# Patient Record
Sex: MALE | Race: WHITE | NOT HISPANIC OR LATINO | URBAN - METROPOLITAN AREA
[De-identification: names, ages, dates, MRNs, and addresses within clinical notes are randomized per-mention and may not be internally consistent; named-entity substitution may affect disease eponyms.]

---

## 2017-08-28 ENCOUNTER — INPATIENT (INPATIENT)
Facility: HOSPITAL | Age: 82
LOS: 2 days | Discharge: HOME | DRG: 291 | End: 2017-08-31
Attending: INTERNAL MEDICINE | Admitting: INTERNAL MEDICINE
Payer: MEDICARE

## 2017-08-28 VITALS
TEMPERATURE: 97 F | DIASTOLIC BLOOD PRESSURE: 68 MMHG | HEART RATE: 69 BPM | SYSTOLIC BLOOD PRESSURE: 150 MMHG | OXYGEN SATURATION: 97 % | RESPIRATION RATE: 18 BRPM

## 2017-08-28 DIAGNOSIS — E78.5 HYPERLIPIDEMIA, UNSPECIFIED: ICD-10-CM

## 2017-08-28 DIAGNOSIS — Z29.9 ENCOUNTER FOR PROPHYLACTIC MEASURES, UNSPECIFIED: ICD-10-CM

## 2017-08-28 DIAGNOSIS — R06.02 SHORTNESS OF BREATH: ICD-10-CM

## 2017-08-28 DIAGNOSIS — E11.9 TYPE 2 DIABETES MELLITUS WITHOUT COMPLICATIONS: ICD-10-CM

## 2017-08-28 DIAGNOSIS — I10 ESSENTIAL (PRIMARY) HYPERTENSION: ICD-10-CM

## 2017-08-28 DIAGNOSIS — R06.00 DYSPNEA, UNSPECIFIED: ICD-10-CM

## 2017-08-28 DIAGNOSIS — N18.3 CHRONIC KIDNEY DISEASE, STAGE 3 (MODERATE): ICD-10-CM

## 2017-08-28 LAB
ALBUMIN SERPL ELPH-MCNC: 4.1 G/DL — SIGNIFICANT CHANGE UP (ref 3.3–5)
ALP SERPL-CCNC: 144 U/L — HIGH (ref 40–120)
ALT FLD-CCNC: 33 U/L RC — SIGNIFICANT CHANGE UP (ref 10–45)
ANION GAP SERPL CALC-SCNC: 13 MMOL/L — SIGNIFICANT CHANGE UP (ref 5–17)
APTT BLD: 32 SEC — SIGNIFICANT CHANGE UP (ref 27.5–37.4)
AST SERPL-CCNC: 33 U/L — SIGNIFICANT CHANGE UP (ref 10–40)
BILIRUB SERPL-MCNC: 0.4 MG/DL — SIGNIFICANT CHANGE UP (ref 0.2–1.2)
BUN SERPL-MCNC: 31 MG/DL — HIGH (ref 7–23)
CALCIUM SERPL-MCNC: 9.5 MG/DL — SIGNIFICANT CHANGE UP (ref 8.4–10.5)
CHLORIDE SERPL-SCNC: 105 MMOL/L — SIGNIFICANT CHANGE UP (ref 96–108)
CK MB BLD-MCNC: 7 % — HIGH (ref 0–3.5)
CK MB CFR SERPL CALC: 9.8 NG/ML — HIGH (ref 0–6.7)
CK SERPL-CCNC: 124 U/L — SIGNIFICANT CHANGE UP (ref 30–200)
CK SERPL-CCNC: 141 U/L — SIGNIFICANT CHANGE UP (ref 30–200)
CO2 SERPL-SCNC: 22 MMOL/L — SIGNIFICANT CHANGE UP (ref 22–31)
CREAT SERPL-MCNC: 1.58 MG/DL — HIGH (ref 0.5–1.3)
GLUCOSE SERPL-MCNC: 143 MG/DL — HIGH (ref 70–99)
HCT VFR BLD CALC: 42.4 % — SIGNIFICANT CHANGE UP (ref 39–50)
HGB BLD-MCNC: 13.3 G/DL — SIGNIFICANT CHANGE UP (ref 13–17)
INR BLD: 1.12 RATIO — SIGNIFICANT CHANGE UP (ref 0.88–1.16)
MCHC RBC-ENTMCNC: 29.1 PG — SIGNIFICANT CHANGE UP (ref 27–34)
MCHC RBC-ENTMCNC: 31.4 GM/DL — LOW (ref 32–36)
MCV RBC AUTO: 92.6 FL — SIGNIFICANT CHANGE UP (ref 80–100)
NT-PROBNP SERPL-SCNC: 7449 PG/ML — HIGH (ref 0–300)
PLATELET # BLD AUTO: 136 K/UL — LOW (ref 150–400)
POTASSIUM SERPL-MCNC: 5 MMOL/L — SIGNIFICANT CHANGE UP (ref 3.5–5.3)
POTASSIUM SERPL-SCNC: 5 MMOL/L — SIGNIFICANT CHANGE UP (ref 3.5–5.3)
PROT SERPL-MCNC: 7.1 G/DL — SIGNIFICANT CHANGE UP (ref 6–8.3)
PROTHROM AB SERPL-ACNC: 12.2 SEC — SIGNIFICANT CHANGE UP (ref 9.8–12.7)
RBC # BLD: 4.58 M/UL — SIGNIFICANT CHANGE UP (ref 4.2–5.8)
RBC # FLD: 13.1 % — SIGNIFICANT CHANGE UP (ref 10.3–14.5)
SODIUM SERPL-SCNC: 140 MMOL/L — SIGNIFICANT CHANGE UP (ref 135–145)
TROPONIN T SERPL-MCNC: 0.08 NG/ML — HIGH (ref 0–0.06)
WBC # BLD: 6.7 K/UL — SIGNIFICANT CHANGE UP (ref 3.8–10.5)
WBC # FLD AUTO: 6.7 K/UL — SIGNIFICANT CHANGE UP (ref 3.8–10.5)

## 2017-08-28 PROCEDURE — 71250 CT THORAX DX C-: CPT | Mod: 26

## 2017-08-28 PROCEDURE — 99284 EMERGENCY DEPT VISIT MOD MDM: CPT | Mod: GC

## 2017-08-28 PROCEDURE — 71020: CPT | Mod: 26

## 2017-08-28 PROCEDURE — 99223 1ST HOSP IP/OBS HIGH 75: CPT

## 2017-08-28 RX ORDER — HEPARIN SODIUM 5000 [USP'U]/ML
5000 INJECTION INTRAVENOUS; SUBCUTANEOUS EVERY 12 HOURS
Qty: 0 | Refills: 0 | Status: DISCONTINUED | OUTPATIENT
Start: 2017-08-28 | End: 2017-08-31

## 2017-08-28 RX ORDER — FUROSEMIDE 40 MG
40 TABLET ORAL ONCE
Qty: 0 | Refills: 0 | Status: COMPLETED | OUTPATIENT
Start: 2017-08-28 | End: 2017-08-28

## 2017-08-28 RX ORDER — INSULIN LISPRO 100/ML
VIAL (ML) SUBCUTANEOUS AT BEDTIME
Qty: 0 | Refills: 0 | Status: DISCONTINUED | OUTPATIENT
Start: 2017-08-28 | End: 2017-08-31

## 2017-08-28 RX ORDER — ASPIRIN/CALCIUM CARB/MAGNESIUM 324 MG
81 TABLET ORAL DAILY
Qty: 0 | Refills: 0 | Status: DISCONTINUED | OUTPATIENT
Start: 2017-08-28 | End: 2017-08-31

## 2017-08-28 RX ORDER — INSULIN LISPRO 100/ML
VIAL (ML) SUBCUTANEOUS
Qty: 0 | Refills: 0 | Status: DISCONTINUED | OUTPATIENT
Start: 2017-08-28 | End: 2017-08-31

## 2017-08-28 RX ORDER — LOSARTAN POTASSIUM 100 MG/1
50 TABLET, FILM COATED ORAL DAILY
Qty: 0 | Refills: 0 | Status: DISCONTINUED | OUTPATIENT
Start: 2017-08-28 | End: 2017-08-31

## 2017-08-28 RX ORDER — ASPIRIN/CALCIUM CARB/MAGNESIUM 324 MG
324 TABLET ORAL DAILY
Qty: 0 | Refills: 0 | Status: DISCONTINUED | OUTPATIENT
Start: 2017-08-28 | End: 2017-08-28

## 2017-08-28 RX ORDER — DEXTROSE 50 % IN WATER 50 %
25 SYRINGE (ML) INTRAVENOUS ONCE
Qty: 0 | Refills: 0 | Status: DISCONTINUED | OUTPATIENT
Start: 2017-08-28 | End: 2017-08-31

## 2017-08-28 RX ADMIN — Medication 40 MILLIGRAM(S): at 20:16

## 2017-08-28 RX ADMIN — Medication 324 MILLIGRAM(S): at 17:48

## 2017-08-28 NOTE — ED PROCEDURE NOTE - GENERAL PROCEDURE NAME
POCUS: Emergency Department Focused Ultrasound performed at patient's bedside.  The complete report will be available in PACS

## 2017-08-28 NOTE — ED PROVIDER NOTE - ATTENDING CONTRIBUTION TO CARE
Patient with shortness of breath 2 weeks. persistent and worsening. Patient has had increased swelling of the lower extremities as well. No n/v/d/c. No chest pain. Patient is shortness of breath with exertion  ncat, alert, cooperative, mmm, normal conjunctiva, trachea midline, decreased breath sounds on the right lung, non-tachycardic, non-tachypneic, mild dyspnea with talking and speaking in 4 word sentences, + 2-3 lower extremity edema, mild erythema to bilateral lower extremities   will get iv, labs, ultrasound TTE, diuresis, patient without pleuritic chest pain or symptoms of PE, Large right pleural effusion and EF of 25-30% are the etiology of his shortness of breath as this time, if patient is optimized and presents symptoms of PE other than acute CHF exacerbation recommend perusing CTA pa or V/Q scan with inpatient team.

## 2017-08-28 NOTE — ED PROVIDER NOTE - NS ED ROS FT
General: No fevers, chills  HEENT: No headaches, dysphagia, changes in vision, hoarseness, nasal congestion  CVS: No chest pain, palpitations   Resp: + shortness of breath, + sputum production, no wheezing   GI: No abdominal pain, nausea, vomiting, changes in bowel habits  Renal: No dysuria  Ext: +edema, no claudication

## 2017-08-28 NOTE — ED PROVIDER NOTE - MEDICAL DECISION MAKING DETAILS
88 yo m w hx of hemoptysis, HTN, HLD presenting with worsening dyspnea concerning for acute decompensated heart failure vs pneumonia vs pulmonary embolism   - bedside TTE, chest xray  CBC, CMP, pro BNP, cardiac enzymes   Diuretics if creatinine ok  low suspicion for PE, will monitor for now, would consider CTA if no improvement with HF treatment

## 2017-08-28 NOTE — H&P ADULT - ASSESSMENT
89 yo M with PMH of HTN, HLD, DM2, CKD presenting with worsening dyspnea concerning for CHF. Pt acutely volume overloaded on exam, noted elevated BNP, as well as congestion on CXR all concerning for new diagnosis of CHF.  Differential also including infectious etiology like pneumonia vs viral URI, however given chronicity less likely. Low suspicion for PE at this time.  Admission to rule out CHF and treat volume overload with plan below.

## 2017-08-28 NOTE — H&P ADULT - RESPIRATORY COMMENTS
normal respiratory effort but appears incresed after speaking in full sentences, bilateral crackles appreciated at bases, coarse breath sounds diffusely. no wheezing.

## 2017-08-28 NOTE — ED ADULT NURSE NOTE - OBJECTIVE STATEMENT
88 y/o Male c/o worsening dyspnea at rest. Patient has baseline dyspnea. Patient was in [place] a week ago and his symptoms started when he returned . Patient c/o cough this past week,  with worsening hemoptysis as well as yellow sputum production. Patient denies  chest pain, nasal congestion, sore throat, fevers, chills. 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  (+) SOB, (+) sputum production,  (+) leg edema.  Skin warm and dry.

## 2017-08-28 NOTE — H&P ADULT - PROBLEM SELECTOR PLAN 3
hold oral agents inpatient. check a1c, place on sliding scale.  will likely need basal insulin. will assess with fingersticks, a1c

## 2017-08-28 NOTE — H&P ADULT - PROBLEM SELECTOR PLAN 1
concerning for new diagnosis CHF vs infectious etiologies. Volume overload on exam, elevated pro-BNP, CXR with congestion and questionable RLL opacity. Noted elevated cardiac enzymes without complaints of CP.  -monitor on telemetry  -trend cardiac enzymes (ED discussed with Dr. Bell, low suspicion for ACS at this time)  -give lasix 40mg IVP x 1   -strict I&Os, daily weights  -check official TTE  -check CT chest to better assess for underlying pneumonia (pt with cough, but no fever or leukocytosis)  -PT consult

## 2017-08-28 NOTE — ED PROVIDER NOTE - OBJECTIVE STATEMENT
88 yo M w hx of HTN, HLD, DM, chronic hemoptysis likely relating to prior work experience presenting with worsening dyspnea at rest. Patient was in [place] a week prior to presentation and his symptoms started on his return. Patient has baseline dyspnea, but for the past week has been having cough, worsening hemoptysis as well as yellow sputum production. Patient denies any chest pain, nasal congestion, sore throat, fevers, chills. 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 Patient does not have known hx of heart failure.     Per his pulmonologist, Dr. Red, patient was referred to ED due to concern of worsening sputum production secondary to heart failure versus pneumonia.

## 2017-08-28 NOTE — ED PROVIDER NOTE - PROGRESS NOTE DETAILS
Discussed with Dr. Bell (cardiology) will trend enzymes, no heparin gtt at this time. Will reassess after repeat enzymes.

## 2017-08-28 NOTE — H&P ADULT - EXTREMITIES COMMENTS
2-3+ pitting edema bilaterally, mild erythema of shins bilaterally, no increased warmth or TTP  oncyhomycoses of toes

## 2017-08-28 NOTE — H&P ADULT - NEGATIVE GASTROINTESTINAL SYMPTOMS
no melena/no diarrhea/no vomiting/no constipation/no abdominal pain/no hematochezia/no change in bowel habits/no nausea

## 2017-08-28 NOTE — ED PROVIDER NOTE - PHYSICAL EXAMINATION
General: NAD   HEENT: EOMI, nl oropharynx   CVS: regular rate/rhythm, no murmurs  Pulm: bilateral coarse expiratory rhonchi in upper lung fields, crackles at the bases   GI: nl bs, soft, nt, nd   ext: 3+bilateral pitting edema

## 2017-08-28 NOTE — ED ADULT NURSE NOTE - PMH
HLD (hyperlipidemia)    HTN (hypertension)    Type 2 diabetes mellitus with retinopathy, macular edema presence unspecified, unspecified long term insulin use status, unspecified retinopathy severity

## 2017-08-28 NOTE — H&P ADULT - NSHPLABSRESULTS_GEN_ALL_CORE
CXR reviewed by me: pulm vascular congestion, right base atelectasis vs consolidation    EKG reviewed by me: normal sinus rhythm 66, q wave III, TWI in I , AVL, QTc 459    labs  BNP 7449  trop 0.08    CKMB 9.8

## 2017-08-28 NOTE — H&P ADULT - NSHPSOCIALHISTORY_GEN_ALL_CORE
lives with spouse  drinks alcohol 2-3 times a week, liquor   smoked for 1 year over 70 years ago  works in [employer]   able to carry out ADLs

## 2017-08-28 NOTE — H&P ADULT - HISTORY OF PRESENT ILLNESS
88 yo M with PMH of HTN, HLD, DM2, CKD presenting with worsening dyspnea. Per patient and his family at bedside, he has been experiencing worsening breathing problems over the past couple of months, acutely worsening over the past week. Pt has noticed worsening dyspnea to the extent that he becomes short of breath after 10-15 steps or after carrying out a long conversation. Other associated symptoms include worsening "barking, wet" cough, LE swelling, increased abdominal girth, 3 pillow orthopnea all occurring over the past several months (possibly longer). Pt was in [place] for a week of vacation (returned 1 week ago) and during this time noticed his symptoms getting worse. He notes that his cough is now associated with "yellow" sputum. Pt saw his pulmonologist today for evaluation who took a CXR and subsequently advised pt to come into the ED for evaluation. Of note, pt was last hospitalzied at [facility] last november for pneumonia and hemoptysis. pt was treated without complications. 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 hemoptysis subsequently resolved after change of work environment.    Denies fever/chills, chest pain, palpitations, sore throat, nasal congestion, sick contacts, abdominal pain, nausea/vomiting, diarrhea, constipation, hemoptysis.     VS in ED  /68, P 69, R 18, O2 97%, T 97.4

## 2017-08-28 NOTE — H&P ADULT - PROBLEM SELECTOR PLAN 2
plan as above  -telemetry  -TTE  -trend cardiac enzymes  -lasix 40mg IVP x 1  -monitor I&Os, daily weights.

## 2017-08-29 LAB
ANION GAP SERPL CALC-SCNC: 12 MMOL/L — SIGNIFICANT CHANGE UP (ref 5–17)
BUN SERPL-MCNC: 28 MG/DL — HIGH (ref 7–23)
CALCIUM SERPL-MCNC: 9.4 MG/DL — SIGNIFICANT CHANGE UP (ref 8.4–10.5)
CHLORIDE SERPL-SCNC: 103 MMOL/L — SIGNIFICANT CHANGE UP (ref 96–108)
CK MB BLD-MCNC: 7.2 % — HIGH (ref 0–3.5)
CK MB CFR SERPL CALC: 8.9 NG/ML — HIGH (ref 0–6.7)
CO2 SERPL-SCNC: 24 MMOL/L — SIGNIFICANT CHANGE UP (ref 22–31)
CREAT SERPL-MCNC: 1.57 MG/DL — HIGH (ref 0.5–1.3)
GLUCOSE SERPL-MCNC: 139 MG/DL — HIGH (ref 70–99)
HBA1C BLD-MCNC: 8.5 % — HIGH (ref 4–5.6)
HCT VFR BLD CALC: 42.5 % — SIGNIFICANT CHANGE UP (ref 39–50)
HGB BLD-MCNC: 13.2 G/DL — SIGNIFICANT CHANGE UP (ref 13–17)
MCHC RBC-ENTMCNC: 27.3 PG — SIGNIFICANT CHANGE UP (ref 27–34)
MCHC RBC-ENTMCNC: 31.1 GM/DL — LOW (ref 32–36)
MCV RBC AUTO: 87.8 FL — SIGNIFICANT CHANGE UP (ref 80–100)
PLATELET # BLD AUTO: 169 K/UL — SIGNIFICANT CHANGE UP (ref 150–400)
POTASSIUM SERPL-MCNC: 4.8 MMOL/L — SIGNIFICANT CHANGE UP (ref 3.5–5.3)
POTASSIUM SERPL-SCNC: 4.8 MMOL/L — SIGNIFICANT CHANGE UP (ref 3.5–5.3)
RBC # BLD: 4.84 M/UL — SIGNIFICANT CHANGE UP (ref 4.2–5.8)
RBC # FLD: 14.9 % — HIGH (ref 10.3–14.5)
SODIUM SERPL-SCNC: 139 MMOL/L — SIGNIFICANT CHANGE UP (ref 135–145)
TROPONIN T SERPL-MCNC: 0.1 NG/ML — HIGH (ref 0–0.06)
WBC # BLD: 6.6 K/UL — SIGNIFICANT CHANGE UP (ref 3.8–10.5)
WBC # FLD AUTO: 6.6 K/UL — SIGNIFICANT CHANGE UP (ref 3.8–10.5)

## 2017-08-29 PROCEDURE — 93308 TTE F-UP OR LMTD: CPT | Mod: 26

## 2017-08-29 RX ORDER — FUROSEMIDE 40 MG
40 TABLET ORAL DAILY
Qty: 0 | Refills: 0 | Status: DISCONTINUED | OUTPATIENT
Start: 2017-08-29 | End: 2017-08-31

## 2017-08-29 RX ADMIN — HEPARIN SODIUM 5000 UNIT(S): 5000 INJECTION INTRAVENOUS; SUBCUTANEOUS at 17:33

## 2017-08-29 RX ADMIN — Medication 40 MILLIGRAM(S): at 11:27

## 2017-08-29 RX ADMIN — HEPARIN SODIUM 5000 UNIT(S): 5000 INJECTION INTRAVENOUS; SUBCUTANEOUS at 05:36

## 2017-08-29 RX ADMIN — Medication 4: at 12:20

## 2017-08-29 RX ADMIN — Medication 81 MILLIGRAM(S): at 11:27

## 2017-08-29 RX ADMIN — LOSARTAN POTASSIUM 50 MILLIGRAM(S): 100 TABLET, FILM COATED ORAL at 05:36

## 2017-08-29 NOTE — PROGRESS NOTE ADULT - SUBJECTIVE AND OBJECTIVE BOX
Patient is a 89y old  Male who presents with a chief complaint of dyspnea (28 Aug 2017 19:52)    Subjectiev : pt feels slightly better today, cough a little improved, still has dyspnea.    ROS:   -afebrile, denies chest pain    ACTIVE MEDICATION LIST:  MEDICATIONS  (STANDING):  heparin  Injectable 5000 Unit(s) SubCutaneous every 12 hours  insulin lispro (HumaLOG) corrective regimen sliding scale   SubCutaneous three times a day before meals  insulin lispro (HumaLOG) corrective regimen sliding scale   SubCutaneous at bedtime  dextrose 50% Injectable 25 Gram(s) IV Push once  aspirin  chewable 81 milliGRAM(s) Oral daily  losartan 50 milliGRAM(s) Oral daily  furosemide   Injectable 40 milliGRAM(s) IV Push daily    MEDICATIONS  (PRN):      EXAM:  Vital Signs Last 24 Hrs  T(C): 36.4 (29 Aug 2017 04:32), Max: 37.1 (28 Aug 2017 20:53)  T(F): 97.6 (29 Aug 2017 04:32), Max: 98.7 (28 Aug 2017 20:53)  HR: 73 (29 Aug 2017 11:25) (65 - 82)  BP: 153/77 (29 Aug 2017 11:25) (147/72 - 161/79)  BP(mean): --  RR: 16 (29 Aug 2017 04:32) (16 - 18)  SpO2: 99% (29 Aug 2017 04:32) (94% - 99%)    GENERAL: The patient is awake and alert in no apparent distress.     LUNGS: decreased at bases     HEART: Regular rate and rhythm without murmur.    LABS/IMAGING: reviewed    CT chest: moderate right pleural effusion with underlying atelectasis.

## 2017-08-29 NOTE — PROGRESS NOTE ADULT - SUBJECTIVE AND OBJECTIVE BOX
PULMONARY PROGRESS NOTE    JODY WILKINS  [id number]    Patient is a 89y old  Male who presents with a chief complaint of dyspnea (28 Aug 2017 19:52)      HPI:  -feels slightly better today, cough a little improved, still has dyspnea.    ROS:   -afebrile, denies chest pain    ACTIVE MEDICATION LIST:  MEDICATIONS  (STANDING):  heparin  Injectable 5000 Unit(s) SubCutaneous every 12 hours  insulin lispro (HumaLOG) corrective regimen sliding scale   SubCutaneous three times a day before meals  insulin lispro (HumaLOG) corrective regimen sliding scale   SubCutaneous at bedtime  dextrose 50% Injectable 25 Gram(s) IV Push once  aspirin  chewable 81 milliGRAM(s) Oral daily  losartan 50 milliGRAM(s) Oral daily  furosemide   Injectable 40 milliGRAM(s) IV Push daily    MEDICATIONS  (PRN):      EXAM:  Vital Signs Last 24 Hrs  T(C): 36.4 (29 Aug 2017 04:32), Max: 37.1 (28 Aug 2017 20:53)  T(F): 97.6 (29 Aug 2017 04:32), Max: 98.7 (28 Aug 2017 20:53)  HR: 73 (29 Aug 2017 11:25) (65 - 82)  BP: 153/77 (29 Aug 2017 11:25) (147/72 - 161/79)  BP(mean): --  RR: 16 (29 Aug 2017 04:32) (16 - 18)  SpO2: 99% (29 Aug 2017 04:32) (94% - 99%)    GENERAL: The patient is awake and alert in no apparent distress.     LUNGS: decreased at bases     HEART: Regular rate and rhythm without murmur.    LABS/IMAGING: reviewed    CT chest: moderate right pleural effusion with underlying atelectasis.      PROBLEM LIST:  89y Male with HEALTH ISSUES - PROBLEM Dx:  Pleural effusion  dyspnea/cough  HLD (hyperlipidemia)  CKD (chronic kidney disease), stage III  HTN (hypertension)  DM2 (diabetes mellitus, type 2)    RECS:  -Pleural effusion: will set up thoracentesis for tomorrow am, patient and family in agreement  -appreciate cardiology follow up, diuresis, echo    Meche Wagner MD   [phone]

## 2017-08-29 NOTE — PROGRESS NOTE ADULT - SUBJECTIVE AND OBJECTIVE BOX
FRANKY JODY    Patient is a 89y old  Male who presents with a chief complaint of dyspnea,increased SORTO,No CP.patient with H/O HTN,DM,hyperlipidemia,bronchiectasis, and CRI.Now with right pleural effusion,l basilar rales and edema.    Allergies    No Known Allergies    Intolerances      MEDICATIONS  (STANDING):  heparin  Injectable 5000 Unit(s) SubCutaneous every 12 hours  insulin lispro (HumaLOG) corrective regimen sliding scale   SubCutaneous three times a day before meals  insulin lispro (HumaLOG) corrective regimen sliding scale   SubCutaneous at bedtime  dextrose 50% Injectable 25 Gram(s) IV Push once  aspirin  chewable 81 milliGRAM(s) Oral daily  losartan 50 milliGRAM(s) Oral daily    MEDICATIONS  (PRN):      ROS:  Positive:SOB,SORTO    General: Denies weight loss, fevers, rash, decreased hearing  Cardiac: Denies chest pain,orthopnea, PND, claudication, edema, snoring, daytime somnolence, palpitations, syncope  Resp: Denies cough, sputum, wheezing, hemoptysis  GI: Denies change in bowel habits, diarrhea, weight loss, melena, tarry stools,   nausea, vomiting, jaundice, abdominal pain, dysphagia  : Denies dysuria, nocturia, hematuria  Neuro: Denies tinnitus, headache, visual changes, weakness, dizziness or vertigo  Musculoskeletal: Denies neck pain back pain joint pain.  Skin: Denies rash, itching, dryness.  Endocrine: Denies polydipsia, polyuria  Psychiatric: Denies depression, anxiety      PHYSICAL EXAM:  Vital Signs Last 24 Hrs  T(C): 36.4 (29 Aug 2017 04:32), Max: 37.1 (28 Aug 2017 20:53)  T(F): 97.6 (29 Aug 2017 04:32), Max: 98.7 (28 Aug 2017 20:53)  HR: 65 (29 Aug 2017 04:32) (65 - 82)  BP: 147/72 (29 Aug 2017 04:32) (147/72 - 161/79)  BP(mean): --  RR: 16 (29 Aug 2017 04:32) (16 - 18)  SpO2: 99% (29 Aug 2017 04:32) (94% - 99%)  Daily Height in cm: 170.18 (28 Aug 2017 18:15)    Daily   I&O's Summary    28 Aug 2017 07:01  -  29 Aug 2017 06:08  --------------------------------------------------------  IN: 460 mL / OUT: 0 mL / NET: 460 mL        General Appearance: 	 Alert, cooperative, no distress  HEENT: normocephalic, atraumatic, PERRLA, EOMI, conjunctiva normal, sclera anicteric,   Neck: no JVD,  carotid 2+  bilaterally without bruits, thyroid normal to inspection and palpation, no adenopathy, trachea midline  Lungs:  Decreased BS right base,left basilar rales  Cor:  pmi 5th ICS MCL, regular rate and rhythm, S1 normal intensity, S2 normal intensity, no gallops, murmurs or rubs  Abdomen:	 soft, non-tender; bowel sounds normal; no masses,  no organomegaly  Extremities: without cyanosis, clubbing,1+ edema  Vasc: 2-+ PT and DP pulses; no varicosities  Neurologic: A&O x 3 (time, place, person). Symmetric strength; limited exam  Musculoskeletal: no kyphosis, scoliosis; normal gait, normal tone  Skin: no rashes; limited exam    EKG:NSD,LAD,J piont elevation V1-V3,ST/T changes 1,AVL,V5V6.      Labs:  CBC Full  -  ( 28 Aug 2017 16:20 )  WBC Count : 6.7 K/uL  Hemoglobin : 13.3 g/dL  Hematocrit : 42.4 %  Platelet Count - Automated : 136 K/uL  Mean Cell Volume : 92.6 fl  Mean Cell Hemoglobin : 29.1 pg  Mean Cell Hemoglobin Concentration : 31.4 gm/dL  Auto Neutrophil # : x  Auto Lymphocyte # : x  Auto Monocyte # : x  Auto Eosinophil # : x  Auto Basophil # : x  Auto Neutrophil % : x  Auto Lymphocyte % : x  Auto Monocyte % : x  Auto Eosinophil % : x  Auto Basophil % : x      CARDIAC MARKERS ( 28 Aug 2017 22:56 )  x     / 0.10 ng/mL / 124 U/L / x     / 8.9 ng/mL  CARDIAC MARKERS ( 28 Aug 2017 16:20 )  x     / 0.08 ng/mL / 141 U/L / x     / 9.8 ng/mL      PT/INR - ( 28 Aug 2017 16:20 )   PT: 12.2 sec;   INR: 1.12 ratio         PTT - ( 28 Aug 2017 16:20 )  PTT:32.0 se      Impression/Plan:Patient with h/o DM,CRI,HTN,hyperlipidemia,bronchiectasis presents with SORTO,SOB and large right pleural effusions.No CP,mild elevation of CPK MB and troponins in setting of CRI.Wouldcheck complete Echo to evaluate LV FX,if significantly decreased may need cardiac Cath.Check CT of Chest with pulmonary consult.Continue Cozaar,ASA.Would give IV Lasix 40 QD X few days.SQ heparin,OOB.Insulin for DM control.        Oneil Bell MD, FACC  [facility]

## 2017-08-29 NOTE — CONSULT NOTE ADULT - SUBJECTIVE AND OBJECTIVE BOX
CHIEF COMPLAINT:   89 yo M with PMH of HTN, HLD, DM2, CKD presenting with worsening dyspnea. Per patient and his family at bedside, he has been experiencing worsening breathing problems over the past couple of months, acutely worsening over the past week. Pt has noticed worsening dyspnea to the extent that he becomes short of breath after 10-15 steps or after carrying out a long conversation. Other associated symptoms include worsening "barking, wet" cough, LE swelling, increased abdominal girth, 3 pillow orthopnea all occurring over the past several months (possibly longer). Pt was in [place] for a week of vacation (returned 1 week ago) and during this time noticed his symptoms getting worse. He notes that his cough is now associated with "yellow" sputum. Pt saw his pulmonologist today for evaluation who took a CXR and subsequently advised pt to come into the ED for evaluation. Of note, pt was last hospitalzied at [facility] last november for pneumonia and hemoptysis. pt was treated without complications. 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 hemoptysis subsequently resolved after change of work environment.  No history of cardiac disease as per patient, denies any history of MI, no chest pains on exertion    HISTORY OF PRESENT ILLNESS:    PAST MEDICAL & SURGICAL HISTORY:  Type 2 diabetes mellitus with retinopathy, macular edema presence unspecified, unspecified long term insulin use status, unspecified retinopathy severity  HLD (hyperlipidemia)  HTN (hypertension)  No significant past surgical history    MEDICATIONS:  heparin  Injectable 5000 Unit(s) SubCutaneous every 12 hours  aspirin  chewable 81 milliGRAM(s) Oral daily  losartan 50 milliGRAM(s) Oral daily  furosemide   Injectable 40 milliGRAM(s) IV Push daily  insulin lispro (HumaLOG) corrective regimen sliding scale   SubCutaneous three times a day before meals  insulin lispro (HumaLOG) corrective regimen sliding scale   SubCutaneous at bedtime  dextrose 50% Injectable 25 Gram(s) IV Push once    Allergies    No Known Allergies    Intolerances      PHYSICAL EXAM:  T(C): 36.4 (08-29-17 @ 04:32), Max: 37.1 (08-28-17 @ 20:53)  HR: 73 (08-29-17 @ 11:25) (65 - 82)  BP: 153/77 (08-29-17 @ 11:25) (147/72 - 161/79)  RR: 16 (08-29-17 @ 04:32) (16 - 18)  SpO2: 99% (08-29-17 @ 04:32) (94% - 99%)  Wt(kg): --  I&O's Summary    28 Aug 2017 07:01  -  29 Aug 2017 07:00  --------------------------------------------------------  IN: 460 mL / OUT: 0 mL / NET: 460 mL        Appearance: Normal	  HEENT:   Normal oral mucosa, PERRL, EOMI	  Cardiovascular: Normal S1 S2, No JVD, No murmurs, No edema  Respiratory: Lungs clear to auscultation	  Gastrointestinal:  Soft, Non-tender, + BS	  Extremities: 2+ edema b/l                          13.2   6.60  )-----------( 169      ( 29 Aug 2017 07:27 )             42.5     08-29    139  |  103  |  28<H>  ----------------------------<  139<H>  4.8   |  24  |  1.57<H>    Ca    9.4      29 Aug 2017 08:51    TPro  7.1  /  Alb  4.1  /  TBili  0.4  /  DBili  x   /  AST  33  /  ALT  33  /  AlkPhos  144<H>  08-28    proBNP: Serum Pro-Brain Natriuretic Peptide: 7449 pg/mL (08-28 @ 16:20)    Lipid Profile:   HgA1c: Hemoglobin A1C, Whole Blood: 8.5 % (08-29 @ 07:27)    TSH:     ASSESSMENT/PLAN:

## 2017-08-29 NOTE — CONSULT NOTE ADULT - ASSESSMENT
EKG - NSR T wave inversion 1 avl   Echo in ER - EF 25 - 30%  Official echo - pending     a/p     1) Acute Systolic CHF exacerbation - continue diuresis with IV lasix, pt is getting symptomatic relief, f/u CT chest result    2) Right sided pleural effusion - for thoracentesis tomorrow     3) DVT prophylasix - heparin SC

## 2017-08-30 ENCOUNTER — RESULT REVIEW (OUTPATIENT)
Age: 82
End: 2017-08-30

## 2017-08-30 LAB
ANION GAP SERPL CALC-SCNC: 16 MMOL/L — SIGNIFICANT CHANGE UP (ref 5–17)
B PERT IGG+IGM PNL SER: CLEAR — SIGNIFICANT CHANGE UP
BASOPHILS # BLD AUTO: 0.04 K/UL — SIGNIFICANT CHANGE UP (ref 0–0.2)
BASOPHILS NFR BLD AUTO: 0.6 % — SIGNIFICANT CHANGE UP (ref 0–2)
BUN SERPL-MCNC: 33 MG/DL — HIGH (ref 7–23)
CALCIUM SERPL-MCNC: 8.8 MG/DL — SIGNIFICANT CHANGE UP (ref 8.4–10.5)
CHLORIDE SERPL-SCNC: 98 MMOL/L — SIGNIFICANT CHANGE UP (ref 96–108)
CO2 SERPL-SCNC: 24 MMOL/L — SIGNIFICANT CHANGE UP (ref 22–31)
COLOR FLD: YELLOW — SIGNIFICANT CHANGE UP
CREAT SERPL-MCNC: 1.65 MG/DL — HIGH (ref 0.5–1.3)
EOSINOPHIL # BLD AUTO: 0.29 K/UL — SIGNIFICANT CHANGE UP (ref 0–0.5)
EOSINOPHIL NFR BLD AUTO: 4.6 % — SIGNIFICANT CHANGE UP (ref 0–6)
FLUID INTAKE SUBSTANCE CLASS: SIGNIFICANT CHANGE UP
FLUID SEGMENTED GRANULOCYTES: 15 % — SIGNIFICANT CHANGE UP
FOLATE+VIT B12 SERBLD-IMP: 4 % — SIGNIFICANT CHANGE UP
GLUCOSE FLD-MCNC: 194 MG/DL — SIGNIFICANT CHANGE UP
GLUCOSE SERPL-MCNC: 105 MG/DL — HIGH (ref 70–99)
GRAM STN FLD: SIGNIFICANT CHANGE UP
HCT VFR BLD CALC: 40.7 % — SIGNIFICANT CHANGE UP (ref 39–50)
HGB BLD-MCNC: 13 G/DL — SIGNIFICANT CHANGE UP (ref 13–17)
IMM GRANULOCYTES NFR BLD AUTO: 0.3 % — SIGNIFICANT CHANGE UP (ref 0–1.5)
LDH SERPL L TO P-CCNC: 87 U/L — SIGNIFICANT CHANGE UP
LYMPHOCYTES # BLD AUTO: 1.54 K/UL — SIGNIFICANT CHANGE UP (ref 1–3.3)
LYMPHOCYTES # BLD AUTO: 24.6 % — SIGNIFICANT CHANGE UP (ref 13–44)
LYMPHOCYTES # FLD: 53 % — SIGNIFICANT CHANGE UP
MCHC RBC-ENTMCNC: 27.2 PG — SIGNIFICANT CHANGE UP (ref 27–34)
MCHC RBC-ENTMCNC: 31.9 GM/DL — LOW (ref 32–36)
MCV RBC AUTO: 85.1 FL — SIGNIFICANT CHANGE UP (ref 80–100)
MESOTHL CELL # FLD: 1 % — SIGNIFICANT CHANGE UP
MONOCYTES # BLD AUTO: 0.68 K/UL — SIGNIFICANT CHANGE UP (ref 0–0.9)
MONOCYTES NFR BLD AUTO: 10.9 % — SIGNIFICANT CHANGE UP (ref 2–14)
MONOS+MACROS # FLD: 27 % — SIGNIFICANT CHANGE UP
NEUTROPHILS # BLD AUTO: 3.69 K/UL — SIGNIFICANT CHANGE UP (ref 1.8–7.4)
NEUTROPHILS NFR BLD AUTO: 59 % — SIGNIFICANT CHANGE UP (ref 43–77)
NIGHT BLUE STAIN TISS: SIGNIFICANT CHANGE UP
PH FLD: 7.5 — SIGNIFICANT CHANGE UP
PLATELET # BLD AUTO: 182 K/UL — SIGNIFICANT CHANGE UP (ref 150–400)
POTASSIUM SERPL-MCNC: 5 MMOL/L — SIGNIFICANT CHANGE UP (ref 3.5–5.3)
POTASSIUM SERPL-SCNC: 5 MMOL/L — SIGNIFICANT CHANGE UP (ref 3.5–5.3)
PROT FLD-MCNC: 2.5 G/DL — SIGNIFICANT CHANGE UP
RBC # BLD: 4.78 M/UL — SIGNIFICANT CHANGE UP (ref 4.2–5.8)
RBC # FLD: 14.8 % — HIGH (ref 10.3–14.5)
RCV VOL RI: 1550 /UL — HIGH (ref 0–5)
SODIUM SERPL-SCNC: 138 MMOL/L — SIGNIFICANT CHANGE UP (ref 135–145)
SPECIMEN SOURCE: SIGNIFICANT CHANGE UP
SPECIMEN SOURCE: SIGNIFICANT CHANGE UP
TOTAL NUCLEATED CELL COUNT, BODY FLUID: 288 /UL — HIGH (ref 0–5)
TUBE TYPE: SIGNIFICANT CHANGE UP
WBC # BLD: 6.26 K/UL — SIGNIFICANT CHANGE UP (ref 3.8–10.5)
WBC # FLD AUTO: 6.26 K/UL — SIGNIFICANT CHANGE UP (ref 3.8–10.5)

## 2017-08-30 PROCEDURE — 88112 CYTOPATH CELL ENHANCE TECH: CPT | Mod: 26

## 2017-08-30 PROCEDURE — 71010: CPT | Mod: 26

## 2017-08-30 PROCEDURE — 88305 TISSUE EXAM BY PATHOLOGIST: CPT | Mod: 26

## 2017-08-30 PROCEDURE — 93306 TTE W/DOPPLER COMPLETE: CPT | Mod: 26

## 2017-08-30 RX ADMIN — Medication 81 MILLIGRAM(S): at 11:57

## 2017-08-30 RX ADMIN — HEPARIN SODIUM 5000 UNIT(S): 5000 INJECTION INTRAVENOUS; SUBCUTANEOUS at 17:15

## 2017-08-30 RX ADMIN — LOSARTAN POTASSIUM 50 MILLIGRAM(S): 100 TABLET, FILM COATED ORAL at 05:11

## 2017-08-30 RX ADMIN — Medication 4: at 11:57

## 2017-08-30 RX ADMIN — Medication 2: at 17:14

## 2017-08-30 RX ADMIN — Medication 40 MILLIGRAM(S): at 05:11

## 2017-08-30 NOTE — DIETITIAN INITIAL EVALUATION ADULT. - PT NOT SOURCE
Pt with appointment about to be transferred at time of visit - limited hx attained. Family at bedside provided additional information.

## 2017-08-30 NOTE — DIETITIAN INITIAL EVALUATION ADULT. - ADHERENCE
Pt denies following modified diet PTA. Pt does not check blood sugars PTA. Pt takes oral diabetic medication PTA./n/a

## 2017-08-30 NOTE — PROCEDURE NOTE - NSPROCDETAILS_GEN_ALL_CORE
location identified, draped/prepped, sterile technique used, needle inserted/introduced/catheter inserted over needle

## 2017-08-30 NOTE — DIETITIAN INITIAL EVALUATION ADULT. - ENERGY NEEDS
ht.67inches wt.171.2pounds BMI:26.8kg/m2 IBW:148pounds(+/-10%) %IBW:116%  Pt is 88 yo M with CKD stage III, T2DM, HLD, HTN, and new onset CHF.  +1 b/l leg Edema noted  No Pressure Ulcers noted  Gin Wall MBA RDN CDN Pager [phone]

## 2017-08-30 NOTE — PROGRESS NOTE ADULT - SUBJECTIVE AND OBJECTIVE BOX
PULMONARY PROGRESS NOTE    JODY WILKINS  MRN-[id number]    Patient is a 89y old  Male who presents with a chief complaint of dyspnea (28 Aug 2017 19:52)      HPI:  no complaints this AM, some cough    ROS:   -afebrile, denies chest pain    MEDICATIONS  (STANDING):  heparin  Injectable 5000 Unit(s) SubCutaneous every 12 hours  insulin lispro (HumaLOG) corrective regimen sliding scale   SubCutaneous three times a day before meals  insulin lispro (HumaLOG) corrective regimen sliding scale   SubCutaneous at bedtime  dextrose 50% Injectable 25 Gram(s) IV Push once  aspirin  chewable 81 milliGRAM(s) Oral daily  losartan 50 milliGRAM(s) Oral daily  furosemide   Injectable 40 milliGRAM(s) IV Push daily    MEDICATIONS  (PRN):      Vital Signs Last 24 Hrs  T(C): 36.6 (30 Aug 2017 09:50), Max: 37.1 (29 Aug 2017 20:31)  T(F): 97.9 (30 Aug 2017 09:50), Max: 98.7 (29 Aug 2017 20:31)  HR: 77 (30 Aug 2017 09:50) (66 - 77)  BP: 151/71 (30 Aug 2017 09:50) (129/74 - 153/77)  BP(mean): --  RR: 18 (30 Aug 2017 09:50) (18 - 18)  SpO2: 95% (30 Aug 2017 09:50) (94% - 98%)    GENERAL: The patient is awake and alert in no apparent distress.     LUNGS: decreased at bases     HEART: S1/S2    LABS/IMAGING: reviewed    CT chest: moderate right pleural effusion with underlying atelectasis.      PROBLEM LIST:  89y Male with HEALTH ISSUES - PROBLEM Dx:  Pleural effusion  dyspnea/cough  HLD (hyperlipidemia)  CKD (chronic kidney disease), stage III  HTN (hypertension)  DM2 (diabetes mellitus, type 2)    RECS:  -Pleural effusion: s/p thora this morning, 1L yellow fluid removed, pt tolerated procedure well, follow up chemistry and cytology.  -appreciate cardiology follow up, diuresis, may need cath for decreased LV function.    Meche Wagner MD   [phone]

## 2017-08-30 NOTE — PROGRESS NOTE ADULT - ASSESSMENT
88 yo M with PMH of HTN, HLD, DM2, CKD presenting with worsening dyspnea concerning for CHF. Pt acutely volume overloaded on exam, noted elevated BNP, as well as congestion on CXR all concerning for new diagnosis of CHF.  Differential also including infectious etiology like pneumonia vs viral URI, however given chronicity less likely. Low suspicion for PE at this time.  Admission to rule out CHF and treat volume overload with plan below.

## 2017-08-30 NOTE — PROGRESS NOTE ADULT - SUBJECTIVE AND OBJECTIVE BOX
Patient is a 89y old  Male who presents with a chief complaint of dyspnea (28 Aug 2017 19:52)    Subjective/ overnight events : pt denies cp, shortness of breath,s/p thoracentesis       MEDICATIONS  (STANDING):  heparin  Injectable 5000 Unit(s) SubCutaneous every 12 hours  insulin lispro (HumaLOG) corrective regimen sliding scale   SubCutaneous three times a day before meals  insulin lispro (HumaLOG) corrective regimen sliding scale   SubCutaneous at bedtime  dextrose 50% Injectable 25 Gram(s) IV Push once  aspirin  chewable 81 milliGRAM(s) Oral daily  losartan 50 milliGRAM(s) Oral daily  furosemide   Injectable 40 milliGRAM(s) IV Push daily    MEDICATIONS  (PRN):      Vital Signs Last 24 Hrs  T(C): 36.8 (30 Aug 2017 20:29), Max: 36.8 (30 Aug 2017 20:29)  T(F): 98.3 (30 Aug 2017 20:29), Max: 98.3 (30 Aug 2017 20:29)  HR: 72 (30 Aug 2017 20:29) (67 - 77)  BP: 118/68 (30 Aug 2017 20:29) (118/68 - 151/71)  BP(mean): --  RR: 18 (30 Aug 2017 20:29) (18 - 18)  SpO2: 94% (30 Aug 2017 20:29) (94% - 96%)  Constitutional: No fever, fatigue  Skin: No rash.  Eyes: No recent vision problems or eye pain.  ENT: No congestion, ear pain, or sore throat.  Cardiovascular: No chest pain or palpation.  Respiratory: No cough, shortness of breath, congestion, or wheezing.  Gastrointestinal: No abdominal pain, nausea, vomiting, or diarrhea.  Genitourinary: No dysuria.  Musculoskeletal: No joint swelling.  Neurologic: No headache.    HEENT :PERRLA, EOMI  cvs :S1. S2  RS: dec breath sounds at bases   ABD:soft, bs+  EXT:edema+      LABS:  08-30    138  |  98  |  33<H>  ----------------------------<  105<H>  5.0   |  24  |  1.65<H>    Ca    8.8      30 Aug 2017 07:40      Creatinine Trend: 1.65 <--, 1.57 <--, 1.58 <--                        13.0   6.26  )-----------( 182      ( 30 Aug 2017 07:47 )             40.7     Urine Studies:      CARDIAC MARKERS ( 28 Aug 2017 22:56 )  x     / 0.10 ng/mL / 124 U/L / x     / 8.9 ng/mL

## 2017-08-30 NOTE — DIETITIAN INITIAL EVALUATION ADULT. - ORAL INTAKE PTA
good/Pt reports consuming 3 meals/day. Breakfast: 2 eggs. Lunch: sandwich. Dinner: pt reports eating out. Pt consumes vodka 2-3x/week. Pt's wife explained pt is a "steak and potatoes" siva with no intention of following diet once discharged. Pt works for [employer]. Pt taking MVI PTA. Pt's wife confirmed NKFA.

## 2017-08-30 NOTE — DIETITIAN INITIAL EVALUATION ADULT. - OTHER INFO
Pt seen for HbA1c of 8.5 consult. Pt about to leave for thoracentesis at time of visit - limited hx attained, additional hx attained from wife and son-in-law at bedside. Pt's wife reports UBW of 180pounds with no recent changes in weight. Per chart, pt's weight 8/29 - 177.6pounds 8/30 - 171.2pounds. Weight change likely d/t fluid shifts, weight today taken s/p thoracentesis. Pt denies any N/V, constipation or diarrhea. Pt denies any chewing/swallowing difficulties. Pt with partial upper dentures.

## 2017-08-30 NOTE — PROGRESS NOTE ADULT - SUBJECTIVE AND OBJECTIVE BOX
FRANKY JODY    Patient is a 89y old  Male who presents with a chief complaint of dyspnea,systolic CHF,pleural effusions,HTN,DM,Hyperlipidemia,CRI.  Allergies    No Known Allergies    Intolerances      MEDICATIONS  (STANDING):  heparin  Injectable 5000 Unit(s) SubCutaneous every 12 hours  insulin lispro (HumaLOG) corrective regimen sliding scale   SubCutaneous three times a day before meals  insulin lispro (HumaLOG) corrective regimen sliding scale   SubCutaneous at bedtime  dextrose 50% Injectable 25 Gram(s) IV Push once  aspirin  chewable 81 milliGRAM(s) Oral daily  losartan 50 milliGRAM(s) Oral daily  furosemide   Injectable 40 milliGRAM(s) IV Push daily    MEDICATIONS  (PRN):      ROS:  Positive:SORTO    General: Denies weight loss, fevers, rash, decreased hearing  Cardiac: Denies chest pain, SOB, SORTO, orthopnea, PND, claudication, edema, snoring, daytime somnolence, palpitations, syncope  Resp: Denies SOB, SORTO, cough, sputum, wheezing, hemoptysis  GI: Denies change in bowel habits, diarrhea, weight loss, melena, tarry stools,   nausea, vomiting, jaundice, abdominal pain, dysphagia  : Denies dysuria, nocturia, hematuria  Neuro: Denies tinnitus, headache, visual changes, weakness, dizziness or vertigo  Musculoskeletal: Denies neck pain back pain joint pain.  Skin: Denies rash, itching, dryness.  Endocrine: Denies polydipsia, polyuria  Psychiatric: Denies depression, anxiety      PHYSICAL EXAM:  Vital Signs Last 24 Hrs  T(C): 36.7 (30 Aug 2017 04:50), Max: 37.1 (29 Aug 2017 20:31)  T(F): 98 (30 Aug 2017 04:50), Max: 98.7 (29 Aug 2017 20:31)  HR: 67 (30 Aug 2017 04:50) (66 - 73)  BP: 136/70 (30 Aug 2017 04:50) (129/74 - 153/77)  BP(mean): --  RR: 18 (30 Aug 2017 04:50) (18 - 18)  SpO2: 94% (30 Aug 2017 04:50) (94% - 98%)  Daily     Daily Weight in k.6 (29 Aug 2017 12:08)  I&O's Summary    28 Aug 2017 07:01  -  29 Aug 2017 07:00  --------------------------------------------------------  IN: 460 mL / OUT: 0 mL / NET: 460 mL    29 Aug 2017 07:01  -  30 Aug 2017 05:13  --------------------------------------------------------  IN: 1410 mL / OUT: 2330 mL / NET: -920 mL        General Appearance: 	 Alert, cooperative, no distress  HEENT: normocephalic, atraumatic, PERRLA, EOMI, conjunctiva normal, sclera anicteric,   Neck: no JVD,  carotid 2+  bilaterally without bruits, thyroid normal to inspection and palpation, no adenopathy, trachea midline  Lungs:  Decreased BS right base,left rales  Cor:  pmi 5th ICS MCL, regular rate and rhythm, S1 normal intensity, S2 normal intensity, no gallops, murmurs or rubs  Abdomen:	 soft, non-tender; bowel sounds normal; no masses,  no organomegaly  Extremities: without cyanosis, clubbing,1+ edema  Vasc: 2-+ PT and DP pulses; no varicosities  Neurologic: A&O x 3 (time, place, person). Symmetric strength; limited exam  Musculoskeletal: no kyphosis, scoliosis; normal gait, normal tone  Skin: no rashes; limited exam        Labs:  CBC Full  -  ( 29 Aug 2017 07:27 )  WBC Count : 6.60 K/uL  Hemoglobin : 13.2 g/dL  Hematocrit : 42.5 %  Platelet Count - Automated : 169 K/uL  Mean Cell Volume : 87.8 fl  Mean Cell Hemoglobin : 27.3 pg  Mean Cell Hemoglobin Concentration : 31.1 gm/dL  Auto Neutrophil # : x  Auto Lymphocyte # : x  Auto Monocyte # : x  Auto Eosinophil # : x  Auto Basophil # : x  Auto Neutrophil % : x  Auto Lymphocyte % : x  Auto Monocyte % : x  Auto Eosinophil % : x  Auto Basophil % : x      CARDIAC MARKERS ( 28 Aug 2017 22:56 )  x     / 0.10 ng/mL / 124 U/L / x     / 8.9 ng/mL  CARDIAC MARKERS ( 28 Aug 2017 16:20 )  x     / 0.08 ng/mL / 141 U/L / x     / 9.8 ng/mL      PT/INR - ( 28 Aug 2017 16:20 )   PT: 12.2 sec;   INR: 1.12 ratio         PTT - ( 28 Aug 2017 16:20 )  PTT:32.0 se      Impression/Plan:Patient with SORTO,acute systolic CHF,HTN,DM,CRI,hyperlipidemia with moderate right effusion and partial collapse RLL.Continue Lasiz O>I,Cozaar,ASA ,SQ heparin.Agrre with thoracentisis today right effusion.F/U Echo to fully evaluate decreased LV FX.May need cardiac cath in future to evalute new LV dysfx.        Oneil Bell MD, FACC  [facility]

## 2017-08-30 NOTE — DIETITIAN INITIAL EVALUATION ADULT. - NS AS NUTRI INTERV ED CONTENT
Unable to provide at time of visit as pt being transferred for thoracentesis. Wife and son-in-law requested RD to return for diet education. Attempted to return x2, pt off floor.

## 2017-08-31 ENCOUNTER — TRANSCRIPTION ENCOUNTER (OUTPATIENT)
Age: 82
End: 2017-08-31

## 2017-08-31 VITALS
TEMPERATURE: 98 F | DIASTOLIC BLOOD PRESSURE: 70 MMHG | RESPIRATION RATE: 18 BRPM | HEART RATE: 71 BPM | OXYGEN SATURATION: 96 % | SYSTOLIC BLOOD PRESSURE: 127 MMHG

## 2017-08-31 LAB
ANION GAP SERPL CALC-SCNC: 20 MMOL/L — HIGH (ref 5–17)
BUN SERPL-MCNC: 41 MG/DL — HIGH (ref 7–23)
CALCIUM SERPL-MCNC: 9.8 MG/DL — SIGNIFICANT CHANGE UP (ref 8.4–10.5)
CHLORIDE SERPL-SCNC: 99 MMOL/L — SIGNIFICANT CHANGE UP (ref 96–108)
CO2 SERPL-SCNC: 22 MMOL/L — SIGNIFICANT CHANGE UP (ref 22–31)
CREAT SERPL-MCNC: 1.91 MG/DL — HIGH (ref 0.5–1.3)
GLUCOSE SERPL-MCNC: 165 MG/DL — HIGH (ref 70–99)
HCT VFR BLD CALC: 43.7 % — SIGNIFICANT CHANGE UP (ref 39–50)
HGB BLD-MCNC: 14 G/DL — SIGNIFICANT CHANGE UP (ref 13–17)
MAGNESIUM SERPL-MCNC: 1.6 MG/DL — SIGNIFICANT CHANGE UP (ref 1.6–2.6)
MCHC RBC-ENTMCNC: 27.4 PG — SIGNIFICANT CHANGE UP (ref 27–34)
MCHC RBC-ENTMCNC: 32 GM/DL — SIGNIFICANT CHANGE UP (ref 32–36)
MCV RBC AUTO: 85.5 FL — SIGNIFICANT CHANGE UP (ref 80–100)
NON-GYNECOLOGICAL CYTOLOGY STUDY: SIGNIFICANT CHANGE UP
PHOSPHATE SERPL-MCNC: 3.3 MG/DL — SIGNIFICANT CHANGE UP (ref 2.5–4.5)
PLATELET # BLD AUTO: 183 K/UL — SIGNIFICANT CHANGE UP (ref 150–400)
POTASSIUM SERPL-MCNC: 4.5 MMOL/L — SIGNIFICANT CHANGE UP (ref 3.5–5.3)
POTASSIUM SERPL-SCNC: 4.5 MMOL/L — SIGNIFICANT CHANGE UP (ref 3.5–5.3)
RBC # BLD: 5.11 M/UL — SIGNIFICANT CHANGE UP (ref 4.2–5.8)
RBC # FLD: 14.2 % — SIGNIFICANT CHANGE UP (ref 10.3–14.5)
SODIUM SERPL-SCNC: 141 MMOL/L — SIGNIFICANT CHANGE UP (ref 135–145)
WBC # BLD: 6.53 K/UL — SIGNIFICANT CHANGE UP (ref 3.8–10.5)
WBC # FLD AUTO: 6.53 K/UL — SIGNIFICANT CHANGE UP (ref 3.8–10.5)

## 2017-08-31 PROCEDURE — 93010 ELECTROCARDIOGRAM REPORT: CPT

## 2017-08-31 RX ORDER — ASPIRIN/CALCIUM CARB/MAGNESIUM 324 MG
1 TABLET ORAL
Qty: 0 | Refills: 0 | COMMUNITY
Start: 2017-08-31

## 2017-08-31 RX ADMIN — LOSARTAN POTASSIUM 50 MILLIGRAM(S): 100 TABLET, FILM COATED ORAL at 05:35

## 2017-08-31 RX ADMIN — Medication 2: at 07:57

## 2017-08-31 RX ADMIN — HEPARIN SODIUM 5000 UNIT(S): 5000 INJECTION INTRAVENOUS; SUBCUTANEOUS at 05:35

## 2017-08-31 RX ADMIN — Medication 40 MILLIGRAM(S): at 05:35

## 2017-08-31 NOTE — PROVIDER CONTACT NOTE (OTHER) - ACTION/TREATMENT ORDERED:
Continue to monitor
EKG done, no changes noted. Will continue to monitor patient.
Will continue to monitor patient.
Will continue to monitor patient.

## 2017-08-31 NOTE — CHART NOTE - NSCHARTNOTEFT_GEN_A_CORE
Request from Dr Ramon to facilitate patient discharge home today.  Medication reconciliation reviewed, revised, and resolved with Dr Ramon  who has medically cleared patient for discharge and follow ups as advised.  Patient/family states understanding of discharge instruction and follow ups.  Please refer to discharge note for detailed hospital course.   Plan discussed with consults  who agrees and patient will follow up with Dr Bell Cardiologist and Dr Poole in one week.   Medicine NP ADELINA [place] [phone]

## 2017-08-31 NOTE — PROVIDER CONTACT NOTE (OTHER) - ASSESSMENT
Pt asymptomatic
Pt asleep during event, VSS. Pt denies any CP, SOB, or palpitations. As per tele, changes in ST elevation noted.
Pt asleep during event, VSS. Pt denies any CP, SOB, palpitations. Pt back to SR 70's.
Pt asleep during events, VSS. Pt denies any CP, SOB, or palpitations.

## 2017-08-31 NOTE — PHYSICAL THERAPY INITIAL EVALUATION ADULT - PERTINENT HX OF CURRENT PROBLEM, REHAB EVAL
8/29/17	admitted with sob--r/o new onset CHF, T2DM, CKD--3,CT chest shows mod right,trace left pleural effusion, no pna, thoracentesis ordered , check echo, lasix 40 iv daily, pulm on board, card pablo

## 2017-08-31 NOTE — PROGRESS NOTE ADULT - SUBJECTIVE AND OBJECTIVE BOX
PULMONARY PROGRESS NOTE    JODY WILKINS  MRN-[id number]    Patient is a 89y old  Male who presents with a chief complaint of dyspnea (28 Aug 2017 19:52)      HPI:  no complaints this AM, cough improved, breathing comfortably, wants to go home.    ROS:   -afebrile, denies chest pain    MEDICATIONS  (STANDING):  heparin  Injectable 5000 Unit(s) SubCutaneous every 12 hours  insulin lispro (HumaLOG) corrective regimen sliding scale   SubCutaneous three times a day before meals  insulin lispro (HumaLOG) corrective regimen sliding scale   SubCutaneous at bedtime  dextrose 50% Injectable 25 Gram(s) IV Push once  aspirin  chewable 81 milliGRAM(s) Oral daily  losartan 50 milliGRAM(s) Oral daily  furosemide   Injectable 40 milliGRAM(s) IV Push daily    MEDICATIONS  (PRN):    Vital Signs Last 24 Hrs  T(C): 36.6 (31 Aug 2017 04:37), Max: 36.8 (30 Aug 2017 20:29)  T(F): 97.8 (31 Aug 2017 04:37), Max: 98.3 (30 Aug 2017 20:29)  HR: 72 (31 Aug 2017 09:52) (66 - 74)  BP: 128/72 (31 Aug 2017 09:52) (118/68 - 139/73)  BP(mean): --  RR: 18 (31 Aug 2017 04:37) (17 - 18)  SpO2: 96% (31 Aug 2017 04:37) (94% - 98%)    GENERAL: The patient is awake and alert in no apparent distress.     LUNGS: clear     HEART: S1/S2    LABS/IMAGING: reviewed    < from: Xray Chest 1 View AP/PA (08.30.17 @ 11:01) >  IMPRESSION:   Right lower lobe opacity may represent atelectasis. No pleural effusion   or pneumothorax.    < end of copied text >      PROBLEM LIST:  89y Male with HEALTH ISSUES - PROBLEM Dx:  Pleural effusion  dyspnea/cough  HLD (hyperlipidemia)  CKD (chronic kidney disease), stage III  HTN (hypertension)  DM2 (diabetes mellitus, type 2)    RECS:  -Pleural effusion: s/p thora yesterday, appears transudative, will follow up cultures and  cytology as outpatient.  -appreciate cardiology follow up, no acute change, no intervention at this time  -ok for discharge home, patient should follow up with  next week.    Meche Wagner MD   [phone]

## 2017-08-31 NOTE — CHART NOTE - NSCHARTNOTEFT_GEN_A_CORE
Notified by RN for possible ST elevation on telemetry. EKG obtained. No acute changes, including no ST/T wave changes on the EKG in comparison with prior from 8/28/17. Patient with no complaints of chest pain or shortness of breath. Will continue to monitor.   Shaniqua St PA-C [id number]

## 2017-08-31 NOTE — DISCHARGE NOTE ADULT - CARE PLAN
Principal Discharge DX:	Congestive heart failure  Goal:	Resolved  Instructions for follow-up, activity and diet:	Weigh yourself daily.  If you gain 3lbs in 3 days, or 5lbs in a week call your Health Care Provider.  Do not eat or drink foods containing more than 2000mg of salt (sodium) in your diet every day.  Call your Health Care Provider if you have any swelling or increased swelling in your feet, ankles, and/or stomach.  Take all of your medication as directed.  If you become dizzy call your Health Care Provider.  Secondary Diagnosis:	CKD (chronic kidney disease), stage III  Instructions for follow-up, activity and diet:	Avoid taking (NSAIDs) - (ex: Ibuprofen, Advil, Celebrex, Naprosyn)  Avoid taking any nephrotoxic agents (can harm kidneys) - Intravenous contrast for diagnostic testing, combination cold medications.  Have all medications adjusted for your renal function by your Health Care Provider.  Blood pressure control is important.  Take all medication as prescribed.  Secondary Diagnosis:	HTN (hypertension)  Instructions for follow-up, activity and diet:	Follow up with your medical doctor to establish long term blood pressure treatment goals.

## 2017-08-31 NOTE — DISCHARGE NOTE ADULT - PLAN OF CARE
Resolved Weigh yourself daily.  If you gain 3lbs in 3 days, or 5lbs in a week call your Health Care Provider.  Do not eat or drink foods containing more than 2000mg of salt (sodium) in your diet every day.  Call your Health Care Provider if you have any swelling or increased swelling in your feet, ankles, and/or stomach.  Take all of your medication as directed.  If you become dizzy call your Health Care Provider. Avoid taking (NSAIDs) - (ex: Ibuprofen, Advil, Celebrex, Naprosyn)  Avoid taking any nephrotoxic agents (can harm kidneys) - Intravenous contrast for diagnostic testing, combination cold medications.  Have all medications adjusted for your renal function by your Health Care Provider.  Blood pressure control is important.  Take all medication as prescribed. Follow up with your medical doctor to establish long term blood pressure treatment goals.

## 2017-08-31 NOTE — DISCHARGE NOTE ADULT - CARE PROVIDER_API CALL
Oneil Bell), Cardiovascular Disease; Internal Medicine  [address]  [address]  [place] [address]  Phone: [phone]  Fax: [phone]    Primitivo Poole), Critical Care Medicine; Internal Medicine; Pulmonary Disease; Sleep Medicine  [address]  [address]  [place] [address]  Phone: [phone]  Fax: [phone]

## 2017-08-31 NOTE — PROGRESS NOTE ADULT - SUBJECTIVE AND OBJECTIVE BOX
FRANKY JODY    Patient is a 89y old  Male who presents with a chief complaint of dyspnea,diastolic CHF,hTN,DM much improved after diuresis and thoracentesis.      Allergies    No Known Allergies    Intolerances      MEDICATIONS  (STANDING):  heparin  Injectable 5000 Unit(s) SubCutaneous every 12 hours  insulin lispro (HumaLOG) corrective regimen sliding scale   SubCutaneous three times a day before meals  insulin lispro (HumaLOG) corrective regimen sliding scale   SubCutaneous at bedtime  dextrose 50% Injectable 25 Gram(s) IV Push once  aspirin  chewable 81 milliGRAM(s) Oral daily  losartan 50 milliGRAM(s) Oral daily  furosemide   Injectable 40 milliGRAM(s) IV Push daily    MEDICATIONS  (PRN):      ROS:  Positive:negative    General: Denies weight loss, fevers, rash, decreased hearing  Cardiac: Denies chest pain, SOB, SORTO, orthopnea, PND, claudication, edema, snoring, daytime somnolence, palpitations, syncope  Resp: Denies SOB, SORTO, cough, sputum, wheezing, hemoptysis  GI: Denies change in bowel habits, diarrhea, weight loss, melena, tarry stools,   nausea, vomiting, jaundice, abdominal pain, dysphagia  : Denies dysuria, nocturia, hematuria  Neuro: Denies tinnitus, headache, visual changes, weakness, dizziness or vertigo  Musculoskeletal: Denies neck pain back pain joint pain.  Skin: Denies rash, itching, dryness.  Endocrine: Denies polydipsia, polyuria  Psychiatric: Denies depression, anxiety      PHYSICAL EXAM:  Vital Signs Last 24 Hrs  T(C): 36.6 (31 Aug 2017 04:37), Max: 36.8 (30 Aug 2017 20:29)  T(F): 97.8 (31 Aug 2017 04:37), Max: 98.3 (30 Aug 2017 20:29)  HR: 68 (31 Aug 2017 04:37) (66 - 77)  BP: 138/79 (31 Aug 2017 04:37) (118/68 - 151/71)  BP(mean): --  RR: 18 (31 Aug 2017 04:37) (17 - 18)  SpO2: 96% (31 Aug 2017 04:37) (94% - 98%)  Daily     Daily Weight in k.7 (30 Aug 2017 15:52)  I&O's Summary    29 Aug 2017 07:01  -  30 Aug 2017 07:00  --------------------------------------------------------  IN: 1410 mL / OUT: 2330 mL / NET: -920 mL    30 Aug 2017 07:01  -  31 Aug 2017 05:12  --------------------------------------------------------  IN: 780 mL / OUT: 300 mL / NET: 480 mL        General Appearance: 	 Alert, cooperative, no distress  HEENT: normocephalic, atraumatic, PERRLA, EOMI, conjunctiva normal, sclera anicteric,   Neck: no JVD,  carotid 2+  bilaterally without bruits, thyroid normal to inspection and palpation, no adenopathy, trachea midline  Lungs:  clear to auscultation and percussion bilaterally  Cor:  pmi 5th ICS MCL, regular rate and rhythm, S1 normal intensity, S2 normal intensity, no gallops, murmurs or rubs  Abdomen:	 soft, non-tender; bowel sounds normal; no masses,  no organomegaly  Extremities: without cyanosis, clubbing or edema  Vasc: 2-+ PT and DP pulses; no varicosities  Neurologic: A&O x 3 (time, place, person). Symmetric strength; limited exam  Musculoskeletal: no kyphosis, scoliosis; normal gait, normal tone  Skin: no rashes; limited exam      Labs:  CBC Full  -  ( 30 Aug 2017 07:47 )  WBC Count : 6.26 K/uL  Hemoglobin : 13.0 g/dL  Hematocrit : 40.7 %  Platelet Count - Automated : 182 K/uL  Mean Cell Volume : 85.1 fl  Mean Cell Hemoglobin : 27.2 pg  Mean Cell Hemoglobin Concentration : 31.9 gm/dL  Auto Neutrophil # : 3.69 K/uL  Auto Lymphocyte # : 1.54 K/uL  Auto Monocyte # : 0.68 K/uL  Auto Eosinophil # : 0.29 K/uL  Auto Basophil # : 0.04 K/uL  Auto Neutrophil % : 59.0 %  Auto Lymphocyte % : 24.6 %  Auto Monocyte % : 10.9 %  Auto Eosinophil % : 4.6 %  Auto Basophil % : 0.6 %        Impression/Plan:Patient with acute diastolic CHF,DM,HTN much improved with diuresis and thoracentesis.Would continue Cozzar,Lasix 20 mg QD,ASA.No CP or SOB.rRepeat Echo nl LV FX,LVH,LAE,moild MR.Stable for D/C f/u DR.Joel Goldberg 1 Week.        Oneil Bell MD, FACC  [facility]

## 2017-08-31 NOTE — DISCHARGE NOTE ADULT - MEDICATION SUMMARY - MEDICATIONS TO TAKE
I will START or STAY ON the medications listed below when I get home from the hospital:    aspirin 81 mg oral tablet  -- 2 tab(s) by mouth once a day  -- Indication: For Cad    Advil 200 mg oral tablet  -- 3 tab(s) by mouth , As Needed  -- Indication: For Pain     aspirin 81 mg oral tablet, chewable  -- 1 tab(s) by mouth once a day  -- Indication: For CAD    irbesartan 150 mg oral tablet  -- 1 tab(s) by mouth once a day  -- Indication: For Hypertension     glipiZIDE 10 mg oral tablet  -- 1 tab(s) by mouth once a day  -- Indication: For DM2 (diabetes mellitus, type 2)    metFORMIN 500 mg oral tablet  -- 1 tab(s) by mouth 2 times a day  -- Indication: For DM2 (diabetes mellitus, type 2)    pioglitazone 30 mg oral tablet  -- 1 tab(s) by mouth once a day  -- Indication: For DM2 (diabetes mellitus, type 2)    Onglyza 2.5 mg oral tablet  -- 1 tab(s) by mouth once a day  -- Indication: For DM2 (diabetes mellitus, type 2)    Centrum Silver Men's  -- 1 tab(s) by mouth once a day  -- Indication: For Suplements

## 2017-08-31 NOTE — DISCHARGE NOTE ADULT - PATIENT PORTAL LINK FT
“You can access the Follow[employer] Patient Portal, offered by [facility], by registering with the following website: [email or web address]”

## 2017-08-31 NOTE — DISCHARGE NOTE ADULT - HOSPITAL COURSE
PMD 88 yo M with PMH of HTN, HLD, DM2, CKD presenting with worsening dyspnea. Per patient and his family at bedside, he has been experiencing worsening breathing problems over the past couple of months, acutely worsening over the past week. Pt has noticed worsening dyspnea to the extent that he becomes short of breath after 10-15 steps or after carrying out a long conversation. Other associated symptoms include worsening "barking, wet" cough, LE swelling, increased abdominal girth, 3 pillow orthopnea all occurring over the past several months (possibly longer). Pt was in [place] for a week of vacation (returned 1 week ago) and during this time noticed his symptoms getting worse. He notes that his cough is now associated with "yellow" sputum. Pt saw his pulmonologist today for evaluation who took a CXR and subsequently advised pt to come into the ED for evaluation. Of note, pt was last hospitalzied at [facility] last november for pneumonia and hemoptysis. pt was treated without complications. 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 hemoptysis subsequently resolved after change of work environment.    Dx--SOB, r/o CHF, CKD--3    - Dyspnea.  Plan: ct chest with pleural effusion -s/p thoracentesis  , f/u cx / cytology. pulm evaluated pt      Congestive heart failure.  Plan: echo,  daily weights. cards evaluated pt    -DM2 (diabetes mellitus, type 2).  Plan: hold oral agents inpatient. check a1c, place on sliding scale.        HTN (hypertension).  Plan: continue ARB, losartan inpatient        CKD (chronic kidney disease), stage III.  Plan: ckd appears baseline based on admission from 11/2016

## 2017-09-04 LAB
CULTURE RESULTS: SIGNIFICANT CHANGE UP
SPECIMEN SOURCE: SIGNIFICANT CHANGE UP

## 2017-09-27 LAB
CULTURE RESULTS: SIGNIFICANT CHANGE UP
SPECIMEN SOURCE: SIGNIFICANT CHANGE UP

## 2017-10-18 LAB
CULTURE RESULTS: SIGNIFICANT CHANGE UP
SPECIMEN SOURCE: SIGNIFICANT CHANGE UP

## 2017-10-19 PROCEDURE — 85730 THROMBOPLASTIN TIME PARTIAL: CPT

## 2017-10-19 PROCEDURE — 87206 SMEAR FLUORESCENT/ACID STAI: CPT

## 2017-10-19 PROCEDURE — 71046 X-RAY EXAM CHEST 2 VIEWS: CPT

## 2017-10-19 PROCEDURE — 99285 EMERGENCY DEPT VISIT HI MDM: CPT | Mod: 25

## 2017-10-19 PROCEDURE — 87070 CULTURE OTHR SPECIMN AEROBIC: CPT

## 2017-10-19 PROCEDURE — 71250 CT THORAX DX C-: CPT

## 2017-10-19 PROCEDURE — 85610 PROTHROMBIN TIME: CPT

## 2017-10-19 PROCEDURE — 85027 COMPLETE CBC AUTOMATED: CPT

## 2017-10-19 PROCEDURE — 89051 BODY FLUID CELL COUNT: CPT

## 2017-10-19 PROCEDURE — 82550 ASSAY OF CK (CPK): CPT

## 2017-10-19 PROCEDURE — 87102 FUNGUS ISOLATION CULTURE: CPT

## 2017-10-19 PROCEDURE — 83735 ASSAY OF MAGNESIUM: CPT

## 2017-10-19 PROCEDURE — 82553 CREATINE MB FRACTION: CPT

## 2017-10-19 PROCEDURE — 83880 ASSAY OF NATRIURETIC PEPTIDE: CPT

## 2017-10-19 PROCEDURE — 83036 HEMOGLOBIN GLYCOSYLATED A1C: CPT

## 2017-10-19 PROCEDURE — 83615 LACTATE (LD) (LDH) ENZYME: CPT

## 2017-10-19 PROCEDURE — 93308 TTE F-UP OR LMTD: CPT

## 2017-10-19 PROCEDURE — 93005 ELECTROCARDIOGRAM TRACING: CPT

## 2017-10-19 PROCEDURE — 84157 ASSAY OF PROTEIN OTHER: CPT

## 2017-10-19 PROCEDURE — 88305 TISSUE EXAM BY PATHOLOGIST: CPT

## 2017-10-19 PROCEDURE — 82945 GLUCOSE OTHER FLUID: CPT

## 2017-10-19 PROCEDURE — 93306 TTE W/DOPPLER COMPLETE: CPT

## 2017-10-19 PROCEDURE — 71045 X-RAY EXAM CHEST 1 VIEW: CPT

## 2017-10-19 PROCEDURE — 80048 BASIC METABOLIC PNL TOTAL CA: CPT

## 2017-10-19 PROCEDURE — 87015 SPECIMEN INFECT AGNT CONCNTJ: CPT

## 2017-10-19 PROCEDURE — 87075 CULTR BACTERIA EXCEPT BLOOD: CPT

## 2017-10-19 PROCEDURE — 87116 MYCOBACTERIA CULTURE: CPT

## 2017-10-19 PROCEDURE — 32555 ASPIRATE PLEURA W/ IMAGING: CPT

## 2017-10-19 PROCEDURE — 84100 ASSAY OF PHOSPHORUS: CPT

## 2017-10-19 PROCEDURE — 88112 CYTOPATH CELL ENHANCE TECH: CPT

## 2017-10-19 PROCEDURE — 83986 ASSAY PH BODY FLUID NOS: CPT

## 2017-10-19 PROCEDURE — 80053 COMPREHEN METABOLIC PANEL: CPT

## 2017-10-19 PROCEDURE — 97161 PT EVAL LOW COMPLEX 20 MIN: CPT

## 2017-10-19 PROCEDURE — 84484 ASSAY OF TROPONIN QUANT: CPT

## 2017-10-19 PROCEDURE — 87205 SMEAR GRAM STAIN: CPT

## 2017-10-20 ENCOUNTER — INPATIENT (INPATIENT)
Facility: HOSPITAL | Age: 82
LOS: 1 days | Discharge: HOME | DRG: 638 | End: 2017-10-22
Attending: INTERNAL MEDICINE | Admitting: INTERNAL MEDICINE
Payer: MEDICARE

## 2017-10-20 VITALS
OXYGEN SATURATION: 97 % | RESPIRATION RATE: 18 BRPM | HEART RATE: 74 BPM | DIASTOLIC BLOOD PRESSURE: 55 MMHG | SYSTOLIC BLOOD PRESSURE: 126 MMHG | TEMPERATURE: 97 F

## 2017-10-20 DIAGNOSIS — I10 ESSENTIAL (PRIMARY) HYPERTENSION: ICD-10-CM

## 2017-10-20 DIAGNOSIS — E16.2 HYPOGLYCEMIA, UNSPECIFIED: ICD-10-CM

## 2017-10-20 DIAGNOSIS — E78.5 HYPERLIPIDEMIA, UNSPECIFIED: ICD-10-CM

## 2017-10-20 DIAGNOSIS — E11.649 TYPE 2 DIABETES MELLITUS WITH HYPOGLYCEMIA WITHOUT COMA: ICD-10-CM

## 2017-10-20 LAB
ALBUMIN SERPL ELPH-MCNC: 4.4 G/DL — SIGNIFICANT CHANGE UP (ref 3.3–5)
ALP SERPL-CCNC: 131 U/L — HIGH (ref 40–120)
ALT FLD-CCNC: 39 U/L RC — SIGNIFICANT CHANGE UP (ref 10–45)
ANION GAP SERPL CALC-SCNC: 15 MMOL/L — SIGNIFICANT CHANGE UP (ref 5–17)
APPEARANCE UR: CLEAR — SIGNIFICANT CHANGE UP
APPEARANCE UR: CLEAR — SIGNIFICANT CHANGE UP
APTT BLD: 32.8 SEC — SIGNIFICANT CHANGE UP (ref 27.5–37.4)
AST SERPL-CCNC: 60 U/L — HIGH (ref 10–40)
BASOPHILS # BLD AUTO: 0.1 K/UL — SIGNIFICANT CHANGE UP (ref 0–0.2)
BASOPHILS NFR BLD AUTO: 0.8 % — SIGNIFICANT CHANGE UP (ref 0–2)
BILIRUB SERPL-MCNC: 0.4 MG/DL — SIGNIFICANT CHANGE UP (ref 0.2–1.2)
BILIRUB UR-MCNC: NEGATIVE — SIGNIFICANT CHANGE UP
BILIRUB UR-MCNC: NEGATIVE — SIGNIFICANT CHANGE UP
BUN SERPL-MCNC: 67 MG/DL — HIGH (ref 7–23)
CALCIUM SERPL-MCNC: 9.2 MG/DL — SIGNIFICANT CHANGE UP (ref 8.4–10.5)
CHLORIDE SERPL-SCNC: 100 MMOL/L — SIGNIFICANT CHANGE UP (ref 96–108)
CK MB CFR SERPL CALC: 11.4 NG/ML — HIGH (ref 0–6.7)
CO2 SERPL-SCNC: 20 MMOL/L — LOW (ref 22–31)
COLOR SPEC: COLORLESS — SIGNIFICANT CHANGE UP
COLOR SPEC: YELLOW — SIGNIFICANT CHANGE UP
CREAT SERPL-MCNC: 1.94 MG/DL — HIGH (ref 0.5–1.3)
DIFF PNL FLD: NEGATIVE — SIGNIFICANT CHANGE UP
DIFF PNL FLD: NEGATIVE — SIGNIFICANT CHANGE UP
EOSINOPHIL # BLD AUTO: 0.1 K/UL — SIGNIFICANT CHANGE UP (ref 0–0.5)
EOSINOPHIL NFR BLD AUTO: 2.1 % — SIGNIFICANT CHANGE UP (ref 0–6)
GAS PNL BLDV: SIGNIFICANT CHANGE UP
GLUCOSE BLDC GLUCOMTR-MCNC: 110 MG/DL — HIGH (ref 70–99)
GLUCOSE BLDC GLUCOMTR-MCNC: 208 MG/DL — HIGH (ref 70–99)
GLUCOSE SERPL-MCNC: 33 MG/DL — CRITICAL LOW (ref 70–99)
GLUCOSE UR QL: NEGATIVE MG/DL — SIGNIFICANT CHANGE UP
GLUCOSE UR QL: NEGATIVE — SIGNIFICANT CHANGE UP
HCT VFR BLD CALC: 37.8 % — LOW (ref 39–50)
HGB BLD-MCNC: 12.2 G/DL — LOW (ref 13–17)
INR BLD: 1.02 RATIO — SIGNIFICANT CHANGE UP (ref 0.88–1.16)
KETONES UR-MCNC: NEGATIVE — SIGNIFICANT CHANGE UP
KETONES UR-MCNC: NEGATIVE — SIGNIFICANT CHANGE UP
LEUKOCYTE ESTERASE UR-ACNC: NEGATIVE — SIGNIFICANT CHANGE UP
LEUKOCYTE ESTERASE UR-ACNC: NEGATIVE — SIGNIFICANT CHANGE UP
LIDOCAIN IGE QN: 72 U/L — HIGH (ref 7–60)
LYMPHOCYTES # BLD AUTO: 1.2 K/UL — SIGNIFICANT CHANGE UP (ref 1–3.3)
LYMPHOCYTES # BLD AUTO: 17.1 % — SIGNIFICANT CHANGE UP (ref 13–44)
MCHC RBC-ENTMCNC: 29.2 PG — SIGNIFICANT CHANGE UP (ref 27–34)
MCHC RBC-ENTMCNC: 32.3 GM/DL — SIGNIFICANT CHANGE UP (ref 32–36)
MCV RBC AUTO: 90.4 FL — SIGNIFICANT CHANGE UP (ref 80–100)
MONOCYTES # BLD AUTO: 0.8 K/UL — SIGNIFICANT CHANGE UP (ref 0–0.9)
MONOCYTES NFR BLD AUTO: 11.6 % — SIGNIFICANT CHANGE UP (ref 2–14)
NEUTROPHILS # BLD AUTO: 4.8 K/UL — SIGNIFICANT CHANGE UP (ref 1.8–7.4)
NEUTROPHILS NFR BLD AUTO: 68.4 % — SIGNIFICANT CHANGE UP (ref 43–77)
NITRITE UR-MCNC: NEGATIVE — SIGNIFICANT CHANGE UP
NITRITE UR-MCNC: NEGATIVE — SIGNIFICANT CHANGE UP
PH UR: 5.5 — SIGNIFICANT CHANGE UP (ref 5–8)
PH UR: 5.5 — SIGNIFICANT CHANGE UP (ref 5–8)
PLATELET # BLD AUTO: 133 K/UL — LOW (ref 150–400)
POTASSIUM SERPL-MCNC: 4.6 MMOL/L — SIGNIFICANT CHANGE UP (ref 3.5–5.3)
POTASSIUM SERPL-SCNC: 4.6 MMOL/L — SIGNIFICANT CHANGE UP (ref 3.5–5.3)
PROT SERPL-MCNC: 7.5 G/DL — SIGNIFICANT CHANGE UP (ref 6–8.3)
PROT UR-MCNC: NEGATIVE MG/DL — SIGNIFICANT CHANGE UP
PROT UR-MCNC: NEGATIVE — SIGNIFICANT CHANGE UP
PROTHROM AB SERPL-ACNC: 11.1 SEC — SIGNIFICANT CHANGE UP (ref 9.8–12.7)
RBC # BLD: 4.18 M/UL — LOW (ref 4.2–5.8)
RBC # FLD: 13 % — SIGNIFICANT CHANGE UP (ref 10.3–14.5)
SODIUM SERPL-SCNC: 135 MMOL/L — SIGNIFICANT CHANGE UP (ref 135–145)
SP GR SPEC: 1.01 — LOW (ref 1.01–1.02)
SP GR SPEC: 1.01 — SIGNIFICANT CHANGE UP (ref 1.01–1.02)
TROPONIN T SERPL-MCNC: 0.17 NG/ML — HIGH (ref 0–0.06)
UROBILINOGEN FLD QL: NEGATIVE MG/DL — SIGNIFICANT CHANGE UP
UROBILINOGEN FLD QL: NEGATIVE — SIGNIFICANT CHANGE UP
WBC # BLD: 7 K/UL — SIGNIFICANT CHANGE UP (ref 3.8–10.5)
WBC # FLD AUTO: 7 K/UL — SIGNIFICANT CHANGE UP (ref 3.8–10.5)

## 2017-10-20 PROCEDURE — 71010: CPT | Mod: 26

## 2017-10-20 PROCEDURE — 99291 CRITICAL CARE FIRST HOUR: CPT

## 2017-10-20 PROCEDURE — 99223 1ST HOSP IP/OBS HIGH 75: CPT

## 2017-10-20 RX ORDER — ASPIRIN/CALCIUM CARB/MAGNESIUM 324 MG
81 TABLET ORAL DAILY
Qty: 0 | Refills: 0 | Status: DISCONTINUED | OUTPATIENT
Start: 2017-10-20 | End: 2017-10-22

## 2017-10-20 RX ORDER — DEXTROSE 50 % IN WATER 50 %
50 SYRINGE (ML) INTRAVENOUS ONCE
Qty: 0 | Refills: 0 | Status: COMPLETED | OUTPATIENT
Start: 2017-10-20 | End: 2017-10-20

## 2017-10-20 RX ORDER — SODIUM CHLORIDE 9 MG/ML
1000 INJECTION, SOLUTION INTRAVENOUS
Qty: 0 | Refills: 0 | Status: DISCONTINUED | OUTPATIENT
Start: 2017-10-20 | End: 2017-10-22

## 2017-10-20 RX ORDER — OCTREOTIDE ACETATE 200 UG/ML
50 INJECTION, SOLUTION INTRAVENOUS; SUBCUTANEOUS ONCE
Qty: 0 | Refills: 0 | Status: COMPLETED | OUTPATIENT
Start: 2017-10-20 | End: 2017-10-20

## 2017-10-20 RX ORDER — ASPIRIN/CALCIUM CARB/MAGNESIUM 324 MG
324 TABLET ORAL ONCE
Qty: 0 | Refills: 0 | Status: COMPLETED | OUTPATIENT
Start: 2017-10-20 | End: 2017-10-20

## 2017-10-20 RX ORDER — GLUCAGON INJECTION, SOLUTION 0.5 MG/.1ML
1 INJECTION, SOLUTION SUBCUTANEOUS ONCE
Qty: 0 | Refills: 0 | Status: DISCONTINUED | OUTPATIENT
Start: 2017-10-20 | End: 2017-10-22

## 2017-10-20 RX ORDER — INSULIN LISPRO 100/ML
VIAL (ML) SUBCUTANEOUS AT BEDTIME
Qty: 0 | Refills: 0 | Status: DISCONTINUED | OUTPATIENT
Start: 2017-10-20 | End: 2017-10-22

## 2017-10-20 RX ORDER — LOSARTAN POTASSIUM 100 MG/1
50 TABLET, FILM COATED ORAL DAILY
Qty: 0 | Refills: 0 | Status: DISCONTINUED | OUTPATIENT
Start: 2017-10-20 | End: 2017-10-22

## 2017-10-20 RX ORDER — DEXTROSE 50 % IN WATER 50 %
25 SYRINGE (ML) INTRAVENOUS ONCE
Qty: 0 | Refills: 0 | Status: DISCONTINUED | OUTPATIENT
Start: 2017-10-20 | End: 2017-10-22

## 2017-10-20 RX ORDER — MULTIVIT-MIN/FERROUS GLUCONATE 9 MG/15 ML
1 LIQUID (ML) ORAL DAILY
Qty: 0 | Refills: 0 | Status: DISCONTINUED | OUTPATIENT
Start: 2017-10-20 | End: 2017-10-22

## 2017-10-20 RX ORDER — DEXTROSE 50 % IN WATER 50 %
12.5 SYRINGE (ML) INTRAVENOUS ONCE
Qty: 0 | Refills: 0 | Status: DISCONTINUED | OUTPATIENT
Start: 2017-10-20 | End: 2017-10-22

## 2017-10-20 RX ORDER — DEXTROSE 50 % IN WATER 50 %
1 SYRINGE (ML) INTRAVENOUS ONCE
Qty: 0 | Refills: 0 | Status: DISCONTINUED | OUTPATIENT
Start: 2017-10-20 | End: 2017-10-22

## 2017-10-20 RX ORDER — HEPARIN SODIUM 5000 [USP'U]/ML
5000 INJECTION INTRAVENOUS; SUBCUTANEOUS EVERY 12 HOURS
Qty: 0 | Refills: 0 | Status: DISCONTINUED | OUTPATIENT
Start: 2017-10-20 | End: 2017-10-22

## 2017-10-20 RX ORDER — INSULIN LISPRO 100/ML
VIAL (ML) SUBCUTANEOUS
Qty: 0 | Refills: 0 | Status: DISCONTINUED | OUTPATIENT
Start: 2017-10-20 | End: 2017-10-21

## 2017-10-20 RX ADMIN — Medication 324 MILLIGRAM(S): at 13:53

## 2017-10-20 RX ADMIN — OCTREOTIDE ACETATE 50 MICROGRAM(S): 200 INJECTION, SOLUTION INTRAVENOUS; SUBCUTANEOUS at 13:53

## 2017-10-20 RX ADMIN — Medication 50 MILLILITER(S): at 12:24

## 2017-10-20 NOTE — ED PROVIDER NOTE - MEDICAL DECISION MAKING DETAILS
Attending MD Cummins: 89M with CHF, DM on metformin/glipizide presenting with fatigue and headache, FS in triage 36. Concern for sulfonylurea toxicity, ddx includes acute on chronic kidney disease, sepsis. Will obtain labs, given d50 and octreotide, will monitor closely. Will need admission given risk of recurrent hypoglycemia

## 2017-10-20 NOTE — CONSULT NOTE ADULT - PROBLEM SELECTOR RECOMMENDATION 9
Hypoglycemia likely due to PARAS from increased dose of lasix in combination with use of sulfonylurea and poor po intake.  Diabetes education and nutrition eval.  Recommend 3 meals a day especially if on medication for diabetes  For now monitor on correction scale.  Once glucose consistently > 180 can start basal bolus insulin  Would avoid this outpatient regimen upon discharge given CKD and risk of hypoglycemia with sulfonylurea, worsening heart failure with Actos, risk of pancreatitis with januvia with chronic Vodka use, and potential risk of lactic acidosis with metformin use if GFR <30.   Discussed use of insulin, pt. does not want insulin at this time but willing to be shown how to use it.  He needs a glucometer and teaching  Outpt. optho, podiatry, nephrology.  Can follow-up with Dr. Painting as outpatient. Discussed plan with his associate Dr. Aceves.  Plan to discharge on Lantus and prandin.

## 2017-10-20 NOTE — ED PROVIDER NOTE - PROGRESS NOTE DETAILS
Code stroke activated from Triage. Fingerstick was 36. No gross deficit appreciated on exam. Hypoglycemic, do not suspect CVA in patient and code stroke deactivated by MD Cummins. Attending MD Cummins: patient feeling much better now, repeat FS 140s, tolerating PO. Awaiting remainder of labwork. Attending MD Cummins: discussed with Dr. Goldberg, recommends Dr. Brady Attending MD Cummins: mild troponemia, no active chest pain, doubt ACS but will continue to cycle Evan on tele, ASA ordered. Discussed with Dr. Skaggs, covering for Dr. Painting (patient's primary endocrinologist) is aware of patients' presentation and will let Dr. Painting know about it. Unfortunately, team does not come to [facility] anymore.

## 2017-10-20 NOTE — ED PROVIDER NOTE - OBJECTIVE STATEMENT
89 year old male with pmhx of type 2 diabetes, hyperlipidemia, Hypertension presents with generalized fatigue and dull headache since 2 hours ago. Patient states that he woke up this morning around 3 am and began his usual workday. Took all his diabetic oral medications, with no extra doses or confusion of medications. Did not eat a lot of breakfast, just had an apple. No chest pain or trouble breathing. No abdominal pain, nausea, or vomiting. Prior to today, patient was well and on no new medications.   PMD- Joel Goldberg 89 year old male with pmhx of type 2 diabetes, hyperlipidemia, Hypertension presents with generalized fatigue and dull headache since 2 hours ago. Patient states that he woke up this morning around 3 am and began his usual workday. Took all his diabetic oral medications, with no extra doses or confusion of medications. Did not eat a lot of breakfast, just had an apple. No chest pain or trouble breathing. No abdominal pain, nausea, or vomiting. Prior to today, patient was well and on no new medications.   PMD- Joel Goldberg  Endocrinologist- Dr. Pavan Painting [phone]

## 2017-10-20 NOTE — H&P ADULT - HISTORY OF PRESENT ILLNESS
89 year old male with pmhx of type 2 diabetes, hyperlipidemia, Hypertension presents with generalized fatigue and dull headache since 2 hours ago. Patient states that he woke up this morning around 3 am and began his usual workday. Took all his diabetic oral medications, with no extra doses or confusion of medications. Did not eat a lot of breakfast, just had an apple. No chest pain or trouble breathing. No abdominal pain, nausea, or vomiting. Prior to today, patient was well and on no new medications.   PMD- Joel Goldberg

## 2017-10-20 NOTE — ED ADULT NURSE NOTE - OBJECTIVE STATEMENT
88yo m a&ox4 wheel chaired into ED c/o weakness and headache x~2hours. pt family states pt had slurred speech, no slurred speech noted upon ED assessment. upon arrival, pt FS=36, pt states he is diabetic and did not have any breakfast this morning because he is not hungry. pt denies fevers/chills, denies visual changes, +ambulatory with assistance, +strength and sensation b/l. pt denies n/v/d. code stroke cancelled (called out in triage) by MD Attending. pt states he took his glipizide at 0300 before work - states he does not believe he took more of his medication than prescribed.

## 2017-10-20 NOTE — CONSULT NOTE ADULT - ASSESSMENT
86 y/o M w/ uncontrolled Type 2 DM w/ hypoglycemia w/ HTN and HLD (high risk patient with high level decision-making).

## 2017-10-20 NOTE — ED PROVIDER NOTE - CRITICAL CARE PROVIDED
direct patient care (not related to procedure)/documentation/additional history taking/consultation with other physicians/interpretation of diagnostic studies

## 2017-10-20 NOTE — H&P ADULT - NSHPLABSRESULTS_GEN_ALL_CORE
12.2   7.0   )-----------( 133      ( 20 Oct 2017 11:35 )             37.8       10-20    135  |  100  |  67<H>  ----------------------------<  33<LL>  4.6   |  20<L>  |  1.94<H>    Ca    9.2      20 Oct 2017 11:35    TPro  7.5  /  Alb  4.4  /  TBili  0.4  /  DBili  x   /  AST  60<H>  /  ALT  39  /  AlkPhos  131<H>  10              Urinalysis Basic - ( 20 Oct 2017 15:05 )    Color: Colorless / Appearance: Clear / S.008 / pH: x  Gluc: x / Ketone: Negative  / Bili: Negative / Urobili: Negative   Blood: x / Protein: Negative / Nitrite: Negative   Leuk Esterase: Negative / RBC: x / WBC x   Sq Epi: x / Non Sq Epi: x / Bacteria: x        PT/INR - ( 20 Oct 2017 11:35 )   PT: 11.1 sec;   INR: 1.02 ratio         PTT - ( 20 Oct 2017 11:35 )  PTT:32.8 sec    Lactate Trend      CARDIAC MARKERS ( 20 Oct 2017 11:35 )  x     / 0.17 ng/mL / x     / x     / 11.4 ng/mL        CAPILLARY BLOOD GLUCOSE  36 (20 Oct 2017 16:17)      POCT Blood Glucose.: 110 mg/dL (20 Oct 2017 15:36)        < from: Xray Chest 1 View AP- PORTABLE-Urgent (10.20.17 @ 12:12) >    Impression:    Poor inspiratory effort. The heart is slightly enlarged. Small right   pleural effusion. Right lower lobe pneumonia and/or atelectasis cannot be   ruled out entirely. Degenerative changes of the thoracic spine and both   shoulders.      < end of copied text >

## 2017-10-20 NOTE — H&P ADULT - ASSESSMENT
89 m with   Hypoglycemia- hold oral hypoglycemiant, BS control, Endocrine evaluation called.  HTN control  CKD stage 3- hold diuretics now  Further action as per clinical course   Mao Brady MD pager [phone]

## 2017-10-20 NOTE — CONSULT NOTE ADULT - SUBJECTIVE AND OBJECTIVE BOX
HPI:  88 y/o M w/ hx of 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 follows with Dr. Painting with nephropathy, retinopathy s/p laser tx, and neuropathy last A1c 8% (10/5/17) at his last visit with his endo. At home on metformin 500mg 3 tabs daily, Actos, glipizide, and onglyza. Adherent to medications. Does not have a glucometer. Does not check his glucose at home. No reported hx of hypoglycemia. No symptoms at home of hypoglycemia. Eats only 1 meal a day usually at 2pm. Drinks vodka on a regular basis. Last drink about 1 week ago. +diet soda. No juice. +rice and french fries. Lasix dose increased last week for swelling.   Also hx of hyperlipidemia, Hypertension presents with generalized fatigue and dull headache x 2 hours. Patient states that he woke up this morning around 3 am and began his usual workday. Took all his diabetic oral medications, with no extra doses or confusion of medications. Did not eat a lot of breakfast, just had an apple and eggs. No chest pain or trouble breathing. No abdominal pain, nausea, or vomiting. Prior to today, patient was well and on no new medications.        PAST MEDICAL & SURGICAL HISTORY:  Type 2 diabetes mellitus with retinopathy, macular edema presence unspecified, unspecified long term insulin use status, unspecified retinopathy severity  HLD (hyperlipidemia)  HTN (hypertension)  No significant past surgical history      FAMILY HISTORY:  Parents  in the [place]  Aunt with Type 2 DM    Social History: No tobacco use, + vodka    Outpatient Medications:  · 	aspirin 81 mg oral tablet: 2 tab(s) orally once a day, Last Dose Taken:    · 	irbesartan 150 mg oral tablet: 1 tab(s) orally once a day, Last Dose Taken:    · 	Centrum Silver Men's: 1 tab(s) orally once a day, Last Dose Taken:    · 	Advil 200 mg oral tablet: 3 tab(s) orally , As Needed, Last Dose Taken:    · 	glipiZIDE 10 mg oral tablet: 1 tab(s) orally 2 times a day, Last Dose Taken:    · 	metFORMIN 500 mg oral tablet: 3 tab(s) orally once a day, Last Dose Taken:    · 	Lasix 20 mg oral tablet: 1 tab(s) orally 2 times a day, As Needed, Last Dose Taken:    · 	pravastatin 40 mg oral tablet: 1 tab(s) orally once a day, Last Dose Taken:    · 	Vitamin D3 400 intl units oral tablet: 1 tab(s) orally once a day, Last Dose Taken:    · 	pioglitazone 30 mg oral tablet: 1 tab(s) orally once a day, Last Dose Taken:    · 	Onglyza 2.5 mg oral tablet: 1 tab(s) orally once a day, Last Dose Taken:      MEDICATIONS  (STANDING):  aspirin enteric coated 81 milliGRAM(s) Oral daily  dextrose 5%. 1000 milliLiter(s) (50 mL/Hr) IV Continuous <Continuous>  dextrose 50% Injectable 12.5 Gram(s) IV Push once  dextrose 50% Injectable 25 Gram(s) IV Push once  dextrose 50% Injectable 25 Gram(s) IV Push once  heparin  Injectable 5000 Unit(s) SubCutaneous every 12 hours  insulin lispro (HumaLOG) corrective regimen sliding scale   SubCutaneous three times a day before meals  insulin lispro (HumaLOG) corrective regimen sliding scale   SubCutaneous at bedtime  losartan 50 milliGRAM(s) Oral daily  multivitamin/minerals 1 Tablet(s) Oral daily    MEDICATIONS  (PRN):  dextrose Gel 1 Dose(s) Oral once PRN Blood Glucose LESS THAN 70 milliGRAM(s)/deciliter  glucagon  Injectable 1 milliGRAM(s) IntraMuscular once PRN Glucose LESS THAN 70 milligrams/deciliter      Allergies    No Known Allergies    Intolerances      Review of Systems:  Constitutional: No fever, +weight loss with diuretic  Eyes: +retinopathy s/p laser  Neuro: + headache, +neuropathy  HEENT: No throat pain  Cardiovascular: No chest pain  Respiratory: No SOB  GI: No nausea or vomiting  : No polyuria  Skin: no rash  Psych: no depression  Endocrine: No polydipsia, No heat or cold intolerance, rest as noted in HPI  Hem/lymph: no swelling    All other review of systems negative      PHYSICAL EXAM:  VITALS: T(C): 36.4 (10-20-17 @ 15:57)  T(F): 97.5 (10-20-17 @ 15:57), Max: 97.5 (10-20-17 @ 15:57)  HR: 66 (10-20-17 @ 15:28) (66 - 74)  BP: 145/79 (10-20-17 @ 15:28) (126/55 - 145/79)  RR:  (17 - 18)  SpO2:  (97% - 99%)  Wt(kg): --  GENERAL: NAD at this time  EYES: No proptosis, EOMI  HEENT:  Atraumatic, Normocephalic,   THYROID: Normal size, no palpable nodules  RESPIRATORY: Clear to auscultation bilaterally, full excursion, non-labored  CARDIOVASCULAR: Regular rhythm; No murmurs; +tibial pitting edema  GI: Soft, nontender, non distended, normal bowel sounds  SKIN: Dry, intact, No rashes or lesions  MUSCULOSKELETAL: normal strength  NEURO: follows commands, no tremor, normal reflexes  PSYCH: Alert and oriented x 3, normal affect, normal mood  CUSHING'S SIGNS: no striae      POCT Blood Glucose.: 110 mg/dL (10-20-17 @ 15:36)  POCT Blood Glucose.: 122 mg/dL (10-20-17 @ 14:36)  POCT Blood Glucose.: 132 mg/dL (10-20-17 @ 13:33)  POCT Blood Glucose.: 125 mg/dL (10-20-17 @ 12:29)  POCT Blood Glucose.: 146 mg/dL (10-20-17 @ 11:41)  POCT Blood Glucose.: 36 mg/dL (10-20-17 @ 11:20)  POCT Blood Glucose.: 38 mg/dL (10-20-17 @ 11:17)                              12.2   7.0   )-----------( 133      ( 20 Oct 2017 11:35 )             37.8       10-20    135  |  100  |  67<H>  ----------------------------<  33<LL>  4.6   |  20<L>  |  1.94<H>    EGFR if : 35<L>  EGFR if non : 30<L>    Ca    9.2      10-20    TPro  7.5  /  Alb  4.4  /  TBili  0.4  /  DBili  x   /  AST  60<H>  /  ALT  39  /  AlkPhos  131<H>  10-20    Thyroid Function Tests:      Hemoglobin A1C, Whole Blood: 8.5 % <H> [4.0 - 5.6] (17 @ 07:27)        Radiology:

## 2017-10-20 NOTE — H&P ADULT - NSHPPHYSICALEXAM_GEN_ALL_CORE
PHYSICAL EXAMINATION:  Vital Signs Last 24 Hrs  T(C): 36.4 (20 Oct 2017 15:57), Max: 36.4 (20 Oct 2017 15:57)  T(F): 97.5 (20 Oct 2017 15:57), Max: 97.5 (20 Oct 2017 15:57)  HR: 66 (20 Oct 2017 15:28) (66 - 74)  BP: 145/79 (20 Oct 2017 15:28) (126/55 - 145/79)  BP(mean): --  RR: 17 (20 Oct 2017 15:28) (17 - 18)  SpO2: 99% (20 Oct 2017 15:28) (97% - 99%)  CAPILLARY BLOOD GLUCOSE  36 (20 Oct 2017 16:17)      POCT Blood Glucose.: 110 mg/dL (20 Oct 2017 15:36)  POCT Blood Glucose.: 122 mg/dL (20 Oct 2017 14:36)  POCT Blood Glucose.: 132 mg/dL (20 Oct 2017 13:33)  POCT Blood Glucose.: 125 mg/dL (20 Oct 2017 12:29)  POCT Blood Glucose.: 146 mg/dL (20 Oct 2017 11:41)  POCT Blood Glucose.: 36 mg/dL (20 Oct 2017 11:20)  POCT Blood Glucose.: 38 mg/dL (20 Oct 2017 11:17)      GENERAL: NAD, well-groomed, well-developed  HEAD:  atraumatic, normocephalic  EYES: sclera anicteric  ENMT: mucous membranes moist  NECK: supple, No JVD  CHEST/LUNG: clear to auscultation bilaterally; no rales, rhonchi, or wheezing b/l  HEART: normal S1, S2  ABDOMEN: BS+, soft, ND, NT   EXTREMITIES:  pulses palpable; no clubbing, cyanosis, 1+ edema b/l LEs  NEURO: awake, alert, interactive; moves all extremities  SKIN: no rashes or lesions

## 2017-10-20 NOTE — H&P ADULT - NSHPSOCIALHISTORY_GEN_ALL_CORE
Social History:    Marital Status:  ( x  )    (   ) Single    (   )    (  )   Occupation:   Lives with: (  ) alone  (  ) children   (x ) spouse   (  ) parents  (  ) other    Substance Use (street drugs): (x  ) never used  (  ) other:  Tobacco Usage:  (  x ) never smoked   (   ) former smoker   (   ) current smoker  (     ) pack years  (        ) last cigarette date  Alcohol Usage: denies    (     ) Advanced Directives: (     ) None    (      ) DNR    (     ) DNI    (     ) Health Care Proxy:

## 2017-10-20 NOTE — H&P ADULT - NSHPREVIEWOFSYSTEMS_GEN_ALL_CORE
REVIEW OF SYSTEMS:    CONSTITUTIONAL: + weakness, fevers or chills  EYES/ENT: No visual changes;  No vertigo or throat pain   NECK: No pain or stiffness  RESPIRATORY: No cough, wheezing, hemoptysis; No shortness of breath  CARDIOVASCULAR: No chest pain or palpitations  GASTROINTESTINAL: No abdominal or epigastric pain. No nausea, vomiting, or hematemesis; No diarrhea or constipation. No melena or hematochezia.  GENITOURINARY: No dysuria, frequency or hematuria  NEUROLOGICAL: No numbness or weakness  SKIN: No itching, burning, rashes, or lesions   All other review of systems is negative unless indicated above.

## 2017-10-21 LAB
ANION GAP SERPL CALC-SCNC: 12 MMOL/L — SIGNIFICANT CHANGE UP (ref 5–17)
BASOPHILS # BLD AUTO: 0.04 K/UL — SIGNIFICANT CHANGE UP (ref 0–0.2)
BASOPHILS NFR BLD AUTO: 0.8 % — SIGNIFICANT CHANGE UP (ref 0–2)
BUN SERPL-MCNC: 62 MG/DL — HIGH (ref 7–23)
CALCIUM SERPL-MCNC: 9.4 MG/DL — SIGNIFICANT CHANGE UP (ref 8.4–10.5)
CHLORIDE SERPL-SCNC: 106 MMOL/L — SIGNIFICANT CHANGE UP (ref 96–108)
CHOLEST SERPL-MCNC: 111 MG/DL — SIGNIFICANT CHANGE UP (ref 10–199)
CO2 SERPL-SCNC: 18 MMOL/L — LOW (ref 22–31)
CREAT SERPL-MCNC: 1.98 MG/DL — HIGH (ref 0.5–1.3)
CULTURE RESULTS: NO GROWTH — SIGNIFICANT CHANGE UP
EOSINOPHIL # BLD AUTO: 0.28 K/UL — SIGNIFICANT CHANGE UP (ref 0–0.5)
EOSINOPHIL NFR BLD AUTO: 5.4 % — SIGNIFICANT CHANGE UP (ref 0–6)
GLUCOSE BLDC GLUCOMTR-MCNC: 111 MG/DL — HIGH (ref 70–99)
GLUCOSE BLDC GLUCOMTR-MCNC: 131 MG/DL — HIGH (ref 70–99)
GLUCOSE BLDC GLUCOMTR-MCNC: 149 MG/DL — HIGH (ref 70–99)
GLUCOSE BLDC GLUCOMTR-MCNC: 206 MG/DL — HIGH (ref 70–99)
GLUCOSE BLDC GLUCOMTR-MCNC: 89 MG/DL — SIGNIFICANT CHANGE UP (ref 70–99)
GLUCOSE SERPL-MCNC: 135 MG/DL — HIGH (ref 70–99)
HBA1C BLD-MCNC: 7.9 % — HIGH (ref 4–5.6)
HCT VFR BLD CALC: 36.8 % — LOW (ref 39–50)
HDLC SERPL-MCNC: 70 MG/DL — SIGNIFICANT CHANGE UP (ref 40–125)
HGB BLD-MCNC: 11.7 G/DL — LOW (ref 13–17)
IMM GRANULOCYTES NFR BLD AUTO: 0.6 % — SIGNIFICANT CHANGE UP (ref 0–1.5)
LIPID PNL WITH DIRECT LDL SERPL: 32 MG/DL — SIGNIFICANT CHANGE UP
LYMPHOCYTES # BLD AUTO: 0.81 K/UL — LOW (ref 1–3.3)
LYMPHOCYTES # BLD AUTO: 15.5 % — SIGNIFICANT CHANGE UP (ref 13–44)
MAGNESIUM SERPL-MCNC: 1.9 MG/DL — SIGNIFICANT CHANGE UP (ref 1.6–2.6)
MCHC RBC-ENTMCNC: 27.1 PG — SIGNIFICANT CHANGE UP (ref 27–34)
MCHC RBC-ENTMCNC: 31.8 GM/DL — LOW (ref 32–36)
MCV RBC AUTO: 85.2 FL — SIGNIFICANT CHANGE UP (ref 80–100)
MONOCYTES # BLD AUTO: 0.54 K/UL — SIGNIFICANT CHANGE UP (ref 0–0.9)
MONOCYTES NFR BLD AUTO: 10.3 % — SIGNIFICANT CHANGE UP (ref 2–14)
NEUTROPHILS # BLD AUTO: 3.52 K/UL — SIGNIFICANT CHANGE UP (ref 1.8–7.4)
NEUTROPHILS NFR BLD AUTO: 67.4 % — SIGNIFICANT CHANGE UP (ref 43–77)
PHOSPHATE SERPL-MCNC: 3.5 MG/DL — SIGNIFICANT CHANGE UP (ref 2.5–4.5)
PLATELET # BLD AUTO: 160 K/UL — SIGNIFICANT CHANGE UP (ref 150–400)
POTASSIUM SERPL-MCNC: 5.2 MMOL/L — SIGNIFICANT CHANGE UP (ref 3.5–5.3)
POTASSIUM SERPL-SCNC: 5.2 MMOL/L — SIGNIFICANT CHANGE UP (ref 3.5–5.3)
RBC # BLD: 4.32 M/UL — SIGNIFICANT CHANGE UP (ref 4.2–5.8)
RBC # FLD: 14.9 % — HIGH (ref 10.3–14.5)
SODIUM SERPL-SCNC: 136 MMOL/L — SIGNIFICANT CHANGE UP (ref 135–145)
SPECIMEN SOURCE: SIGNIFICANT CHANGE UP
TOTAL CHOLESTEROL/HDL RATIO MEASUREMENT: 1.6 RATIO — LOW (ref 3.4–9.6)
TRIGL SERPL-MCNC: 46 MG/DL — SIGNIFICANT CHANGE UP (ref 10–149)
TSH SERPL-MCNC: 1.37 UIU/ML — SIGNIFICANT CHANGE UP (ref 0.27–4.2)
WBC # BLD: 5.22 K/UL — SIGNIFICANT CHANGE UP (ref 3.8–10.5)
WBC # FLD AUTO: 5.22 K/UL — SIGNIFICANT CHANGE UP (ref 3.8–10.5)

## 2017-10-21 RX ADMIN — HEPARIN SODIUM 5000 UNIT(S): 5000 INJECTION INTRAVENOUS; SUBCUTANEOUS at 05:18

## 2017-10-21 RX ADMIN — LOSARTAN POTASSIUM 50 MILLIGRAM(S): 100 TABLET, FILM COATED ORAL at 05:17

## 2017-10-21 RX ADMIN — HEPARIN SODIUM 5000 UNIT(S): 5000 INJECTION INTRAVENOUS; SUBCUTANEOUS at 17:10

## 2017-10-21 RX ADMIN — Medication 1 TABLET(S): at 12:17

## 2017-10-21 RX ADMIN — Medication 81 MILLIGRAM(S): at 12:17

## 2017-10-21 RX ADMIN — Medication 2: at 12:16

## 2017-10-21 NOTE — CONSULT NOTE ADULT - SUBJECTIVE AND OBJECTIVE BOX
Patient is a 89y old  Male who presents with a chief complaint of headache,weakness (20 Oct 2017 17:06)    89 yo man pt of Dr. Goldberg with Type 2 DM on multiple oral agents, CRI, HTN, hyperlipidemia, diastolic CHF, presenting with 2 hours of headache and fatigue, due to symptomatic hypoglycemia with BG 30.  Patient's lasix dose was recently increased for edema, Cr is up above baseline, and patient reports eating light breakfast day of admission.  Here in hospital oral hypoglycemics held and he was placed on SSI insulin.  Today BGs in 100-200 range.  Endocrine saw pt in consultation and recommended insulin given side effects of multiple oral hypoglycemic agents, but patient refused at this time.  Pt denies chest pain, dyspnea or palpitations.    PAST MEDICAL & SURGICAL HISTORY:  Type 2 diabetes mellitus with retinopathy, macular edema presence unspecified, unspecified long term insulin use status, unspecified retinopathy severity  HLD (hyperlipidemia)  HTN (hypertension)  No significant past surgical history    Allergies    No Known Allergies    Intolerances      MEDICATIONS  (STANDING):  aspirin enteric coated 81 milliGRAM(s) Oral daily  dextrose 5%. 1000 milliLiter(s) (50 mL/Hr) IV Continuous <Continuous>  dextrose 50% Injectable 12.5 Gram(s) IV Push once  dextrose 50% Injectable 25 Gram(s) IV Push once  dextrose 50% Injectable 25 Gram(s) IV Push once  heparin  Injectable 5000 Unit(s) SubCutaneous every 12 hours  insulin lispro (HumaLOG) corrective regimen sliding scale   SubCutaneous three times a day before meals  insulin lispro (HumaLOG) corrective regimen sliding scale   SubCutaneous at bedtime  losartan 50 milliGRAM(s) Oral daily  multivitamin/minerals 1 Tablet(s) Oral daily    MEDICATIONS  (PRN):  dextrose Gel 1 Dose(s) Oral once PRN Blood Glucose LESS THAN 70 milliGRAM(s)/deciliter  glucagon  Injectable 1 milliGRAM(s) IntraMuscular once PRN Glucose LESS THAN 70 milligrams/deciliter    FAMILY HISTORY:  No pertinent family history in first degree relatives      ROS:  Positive:    General: Denies weight loss, fevers, rash, decreased hearing  Cardiac: See HPI  Resp: Denies cough, sputum, wheezing, hemoptysis  GI: Denies change in bowel habits, diarrhea, weight loss, melena, tarry stools,   nausea, vomiting, jaundice, abdominal pain, dysphagia  : Denies dysuria, nocturia, hematuria  Neuro: See hPI  Musculoskeletal: Denies neck pain back pain joint pain.  Skin: Denies rash, itching, dryness.  Endocrine: Denies polydipsia, polyuria  Psychiatric: Denies depression, anxiety  All other review of systems is negative unless indicated above.    PHYSICAL EXAM:  Vital Signs Last 24 Hrs  T(C): 36.7 (21 Oct 2017 11:57), Max: 36.7 (21 Oct 2017 04:25)  T(F): 98 (21 Oct 2017 11:57), Max: 98 (21 Oct 2017 04:25)  HR: 66 (21 Oct 2017 11:57) (66 - 77)  BP: 124/73 (21 Oct 2017 11:57) (123/75 - 145/79)  BP(mean): --  RR: 18 (21 Oct 2017 11:57) (16 - 18)  SpO2: 98% (21 Oct 2017 11:57) (95% - 99%)  I&O's Summary    20 Oct 2017 07:01  -  21 Oct 2017 07:00  --------------------------------------------------------  IN: 50 mL / OUT: 1 mL / NET: 49 mL    21 Oct 2017 07:01  -  21 Oct 2017 14:49  --------------------------------------------------------  IN: 480 mL / OUT: 0 mL / NET: 480 mL        General Appearance: 	 Alert, cooperative, no distress  HEENT: normocephalic, atraumatic, PERRLA, EOMI, conjunctiva normal, sclera anicteric,   Neck: no JVD,  carotid 2+  bilaterally without bruits, thyroid normal to inspection and palpation, no adenopathy, trachea midline  Lungs:  clear to auscultation and percussion bilaterally  Cor:  pmi 5th ICS MCL, regular rate and rhythm, S1 normal intensity, S2 normal intensity, no gallops, mumrus or rubs  Abdomen:	 soft, non-tender; bowel sounds normal; no masses,  no organomegaly  Extremities: without cyanosis, clubbing or edema  Vasc: 2-+ PT and DP pulses; no varicosities  Neurologic: A&O x 3 (time, place, person). Symmetric strength; limited exam  Musculoskeletal: no kyphosis, scoliosis; normal gait, normal tone  Skin: no rashes; limited exam    EKG:  Telemetry:    LABS:                        11.7   5.22  )-----------( 160      ( 21 Oct 2017 08:48 )             36.8     10-21    136  |  106  |  62<H>  ----------------------------<  135<H>  5.2   |  18<L>  |  1.98<H>    Ca    9.4      21 Oct 2017 08:59  Phos  3.5     10-21  Mg     1.9     10-21    TPro  7.5  /  Alb  4.4  /  TBili  0.4  /  DBili  x   /  AST  60<H>  /  ALT  39  /  AlkPhos  131<H>  10-20    CAPILLARY BLOOD GLUCOSE  206 (21 Oct 2017 11:57)  111 (21 Oct 2017 07:19)  208 (20 Oct 2017 21:10)  95 (20 Oct 2017 17:56)  36 (20 Oct 2017 16:17)      POCT Blood Glucose.: 206 mg/dL (21 Oct 2017 11:59)  POCT Blood Glucose.: 111 mg/dL (21 Oct 2017 07:21)  POCT Blood Glucose.: 208 mg/dL (20 Oct 2017 21:10)  POCT Blood Glucose.: 95 mg/dL (20 Oct 2017 17:54)  POCT Blood Glucose.: 110 mg/dL (20 Oct 2017 15:36)    PT/INR - ( 20 Oct 2017 11:35 )   PT: 11.1 sec;   INR: 1.02 ratio         PTT - ( 20 Oct 2017 11:35 )  PTT:32.8 sec      CXR:      Impression/Plan:    Morro Martins MD  [facility] Patient is a 89y old  Male who presents with a chief complaint of headache,weakness (20 Oct 2017 17:06)    88 yo man pt of Dr. Goldberg with Type 2 DM on multiple oral agents, CRI, HTN, hyperlipidemia, diastolic CHF, presenting with 2 hours of headache and fatigue and difficulty speaking, due to symptomatic hypoglycemia with BG 30.  Patient's lasix dose was recently increased for edema, Cr is up above baseline Cr 1.5-1.6, and patient reports eating light breakfast day of admission.  Here in hospital oral hypoglycemics held and he was placed on SSI insulin.  Today BGs in 100-200 range.  Endocrine saw pt in consultation and recommended insulin given side effects of multiple oral hypoglycemic agents, but patient refused at this time.  Pt denies chest pain, dyspnea or palpitations.    PAST MEDICAL & SURGICAL HISTORY:  Type 2 diabetes mellitus with retinopathy, macular edema presence unspecified, unspecified long term insulin use status, unspecified retinopathy severity  HLD (hyperlipidemia)  HTN (hypertension)  No significant past surgical history    Allergies    No Known Allergies    Intolerances      MEDICATIONS  (STANDING):  aspirin enteric coated 81 milliGRAM(s) Oral daily  dextrose 5%. 1000 milliLiter(s) (50 mL/Hr) IV Continuous <Continuous>  dextrose 50% Injectable 12.5 Gram(s) IV Push once  dextrose 50% Injectable 25 Gram(s) IV Push once  dextrose 50% Injectable 25 Gram(s) IV Push once  heparin  Injectable 5000 Unit(s) SubCutaneous every 12 hours  insulin lispro (HumaLOG) corrective regimen sliding scale   SubCutaneous three times a day before meals  insulin lispro (HumaLOG) corrective regimen sliding scale   SubCutaneous at bedtime  losartan 50 milliGRAM(s) Oral daily  multivitamin/minerals 1 Tablet(s) Oral daily    MEDICATIONS  (PRN):  dextrose Gel 1 Dose(s) Oral once PRN Blood Glucose LESS THAN 70 milliGRAM(s)/deciliter  glucagon  Injectable 1 milliGRAM(s) IntraMuscular once PRN Glucose LESS THAN 70 milligrams/deciliter    FAMILY HISTORY:  No pertinent family history in first degree relatives      ROS:  Positive:    General: Denies weight loss, fevers, rash, decreased hearing  Cardiac: See HPI  Resp: Denies cough, sputum, wheezing, hemoptysis  GI: Denies change in bowel habits, diarrhea, weight loss, melena, tarry stools,   nausea, vomiting, jaundice, abdominal pain, dysphagia  : Denies dysuria, nocturia, hematuria  Neuro: See hPI  Musculoskeletal: Denies neck pain back pain joint pain.  Skin: Denies rash, itching, dryness.  Endocrine: Denies polydipsia, polyuria  Psychiatric: Denies depression, anxiety  All other review of systems is negative unless indicated above.    PHYSICAL EXAM:  Vital Signs Last 24 Hrs  T(C): 36.7 (21 Oct 2017 11:57), Max: 36.7 (21 Oct 2017 04:25)  T(F): 98 (21 Oct 2017 11:57), Max: 98 (21 Oct 2017 04:25)  HR: 66 (21 Oct 2017 11:57) (66 - 77)  BP: 124/73 (21 Oct 2017 11:57) (123/75 - 145/79)  BP(mean): --  RR: 18 (21 Oct 2017 11:57) (16 - 18)  SpO2: 98% (21 Oct 2017 11:57) (95% - 99%)  I&O's Summary    20 Oct 2017 07:01  -  21 Oct 2017 07:00  --------------------------------------------------------  IN: 50 mL / OUT: 1 mL / NET: 49 mL    21 Oct 2017 07:01  -  21 Oct 2017 14:49  --------------------------------------------------------  IN: 480 mL / OUT: 0 mL / NET: 480 mL        General Appearance: 	 Alert, cooperative, no distress  HEENT: normocephalic, atraumatic, PERRLA, EOMI, conjunctiva normal, sclera anicteric,   Neck: JVP 10-12  Lungs:  clear to auscultation and percussion bilaterally  Cor:  pmi 5th ICS MCL, regular rate and rhythm, S1 normal intensity, S2 normal intensity CLAUDIA  Abdomen:	 Softly distended, active BS  Extremities: 1+ edema      EKG: SR, LBBB  Telemetry:    LABS:                        11.7   5.22  )-----------( 160      ( 21 Oct 2017 08:48 )             36.8     10-21    136  |  106  |  62<H>  ----------------------------<  135<H>  5.2   |  18<L>  |  1.98<H>    Ca    9.4      21 Oct 2017 08:59  Phos  3.5     10-21  Mg     1.9     10-21    TPro  7.5  /  Alb  4.4  /  TBili  0.4  /  DBili  x   /  AST  60<H>  /  ALT  39  /  AlkPhos  131<H>  10-20    CAPILLARY BLOOD GLUCOSE  206 (21 Oct 2017 11:57)  111 (21 Oct 2017 07:19)  208 (20 Oct 2017 21:10)  95 (20 Oct 2017 17:56)  36 (20 Oct 2017 16:17)      POCT Blood Glucose.: 206 mg/dL (21 Oct 2017 11:59)  POCT Blood Glucose.: 111 mg/dL (21 Oct 2017 07:21)  POCT Blood Glucose.: 208 mg/dL (20 Oct 2017 21:10)  POCT Blood Glucose.: 95 mg/dL (20 Oct 2017 17:54)  POCT Blood Glucose.: 110 mg/dL (20 Oct 2017 15:36)    PT/INR - ( 20 Oct 2017 11:35 )   PT: 11.1 sec;   INR: 1.02 ratio         PTT - ( 20 Oct 2017 11:35 )  PTT:32.8 sec    CXR: No clear infiltrate      Impression/Plan: Symptomatic hypoglycemia now improved  Discharge either today or in am once home diabetic regimen clarified with endo  Would hold lasix until follows up in office next week  Encourage oral PO intake    Morro Martins MD  [facility]

## 2017-10-22 ENCOUNTER — TRANSCRIPTION ENCOUNTER (OUTPATIENT)
Age: 82
End: 2017-10-22

## 2017-10-22 VITALS
TEMPERATURE: 97 F | OXYGEN SATURATION: 98 % | SYSTOLIC BLOOD PRESSURE: 138 MMHG | HEART RATE: 90 BPM | RESPIRATION RATE: 18 BRPM | DIASTOLIC BLOOD PRESSURE: 56 MMHG

## 2017-10-22 DIAGNOSIS — N18.9 CHRONIC KIDNEY DISEASE, UNSPECIFIED: ICD-10-CM

## 2017-10-22 LAB
GLUCOSE BLDC GLUCOMTR-MCNC: 101 MG/DL — HIGH (ref 70–99)
GLUCOSE BLDC GLUCOMTR-MCNC: 197 MG/DL — HIGH (ref 70–99)
HCT VFR BLD CALC: 34.5 % — LOW (ref 39–50)
HGB BLD-MCNC: 11.3 G/DL — LOW (ref 13–17)
MCHC RBC-ENTMCNC: 28.3 PG — SIGNIFICANT CHANGE UP (ref 27–34)
MCHC RBC-ENTMCNC: 32.8 GM/DL — SIGNIFICANT CHANGE UP (ref 32–36)
MCV RBC AUTO: 86.3 FL — SIGNIFICANT CHANGE UP (ref 80–100)
PLATELET # BLD AUTO: 161 K/UL — SIGNIFICANT CHANGE UP (ref 150–400)
RBC # BLD: 4 M/UL — LOW (ref 4.2–5.8)
RBC # FLD: 15.1 % — HIGH (ref 10.3–14.5)
WBC # BLD: 4.78 K/UL — SIGNIFICANT CHANGE UP (ref 3.8–10.5)
WBC # FLD AUTO: 4.78 K/UL — SIGNIFICANT CHANGE UP (ref 3.8–10.5)

## 2017-10-22 PROCEDURE — 99232 SBSQ HOSP IP/OBS MODERATE 35: CPT

## 2017-10-22 RX ORDER — METFORMIN HYDROCHLORIDE 850 MG/1
3 TABLET ORAL
Qty: 0 | Refills: 0 | COMMUNITY

## 2017-10-22 RX ORDER — FUROSEMIDE 40 MG
1 TABLET ORAL
Qty: 0 | Refills: 0 | COMMUNITY

## 2017-10-22 RX ORDER — IBUPROFEN 200 MG
3 TABLET ORAL
Qty: 0 | Refills: 0 | COMMUNITY

## 2017-10-22 RX ADMIN — LOSARTAN POTASSIUM 50 MILLIGRAM(S): 100 TABLET, FILM COATED ORAL at 05:09

## 2017-10-22 RX ADMIN — Medication 1 TABLET(S): at 12:02

## 2017-10-22 RX ADMIN — Medication 81 MILLIGRAM(S): at 12:02

## 2017-10-22 RX ADMIN — HEPARIN SODIUM 5000 UNIT(S): 5000 INJECTION INTRAVENOUS; SUBCUTANEOUS at 05:09

## 2017-10-22 NOTE — DISCHARGE NOTE ADULT - CARE PROVIDERS DIRECT ADDRESSES
,DirectAddress_Unknown,[email or web address]

## 2017-10-22 NOTE — DISCHARGE NOTE ADULT - PATIENT PORTAL LINK FT
“You can access the Follow[employer] Patient Portal, offered by [facility], by registering with the following website: [email or web address]”

## 2017-10-22 NOTE — DIETITIAN INITIAL EVALUATION ADULT. - ORAL INTAKE PTA
Pt reports good appetite and intake PTA. Pt works long hours typically eats two meals daily. Pt brings food from home to eat at work, typically sandwich, fruit, meat and vegetables for dinner. Pt takes vitamin D3 PTA. NKFA./good

## 2017-10-22 NOTE — DISCHARGE NOTE ADULT - PLAN OF CARE
Resolution of symptom HgA1C this admission.  Make sure you get your HgA1c checked every three months.  If you take oral diabetes medications, check your blood glucose two times a day.  If you take insulin, check your blood glucose before meals and at bedtime.  It's important not to skip any meals.  Keep a log of your blood glucose results and always take it with you to your doctor appointments.  Keep a list of your current medications including injectables and over the counter medications and bring this medication list with you to all your doctor appointments.  If you have not seen your ophthalmologist this year call for appointment.  Check your feet daily for redness, sores, or openings. Do not self treat. If no improvement in two days call your primary care physician for an appointment.  Low blood sugar (hypoglycemia) is a blood sugar below 70mg/dl. Check your blood sugar if you feel signs/symptoms of hypoglycemia. If your blood sugar is below 70 take 15 grams of carbohydrates (ex 4 oz of apple juice, 3-4 glucose tablets, or 4-6 oz of regular soda) wait 15 minutes and repeat blood sugar to make sure it comes up above 70.  If your blood sugar is above 70 and you are due for a meal, have a meal.  If you are not due for a meal have a snack.  This snack helps keeps your blood sugar at a safe range. Avoid taking (NSAIDs) - (ex: Ibuprofen, Advil, Celebrex, Naprosyn)  Avoid taking any nephrotoxic agents (can harm kidneys) - Intravenous contrast for diagnostic testing, combination cold medications.  Have all medications adjusted for your renal function by your Health Care Provider.  Blood pressure control is important.  Take all medication as prescribed. Low salt diet  Activity as tolerated.  Take all medication as prescribed.  Follow up with your medical doctor for routine blood pressure monitoring at your next visit.  Notify your doctor if you have any of the following symptoms:   Dizziness, Lightheadedness, Blurry vision, Headache, Chest pain, Shortness of breath

## 2017-10-22 NOTE — DIETITIAN INITIAL EVALUATION ADULT. - NS AS NUTRI INTERV ED CONTENT
Discussed DM nutrition therapy including: sources of CHO, portion sizes of CHO, pairing CHO with proteins, reading food labels, consistent eating patterns, and importance of self monitoring blood glucose levels. Pt verbalized understanding and accepted written handout. DASH/TLC nutrition therapy discussed with patient including; low sodium foods, foods to avoid, increased consumption of lean meats, whole grains, fruit and vegetables.

## 2017-10-22 NOTE — PROGRESS NOTE ADULT - PROBLEM SELECTOR PLAN 1
Discharge plan:  Pt should stop Metformin (GFR < 30), Glipizide (contraindicated w/PARAS), Actos (can potentiate HF). Pt can stay on Onglyza for now. Counseled on limiting alcohol intake.   -Pt will need to follow up with Dr Painting this week to re-evaluate DM regimen. May benefit from Prandin and Lantus. Would not start at this time 2/2 BG values remaining at goal w/PARAS and recent sulfonylurea use.  -discussed w/pt and medicine NP  pager: [phone] Discharge plan:  Pt should stop Metformin (GFR < 30), Glipizide (contraindicated w/PARAS), Actos (can potentiate HF). Pt can stay on Onglyza for now. Counseled on limiting alcohol intake.   -Pt will need to follow up with Dr Painting this week to re-evaluate DM regimen. May benefit from Prandin and Lantus. Would not start at this time 2/2 BG values remaining at goal w/PARAS and recent sulfonylurea use.  -Please send RX to pharmacy for glucometer, test strips and lancets.   -discussed w/pt and medicine NP  pager: [phone]

## 2017-10-22 NOTE — DIETITIAN INITIAL EVALUATION ADULT. - OTHER INFO
Nutrition consult received for education and elevated HgbA1c. Per chart, pt is an 89y old  Male who presents with a chief complaint of headache, weakness, hypoglycemia (now resolved) oral hypoglycemics (on hold), HTN control, CKD stage 3- (diuretics on hold). Pt reports very good appetite and intake, eating >75% of meals. Pt denies any history of chewing/swallowing difficulty or GI distress at this time. Last BM today. Pt reports weight has been stable,  pounds, current weight 163.5 pounds- RD to continue to monitor weight trends. Discussed T2DM and DASH/TLC nutrition therapy and provided written handout. Nutrition consult received for education and elevated HgbA1c. Per chart, pt is an 89y old  Male who presents with a chief complaint of headache, weakness, hypoglycemia (now resolved) oral hypoglycemics (on hold), HTN control, CKD stage 3- (diuretics on hold). Pt reports very good appetite and intake, eating >75% of meals. Pt denies any history of chewing/swallowing difficulty or GI distress at this time. Last BM today. Pt reports weight has been stable,  pounds, current weight 163.5 pounds- RD to continue to monitor weight trends. Discussed T2DM and DASH/TLC nutrition therapy and provided written handout. Pt verbalized intent to make changes to his diet.

## 2017-10-22 NOTE — DISCHARGE NOTE ADULT - SECONDARY DIAGNOSIS.
CKD (chronic kidney disease) Hypertension, unspecified type Type 2 diabetes mellitus with hypoglycemia without coma, without long-term current use of insulin

## 2017-10-22 NOTE — DIETITIAN INITIAL EVALUATION ADULT. - PERTINENT LABORATORY DATA
Point of Care Testing BG: (10/21) , (10/22) 101, (10/21) BUN 62, Creatinine 1.98, Glucose 135, HgbA1c 7.9%

## 2017-10-22 NOTE — DISCHARGE NOTE ADULT - INSTRUCTIONS
Call and make follow up appointment with Primary Care Provider and Cardiologist after discharged. Return to the nearest emergency room or call 911 for worsening headache. dizziness, sweating, blurry vision or chest pain.

## 2017-10-22 NOTE — DISCHARGE NOTE ADULT - MEDICATION SUMMARY - MEDICATIONS TO TAKE
I will START or STAY ON the medications listed below when I get home from the hospital:    aspirin 81 mg oral tablet  -- 2 tab(s) by mouth once a day  -- Indication: For Preventive measure    irbesartan 150 mg oral tablet  -- 1 tab(s) by mouth once a day  -- Indication: For Hypertension, unspecified type    Onglyza 2.5 mg oral tablet  -- 1 tab(s) by mouth once a day  -- Indication: For Type 2 diabetes mellitus with hypoglycemia without coma, without long-term current use of insulin    pravastatin 40 mg oral tablet  -- 1 tab(s) by mouth once a day  -- Indication: For Hyperlipidemia, unspecified hyperlipidemia type    Centrum Silver Men's  -- 1 tab(s) by mouth once a day  -- Indication: For PMD- Joel Goldberg    Vitamin D3 400 intl units oral tablet  -- 1 tab(s) by mouth once a day  -- Indication: For PMD- Joel Goldberg

## 2017-10-22 NOTE — DIETITIAN INITIAL EVALUATION ADULT. - ADHERENCE
fair/Pt tries to follow a diabetic diet and avoid concentrated sweets, but is not always compliant. Pt noted to have HgbA1c (10/21) 7.9%, Pt does not check fingersticks PTA.

## 2017-10-22 NOTE — PROGRESS NOTE ADULT - SUBJECTIVE AND OBJECTIVE BOX
Patient is a 89y old  Male who presents with a chief complaint of headache,weakness (20 Oct 2017 17:06)      SUBJECTIVE / OVERNIGHT EVENTS: Comfortable without new complaints.   Review of Systems  chest pain no  palpitations no  sob no  nausea no  headache no    MEDICATIONS  (STANDING):  aspirin enteric coated 81 milliGRAM(s) Oral daily  dextrose 5%. 1000 milliLiter(s) (50 mL/Hr) IV Continuous <Continuous>  dextrose 50% Injectable 12.5 Gram(s) IV Push once  dextrose 50% Injectable 25 Gram(s) IV Push once  dextrose 50% Injectable 25 Gram(s) IV Push once  heparin  Injectable 5000 Unit(s) SubCutaneous every 12 hours  insulin lispro (HumaLOG) corrective regimen sliding scale   SubCutaneous at bedtime  losartan 50 milliGRAM(s) Oral daily  multivitamin/minerals 1 Tablet(s) Oral daily    MEDICATIONS  (PRN):  dextrose Gel 1 Dose(s) Oral once PRN Blood Glucose LESS THAN 70 milliGRAM(s)/deciliter  glucagon  Injectable 1 milliGRAM(s) IntraMuscular once PRN Glucose LESS THAN 70 milligrams/deciliter          PHYSICAL EXAM:  GENERAL: NAD, well-developed  HEAD:  Atraumatic, Normocephalic  EYES: EOMI, PERRLA, conjunctiva and sclera clear  NECK: Supple, No JVD  CHEST/LUNG: Clear to auscultation bilaterally; No wheeze  HEART: Regular rate and rhythm; No murmurs, rubs, or gallops  ABDOMEN: Soft, Nontender, Nondistended; Bowel sounds present  EXTREMITIES:  2+ Peripheral Pulses, No clubbing, cyanosis, or edema  PSYCH: AAOx3  NEUROLOGY: non-focal  SKIN: No rashes or lesions    LABS:                        11.3   4.78  )-----------( 161      ( 22 Oct 2017 08:46 )             34.5     10-    136  |  106  |  62<H>  ----------------------------<  135<H>  5.2   |  18<L>  |  1.98<H>    Ca    9.4      21 Oct 2017 08:59  Phos  3.5     10-21  Mg     1.9     10-21    TPro  7.5  /  Alb  4.4  /  TBili  0.4  /  DBili  x   /  AST  60<H>  /  ALT  39  /  AlkPhos  131<H>  10-20    PT/INR - ( 20 Oct 2017 11:35 )   PT: 11.1 sec;   INR: 1.02 ratio         PTT - ( 20 Oct 2017 11:35 )  PTT:32.8 sec  CARDIAC MARKERS ( 20 Oct 2017 11:35 )  x     / 0.17 ng/mL / x     / x     / 11.4 ng/mL      Urinalysis Basic - ( 20 Oct 2017 20:08 )    Color: Yellow / Appearance: Clear / S.010 / pH: x  Gluc: x / Ketone: Negative  / Bili: Negative / Urobili: Negative mg/dL   Blood: x / Protein: Negative mg/dL / Nitrite: Negative   Leuk Esterase: Negative / RBC: x / WBC x   Sq Epi: x / Non Sq Epi: x / Bacteria: x        RADIOLOGY & ADDITIONAL TESTS:    Imaging Personally Reviewed:    Consultant(s) Notes Reviewed:      Care Discussed with Consultants/Other Providers:
Patient is a 89y old  Male who presents with a chief complaint of headache,weakness (20 Oct 2017 17:06)      SUBJECTIVE / OVERNIGHT EVENTS: Comfortable without new complaints.   Review of Systems  chest pain no  palpitations no  sob no  nausea no  headache no    MEDICATIONS  (STANDING):  aspirin enteric coated 81 milliGRAM(s) Oral daily  dextrose 5%. 1000 milliLiter(s) (50 mL/Hr) IV Continuous <Continuous>  dextrose 50% Injectable 12.5 Gram(s) IV Push once  dextrose 50% Injectable 25 Gram(s) IV Push once  dextrose 50% Injectable 25 Gram(s) IV Push once  heparin  Injectable 5000 Unit(s) SubCutaneous every 12 hours  insulin lispro (HumaLOG) corrective regimen sliding scale   SubCutaneous three times a day before meals  insulin lispro (HumaLOG) corrective regimen sliding scale   SubCutaneous at bedtime  losartan 50 milliGRAM(s) Oral daily  multivitamin/minerals 1 Tablet(s) Oral daily    MEDICATIONS  (PRN):  dextrose Gel 1 Dose(s) Oral once PRN Blood Glucose LESS THAN 70 milliGRAM(s)/deciliter  glucagon  Injectable 1 milliGRAM(s) IntraMuscular once PRN Glucose LESS THAN 70 milligrams/deciliter          PHYSICAL EXAM:  GENERAL: NAD, well-developed  HEAD:  Atraumatic, Normocephalic  EYES: EOMI, PERRLA, conjunctiva and sclera clear  NECK: Supple, No JVD  CHEST/LUNG: Clear to auscultation bilaterally; No wheeze  HEART: Regular rate and rhythm; No murmurs, rubs, or gallops  ABDOMEN: Soft, Nontender, Nondistended; Bowel sounds present  EXTREMITIES:  2+ Peripheral Pulses, No clubbing, cyanosis, or edema  PSYCH: AAOx3  NEUROLOGY: non-focal  SKIN: No rashes or lesions    LABS:                        11.7   5.22  )-----------( 160      ( 21 Oct 2017 08:48 )             36.8     10-    136  |  106  |  62<H>  ----------------------------<  135<H>  5.2   |  18<L>  |  1.98<H>    Ca    9.4      21 Oct 2017 08:59  Phos  3.5     10-21  Mg     1.9     10-    TPro  7.5  /  Alb  4.4  /  TBili  0.4  /  DBili  x   /  AST  60<H>  /  ALT  39  /  AlkPhos  131<H>  10-20    PT/INR - ( 20 Oct 2017 11:35 )   PT: 11.1 sec;   INR: 1.02 ratio         PTT - ( 20 Oct 2017 11:35 )  PTT:32.8 sec  CARDIAC MARKERS ( 20 Oct 2017 11:35 )  x     / 0.17 ng/mL / x     / x     / 11.4 ng/mL      Urinalysis Basic - ( 20 Oct 2017 20:08 )    Color: Yellow / Appearance: Clear / S.010 / pH: x  Gluc: x / Ketone: Negative  / Bili: Negative / Urobili: Negative mg/dL   Blood: x / Protein: Negative mg/dL / Nitrite: Negative   Leuk Esterase: Negative / RBC: x / WBC x   Sq Epi: x / Non Sq Epi: x / Bacteria: x        RADIOLOGY & ADDITIONAL TESTS:    Imaging Personally Reviewed:    Consultant(s) Notes Reviewed:      Care Discussed with Consultants/Other Providers:
Patient is a 89y old  Male who presents with a chief complaint of headache,weakness (20 Oct 2017 17:06)    He denies c/o chest pain, SOB or palpitations. Eating breakfast.     Allergies: No Known Allergies        MEDICATIONS  (STANDING):  aspirin enteric coated 81 milliGRAM(s) Oral daily  dextrose 5%. 1000 milliLiter(s) (50 mL/Hr) IV Continuous <Continuous>  dextrose 50% Injectable 12.5 Gram(s) IV Push once  dextrose 50% Injectable 25 Gram(s) IV Push once  dextrose 50% Injectable 25 Gram(s) IV Push once  heparin  Injectable 5000 Unit(s) SubCutaneous every 12 hours  insulin lispro (HumaLOG) corrective regimen sliding scale   SubCutaneous at bedtime  losartan 50 milliGRAM(s) Oral daily  multivitamin/minerals 1 Tablet(s) Oral daily    MEDICATIONS  (PRN):  dextrose Gel 1 Dose(s) Oral once PRN Blood Glucose LESS THAN 70 milliGRAM(s)/deciliter  glucagon  Injectable 1 milliGRAM(s) IntraMuscular once PRN Glucose LESS THAN 70 milligrams/deciliter      PHYSICAL EXAM:  Vital Signs Last 24 Hrs  T(C): 37 (22 Oct 2017 04:30), Max: 37 (22 Oct 2017 04:30)  T(F): 98.6 (22 Oct 2017 04:30), Max: 98.6 (22 Oct 2017 04:30)  HR: 79 (22 Oct 2017 04:30) (66 - 79)  BP: 145/82 (22 Oct 2017 04:30) (121/68 - 145/82)  BP(mean): --  RR: 18 (22 Oct 2017 04:30) (18 - 18)  SpO2: 95% (22 Oct 2017 04:30) (95% - 98%)  Daily     Daily Weight in k.2 (22 Oct 2017 07:23)  I&O's Summary    21 Oct 2017 07:01  -  22 Oct 2017 07:00  --------------------------------------------------------  IN: 920 mL / OUT: 2 mL / NET: 918 mL        General Appearance: 	 Alert, cooperative, no distress  HEENT: normocephalic, atraumatic,  Neck: no JVD,  carotid 2+  bilaterally without bruits  Lungs:  clear to auscultation and percussion bilaterally  Cor:  pmi 5th ICS MCL, regular rate and rhythm, S1 normal intensity, S2 normal intensity, no gallops, murmurs or rubs  Abdomen: soft, non-tender; bowel sounds normal; no masses,  no organomegaly  Extremities: without cyanosis, clubbing or edema  Vasc: 2-+ PT and DP pulses; no varicosities  Neurologic: A&O x 3 (time, place, person). Symmetric strength; limited exam  Skin: no rashes; limited exam      Labs:  CBC Full  -  ( 21 Oct 2017 08:48 )  WBC Count : 5.22 K/uL  Hemoglobin : 11.7 g/dL  Hematocrit : 36.8 %  Platelet Count - Automated : 160 K/uL  Mean Cell Volume : 85.2 fl  Mean Cell Hemoglobin : 27.1 pg  Mean Cell Hemoglobin Concentration : 31.8 gm/dL  Auto Neutrophil # : 3.52 K/uL  Auto Lymphocyte # : 0.81 K/uL  Auto Monocyte # : 0.54 K/uL  Auto Eosinophil # : 0.28 K/uL  Auto Basophil # : 0.04 K/uL  Auto Neutrophil % : 67.4 %  Auto Lymphocyte % : 15.5 %  Auto Monocyte % : 10.3 %  Auto Eosinophil % : 5.4 %  Auto Basophil % : 0.8 %    10-21    136  |  106  |  62<H>  ----------------------------<  135<H>  5.2   |  18<L>  |  1.98<H>    Ca    9.4      21 Oct 2017 08:59  Phos  3.5     10-  Mg     1.9     10-    TPro  7.5  /  Alb  4.4  /  TBili  0.4  /  DBili  x   /  AST  60<H>  /  ALT  39  /  AlkPhos  131<H>  10-20    CARDIAC MARKERS ( 20 Oct 2017 11:35 )  x     / 0.17 ng/mL / x     / x     / 11.4 ng/mL      PT/INR - ( 20 Oct 2017 11:35 )   PT: 11.1 sec;   INR: 1.02 ratio         PTT - ( 20 Oct 2017 11:35 )  PTT:32.8 sec  Urinalysis Basic - ( 20 Oct 2017 20:08 )    Color: Yellow / Appearance: Clear / S.010 / pH: x  Gluc: x / Ketone: Negative  / Bili: Negative / Urobili: Negative mg/dL   Blood: x / Protein: Negative mg/dL / Nitrite: Negative   Leuk Esterase: Negative / RBC: x / WBC x   Sq Epi: x / Non Sq Epi: x / Bacteria: x                    Musa Chung MD [facility]  [phone]
Diabetes Follow up note:  Interval Hx:  89 year old male w/T2DM w/nephropathy, retinipathy, neuropathy a/w hypoglycemia in the setting of PARAS on CKD. Pt seen at bedside. Pt  w/hypoglycemic event yesterday after only receiving 2 units of correction for BG of 206. Fasting glucose this AM is 101. Pt follows w/Dr Painting (endo) as outpt.     Review of Systems:  General: Denies pain.   GI: Tolerating POs without any N/V/D/ABD PAIN.  CV: No CP/SOB  ENDO: No S&Sx of hypoglycemia.   MEDS:    insulin lispro (HumaLOG) corrective regimen sliding scale   SubCutaneous at bedtime      Allergies    No Known Allergies      PE:  General: Male sitting in chair. NAD.   Vital Signs Last 24 Hrs  T(C): 36.8 (22 Oct 2017 09:02), Max: 37 (22 Oct 2017 04:30)  T(F): 98.2 (22 Oct 2017 09:02), Max: 98.6 (22 Oct 2017 04:30)  HR: 86 (22 Oct 2017 09:02) (66 - 86)  BP: 125/63 (22 Oct 2017 09:02) (121/68 - 145/82)  BP(mean): --  RR: 18 (22 Oct 2017 09:02) (18 - 18)  SpO2: 95% (22 Oct 2017 09:02) (95% - 98%)  Abd: Soft, NT,ND,   Extremities: Warm.   Neuro: A&O X3    LABS:    POCT Blood Glucose.: 101 mg/dL (10-22-17 @ 07:21)  POCT Blood Glucose.: 149 mg/dL (10-21-17 @ 21:06)  POCT Blood Glucose.: 131 mg/dL (10-21-17 @ 17:46)  POCT Blood Glucose.: 89 mg/dL (10-21-17 @ 17:22)  POCT Blood Glucose.: 64 mg/dL (10-21-17 @ 16:48)  POCT Blood Glucose.: 61 mg/dL (10-21-17 @ 16:47)  POCT Blood Glucose.: 206 mg/dL (10-21-17 @ 11:59)  POCT Blood Glucose.: 111 mg/dL (10-21-17 @ 07:21)  POCT Blood Glucose.: 208 mg/dL (10-20-17 @ 21:10)  POCT Blood Glucose.: 95 mg/dL (10-20-17 @ 17:54)  POCT Blood Glucose.: 110 mg/dL (10-20-17 @ 15:36)  POCT Blood Glucose.: 122 mg/dL (10-20-17 @ 14:36)  POCT Blood Glucose.: 132 mg/dL (10-20-17 @ 13:33)  POCT Blood Glucose.: 125 mg/dL (10-20-17 @ 12:29)  POCT Blood Glucose.: 146 mg/dL (10-20-17 @ 11:41)  POCT Blood Glucose.: 36 mg/dL (10-20-17 @ 11:20)  POCT Blood Glucose.: 38 mg/dL (10-20-17 @ 11:17)                            11.3   4.78  )-----------( 161      ( 22 Oct 2017 08:46 )             34.5       10-21    136  |  106  |  62<H>  ----------------------------<  135<H>  5.2   |  18<L>  |  1.98<H>    Ca    9.4      21 Oct 2017 08:59  Phos  3.5     10-21  Mg     1.9     10-21    TPro  7.5  /  Alb  4.4  /  TBili  0.4  /  DBili  x   /  AST  60<H>  /  ALT  39  /  AlkPhos  131<H>  10-20      Thyroid Function Tests:  10-21 @ 09:04 TSH 1.37 FreeT4 -- T3 -- Anti TPO -- Anti Thyroglobulin Ab -- TSI --      Hemoglobin A1C, Whole Blood: 7.9 % <H> [4.0 - 5.6] (10-21-17 @ 08:48)  Hemoglobin A1C, Whole Blood: 8.5 % <H> [4.0 - 5.6] (08-29-17 @ 07:27)            Contact number: ubaldo [phone] or [phone]

## 2017-10-22 NOTE — DISCHARGE NOTE ADULT - HOSPITAL COURSE
89 year old male with pmhx of type 2 diabetes, hyperlipidemia, Hypertension presents with generalized fatigue and dull headache since 2 hours ago. Patient states that he woke up this morning around 3 am and began his usual workday. Took all his diabetic oral medications, with no extra doses or confusion of medications. Did not eat a lot of breakfast, just had an apple. No chest pain or trouble breathing. No abdominal pain, nausea, or vomiting. Prior to today, patient was well and on no new medications.   Hypoglycemia - Hypoglycemia likely due to PARAS from increased dose of Lasix in combination with use of sulfonylurea and poor po intake.                          For now  stop all the antidiabetic medications except for Onglyza until you see your endocrinologist ( Pavan Humphrey)                           Chronic Renal failure - hold diuretic (Lasix) for now                            Patient symptomatically improved, Patient stable for discharge with follow up with PMD- Joel Goldberg.                           Endocrinologist- Dr. Pavan Painting [phone] within 1 week

## 2017-10-22 NOTE — PROGRESS NOTE ADULT - ASSESSMENT
89 m with   Hypoglycemia- hold oral hypoglycemiant, BS control, Endocrine evaluation noted. Recommendations for Rx pending.  HTN control  CKD stage 3- hold diuretics now  DCP home if stable.   Mao Brady MD pager [phone]
89 m with   Hypoglycemia- resolved. oral hypoglycemiant on hold, BS control, Endocrine evaluation noted. Recommendations for Rx pending.  HTN control  CKD stage 3- hold diuretics now  DC home if stable and cleared by endocrinology with follow with PMD/ Endocrine as OTP..   Mao Brady MD pager [phone]
89 year old male with CKD, DM, HTN admitted with symptomatic hypoglycemia. He remains on D5W. To stop and monitor FS. Treatment of DM per endocrine. D/C planning in progress.
88 y/o M w/ uncontrolled Type 2 DM w/ hypoglycemia w/ HTN and HLD (high risk patient with high level decision-making). Pt likely experienced hypoglycemia from sulfonylurea effect w/PARAS. Discussed discharge plan w/pt and advised importance of BG monitoring at home. BG goal (100-200mg/dl) for elderly pt w/CKD. Pt would benefit from simplification of outpatient regimen.

## 2017-10-22 NOTE — DISCHARGE NOTE ADULT - CARE PROVIDER_API CALL
Goldberg, Joel (MD), Cardiovascular Disease; Internal Medicine  [address]  [address]  [place] [address]  Phone: [phone]  Fax: [phone]    Pavan Painting), EndocrinologyMetabDiabetes; Internal Medicine  [address] [facility]  [address]  [place] [address]  Phone: [phone]  Fax: [phone]

## 2017-10-22 NOTE — DISCHARGE NOTE ADULT - MEDICATION SUMMARY - MEDICATIONS TO STOP TAKING
I will STOP taking the medications listed below when I get home from the hospital:    Advil 200 mg oral tablet  -- 3 tab(s) by mouth , As Needed    glipiZIDE 10 mg oral tablet  -- 1 tab(s) by mouth 2 times a day    metFORMIN 500 mg oral tablet  -- 3 tab(s) by mouth once a day    Lasix 20 mg oral tablet  -- 1 tab(s) by mouth 2 times a day, As Needed

## 2017-10-22 NOTE — DIETITIAN INITIAL EVALUATION ADULT. - ENERGY NEEDS
Ht: 66 inches Wt: 163.5 pounds BMI:26.4 kg/m2 IBW:  142 pounds(+/-10%) %%  +1 bilateral ankle edema. No pressure ulcers documented.

## 2017-11-09 ENCOUNTER — INPATIENT (INPATIENT)
Facility: HOSPITAL | Age: 82
LOS: 2 days | Discharge: HOME | DRG: 638 | End: 2017-11-12
Attending: INTERNAL MEDICINE | Admitting: INTERNAL MEDICINE
Payer: MEDICARE

## 2017-11-09 VITALS
SYSTOLIC BLOOD PRESSURE: 115 MMHG | HEART RATE: 79 BPM | RESPIRATION RATE: 16 BRPM | TEMPERATURE: 98 F | OXYGEN SATURATION: 96 % | DIASTOLIC BLOOD PRESSURE: 69 MMHG

## 2017-11-09 DIAGNOSIS — R74.0 NONSPECIFIC ELEVATION OF LEVELS OF TRANSAMINASE AND LACTIC ACID DEHYDROGENASE [LDH]: ICD-10-CM

## 2017-11-09 DIAGNOSIS — R53.1 WEAKNESS: ICD-10-CM

## 2017-11-09 DIAGNOSIS — E78.4 OTHER HYPERLIPIDEMIA: ICD-10-CM

## 2017-11-09 DIAGNOSIS — I10 ESSENTIAL (PRIMARY) HYPERTENSION: ICD-10-CM

## 2017-11-09 DIAGNOSIS — E16.2 HYPOGLYCEMIA, UNSPECIFIED: ICD-10-CM

## 2017-11-09 LAB
ALBUMIN SERPL ELPH-MCNC: 4.5 G/DL — SIGNIFICANT CHANGE UP (ref 3.3–5)
ALP SERPL-CCNC: 154 U/L — HIGH (ref 40–120)
ALT FLD-CCNC: 167 U/L RC — HIGH (ref 10–45)
ANION GAP SERPL CALC-SCNC: 14 MMOL/L — SIGNIFICANT CHANGE UP (ref 5–17)
APTT BLD: 27.7 SEC — SIGNIFICANT CHANGE UP (ref 27.5–37.4)
AST SERPL-CCNC: 376 U/L — HIGH (ref 10–40)
BASOPHILS # BLD AUTO: 0.1 K/UL — SIGNIFICANT CHANGE UP (ref 0–0.2)
BASOPHILS NFR BLD AUTO: 1.2 % — SIGNIFICANT CHANGE UP (ref 0–2)
BILIRUB SERPL-MCNC: 0.5 MG/DL — SIGNIFICANT CHANGE UP (ref 0.2–1.2)
BUN SERPL-MCNC: 50 MG/DL — HIGH (ref 7–23)
CALCIUM SERPL-MCNC: 9.2 MG/DL — SIGNIFICANT CHANGE UP (ref 8.4–10.5)
CHLORIDE SERPL-SCNC: 109 MMOL/L — HIGH (ref 96–108)
CO2 SERPL-SCNC: 19 MMOL/L — LOW (ref 22–31)
CREAT SERPL-MCNC: 1.89 MG/DL — HIGH (ref 0.5–1.3)
EOSINOPHIL # BLD AUTO: 0.1 K/UL — SIGNIFICANT CHANGE UP (ref 0–0.5)
EOSINOPHIL NFR BLD AUTO: 1 % — SIGNIFICANT CHANGE UP (ref 0–6)
GLUCOSE BLDC GLUCOMTR-MCNC: 161 MG/DL — HIGH (ref 70–99)
GLUCOSE BLDC GLUCOMTR-MCNC: 303 MG/DL — HIGH (ref 70–99)
GLUCOSE SERPL-MCNC: 157 MG/DL — HIGH (ref 70–99)
HCT VFR BLD CALC: 35.5 % — LOW (ref 39–50)
HGB BLD-MCNC: 11.7 G/DL — LOW (ref 13–17)
INR BLD: 1.17 RATIO — HIGH (ref 0.88–1.16)
LYMPHOCYTES # BLD AUTO: 0.9 K/UL — LOW (ref 1–3.3)
LYMPHOCYTES # BLD AUTO: 15.3 % — SIGNIFICANT CHANGE UP (ref 13–44)
MCHC RBC-ENTMCNC: 29.8 PG — SIGNIFICANT CHANGE UP (ref 27–34)
MCHC RBC-ENTMCNC: 33 GM/DL — SIGNIFICANT CHANGE UP (ref 32–36)
MCV RBC AUTO: 90.5 FL — SIGNIFICANT CHANGE UP (ref 80–100)
MONOCYTES # BLD AUTO: 0.6 K/UL — SIGNIFICANT CHANGE UP (ref 0–0.9)
MONOCYTES NFR BLD AUTO: 10.7 % — SIGNIFICANT CHANGE UP (ref 2–14)
NEUTROPHILS # BLD AUTO: 4.2 K/UL — SIGNIFICANT CHANGE UP (ref 1.8–7.4)
NEUTROPHILS NFR BLD AUTO: 71.9 % — SIGNIFICANT CHANGE UP (ref 43–77)
PLATELET # BLD AUTO: 157 K/UL — SIGNIFICANT CHANGE UP (ref 150–400)
POTASSIUM SERPL-MCNC: 4.9 MMOL/L — SIGNIFICANT CHANGE UP (ref 3.5–5.3)
POTASSIUM SERPL-SCNC: 4.9 MMOL/L — SIGNIFICANT CHANGE UP (ref 3.5–5.3)
PROT SERPL-MCNC: 6.8 G/DL — SIGNIFICANT CHANGE UP (ref 6–8.3)
PROTHROM AB SERPL-ACNC: 12.7 SEC — SIGNIFICANT CHANGE UP (ref 9.8–12.7)
RBC # BLD: 3.92 M/UL — LOW (ref 4.2–5.8)
RBC # FLD: 13.8 % — SIGNIFICANT CHANGE UP (ref 10.3–14.5)
SODIUM SERPL-SCNC: 142 MMOL/L — SIGNIFICANT CHANGE UP (ref 135–145)
WBC # BLD: 5.8 K/UL — SIGNIFICANT CHANGE UP (ref 3.8–10.5)
WBC # FLD AUTO: 5.8 K/UL — SIGNIFICANT CHANGE UP (ref 3.8–10.5)

## 2017-11-09 PROCEDURE — 71010: CPT | Mod: 26

## 2017-11-09 PROCEDURE — 70450 CT HEAD/BRAIN W/O DYE: CPT | Mod: 26

## 2017-11-09 PROCEDURE — 99285 EMERGENCY DEPT VISIT HI MDM: CPT | Mod: GC

## 2017-11-09 RX ORDER — INSULIN LISPRO 100/ML
VIAL (ML) SUBCUTANEOUS AT BEDTIME
Qty: 0 | Refills: 0 | Status: DISCONTINUED | OUTPATIENT
Start: 2017-11-09 | End: 2017-11-12

## 2017-11-09 RX ORDER — DEXTROSE 50 % IN WATER 50 %
25 SYRINGE (ML) INTRAVENOUS ONCE
Qty: 0 | Refills: 0 | Status: DISCONTINUED | OUTPATIENT
Start: 2017-11-09 | End: 2017-11-12

## 2017-11-09 RX ORDER — SODIUM CHLORIDE 9 MG/ML
1000 INJECTION, SOLUTION INTRAVENOUS
Qty: 0 | Refills: 0 | Status: DISCONTINUED | OUTPATIENT
Start: 2017-11-09 | End: 2017-11-12

## 2017-11-09 RX ORDER — FUROSEMIDE 40 MG
0 TABLET ORAL
Qty: 0 | Refills: 0 | COMMUNITY

## 2017-11-09 RX ORDER — DEXTROSE 50 % IN WATER 50 %
12.5 SYRINGE (ML) INTRAVENOUS ONCE
Qty: 0 | Refills: 0 | Status: DISCONTINUED | OUTPATIENT
Start: 2017-11-09 | End: 2017-11-12

## 2017-11-09 RX ORDER — HEPARIN SODIUM 5000 [USP'U]/ML
5000 INJECTION INTRAVENOUS; SUBCUTANEOUS EVERY 12 HOURS
Qty: 0 | Refills: 0 | Status: DISCONTINUED | OUTPATIENT
Start: 2017-11-09 | End: 2017-11-12

## 2017-11-09 RX ORDER — GLUCAGON INJECTION, SOLUTION 0.5 MG/.1ML
1 INJECTION, SOLUTION SUBCUTANEOUS ONCE
Qty: 0 | Refills: 0 | Status: DISCONTINUED | OUTPATIENT
Start: 2017-11-09 | End: 2017-11-12

## 2017-11-09 RX ORDER — DEXTROSE 50 % IN WATER 50 %
1 SYRINGE (ML) INTRAVENOUS ONCE
Qty: 0 | Refills: 0 | Status: DISCONTINUED | OUTPATIENT
Start: 2017-11-09 | End: 2017-11-12

## 2017-11-09 RX ORDER — INSULIN LISPRO 100/ML
VIAL (ML) SUBCUTANEOUS
Qty: 0 | Refills: 0 | Status: DISCONTINUED | OUTPATIENT
Start: 2017-11-09 | End: 2017-11-12

## 2017-11-09 RX ORDER — SODIUM CHLORIDE 9 MG/ML
1000 INJECTION, SOLUTION INTRAVENOUS
Qty: 0 | Refills: 0 | Status: DISCONTINUED | OUTPATIENT
Start: 2017-11-09 | End: 2017-11-09

## 2017-11-09 RX ADMIN — HEPARIN SODIUM 5000 UNIT(S): 5000 INJECTION INTRAVENOUS; SUBCUTANEOUS at 22:04

## 2017-11-09 NOTE — ED PROVIDER NOTE - OBJECTIVE STATEMENT
89yo M with LE weakness this afternoon b/l had to  leg with hands to go up stairs, fell backwards, no head trauma, sons carried upstairs and resolved with orang juice/chocolate. did not check FS on new sulfonyurea x2 weeks. no other new meds. no fever/chills. feels fine now. no CP/SOB. no dizziness. no focal weakness. not on A/C except for ASA    PCP- Teofilo goncalves- [facility]- Goldberg

## 2017-11-09 NOTE — ED PROVIDER NOTE - PROGRESS NOTE DETAILS
patient with eleveated LFTs, no h/o liver disese, drinks 1-2 drinks on occasion not daily, no h/o herbal supplements. no abd pain. no nausea/vomiting. no abd ttp on exam. will admit for hypoglycemia obs -Slowey DO

## 2017-11-09 NOTE — ED ADULT NURSE NOTE - CHPI ED SYMPTOMS NEG
no weakness/no blurred vision/no confusion/no vomiting/no loss of consciousness/no nausea/no change in level of consciousness/no numbness/no fever

## 2017-11-09 NOTE — ED ADULT NURSE NOTE - OBJECTIVE STATEMENT
pt presents with generalized weakness since today, pt states, "I tried to walk up steps and lift up lft lower leg and couldn't, subsequently fell", denies head injury, pt A;Ox3, no facial droop, no arm drift, PEARRL, no acute resp distress noted, v/s stable, pt presents with generalized weakness since today, pt states, "I tried to walk up steps and lift up lft lower leg and couldn't, subsequently fell", denies head injury, pt A;Ox3,skin pallor no facial droop, no arm drift, PEARRL, no acute resp distress noted, v/s stable, afebrile, no abd pain, voids freely, skin intact, ambulates with assistance due to weakness, safety precautions maintained, will continue to monitor

## 2017-11-09 NOTE — H&P ADULT - NSHPPHYSICALEXAM_GEN_ALL_CORE
HEEENT-NC/AT,PERRL,EOMI,CP/SA  LUNGS-CTA  CARD-RRR,S1S2  ABD-NT/ND, soft  EXT- no edema  NEURO-nonfocal

## 2017-11-09 NOTE — H&P ADULT - HISTORY OF PRESENT ILLNESS
88 y/o M w/ hx of 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 follows with endo Dr. Painting with nephropathy, retinopathy s/p laser tx, and neuropathy last A1c 8% (10/5/17)was admitted 10/20 w hypoglycemia on n metformin 500mg 3 tabs daily, Actos, glipizide, and onglyza. ptn DCed 2 days later only on onglyza, followed up w endo and was started on same meds as before. ptn has poor po intake, drinks vodka regularly. dropped his glucose again today. had sx of weakness and cold sweats.
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
## 2017-11-09 NOTE — ED PROVIDER NOTE - CARE PLAN
Principal Discharge DX:	Weakness  Secondary Diagnosis:	Hypoglycemia  Secondary Diagnosis:	Transaminitis

## 2017-11-09 NOTE — ED PROVIDER NOTE - PHYSICAL EXAMINATION
[id number] nad ncat pallor s1s2 rrr ctabl abd soft nt, no sceral icterus stregth 5/5 ue le bl no drift sensation intact, abrasdion rle , cn2-12intact

## 2017-11-10 DIAGNOSIS — I10 ESSENTIAL (PRIMARY) HYPERTENSION: ICD-10-CM

## 2017-11-10 DIAGNOSIS — E11.649 TYPE 2 DIABETES MELLITUS WITH HYPOGLYCEMIA WITHOUT COMA: ICD-10-CM

## 2017-11-10 DIAGNOSIS — E78.5 HYPERLIPIDEMIA, UNSPECIFIED: ICD-10-CM

## 2017-11-10 LAB
ALBUMIN SERPL ELPH-MCNC: 3.5 G/DL — SIGNIFICANT CHANGE UP (ref 3.3–5)
ALP SERPL-CCNC: 136 U/L — HIGH (ref 40–120)
ALT FLD-CCNC: 146 U/L RC — HIGH (ref 10–45)
ANION GAP SERPL CALC-SCNC: 11 MMOL/L — SIGNIFICANT CHANGE UP (ref 5–17)
AST SERPL-CCNC: 306 U/L — HIGH (ref 10–40)
BILIRUB DIRECT SERPL-MCNC: 0.2 MG/DL — SIGNIFICANT CHANGE UP (ref 0–0.2)
BILIRUB INDIRECT FLD-MCNC: 0.3 MG/DL — SIGNIFICANT CHANGE UP (ref 0.2–1)
BILIRUB SERPL-MCNC: 0.5 MG/DL — SIGNIFICANT CHANGE UP (ref 0.2–1.2)
BUN SERPL-MCNC: 43 MG/DL — HIGH (ref 7–23)
CALCIUM SERPL-MCNC: 9.1 MG/DL — SIGNIFICANT CHANGE UP (ref 8.4–10.5)
CHLORIDE SERPL-SCNC: 111 MMOL/L — HIGH (ref 96–108)
CO2 SERPL-SCNC: 19 MMOL/L — LOW (ref 22–31)
CREAT SERPL-MCNC: 1.75 MG/DL — HIGH (ref 0.5–1.3)
GLUCOSE BLDC GLUCOMTR-MCNC: 112 MG/DL — HIGH (ref 70–99)
GLUCOSE BLDC GLUCOMTR-MCNC: 119 MG/DL — HIGH (ref 70–99)
GLUCOSE BLDC GLUCOMTR-MCNC: 126 MG/DL — HIGH (ref 70–99)
GLUCOSE BLDC GLUCOMTR-MCNC: 131 MG/DL — HIGH (ref 70–99)
GLUCOSE BLDC GLUCOMTR-MCNC: 173 MG/DL — HIGH (ref 70–99)
GLUCOSE BLDC GLUCOMTR-MCNC: 222 MG/DL — HIGH (ref 70–99)
GLUCOSE SERPL-MCNC: 118 MG/DL — HIGH (ref 70–99)
HAV IGM SER-ACNC: SIGNIFICANT CHANGE UP
HBV CORE IGM SER-ACNC: SIGNIFICANT CHANGE UP
HBV SURFACE AG SER-ACNC: SIGNIFICANT CHANGE UP
HCT VFR BLD CALC: 34.7 % — LOW (ref 39–50)
HCV AB S/CO SERPL IA: 0.49 S/CO — SIGNIFICANT CHANGE UP
HCV AB SERPL-IMP: SIGNIFICANT CHANGE UP
HGB BLD-MCNC: 11.1 G/DL — LOW (ref 13–17)
IRON SATN MFR SERPL: 17 % — SIGNIFICANT CHANGE UP (ref 16–55)
IRON SATN MFR SERPL: 48 UG/DL — SIGNIFICANT CHANGE UP (ref 45–165)
MCHC RBC-ENTMCNC: 29.1 PG — SIGNIFICANT CHANGE UP (ref 27–34)
MCHC RBC-ENTMCNC: 32.1 GM/DL — SIGNIFICANT CHANGE UP (ref 32–36)
MCV RBC AUTO: 90.7 FL — SIGNIFICANT CHANGE UP (ref 80–100)
PLATELET # BLD AUTO: 154 K/UL — SIGNIFICANT CHANGE UP (ref 150–400)
POTASSIUM SERPL-MCNC: 4.6 MMOL/L — SIGNIFICANT CHANGE UP (ref 3.5–5.3)
POTASSIUM SERPL-SCNC: 4.6 MMOL/L — SIGNIFICANT CHANGE UP (ref 3.5–5.3)
PROT SERPL-MCNC: 6.3 G/DL — SIGNIFICANT CHANGE UP (ref 6–8.3)
RBC # BLD: 3.83 M/UL — LOW (ref 4.2–5.8)
RBC # FLD: 13.7 % — SIGNIFICANT CHANGE UP (ref 10.3–14.5)
SODIUM SERPL-SCNC: 141 MMOL/L — SIGNIFICANT CHANGE UP (ref 135–145)
TIBC SERPL-MCNC: 281 UG/DL — SIGNIFICANT CHANGE UP (ref 220–430)
UIBC SERPL-MCNC: 233 UG/DL — SIGNIFICANT CHANGE UP (ref 110–370)
WBC # BLD: 5.3 K/UL — SIGNIFICANT CHANGE UP (ref 3.8–10.5)
WBC # FLD AUTO: 5.3 K/UL — SIGNIFICANT CHANGE UP (ref 3.8–10.5)

## 2017-11-10 PROCEDURE — 99223 1ST HOSP IP/OBS HIGH 75: CPT

## 2017-11-10 PROCEDURE — 76700 US EXAM ABDOM COMPLETE: CPT | Mod: 26

## 2017-11-10 PROCEDURE — 71020: CPT | Mod: 26

## 2017-11-10 RX ORDER — SODIUM CHLORIDE 9 MG/ML
1000 INJECTION, SOLUTION INTRAVENOUS
Qty: 0 | Refills: 0 | Status: DISCONTINUED | OUTPATIENT
Start: 2017-11-10 | End: 2017-11-10

## 2017-11-10 RX ADMIN — HEPARIN SODIUM 5000 UNIT(S): 5000 INJECTION INTRAVENOUS; SUBCUTANEOUS at 22:14

## 2017-11-10 RX ADMIN — HEPARIN SODIUM 5000 UNIT(S): 5000 INJECTION INTRAVENOUS; SUBCUTANEOUS at 11:46

## 2017-11-10 RX ADMIN — Medication 1: at 18:13

## 2017-11-10 RX ADMIN — SODIUM CHLORIDE 60 MILLILITER(S): 9 INJECTION, SOLUTION INTRAVENOUS at 00:20

## 2017-11-10 NOTE — CONSULT NOTE ADULT - CONSULT REASON
Azotemia
Pt known to Dr. Goldberg
Shortness of breath
abnormal LFTs
Uncontrolled Type 2 DM w/ hypoglycemia

## 2017-11-10 NOTE — CONSULT NOTE ADULT - SUBJECTIVE AND OBJECTIVE BOX
Patient is a 89y Male     Patient is a 89y old  Male who presents with a chief complaint of weakness (09 Nov 2017 17:12)      HPI:  88 y/o M w/ hx of 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 follows with sacha Painting with nephropathy, retinopathy s/p laser tx, and neuropathy last A1c 8% (10/5/17)was admitted 10/20 w hypoglycemia on n metformin 500mg 3 tabs daily, Actos, glipizide, and onglyza. ptn DCed 2 days later only on onglyza, followed up w sacha and was started on same meds as before. ptn has poor po intake, drinks vodka regularly. dropped his glucose again today. had sx of weakness and cold sweats. (09 Nov 2017 17:12)  Feels better this am. Reports no h/o cirrhosis and drinks vodka "on the weekends"      PAST MEDICAL & SURGICAL HISTORY:  Type 2 diabetes mellitus with retinopathy, macular edema presence unspecified, unspecified long term insulin use status, unspecified retinopathy severity  HLD (hyperlipidemia)  HTN (hypertension)  No significant past surgical history      MEDICATIONS  (STANDING):  dextrose 5%. 1000 milliLiter(s) (50 mL/Hr) IV Continuous <Continuous>  dextrose 5%. 1000 milliLiter(s) (60 mL/Hr) IV Continuous <Continuous>  dextrose 50% Injectable 12.5 Gram(s) IV Push once  dextrose 50% Injectable 25 Gram(s) IV Push once  dextrose 50% Injectable 25 Gram(s) IV Push once  heparin  Injectable 5000 Unit(s) SubCutaneous every 12 hours  insulin lispro (HumaLOG) corrective regimen sliding scale   SubCutaneous at bedtime  insulin lispro (HumaLOG) corrective regimen sliding scale   SubCutaneous three times a day before meals      Allergies    No Known Allergies    Intolerances        SOCIAL HISTORY:  Denies ,Smoking,     FAMILY HISTORY:  No pertinent family history in first degree relatives      REVIEW OF SYSTEMS:    CONSTITUTIONAL: No weight loss, fevers or chills  RESPIRATORY: No cough, wheezing, hemoptysis; No shortness of breath  CARDIOVASCULAR: No chest pain or palpitations  GASTROINTESTINAL: No abdominal or epigastric pain. No nausea, vomiting, or hematemesis; No diarrhea or constipation. No melena or hematochezia.  GENITOURINARY: No dysuria,   NEUROLOGICAL: No numbness  SKIN: No itching, burning, rashes, or lesions   All other review of systems is negative unless indicated above.    VITAL:  T(C): , Max: 36.9 (11-09-17 @ 12:26)  T(F): , Max: 98.4 (11-09-17 @ 12:26)  HR: 70 (11-10-17 @ 06:13)  BP: 114/67 (11-10-17 @ 06:13)  BP(mean): --  RR: 18 (11-10-17 @ 06:13)  SpO2: 96% (11-10-17 @ 06:13)  Wt(kg): --    I and O's:    11-09 @ 07:01  -  11-10 @ 07:00  --------------------------------------------------------  IN: 840 mL / OUT: 0 mL / NET: 840 mL      Height (cm): 167.64 (11-09 @ 19:33)  Weight (kg): 78.9 (11-09 @ 12:29)  BMI (kg/m2): 28.1 (11-09 @ 19:33)  BSA (m2): 1.88 (11-09 @ 19:33)    PHYSICAL EXAM:    Constitutional: NAD  HEENT: PERRLA,   Neck: No JVD  Respiratory: CTA B/L  Cardiovascular: S1 and S2  Gastrointestinal: BS+, soft, NT/ND no signs of portal Htn  Extremities: No peripheral edema  Neurological: A/O x 3, no focal deficits  Psychiatric: Normal mood, normal affect  : No James  Skin: No rashes  Access: Not applicable  Back: No CVA tenderness    LABS:                        11.1   5.3   )-----------( 154      ( 10 Nov 2017 06:33 )             34.7     11-10    141  |  111<H>  |  43<H>  ----------------------------<  118<H>  4.6   |  19<L>  |  1.75<H>    Ca    9.1      10 Nov 2017 06:33    TPro  6.8  /  Alb  4.5  /  TBili  0.5  /  DBili  x   /  AST  376<H>  /  ALT  167<H>  /  AlkPhos  154<H>  11-09          RADIOLOGY & ADDITIONAL STUDIES:

## 2017-11-10 NOTE — PROGRESS NOTE ADULT - SUBJECTIVE AND OBJECTIVE BOX
Patient is a 89y old  Male who presents with a chief complaint of weakness (09 Nov 2017 17:12)      SUBJECTIVE / OVERNIGHT EVENTS: BGMs up and down. endo at bedside discussing plan of care w family    MEDICATIONS  (STANDING):  dextrose 5%. 1000 milliLiter(s) (50 mL/Hr) IV Continuous <Continuous>  dextrose 50% Injectable 12.5 Gram(s) IV Push once  dextrose 50% Injectable 25 Gram(s) IV Push once  dextrose 50% Injectable 25 Gram(s) IV Push once  heparin  Injectable 5000 Unit(s) SubCutaneous every 12 hours  insulin lispro (HumaLOG) corrective regimen sliding scale   SubCutaneous at bedtime  insulin lispro (HumaLOG) corrective regimen sliding scale   SubCutaneous three times a day before meals    MEDICATIONS  (PRN):  dextrose Gel 1 Dose(s) Oral once PRN Blood Glucose LESS THAN 70 milliGRAM(s)/deciliter  glucagon  Injectable 1 milliGRAM(s) IntraMuscular once PRN Glucose LESS THAN 70 milligrams/deciliter      Vital Signs Last 24 Hrs  T(F): 97.8 (11-10-17 @ 16:35), Max: 98.7 (11-10-17 @ 09:00)  HR: 63 (11-10-17 @ 16:35) (63 - 83)  BP: 113/61 (11-10-17 @ 16:35) (113/61 - 130/70)  RR: 17 (11-10-17 @ 16:35) (16 - 18)  SpO2: 98% (11-10-17 @ 16:35) (95% - 99%)  Telemetry:   CAPILLARY BLOOD GLUCOSE      POCT Blood Glucose.: 222 mg/dL (10 Nov 2017 11:36)  POCT Blood Glucose.: 112 mg/dL (10 Nov 2017 07:32)  POCT Blood Glucose.: 119 mg/dL (10 Nov 2017 02:00)  POCT Blood Glucose.: 126 mg/dL (10 Nov 2017 00:11)  POCT Blood Glucose.: 161 mg/dL (09 Nov 2017 21:16)  POCT Blood Glucose.: 303 mg/dL (09 Nov 2017 20:17)    I&O's Summary    09 Nov 2017 07:01  -  10 Nov 2017 07:00  --------------------------------------------------------  IN: 840 mL / OUT: 0 mL / NET: 840 mL    10 Nov 2017 07:01  -  10 Nov 2017 17:08  --------------------------------------------------------  IN: 240 mL / OUT: 0 mL / NET: 240 mL        PHYSICAL EXAM:  GENERAL: NAD, well-developed  HEAD:  Atraumatic, Normocephalic  EYES: EOMI, PERRLA, conjunctiva and sclera clear  NECK: Supple, No JVD  CHEST/LUNG: Clear to auscultation bilaterally; No wheeze  HEART: Regular rate and rhythm; No murmurs, rubs, or gallops  ABDOMEN: Soft, Nontender, Nondistended; Bowel sounds present  EXTREMITIES:  2+ Peripheral Pulses, No clubbing, cyanosis, or edema  PSYCH: AAOx3  NEUROLOGY: non-focal  SKIN: No rashes or lesions    LABS:                        11.1   5.3   )-----------( 154      ( 10 Nov 2017 06:33 )             34.7     11-10    141  |  111<H>  |  43<H>  ----------------------------<  118<H>  4.6   |  19<L>  |  1.75<H>    Ca    9.1      10 Nov 2017 06:33    TPro  6.3  /  Alb  3.5  /  TBili  0.5  /  DBili  0.2  /  AST  306<H>  /  ALT  146<H>  /  AlkPhos  136<H>  11-10    PT/INR - ( 09 Nov 2017 14:08 )   PT: 12.7 sec;   INR: 1.17 ratio         PTT - ( 09 Nov 2017 14:08 )  PTT:27.7 sec          RADIOLOGY & ADDITIONAL TESTS:    Imaging Personally Reviewed:    Consultant(s) Notes Reviewed:      Care Discussed with Consultants/Other Providers:

## 2017-11-10 NOTE — CONSULT NOTE ADULT - SUBJECTIVE AND OBJECTIVE BOX
CHIEF COMPLAINT: Weakness     HPI: 89 M with a PMH of HTN, HLD, DM type 2 with retinopathy and CKD, diastolic dysfunction, b/l pleural effusions, s/p R thoracentesis in 8/17 showing transudative fluid with negative cytopath, recent admission in October for hypoglycemia, now presenting with weakness an difficulty walking, found to be hypoglycemic. Currently reports that he feels better. Had shortness of breath at the time of admission but now reports that this has resolved. Denies orthopnea, PND, chest pain hemoptysis, cough, sputum production, fever or chills. CXR shows a small R pleural effusion.     PAST MEDICAL & SURGICAL HISTORY:  Type 2 diabetes mellitus with retinopathy, macular edema presence unspecified, unspecified long term insulin use status, unspecified retinopathy severity  HLD (hyperlipidemia)  HTN (hypertension)  No significant past surgical history      FAMILY HISTORY:  No pertinent family history in first degree relatives      SOCIAL HISTORY:  Smoking: [ ] Never Smoked [x ] Former Smoker (__1 packs x 1.5 ___ years) [ ] Current Smoker  (__ packs x ___ years)  Substance Use: [x ] Never Used [ ] Used ____  EtOH Use: Drinks Vodka regularly on the weekends   Marital Status: [ ] Single [x ]  [ ]  [ ]   Sexual History: NC  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   Recent Travel: None   Advance Directives: Full code     Allergies    No Known Allergies    Intolerances        HOME MEDICATIONS:  aspirin 81 mg oral tablet: 2 tab(s) orally once a day, Last Dose Taken:    · 	irbesartan 150 mg oral tablet: 1 tab(s) orally once a day, Last Dose Taken:    · 	Centrum Silver Men's: 1 tab(s) orally once a day, Last Dose Taken:    · 	pravastatin 40 mg oral tablet: 1 tab(s) orally once a day, Last Dose Taken:    · 	Vitamin D3 400 intl units oral tablet: 1 tab(s) orally once a day, Last Dose Taken:    · 	Lasix: Last Dose Taken:    · 	pioglitazone 30 mg oral tablet: 1 tab(s) orally once a day, Last Dose Taken:    · 	glimepiride 2 mg oral tablet: 1 tab(s) orally once a day, Last Dose Taken:    · 	Onglyza 2.5 mg oral tablet: 1 tab(s) orally once a day, Last Dose Taken:    REVIEW OF SYSTEMS:  Constitutional: [ ] negative [- ] fevers [ -] chills [- ] weight loss [ ] weight gain  HEENT: [ ] negative [ ] dry eyes [ -] eye irritation [- ] postnasal drip [ ] nasal congestion  CV: [ ] negative  [- ] chest pain [ -] orthopnea [ ] palpitations [ ] murmur  Resp: [ ] negative [- ] cough [- ] shortness of breath [- ] dyspnea [- ] wheezing [ -] sputum [ -] hemoptysis  GI: [ ] negative [- ] nausea [ -] vomiting [- ] diarrhea [ -] constipation [- ] abd pain [ ] dysphagia   : [ ] negative [- ] dysuria [- ] nocturia [- ] hematuria [- ] increased urinary frequency  Musculoskeletal: [ ] negative [- ] back pain [ ] myalgias [ ] arthralgias [ ] fracture  Skin: [ ] negative [ ] rash [ -] itch  Neurological: [ ] negative [ -] headache [ -] dizziness [ -] syncope [ -] weakness [ ] numbness  Psychiatric: [ ] negative [ ] anxiety [- ] depression  Endocrine: [ ] negative [ ] diabetes [ -] thyroid problem  Hematologic/Lymphatic: [ ] negative [ ] anemia [ -] bleeding problem  Allergic/Immunologic: [ ] negative [ ] itchy eyes [- ] nasal discharge [ ] hives [ ] angioedema  [x ] All other systems negative  [ ] Unable to assess ROS because ________    OBJECTIVE:  ICU Vital Signs Last 24 Hrs  T(C): 36.3 (10 Nov 2017 13:00), Max: 37.1 (10 Nov 2017 09:00)  T(F): 97.4 (10 Nov 2017 13:00), Max: 98.7 (10 Nov 2017 09:00)  HR: 73 (10 Nov 2017 13:00) (70 - 83)  BP: 128/77 (10 Nov 2017 13:00) (114/67 - 130/70)  BP(mean): --  ABP: --  ABP(mean): --  RR: 18 (10 Nov 2017 13:00) (16 - 18)  SpO2: 99% (10 Nov 2017 13:00) (95% - 99%)        11-09 @ 07:01  -  11-10 @ 07:00  --------------------------------------------------------  IN: 840 mL / OUT: 0 mL / NET: 840 mL    11-10 @ 07:01  -  11-10 @ 14:17  --------------------------------------------------------  IN: 240 mL / OUT: 0 mL / NET: 240 mL      CAPILLARY BLOOD GLUCOSE      POCT Blood Glucose.: 222 mg/dL (10 Nov 2017 11:36)      PHYSICAL EXAM:  General: NAD, speaking in full sentences, no accessory muscle use   HEENT: PERRLA  Lymph Nodes: None palpable   Neck: Supple   Respiratory: CTA b/l, no wheezing or crackles  Cardiovascular: RRR, S1+S2+0  Abdomen: Soft, NT, ND, BS+  Extremities: Mild ankle edema, pulses + b/l   Skin: Chronic skin changes, no rash   Neurological: AAOx3, grossly non focal   Psychiatry: Normal mood     HOSPITAL MEDICATIONS:  heparin  Injectable 5000 Unit(s) SubCutaneous every 12 hours        dextrose 50% Injectable 12.5 Gram(s) IV Push once  dextrose 50% Injectable 25 Gram(s) IV Push once  dextrose 50% Injectable 25 Gram(s) IV Push once  dextrose Gel 1 Dose(s) Oral once PRN  glucagon  Injectable 1 milliGRAM(s) IntraMuscular once PRN  insulin lispro (HumaLOG) corrective regimen sliding scale   SubCutaneous at bedtime  insulin lispro (HumaLOG) corrective regimen sliding scale   SubCutaneous three times a day before meals              dextrose 5%. 1000 milliLiter(s) IV Continuous <Continuous>            LABS:                        11.1   5.3   )-----------( 154      ( 10 Nov 2017 06:33 )             34.7     Hgb Trend: 11.1<--, 11.7<--  11-10    141  |  111<H>  |  43<H>  ----------------------------<  118<H>  4.6   |  19<L>  |  1.75<H>    Ca    9.1      10 Nov 2017 06:33    TPro  6.8  /  Alb  4.5  /  TBili  0.5  /  DBili  x   /  AST  376<H>  /  ALT  167<H>  /  AlkPhos  154<H>  11-09    Creatinine Trend: 1.75<--, 1.89<--, 1.98<--, 1.94<--  PT/INR - ( 09 Nov 2017 14:08 )   PT: 12.7 sec;   INR: 1.17 ratio         PTT - ( 09 Nov 2017 14:08 )  PTT:27.7 sec          MICROBIOLOGY:   None  RADIOLOGY:    < from: Xray Chest 2 Views PA/Lat (11.10.17 @ 09:18) >  mall right pleural effusion.   There is no pneumothorax.  The cardiomediastinal silhouette is within normal limits.   The visualized osseous and soft tissue structures demonstrate no acute   pathology.    < from: CT Head No Cont (11.09.17 @ 15:09) >   No acute intracranial hemorrhage, mass effect, or evidence of   acute territorial infarct.    [ ] Reviewed and interpreted by me    PULMONARY FUNCTION TESTS:    EKG:

## 2017-11-10 NOTE — CONSULT NOTE ADULT - SUBJECTIVE AND OBJECTIVE BOX
HPI:  Mr. Deluca is an 89 year-old man with history of type 2 diabetes, hypertension, and retinopathy s/p laser treatment, s/p admission 10/2017 with symptomatic hypoglycemia, who presented, yesterday to the [facility] ER with generalized weakness and cold sweats, in association with a low blood sugar. He was noted on admission to have a creatinine of 1.89mg/dL and a BUN of 50mg/dL. Therefore, a renal consultation was requested.      PAST MEDICAL & SURGICAL HISTORY:  Type 2 diabetes mellitus with retinopathy, macular edema presence unspecified, unspecified long term insulin use status, unspecified retinopathy severity  HLD (hyperlipidemia)  HTN (hypertension)  DM retinopathy s/p laser surgery  DM neuropathy    Allergies  No Known Allergies    SOCIAL HISTORY:  Drinks vodka regularly    FAMILY HISTORY:  No pertinent family history in first degree relatives    REVIEW OF SYSTEMS:  CONSTITUTIONAL: (+)generalized weakness, (+)cold sweats; no fevers or chills  EYES/ENT: No visual changes;  No vertigo or throat pain   NECK: No pain or stiffness  RESPIRATORY: No cough, wheezing, hemoptysis; No shortness of breath  CARDIOVASCULAR: No chest pain or palpitations  GASTROINTESTINAL: No abdominal or epigastric pain. No nausea, vomiting, or hematemesis; No diarrhea or constipation. No melena or hematochezia.  GENITOURINARY: No dysuria, frequency or hematuria  NEUROLOGICAL: No numbness or weakness  SKIN: No itching, burning, rashes, or lesions   All other review of systems is negative unless indicated above.    VITAL:  T(C): , Max: 37.1 (11-10-17 @ 09:00)  T(F): , Max: 98.7 (11-10-17 @ 09:00)  HR: 83 (11-10-17 @ 09:00)  BP: 118/67 (11-10-17 @ 09:00)  BP(mean): --  RR: 18 (11-10-17 @ 09:00)  SpO2: 97% (11-10-17 @ 09:00)      PHYSICAL EXAM:  Constitutional: NAD, Alert  HEENT: NCAT, MMM  Neck: Supple, No JVD  Respiratory: CTA-b/l  Cardiovascular: RRR s1s2, no m/r/g  Gastrointestinal: BS+, soft, NT/ND  Extremities: No peripheral edema b/l  Neurological: no focal deficits; strength grossly intact  Psychiatric: Normal mood, normal affect  Back: no CVAT b/l  Skin: No rashes, no nevi    LABS:                        11.1   5.3   )-----------( 154      ( 10 Nov 2017 06:33 )             34.7     Na(141)/K(4.6)/Cl(111)/HCO3(19)/BUN(43)/Cr(1.75)Glu(118)/Ca(9.1)/Mg(--)/PO4(--)    11-10 @ 06:33  Na(142)/K(4.9)/Cl(109)/HCO3(19)/BUN(50)/Cr(1.89)Glu(157)/Ca(9.2)/Mg(--)/PO4(--)    11-09 @ 14:08  (10/21/17) - BUN/Cr 62/1.98    (10/21/17) urinalysis - no protein, no blood      IMAGING:    < from: Xray Chest 2 Views PA/Lat (11.10.17 @ 09:18) >  IMPRESSION:   Small right pleural effusion.      < from: Transthoracic Echocardiogram (08.30.17 @ 19:31) >  Conclusions:  1. Mitral annular calcification. Mild mitral regurgitation.  2. Calcified trileaflet aortic valve with normal opening.  3. Severely dilated left atrium.  LA volume index = 58  cc/m2.  4. Mild concentric left ventricular hypertrophy.  5. Normal left ventricular systolic function. No segmental  wall motion abnormalities.  6. Normal right ventricular size and function.        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 No proteinuria on recent urinalyses (which argues against his CKD being from DM). Stable function.  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  (3)Hypoglycemia    RECOMMEND:  (1)Renal ultrasound (inpatient versus outpatient)  (2)Urine: protein, creatinine  (3)Dose new meds for GFR 25-35ml/min (present dosing is acceptable)    Thank you for involving [facility] in this patient's care.    With warm regards,    Elvin Weston MD   [facility]  [phone] HPI:  Mr. Deluca is an 89 year-old man with history of type 2 diabetes, hypertension, and retinopathy s/p laser treatment, s/p admission 10/2017 with symptomatic hypoglycemia, who presented, yesterday to the [facility] ER with generalized weakness and cold sweats, in association with a low blood sugar. He was noted on admission to have a creatinine of 1.89mg/dL and a BUN of 50mg/dL. Therefore, a renal consultation was requested.    Mr. Deluca denies known history of CKD, and does not recall seeing a nephrologist in the past. He follows closely as an outpatient with his PCP Dr. Joel Goldberg and with his endocrinologist Dr. Pavan Painting. He states that his urine has in fact been bubbly for the past 6 months or so. He denies gross hematuria. He attests to prn NSAID use for headaches (3 advil per headache).     PAST MEDICAL & SURGICAL HISTORY:  Type 2 diabetes mellitus with retinopathy, macular edema presence unspecified, unspecified long term insulin use status, unspecified retinopathy severity  HLD (hyperlipidemia)  HTN (hypertension)  DM retinopathy s/p laser surgery  DM neuropathy    Allergies  No Known Allergies    SOCIAL HISTORY:  Drinks vodka regularly    FAMILY HISTORY:  No pertinent family history in first degree relatives    REVIEW OF SYSTEMS:  CONSTITUTIONAL: (+)generalized weakness, (+)cold sweats; no fevers or chills  EYES/ENT: No visual changes;  No vertigo or throat pain   NECK: No pain or stiffness  RESPIRATORY: No cough, wheezing, hemoptysis; No shortness of breath  CARDIOVASCULAR: No chest pain or palpitations  GASTROINTESTINAL: No abdominal or epigastric pain. No nausea, vomiting, or hematemesis; No diarrhea or constipation. No melena or hematochezia.  GENITOURINARY: No dysuria, frequency or hematuria  NEUROLOGICAL: No numbness or weakness  SKIN: No itching, burning, rashes, or lesions   All other review of systems is negative unless indicated above.    VITAL:  T(C): , Max: 37.1 (11-10-17 @ 09:00)  T(F): , Max: 98.7 (11-10-17 @ 09:00)  HR: 83 (11-10-17 @ 09:00)  BP: 118/67 (11-10-17 @ 09:00)  BP(mean): --  RR: 18 (11-10-17 @ 09:00)  SpO2: 97% (11-10-17 @ 09:00)      PHYSICAL EXAM:  Constitutional: NAD, Alert  HEENT: NCAT, MMM  Neck: Supple, No JVD  Respiratory: CTA-b/l  Cardiovascular: RRR s1s2, no m/r/g  Gastrointestinal: BS+, soft, NT/ND  Extremities: No peripheral edema b/l  Neurological: no focal deficits; strength grossly intact  Psychiatric: Normal mood, normal affect  Back: no CVAT b/l  Skin: No rashes, no nevi    LABS:                        11.1   5.3   )-----------( 154      ( 10 Nov 2017 06:33 )             34.7     Na(141)/K(4.6)/Cl(111)/HCO3(19)/BUN(43)/Cr(1.75)Glu(118)/Ca(9.1)/Mg(--)/PO4(--)    11-10 @ 06:33  Na(142)/K(4.9)/Cl(109)/HCO3(19)/BUN(50)/Cr(1.89)Glu(157)/Ca(9.2)/Mg(--)/PO4(--)    11-09 @ 14:08  (10/21/17) - BUN/Cr 62/1.98    (10/21/17) urinalysis - no protein, no blood      IMAGING:    < from: Xray Chest 2 Views PA/Lat (11.10.17 @ 09:18) >  IMPRESSION:   Small right pleural effusion.      < from: Transthoracic Echocardiogram (08.30.17 @ 19:31) >  Conclusions:  1. Mitral annular calcification. Mild mitral regurgitation.  2. Calcified trileaflet aortic valve with normal opening.  3. Severely dilated left atrium.  LA volume index = 58  cc/m2.  4. Mild concentric left ventricular hypertrophy.  5. Normal left ventricular systolic function. No segmental  wall motion abnormalities.  6. Normal right ventricular size and function.        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 No proteinuria on recent urinalyses (which argues against his CKD being from DM). Stable function.  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  (3)Hypoglycemia    RECOMMEND:  (1)Renal ultrasound (inpatient versus outpatient)  (2)Urine: protein, creatinine  (3)Dose new meds for GFR 25-35ml/min (present dosing is acceptable)  (4)Counseled patient to opt for Tylenol/Excedrin for headaches rather than Advil/Aleve  (5)Management of hypoglycemia per primary team  (6)If for discharge, could f/u at my office in 1-2 months    Thank you for involving [facility] in this patient's care.    With warm regards,    Elvin Weston MD   [facility]  [phone]

## 2017-11-10 NOTE — CONSULT NOTE ADULT - SUBJECTIVE AND OBJECTIVE BOX
HPI:  89 y/o M w/ hx of 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 follows with Dr. Painting with nephropathy, retinopathy s/p laser tx, and neuropathy last A1c 7.9% Recentrly admitted 10/2017 with hypoglycemia. Recommended discharge on onglyza 2.5mg daily. Followed up with Dr. Painting restarted on Actos and sulfonylurea in addition to onglyza now presenting with hypoglycemia. At home on Actos 30mg daily, glimepiride 2mg daily, and onglyza 2.5. Adherent to medications. Does not have a glucometer. Does not check his glucose at home. No symptoms at home of hypoglycemia. Eats only 1 meal a day usually at 2pm. Drinks vodka on a regular basis. Last drink about 2 weeks ago. +diet soda. No juice. +rice and french fries.   Also hx of hyperlipidemia, Hypertension presents with hypoglycemia with weakness and diaphoresis. No chest pain or trouble breathing. No abdominal pain, nausea, or vomiting. Prior to today, patient was well and on no new medications.          PAST MEDICAL & SURGICAL HISTORY:  Type 2 diabetes mellitus with retinopathy, macular edema presence unspecified, unspecified long term insulin use status, unspecified retinopathy severity  HLD (hyperlipidemia)  HTN (hypertension)  No significant past surgical history      FAMILY HISTORY:  Parents  in the [place]  Aunt with Type 2 DM      Social History: +vodka regularly. No tobacco use    Outpatient Medications:  · 	aspirin 81 mg oral tablet: 2 tab(s) orally once a day, Last Dose Taken:    · 	irbesartan 150 mg oral tablet: 1 tab(s) orally once a day, Last Dose Taken:    · 	Centrum Silver Men's: 1 tab(s) orally once a day, Last Dose Taken:    · 	pravastatin 40 mg oral tablet: 1 tab(s) orally once a day, Last Dose Taken:    · 	Vitamin D3 400 intl units oral tablet: 1 tab(s) orally once a day, Last Dose Taken:    · 	Lasix: Last Dose Taken:    · 	pioglitazone 30 mg oral tablet: 1 tab(s) orally once a day, Last Dose Taken:    · 	glimepiride 2 mg oral tablet: 1 tab(s) orally once a day, Last Dose Taken:    · 	Onglyza 2.5 mg oral tablet: 1 tab(s) orally once a day, Last Dose Taken:     MEDICATIONS  (STANDING):  dextrose 5%. 1000 milliLiter(s) (50 mL/Hr) IV Continuous <Continuous>  dextrose 50% Injectable 12.5 Gram(s) IV Push once  dextrose 50% Injectable 25 Gram(s) IV Push once  dextrose 50% Injectable 25 Gram(s) IV Push once  heparin  Injectable 5000 Unit(s) SubCutaneous every 12 hours  insulin lispro (HumaLOG) corrective regimen sliding scale   SubCutaneous at bedtime  insulin lispro (HumaLOG) corrective regimen sliding scale   SubCutaneous three times a day before meals    MEDICATIONS  (PRN):  dextrose Gel 1 Dose(s) Oral once PRN Blood Glucose LESS THAN 70 milliGRAM(s)/deciliter  glucagon  Injectable 1 milliGRAM(s) IntraMuscular once PRN Glucose LESS THAN 70 milligrams/deciliter      Allergies    No Known Allergies    Intolerances      Review of Systems:  Constitutional: No fever, No change in weight +weakness  Eyes: +retinopathy  Neuro: No headache, +neuroapthy  HEENT: No throat pain  Cardiovascular: No chest pain  Respiratory: No SOB  GI: No nausea or vomiting  : No polyuria  Skin: no rash  Psych: no depression  Endocrine: No polydipsia, No heat or cold intolerance, rest as noted in HPI  Hem/lymph: no swelling    All other review of systems negative      PHYSICAL EXAM:  VITALS: T(C): 36.6 (11-10-17 @ 16:35)  T(F): 97.8 (11-10-17 @ 16:35), Max: 98.7 (11-10-17 @ 09:00)  HR: 63 (11-10-17 @ 16:35) (63 - 83)  BP: 113/61 (11-10-17 @ 16:35) (113/61 - 130/70)  RR:  (16 - 18)  SpO2:  (95% - 99%)  Wt(kg): --  GENERAL: NAD at this time  EYES: No proptosis, EOMI  HEENT:  Atraumatic, Normocephalic,   THYROID: Normal size, no palpable nodules  RESPIRATORY: Clear to auscultation bilaterally, full excursion, non-labored  CARDIOVASCULAR: Regular rhythm; No murmurs; +tibial pitting edema  GI: Soft, nontender, non distended, normal bowel sounds  SKIN: Dry, intact, No rashes or lesions  MUSCULOSKELETAL: normal strength  NEURO: follows commands, no tremor, normal reflexes  PSYCH: Alert and oriented x 3, normal affect, normal mood  CUSHING'S SIGNS: no striae    POCT Blood Glucose.: 222 mg/dL (11-10-17 @ 11:36)  POCT Blood Glucose.: 112 mg/dL (11-10-17 @ 07:32)  POCT Blood Glucose.: 119 mg/dL (11-10-17 @ 02:00)  POCT Blood Glucose.: 126 mg/dL (11-10-17 @ 00:11)  POCT Blood Glucose.: 161 mg/dL (17 @ 21:16)  POCT Blood Glucose.: 303 mg/dL (17 @ 20:17)                              11.1   5.3   )-----------( 154      ( 10 Nov 2017 06:33 )             34.7       11-10    141  |  111<H>  |  43<H>  ----------------------------<  118<H>  4.6   |  19<L>  |  1.75<H>    EGFR if : 39<L>  EGFR if non : 34<L>    Ca    9.1      11-10    TPro  6.3  /  Alb  3.5  /  TBili  0.5  /  DBili  0.2  /  AST  306<H>  /  ALT  146<H>  /  AlkPhos  136<H>  11-10    Thyroid Function Tests:  10-21 @ 09:04 TSH 1.37 FreeT4 -- T3 -- Anti TPO -- Anti Thyroglobulin Ab -- TSI --      Hemoglobin A1C, Whole Blood: 7.9 % <H> [4.0 - 5.6] (10-21-17 @ 08:48)  Hemoglobin A1C, Whole Blood: 8.5 % <H> [4.0 - 5.6] (17 @ 07:27)      10-21 Chol 111 LDL 32 HDL 70 Trig 46  Radiology:

## 2017-11-10 NOTE — CONSULT NOTE ADULT - ASSESSMENT
89 M with HTN, HLD, DM type 2, CKD stage 3-4, diastolic dysfunction, b/l pleural effusions, s/p R thoracentesis in 8/17 showing transudative fluid with negative cytopath, recent admission in October for hypoglycemia, now readmitted with hypoglycemia, found to have a small right pleural effusion    1. Pleural effusion  - Currently asymptomatic from the pulmonary standpoint   - O2 sats 98-99 % RA at rest. Would check O2 sat on ambulation as well  - Trace right pleural effusion on CXR. otherwise clear lung fields  - No intervention required from the pulmonary standpoint  - Incentive spirometry, keep O2 sats > 92 %  - Oupt FUP with Dr. Red    2. Symptomatic hypoglycemia   - Currently stable FS  - Cont to follow FS, dose meds according to GFR of 25-35 ml/min  - endo FUP    3. Hypertension:  - Anti hypertensives on hold at present, cont to monitor BP    4. DVT Px:  - Hep SQ
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
89 y/o M w/ uncontrolled type 2 diabetes with nephropathy, neuropathy, retinopathy hypoglycemia with recurrent admissions for hypoglycemia after being restarted on previous regimen with inconsistent po intake and CKD (High risk patient with high level decision-making).

## 2017-11-10 NOTE — CONSULT NOTE ADULT - SUBJECTIVE AND OBJECTIVE BOX
Patient is a 89y old  Male who presents with a chief complaint of weakness (09 Nov 2017 17:12)    89 year old man patient of Dr. Goldberg with type 2 diabetes mellitus with recent admission for symptomatic hypoglycemia, hypertension, chronic renal insufficiency, presenting with acute onset of weakness, inability to pick his feet up, leading to a falll without syncope.  His oral diabetic medications have recently been adjusted.  He is not on insulin.  He denies all cardiac symptoms.  He did not hit head.  CT head no acute process.  His sugar was not checked prior to presentation.  He denies decreased PO intake, nausea, vomiting, diarrhea, fevers, or chills.      PAST MEDICAL & SURGICAL HISTORY:  Type 2 diabetes mellitus with retinopathy, macular edema presence unspecified, unspecified long term insulin use status, unspecified retinopathy severity  HLD (hyperlipidemia)  HTN (hypertension)  No significant past surgical history    Allergies    No Known Allergies    MEDICATIONS  (STANDING):  dextrose 5%. 1000 milliLiter(s) (50 mL/Hr) IV Continuous <Continuous>  dextrose 5%. 1000 milliLiter(s) (60 mL/Hr) IV Continuous <Continuous>  dextrose 50% Injectable 12.5 Gram(s) IV Push once  dextrose 50% Injectable 25 Gram(s) IV Push once  dextrose 50% Injectable 25 Gram(s) IV Push once  heparin  Injectable 5000 Unit(s) SubCutaneous every 12 hours  insulin lispro (HumaLOG) corrective regimen sliding scale   SubCutaneous at bedtime  insulin lispro (HumaLOG) corrective regimen sliding scale   SubCutaneous three times a day before meals    MEDICATIONS  (PRN):  dextrose Gel 1 Dose(s) Oral once PRN Blood Glucose LESS THAN 70 milliGRAM(s)/deciliter  glucagon  Injectable 1 milliGRAM(s) IntraMuscular once PRN Glucose LESS THAN 70 milligrams/deciliter      General: Denies weight loss, fevers, rash, decreased hearing, See HPI  Cardiac: Denies chest pain, SOB, SORTO, orthopnea, PND, claudication, edema, snoring, daytime somnolence, palpitations, syncope  Resp: Denies SOB, SORTO, cough, sputum, wheezing, hemoptysis  GI: Denies change in bowel habits, diarrhea, weight loss, melena, tarry stools,   nausea, vomiting, jaundice, abdominal pain, dysphagia  : Denies dysuria, nocturia, hematuria  Neuro: Denies tinnitus, headache, visual changes,  dizziness or vertigo  Musculoskeletal: Denies neck pain back pain joint pain.  Skin: Denies rash, itching, dryness.  Endocrine: Denies polydipsia, polyuria  Psychiatric: Denies depression, anxiety  All other review of systems is negative unless indicated above.    PHYSICAL EXAM:  Vital Signs Last 24 Hrs  T(C): 37.1 (10 Nov 2017 09:00), Max: 37.1 (10 Nov 2017 09:00)  T(F): 98.7 (10 Nov 2017 09:00), Max: 98.7 (10 Nov 2017 09:00)  HR: 83 (10 Nov 2017 09:00) (70 - 83)  BP: 118/67 (10 Nov 2017 09:00) (114/67 - 130/70)  BP(mean): --  RR: 18 (10 Nov 2017 09:00) (16 - 18)  SpO2: 97% (10 Nov 2017 09:00) (95% - 98%)  I&O's Summary    09 Nov 2017 07:01  -  10 Nov 2017 07:00  --------------------------------------------------------  IN: 840 mL / OUT: 0 mL / NET: 840 mL    General Appearance: 	 Alert, cooperative, no distress  HEENT: normocephalic, atraumatic, PERRLA, EOMI, conjunctiva normal, sclera anicteric,   Neck: no JVD  Lungs:  clear to auscultation and percussion bilaterally  Cor:  pmi 5th ICS MCL, regular rate and rhythm, S1 normal intensity, S2 normal intensity  Abdomen:	 soft, non-tender; bowel sounds normal; no masses,  no organomegaly  Extremities: without cyanosis, clubbing or edema      EKG:  Telemetry:    LABS:                        11.1   5.3   )-----------( 154      ( 10 Nov 2017 06:33 )             34.7     11-10    141  |  111<H>  |  43<H>  ----------------------------<  118<H>  4.6   |  19<L>  |  1.75<H>    Ca    9.1      10 Nov 2017 06:33    TPro  6.8  /  Alb  4.5  /  TBili  0.5  /  DBili  x   /  AST  376<H>  /  ALT  167<H>  /  AlkPhos  154<H>  11-09    CAPILLARY BLOOD GLUCOSE      POCT Blood Glucose.: 222 mg/dL (10 Nov 2017 11:36)  POCT Blood Glucose.: 112 mg/dL (10 Nov 2017 07:32)  POCT Blood Glucose.: 119 mg/dL (10 Nov 2017 02:00)  POCT Blood Glucose.: 126 mg/dL (10 Nov 2017 00:11)  POCT Blood Glucose.: 161 mg/dL (09 Nov 2017 21:16)  POCT Blood Glucose.: 303 mg/dL (09 Nov 2017 20:17)    PT/INR - ( 09 Nov 2017 14:08 )   PT: 12.7 sec;   INR: 1.17 ratio         PTT - ( 09 Nov 2017 14:08 )  PTT:27.7 sec    CXR: No acute process      Impression/Plan: Possible recurrent symptomatic hypoglycemia, no evidence of acute neurologic event  Plan: Monitor sugars, Endocrine consult  Stable from cardiac standpoint    Morro Martins MD  [facility]

## 2017-11-10 NOTE — CONSULT NOTE ADULT - PROBLEM SELECTOR RECOMMENDATION 9
Hypoglycemia likely due to CKD in combination with use of sulfonylurea and poor inconsistent po intake.  Diabetes education and nutrition eval.  Recommend 3 meals a day especially if on medication for diabetes  For now monitor on correction scale.  Once glucose consistently > 180 can start basal bolus insulin  Would avoid this outpatient regimen upon discharge given CKD and risk of hypoglycemia with sulfonylurea, worsening heart failure with Actos, risk of pancreatitis with onglyza with chronic Vodka use, and potential risk of lactic acidosis with metformin use if GFR <30.   Discussed use of insulin, pt. does not want insulin at this time but willing to be shown how to use it.  He needs a glucometer and teaching  Outpt. optho, podiatry, nephrology.  Can follow-up with Dr. Painting as outpatient. Discussed plan with his associate Dr. Aceves.  Plan to discharge on Lantus and prandin, if unresponsive to insulin injections can d/c on Actos and onglyza, but aware of risk of pancreatitis with alcohol use.

## 2017-11-11 LAB
ANION GAP SERPL CALC-SCNC: 12 MMOL/L — SIGNIFICANT CHANGE UP (ref 5–17)
BUN SERPL-MCNC: 39 MG/DL — HIGH (ref 7–23)
CALCIUM SERPL-MCNC: 8.8 MG/DL — SIGNIFICANT CHANGE UP (ref 8.4–10.5)
CHLORIDE SERPL-SCNC: 110 MMOL/L — HIGH (ref 96–108)
CO2 SERPL-SCNC: 19 MMOL/L — LOW (ref 22–31)
CREAT SERPL-MCNC: 1.57 MG/DL — HIGH (ref 0.5–1.3)
FERRITIN SERPL-MCNC: 236 NG/ML — SIGNIFICANT CHANGE UP (ref 30–400)
GLUCOSE BLDC GLUCOMTR-MCNC: 101 MG/DL — HIGH (ref 70–99)
GLUCOSE BLDC GLUCOMTR-MCNC: 164 MG/DL — HIGH (ref 70–99)
GLUCOSE BLDC GLUCOMTR-MCNC: 165 MG/DL — HIGH (ref 70–99)
GLUCOSE BLDC GLUCOMTR-MCNC: 192 MG/DL — HIGH (ref 70–99)
GLUCOSE SERPL-MCNC: 99 MG/DL — SIGNIFICANT CHANGE UP (ref 70–99)
POTASSIUM SERPL-MCNC: 4.5 MMOL/L — SIGNIFICANT CHANGE UP (ref 3.5–5.3)
POTASSIUM SERPL-SCNC: 4.5 MMOL/L — SIGNIFICANT CHANGE UP (ref 3.5–5.3)
SODIUM SERPL-SCNC: 141 MMOL/L — SIGNIFICANT CHANGE UP (ref 135–145)

## 2017-11-11 RX ORDER — FUROSEMIDE 40 MG
20 TABLET ORAL DAILY
Qty: 0 | Refills: 0 | Status: DISCONTINUED | OUTPATIENT
Start: 2017-11-11 | End: 2017-11-12

## 2017-11-11 RX ORDER — ATORVASTATIN CALCIUM 80 MG/1
10 TABLET, FILM COATED ORAL AT BEDTIME
Qty: 0 | Refills: 0 | Status: DISCONTINUED | OUTPATIENT
Start: 2017-11-11 | End: 2017-11-12

## 2017-11-11 RX ORDER — ASPIRIN/CALCIUM CARB/MAGNESIUM 324 MG
81 TABLET ORAL DAILY
Qty: 0 | Refills: 0 | Status: DISCONTINUED | OUTPATIENT
Start: 2017-11-11 | End: 2017-11-12

## 2017-11-11 RX ORDER — LOSARTAN POTASSIUM 100 MG/1
25 TABLET, FILM COATED ORAL DAILY
Qty: 0 | Refills: 0 | Status: DISCONTINUED | OUTPATIENT
Start: 2017-11-11 | End: 2017-11-12

## 2017-11-11 RX ADMIN — Medication 1: at 16:48

## 2017-11-11 RX ADMIN — HEPARIN SODIUM 5000 UNIT(S): 5000 INJECTION INTRAVENOUS; SUBCUTANEOUS at 21:34

## 2017-11-11 RX ADMIN — HEPARIN SODIUM 5000 UNIT(S): 5000 INJECTION INTRAVENOUS; SUBCUTANEOUS at 12:19

## 2017-11-11 RX ADMIN — ATORVASTATIN CALCIUM 10 MILLIGRAM(S): 80 TABLET, FILM COATED ORAL at 21:34

## 2017-11-11 RX ADMIN — Medication 1: at 12:19

## 2017-11-11 NOTE — PROGRESS NOTE ADULT - PROBLEM SELECTOR PLAN 1
hold all oral agents. ENdo input appreciated. cont insulin coverage. all BGMs are under 200. ptn doesn't want to use INSULIN , would opt for Onglyza only on outptn basis. would not cont actos or glimepiride or prandin. ptn wants to go home joel. if no further recommendations from endo will plan as above. also needs diabetic teachings and learn how to use a glucometer.

## 2017-11-11 NOTE — PROGRESS NOTE ADULT - SUBJECTIVE AND OBJECTIVE BOX
JODY WILKINS:[id number],   89yMale followed for:  No Known Allergies    PAST MEDICAL & SURGICAL HISTORY:  Type 2 diabetes mellitus with retinopathy, macular edema presence unspecified, unspecified long term insulin use status, unspecified retinopathy severity  HLD (hyperlipidemia)  HTN (hypertension)  No significant past surgical history    FAMILY HISTORY:  No pertinent family history in first degree relatives    MEDICATIONS  (STANDING):  dextrose 5%. 1000 milliLiter(s) (50 mL/Hr) IV Continuous <Continuous>  dextrose 50% Injectable 12.5 Gram(s) IV Push once  dextrose 50% Injectable 25 Gram(s) IV Push once  dextrose 50% Injectable 25 Gram(s) IV Push once  heparin  Injectable 5000 Unit(s) SubCutaneous every 12 hours  insulin lispro (HumaLOG) corrective regimen sliding scale   SubCutaneous at bedtime  insulin lispro (HumaLOG) corrective regimen sliding scale   SubCutaneous three times a day before meals    MEDICATIONS  (PRN):  dextrose Gel 1 Dose(s) Oral once PRN Blood Glucose LESS THAN 70 milliGRAM(s)/deciliter  glucagon  Injectable 1 milliGRAM(s) IntraMuscular once PRN Glucose LESS THAN 70 milligrams/deciliter      Vital Signs Last 24 Hrs  T(C): 36.8 (11 Nov 2017 09:21), Max: 36.9 (11 Nov 2017 01:38)  T(F): 98.3 (11 Nov 2017 09:21), Max: 98.4 (11 Nov 2017 01:38)  HR: 76 (11 Nov 2017 09:21) (63 - 80)  BP: 123/70 (11 Nov 2017 09:21) (113/61 - 132/79)  BP(mean): --  RR: 17 (11 Nov 2017 09:21) (16 - 18)  SpO2: 96% (11 Nov 2017 09:21) (95% - 99%)  nc/at  s1s2  cta  soft, nt, nd no guarding or rebound  no c/c/e    CBC Full  -  ( 10 Nov 2017 06:33 )  WBC Count : 5.3 K/uL  Hemoglobin : 11.1 g/dL  Hematocrit : 34.7 %  Platelet Count - Automated : 154 K/uL  Mean Cell Volume : 90.7 fl  Mean Cell Hemoglobin : 29.1 pg  Mean Cell Hemoglobin Concentration : 32.1 gm/dL  Auto Neutrophil # : x  Auto Lymphocyte # : x  Auto Monocyte # : x  Auto Eosinophil # : x  Auto Basophil # : x  Auto Neutrophil % : x  Auto Lymphocyte % : x  Auto Monocyte % : x  Auto Eosinophil % : x  Auto Basophil % : x    11-11    141  |  110<H>  |  39<H>  ----------------------------<  99  4.5   |  19<L>  |  1.57<H>    Ca    8.8      11 Nov 2017 06:29    TPro  6.3  /  Alb  3.5  /  TBili  0.5  /  DBili  0.2  /  AST  306<H>  /  ALT  146<H>  /  AlkPhos  136<H>  11-10    PT/INR - ( 09 Nov 2017 14:08 )   PT: 12.7 sec;   INR: 1.17 ratio         PTT - ( 09 Nov 2017 14:08 )  PTT:27.7 sec

## 2017-11-11 NOTE — PROGRESS NOTE ADULT - SUBJECTIVE AND OBJECTIVE BOX
NEPHROLOGY-[facility]  [phone]  Ritu Conrad NP      Patient seen and examined in bed. Awake, alert. No complaints, no events noted. Feeling better. Wants to go home.      MEDICATIONS  (STANDING):  dextrose 5%. 1000 milliLiter(s) (50 mL/Hr) IV Continuous <Continuous>  dextrose 50% Injectable 12.5 Gram(s) IV Push once  dextrose 50% Injectable 25 Gram(s) IV Push once  dextrose 50% Injectable 25 Gram(s) IV Push once  heparin  Injectable 5000 Unit(s) SubCutaneous every 12 hours  insulin lispro (HumaLOG) corrective regimen sliding scale   SubCutaneous at bedtime  insulin lispro (HumaLOG) corrective regimen sliding scale   SubCutaneous three times a day before meals      VITAL:  T(C): , Max: 36.9 (11-11-17 @ 01:38)  T(F): , Max: 98.4 (11-11-17 @ 01:38)  HR: 76 (11-11-17 @ 09:21)  BP: 123/70 (11-11-17 @ 09:21)  BP(mean): --  RR: 17 (11-11-17 @ 09:21)  SpO2: 96% (11-11-17 @ 09:21)  Wt(kg): --    I and O's:    11-10 @ 07:01  -  11-11 @ 07:00  --------------------------------------------------------  IN: 780 mL / OUT: 0 mL / NET: 780 mL    11-11 @ 07:01  -  11-11 @ 12:13  --------------------------------------------------------  IN: 450 mL / OUT: 0 mL / NET: 450 mL          PHYSICAL EXAM:    Constitutional: NAD       Neck:  No JVD    Respiratory: CTAB/L    Cardiovascular: S1 and S2    Gastrointestinal: BS+, soft, NT/ND    Extremities: No peripheral edema    : No James    Skin: No rashes, dressing to right shin clean and dry    Access: Not applicable    LABS:                        11.1   5.3   )-----------( 154      ( 10 Nov 2017 06:33 )             34.7     11 Nov 2017 06:29    141    |  110<H>  |  39<H>  ----------------------------<  99     4.5     |  19<L>  |  1.57<H>  10 Nov 2017 06:33    141    |  111<H>  |  43<H>  ----------------------------<  118<H>  4.6     |  19<L>  |  1.75<H>    Ca    8.8        11 Nov 2017 06:29  Ca    9.1        10 Nov 2017 06:33    TPro  6.3    /  Alb  3.5    /  TBili  0.5    /  DBili  0.2    /  AST  306<H>  /  ALT  146<H>  /  AlkPhos  136<H>  10 Nov 2017 06:33  TPro  6.8    /  Alb  4.5    /  TBili  0.5    /  DBili  x      /  AST  376<H>  /  ALT  167<H>  /  AlkPhos  154<H>  09 Nov 2017 14:08        Urine Studies:      RADIOLOGY & ADDITIONAL STUDIES:        < from: US Abdomen Complete (11.10.17 @ 14:21) >    EXAM:  US ABDOMEN COMPLETE                            PROCEDURE DATE:  11/10/2017          INTERPRETATION:  CLINICAL INFORMATION: Abnormal LFTs.    COMPARISON: August 28, 2017 chest CT    TECHNIQUE: Sonography of the abdomen.     FINDINGS:    Liver: Within normal limits.    Bile ducts: Normal caliber. Common bile duct measures 4 mm.     Gallbladder: Within normal limits.        Pancreas: Visualized portions are within normal limits.    Spleen: 10 cm. Within normal limits.    Right kidney: 10 cm. No hydronephrosis.        Left kidney: 10 cm.  No hydronephrosis.        Ascites: None.    Aorta and IVC: Visualized portions are within normal limits.    Right pleural effusion.    IMPRESSION:     No gallstones.        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 No proteinuria on recent urinalyses (which argues against his CKD being from DM). Stable function.  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  (3)Hypoglycemia    RECOMMEND:  (1)Abdominal US revealed no Hydronephrosis  (2)Check BMP daily while admitted  (3)Dose new meds for GFR 25-35ml/min (present dosing is acceptable)  (4)Counseled patient to opt for Tylenol/Excedrin for headaches rather than Advil/Aleve  (5)Management of hypoglycemia per primary team  (6)If for discharge, could f/u with Dr. Elvin Weston in 1-2 months

## 2017-11-11 NOTE — PROGRESS NOTE ADULT - SUBJECTIVE AND OBJECTIVE BOX
JODY WILKINS    Patient is a 89y old  Male who presents with a chief complaint of weakness (09 Nov 2017 17:12)    Patient looks and feels improved. Eating.  Most BG in 100s.    Allergies    No Known Allergies    MEDICATIONS  (STANDING):  dextrose 5%. 1000 milliLiter(s) (50 mL/Hr) IV Continuous <Continuous>  dextrose 50% Injectable 12.5 Gram(s) IV Push once  dextrose 50% Injectable 25 Gram(s) IV Push once  dextrose 50% Injectable 25 Gram(s) IV Push once  heparin  Injectable 5000 Unit(s) SubCutaneous every 12 hours  insulin lispro (HumaLOG) corrective regimen sliding scale   SubCutaneous at bedtime  insulin lispro (HumaLOG) corrective regimen sliding scale   SubCutaneous three times a day before meals    MEDICATIONS  (PRN):  dextrose Gel 1 Dose(s) Oral once PRN Blood Glucose LESS THAN 70 milliGRAM(s)/deciliter  glucagon  Injectable 1 milliGRAM(s) IntraMuscular once PRN Glucose LESS THAN 70 milligrams/deciliter      PHYSICAL EXAM:  Vital Signs Last 24 Hrs  T(C): 36.8 (11 Nov 2017 09:21), Max: 36.9 (11 Nov 2017 01:38)  T(F): 98.3 (11 Nov 2017 09:21), Max: 98.4 (11 Nov 2017 01:38)  HR: 76 (11 Nov 2017 09:21) (63 - 80)  BP: 123/70 (11 Nov 2017 09:21) (113/61 - 132/79)  BP(mean): --  RR: 17 (11 Nov 2017 09:21) (16 - 18)  SpO2: 96% (11 Nov 2017 09:21) (95% - 99%)  Daily     Daily   I&O's Summary    10 Nov 2017 07:01  -  11 Nov 2017 07:00  --------------------------------------------------------  IN: 780 mL / OUT: 0 mL / NET: 780 mL    11 Nov 2017 07:01  -  11 Nov 2017 12:53  --------------------------------------------------------  IN: 450 mL / OUT: 0 mL / NET: 450 mL    General Appearance: 	 Alert, cooperative, no distress  Neck: no JVD  Lungs:  clear to auscultation and percussion bilaterally  Cor:  pmi 5th ICS MCL, regular rate and rhythm, S1 normal intensity, S2 normal intensity  Abdomen:	 soft, non-tender; bowel sounds normal; no masses,  no organomegaly  Extremities: without cyanosis, clubbing or edema    EKG:  Telemetry:    Labs:  CBC Full  -  ( 10 Nov 2017 06:33 )  WBC Count : 5.3 K/uL  Hemoglobin : 11.1 g/dL  Hematocrit : 34.7 %  Platelet Count - Automated : 154 K/uL  Mean Cell Volume : 90.7 fl  Mean Cell Hemoglobin : 29.1 pg  Mean Cell Hemoglobin Concentration : 32.1 gm/dL  Auto Neutrophil # : x  Auto Lymphocyte # : x  Auto Monocyte # : x  Auto Eosinophil # : x  Auto Basophil # : x  Auto Neutrophil % : x  Auto Lymphocyte % : x  Auto Monocyte % : x  Auto Eosinophil % : x  Auto Basophil % : x    Basic Metabolic Panel in AM (11.11.17 @ 06:29)    Sodium, Serum: 141 mmol/L    Potassium, Serum: 4.5 mmol/L    Chloride, Serum: 110 mmol/L    Carbon Dioxide, Serum: 19 mmol/L    Anion Gap, Serum: 12 mmol/L    Blood Urea Nitrogen, Serum: 39 mg/dL    Creatinine, Serum: 1.57 mg/dL    Glucose, Serum: 99 mg/dL    Calcium, Total Serum: 8.8 mg/dL    eGFR if Non : 39: Interpretative comment  The units for eGFR are ml/min/1.73m2 (normalized body surface area). The  eGFR is calculated from a serum creatinine using the CKD-EPI equation.  Other variables required for calculation are race, age and sex. Among  patients with chronic kidney disease (CKD), the eGFR is useful in  determining the stage of disease according to KDOQI CKD classification.  All eGFR results are reported numerically with the following  interpretation.          GFR                    With                 Without     (ml/min/1.73 m2)    Kidney Damage       Kidney Damage        >= 90                    Stage 1                     Normal        60-89                    Stage 2                     Decreased GFR        30-59     Stage 3                     Stage 3        15-29                    Stage 4                     Stage 4        < 15                      Stage 5                     Stage 5  Each stage of CKD assumes that the associated GFR level has been in  effect for at least 3 months. Determination of stages one and two (with  eGFR > 59 ml/min/m2) requires estimation of kidney damage for at least 3  months as defined by structural or functional abnormalities.  Limitations: All estimates of GFR will be less accurate for patients at  extremes of muscle mass (including but not limited to frail elderly,  critically ill, or cancer patients), those with unusual diets, and those  with conditions associated with reduced secretion or extrarenal  elimination of creatinine. The eGFR equation is not recommended for use  in patients with unstable creatinine levels. mL/min/1.73M2    eGFR if African American: 45 mL/min/1.73M2    Impression/Plan: Probable symptomatic hypoglycemia, now improved  Difficult choice for outpatient diabetic regimen given his multiple comorbidities and he does not want insulin  Discharge pending transition to oral agents for DM  Restart ARB as outpatient      Morro Martins MD, FACC  [facility]

## 2017-11-11 NOTE — PROGRESS NOTE ADULT - SUBJECTIVE AND OBJECTIVE BOX
Patient is a 89y old  Male who presents with a chief complaint of weakness (09 Nov 2017 17:12)      SUBJECTIVE / OVERNIGHT EVENTS: no new c/o, wants to go home. all BGMs under 200    MEDICATIONS  (STANDING):  dextrose 5%. 1000 milliLiter(s) (50 mL/Hr) IV Continuous <Continuous>  dextrose 50% Injectable 12.5 Gram(s) IV Push once  dextrose 50% Injectable 25 Gram(s) IV Push once  dextrose 50% Injectable 25 Gram(s) IV Push once  heparin  Injectable 5000 Unit(s) SubCutaneous every 12 hours  insulin lispro (HumaLOG) corrective regimen sliding scale   SubCutaneous at bedtime  insulin lispro (HumaLOG) corrective regimen sliding scale   SubCutaneous three times a day before meals    MEDICATIONS  (PRN):  dextrose Gel 1 Dose(s) Oral once PRN Blood Glucose LESS THAN 70 milliGRAM(s)/deciliter  glucagon  Injectable 1 milliGRAM(s) IntraMuscular once PRN Glucose LESS THAN 70 milligrams/deciliter      Vital Signs Last 24 Hrs  T(F): 97.9 (11-11-17 @ 13:31), Max: 98.4 (11-11-17 @ 01:38)  HR: 71 (11-11-17 @ 13:31) (69 - 80)  BP: 119/59 (11-11-17 @ 13:31) (117/63 - 132/79)  RR: 18 (11-11-17 @ 13:31) (16 - 18)  SpO2: 99% (11-11-17 @ 13:31) (95% - 99%)  Telemetry:   CAPILLARY BLOOD GLUCOSE      POCT Blood Glucose.: 165 mg/dL (11 Nov 2017 16:38)  POCT Blood Glucose.: 192 mg/dL (11 Nov 2017 11:33)  POCT Blood Glucose.: 101 mg/dL (11 Nov 2017 07:40)  POCT Blood Glucose.: 131 mg/dL (10 Nov 2017 21:13)    I&O's Summary    10 Nov 2017 07:01  -  11 Nov 2017 07:00  --------------------------------------------------------  IN: 780 mL / OUT: 0 mL / NET: 780 mL    11 Nov 2017 07:01  -  11 Nov 2017 18:48  --------------------------------------------------------  IN: 790 mL / OUT: 0 mL / NET: 790 mL        PHYSICAL EXAM:  GENERAL: NAD, well-developed  HEAD:  Atraumatic, Normocephalic  EYES: EOMI, PERRLA, conjunctiva and sclera clear  NECK: Supple, No JVD  CHEST/LUNG: Clear to auscultation bilaterally; No wheeze  HEART: Regular rate and rhythm; No murmurs, rubs, or gallops  ABDOMEN: Soft, Nontender, Nondistended; Bowel sounds present  EXTREMITIES:  2+ Peripheral Pulses, No clubbing, cyanosis, or edema  PSYCH: AAOx3  NEUROLOGY: non-focal  SKIN: No rashes or lesions    LABS:                        11.1   5.3   )-----------( 154      ( 10 Nov 2017 06:33 )             34.7     11-11    141  |  110<H>  |  39<H>  ----------------------------<  99  4.5   |  19<L>  |  1.57<H>    Ca    8.8      11 Nov 2017 06:29    TPro  6.3  /  Alb  3.5  /  TBili  0.5  /  DBili  0.2  /  AST  306<H>  /  ALT  146<H>  /  AlkPhos  136<H>  11-10              RADIOLOGY & ADDITIONAL TESTS:    Imaging Personally Reviewed:    Consultant(s) Notes Reviewed:      Care Discussed with Consultants/Other Providers:

## 2017-11-12 ENCOUNTER — TRANSCRIPTION ENCOUNTER (OUTPATIENT)
Age: 82
End: 2017-11-12

## 2017-11-12 VITALS
SYSTOLIC BLOOD PRESSURE: 122 MMHG | RESPIRATION RATE: 18 BRPM | DIASTOLIC BLOOD PRESSURE: 71 MMHG | HEART RATE: 66 BPM | TEMPERATURE: 97 F | OXYGEN SATURATION: 97 %

## 2017-11-12 LAB
ANA PAT FLD IF-IMP: ABNORMAL
ANA TITR SER: ABNORMAL
ANION GAP SERPL CALC-SCNC: 12 MMOL/L — SIGNIFICANT CHANGE UP (ref 5–17)
ANION GAP SERPL CALC-SCNC: 12 MMOL/L — SIGNIFICANT CHANGE UP (ref 5–17)
BUN SERPL-MCNC: 40 MG/DL — HIGH (ref 7–23)
BUN SERPL-MCNC: 41 MG/DL — HIGH (ref 7–23)
CALCIUM SERPL-MCNC: 9 MG/DL — SIGNIFICANT CHANGE UP (ref 8.4–10.5)
CALCIUM SERPL-MCNC: 9.3 MG/DL — SIGNIFICANT CHANGE UP (ref 8.4–10.5)
CHLORIDE SERPL-SCNC: 107 MMOL/L — SIGNIFICANT CHANGE UP (ref 96–108)
CHLORIDE SERPL-SCNC: 108 MMOL/L — SIGNIFICANT CHANGE UP (ref 96–108)
CO2 SERPL-SCNC: 18 MMOL/L — LOW (ref 22–31)
CO2 SERPL-SCNC: 21 MMOL/L — LOW (ref 22–31)
CREAT SERPL-MCNC: 1.7 MG/DL — HIGH (ref 0.5–1.3)
CREAT SERPL-MCNC: 1.71 MG/DL — HIGH (ref 0.5–1.3)
GLUCOSE BLDC GLUCOMTR-MCNC: 111 MG/DL — HIGH (ref 70–99)
GLUCOSE BLDC GLUCOMTR-MCNC: 183 MG/DL — HIGH (ref 70–99)
GLUCOSE SERPL-MCNC: 118 MG/DL — HIGH (ref 70–99)
GLUCOSE SERPL-MCNC: 136 MG/DL — HIGH (ref 70–99)
HCT VFR BLD CALC: 36 % — LOW (ref 39–50)
HGB BLD-MCNC: 11.6 G/DL — LOW (ref 13–17)
MCHC RBC-ENTMCNC: 29 PG — SIGNIFICANT CHANGE UP (ref 27–34)
MCHC RBC-ENTMCNC: 32.1 GM/DL — SIGNIFICANT CHANGE UP (ref 32–36)
MCV RBC AUTO: 90.1 FL — SIGNIFICANT CHANGE UP (ref 80–100)
MITOCHONDRIA AB SER-ACNC: SIGNIFICANT CHANGE UP
PLATELET # BLD AUTO: 168 K/UL — SIGNIFICANT CHANGE UP (ref 150–400)
POTASSIUM SERPL-MCNC: 5.2 MMOL/L — SIGNIFICANT CHANGE UP (ref 3.5–5.3)
POTASSIUM SERPL-MCNC: 6 MMOL/L — HIGH (ref 3.5–5.3)
POTASSIUM SERPL-SCNC: 5.2 MMOL/L — SIGNIFICANT CHANGE UP (ref 3.5–5.3)
POTASSIUM SERPL-SCNC: 6 MMOL/L — HIGH (ref 3.5–5.3)
RBC # BLD: 3.99 M/UL — LOW (ref 4.2–5.8)
RBC # FLD: 13.9 % — SIGNIFICANT CHANGE UP (ref 10.3–14.5)
SMOOTH MUSCLE AB SER-ACNC: SIGNIFICANT CHANGE UP
SODIUM SERPL-SCNC: 138 MMOL/L — SIGNIFICANT CHANGE UP (ref 135–145)
SODIUM SERPL-SCNC: 140 MMOL/L — SIGNIFICANT CHANGE UP (ref 135–145)
WBC # BLD: 5.3 K/UL — SIGNIFICANT CHANGE UP (ref 3.8–10.5)
WBC # FLD AUTO: 5.3 K/UL — SIGNIFICANT CHANGE UP (ref 3.8–10.5)

## 2017-11-12 RX ORDER — IRBESARTAN 75 MG/1
1 TABLET ORAL
Qty: 30 | Refills: 0 | OUTPATIENT
Start: 2017-11-12 | End: 2017-12-12

## 2017-11-12 RX ORDER — GLIMEPIRIDE 1 MG
1 TABLET ORAL
Qty: 0 | Refills: 0 | COMMUNITY

## 2017-11-12 RX ORDER — REPAGLINIDE 1 MG/1
1 TABLET ORAL
Qty: 90 | Refills: 0 | OUTPATIENT
Start: 2017-11-12 | End: 2017-12-12

## 2017-11-12 RX ORDER — PIOGLITAZONE HYDROCHLORIDE 15 MG/1
1 TABLET ORAL
Qty: 0 | Refills: 0 | COMMUNITY

## 2017-11-12 RX ORDER — IRBESARTAN 75 MG/1
1 TABLET ORAL
Qty: 0 | Refills: 0 | COMMUNITY

## 2017-11-12 RX ORDER — IBUPROFEN 200 MG
3 TABLET ORAL
Qty: 0 | Refills: 0 | COMMUNITY

## 2017-11-12 RX ADMIN — Medication 1: at 11:40

## 2017-11-12 RX ADMIN — LOSARTAN POTASSIUM 25 MILLIGRAM(S): 100 TABLET, FILM COATED ORAL at 04:48

## 2017-11-12 RX ADMIN — Medication 20 MILLIGRAM(S): at 04:48

## 2017-11-12 RX ADMIN — Medication 81 MILLIGRAM(S): at 11:44

## 2017-11-12 RX ADMIN — HEPARIN SODIUM 5000 UNIT(S): 5000 INJECTION INTRAVENOUS; SUBCUTANEOUS at 11:43

## 2017-11-12 NOTE — DISCHARGE NOTE ADULT - CARE PROVIDER_API CALL
Goldberg, Joel (MD), Cardiovascular Disease; Internal Medicine  [address]  [address]  [place] [address]  Phone: [phone]  Fax: [phone]    Elvin Weston), Internal Medicine; Nephrology  [address]  [place] [address]  Phone: [phone]  Fax: [phone]    Pavan Painting), EndocrinologyMetabDiabetes; Internal Medicine  [address]  [address]  [place] [address]  Phone: [phone]  Fax: [phone]    Andrew Red), Internal Medicine; Pulmonary Disease  [address]  [address]  [place] [address]  Phone: [phone]  Fax: [phone]

## 2017-11-12 NOTE — DISCHARGE NOTE ADULT - MEDICATION SUMMARY - MEDICATIONS TO TAKE
I will START or STAY ON the medications listed below when I get home from the hospital:    Glucometer  -- Indication: For poorly controlled diabetes    glucometer test strips  -- Indication: For poorly controlled diabetes    lancets for finger sticks  -- Indication: For poorly controlled diabetes    alcohol pads  -- Indication: For prep for finger sticks    aspirin 81 mg oral tablet  -- 2 tab(s) by mouth once a day  -- Indication: For prophylactic measure    Avapro 75 mg oral tablet  -- 1 tab(s) by mouth once a day  -- Indication: For Hypertension, unspecified type    Prandin 0.5 mg oral tablet  -- 1 tab(s) by mouth 3 times a day (before meals) - DO NOT TAKE IF YOU ARE NOT EATING  -- Indication: For poorly controlled diabetes    pravastatin 40 mg oral tablet  -- 1 tab(s) by mouth once a day  -- Indication: For cholesterol management    furosemide 20 mg oral tablet  -- 1 tab(s) by mouth once a day  -- Indication: For diuretic    Centrum Silver Men's  -- 1 tab(s) by mouth once a day  -- Indication: For suppliment

## 2017-11-12 NOTE — PROGRESS NOTE ADULT - PROBLEM SELECTOR PLAN 3
will restart ARBS but at half the dose. PARAS resolved, CKD3. F/U outptn w renal . normotensive at present. hold NSAIDS

## 2017-11-12 NOTE — DISCHARGE NOTE ADULT - PLAN OF CARE
glucose within non-symptomatic range follow up with your endocrinologist  eat 3 meals daily - take your medications as directed  monitor and record your glucose disease management HgA1C this admission 7.9  Make sure you get your HgA1c checked every three months.  If you take oral diabetes medications, check your blood glucose two times a day.  If you take insulin, check your blood glucose before meals and at bedtime.  It's important not to skip any meals.  Keep a log of your blood glucose results and always take it with you to your doctor appointments.  Keep a list of your current medications including injectables and over the counter medications and bring this medication list with you to all your doctor appointments.  If you have not seen your ophthalmologist this year call for appointment.  Check your feet daily for redness, sores, or openings. Do not self treat. If no improvement in two days call your primary care physician for an appointment.  Low blood sugar (hypoglycemia) is a blood sugar below 70mg/dl. Check your blood sugar if you feel signs/symptoms of hypoglycemia. If your blood sugar is below 70 take 15 grams of carbohydrates (ex 4 oz of apple juice, 3-4 glucose tablets, or 4-6 oz of regular soda) wait 15 minutes and repeat blood sugar to make sure it comes up above 70.  If your blood sugar is above 70 and you are due for a meal, have a meal.  If you are not due for a meal have a snack.  This snack helps keeps your blood sugar at a safe range. resolved stop / reduce alcohol consumption  follow up with your physicians maintain kidney function follow up with the renal doctor in 1-2 months  stop taking "NSAIDS" (Motrin, etc..) - consider Tylenol for pain if needed

## 2017-11-12 NOTE — DISCHARGE NOTE ADULT - CARE PLAN
Principal Discharge DX:	Hypoglycemia  Goal:	glucose within non-symptomatic range  Instructions for follow-up, activity and diet:	follow up with your endocrinologist  eat 3 meals daily - take your medications as directed  monitor and record your glucose  Secondary Diagnosis:	Type 2 diabetes mellitus with hypoglycemia without coma, without long-term current use of insulin  Goal:	disease management  Instructions for follow-up, activity and diet:	HgA1C this admission 7.9  Make sure you get your HgA1c checked every three months.  If you take oral diabetes medications, check your blood glucose two times a day.  If you take insulin, check your blood glucose before meals and at bedtime.  It's important not to skip any meals.  Keep a log of your blood glucose results and always take it with you to your doctor appointments.  Keep a list of your current medications including injectables and over the counter medications and bring this medication list with you to all your doctor appointments.  If you have not seen your ophthalmologist this year call for appointment.  Check your feet daily for redness, sores, or openings. Do not self treat. If no improvement in two days call your primary care physician for an appointment.  Low blood sugar (hypoglycemia) is a blood sugar below 70mg/dl. Check your blood sugar if you feel signs/symptoms of hypoglycemia. If your blood sugar is below 70 take 15 grams of carbohydrates (ex 4 oz of apple juice, 3-4 glucose tablets, or 4-6 oz of regular soda) wait 15 minutes and repeat blood sugar to make sure it comes up above 70.  If your blood sugar is above 70 and you are due for a meal, have a meal.  If you are not due for a meal have a snack.  This snack helps keeps your blood sugar at a safe range.  Secondary Diagnosis:	Transaminitis  Goal:	resolved  Instructions for follow-up, activity and diet:	stop / reduce alcohol consumption  follow up with your physicians  Secondary Diagnosis:	CKD (chronic kidney disease)  Goal:	maintain kidney function  Instructions for follow-up, activity and diet:	follow up with the renal doctor in 1-2 months  stop taking "NSAIDS" (Motrin, etc..) - consider Tylenol for pain if needed

## 2017-11-12 NOTE — PROGRESS NOTE ADULT - SUBJECTIVE AND OBJECTIVE BOX
JODY WILKINS:[id number],   89yMale followed for:  No Known Allergies    PAST MEDICAL & SURGICAL HISTORY:  Type 2 diabetes mellitus with retinopathy, macular edema presence unspecified, unspecified long term insulin use status, unspecified retinopathy severity  HLD (hyperlipidemia)  HTN (hypertension)  No significant past surgical history    FAMILY HISTORY:  No pertinent family history in first degree relatives    MEDICATIONS  (STANDING):  aspirin enteric coated 81 milliGRAM(s) Oral daily  atorvastatin 10 milliGRAM(s) Oral at bedtime  dextrose 5%. 1000 milliLiter(s) (50 mL/Hr) IV Continuous <Continuous>  dextrose 50% Injectable 12.5 Gram(s) IV Push once  dextrose 50% Injectable 25 Gram(s) IV Push once  dextrose 50% Injectable 25 Gram(s) IV Push once  furosemide    Tablet 20 milliGRAM(s) Oral daily  heparin  Injectable 5000 Unit(s) SubCutaneous every 12 hours  insulin lispro (HumaLOG) corrective regimen sliding scale   SubCutaneous at bedtime  insulin lispro (HumaLOG) corrective regimen sliding scale   SubCutaneous three times a day before meals  losartan 25 milliGRAM(s) Oral daily    MEDICATIONS  (PRN):  dextrose Gel 1 Dose(s) Oral once PRN Blood Glucose LESS THAN 70 milliGRAM(s)/deciliter  glucagon  Injectable 1 milliGRAM(s) IntraMuscular once PRN Glucose LESS THAN 70 milligrams/deciliter      Vital Signs Last 24 Hrs  T(C): 36.9 (12 Nov 2017 04:50), Max: 36.9 (12 Nov 2017 04:50)  T(F): 98.4 (12 Nov 2017 04:50), Max: 98.4 (12 Nov 2017 04:50)  HR: 74 (12 Nov 2017 04:50) (64 - 86)  BP: 119/74 (12 Nov 2017 04:50) (119/59 - 145/77)  BP(mean): --  RR: 18 (12 Nov 2017 04:50) (18 - 18)  SpO2: 96% (12 Nov 2017 04:50) (96% - 99%)  nc/at  s1s2  cta  soft, nt, nd no guarding or rebound  no c/c/e    CBC Full  -  ( 12 Nov 2017 08:16 )  WBC Count : 5.3 K/uL  Hemoglobin : 11.6 g/dL  Hematocrit : 36.0 %  Platelet Count - Automated : 168 K/uL  Mean Cell Volume : 90.1 fl  Mean Cell Hemoglobin : 29.0 pg  Mean Cell Hemoglobin Concentration : 32.1 gm/dL  Auto Neutrophil # : x  Auto Lymphocyte # : x  Auto Monocyte # : x  Auto Eosinophil # : x  Auto Basophil # : x  Auto Neutrophil % : x  Auto Lymphocyte % : x  Auto Monocyte % : x  Auto Eosinophil % : x  Auto Basophil % : x    11-12    140  |  107  |  40<H>  ----------------------------<  136<H>  5.2   |  21<L>  |  1.70<H>    Ca    9.3      12 Nov 2017 08:16

## 2017-11-12 NOTE — CHART NOTE - NSCHARTNOTEFT_GEN_A_CORE
Contacted by Dr Mata this AM ans asked to discharge this patient to home on all current hospital meds now.  Prandin prior to meals 3 times a day. Avapro 75mg (from home dose of 150mg).  Fingerstick / glucometer teaching by nursing staff - Prescription for glucometer and glucometer supplies provided   Vital signs and labs reviewed - Potassium this AM = 6.0 - "hemolyzed" as per lab report -   Repeat sent STAT -   11-12    140  |  107  |  40<H>  ----------------------------<  136<H>  5.2   |  21<L>  |  1.70<H>    Ca    9.3      12 Nov 2017 08:16    Discharge paperwork completed as directed

## 2017-11-12 NOTE — DISCHARGE NOTE ADULT - INSTRUCTIONS
Low salt, low cholesterol, DIABETIC - attempt 3 meals a day instructed pt and spouse on s/s of hypoglycemia, glucose checks and meter use pt verb understanding and spouse demonstrated steps of how to check f/s. further teaching needed. instructed pt on nutrition and importance of not skipping meals. pt verb understanding. piv removed. ambulating  in room with standby assist. instructed on safety precautions.

## 2017-11-12 NOTE — DISCHARGE NOTE ADULT - HOSPITAL COURSE
89 y/o Male with medical history of 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 follows with Dr. Painting with nephropathy, retinopathy (s/p laser tx,) and neuropathy. Last A1c 7.9% Recently admitted 10/2017 with hypoglycemia. Recommended discharge on Onglyza 2.5mg daily. Followed up with Dr. Painting and was restarted on Actos and sulfonylurea in addition to Onglyza.  Now presented with hypoglycemia. Patient reports that he does not have a glucometer. Does not check his glucose at home. No symptoms at home of hypoglycemia. Eats only 1 meal a day usually at 2pm. Drinks vodka on a regular basis. Last drink about 2 weeks ago. +diet soda. - Seen by endocrine (Dr Kang) during this admission - to be discharged on Prandin prior to meals only with glucose monitoring. Glucometer and supplies prescribed. Teaching to patient and family done  He had shortness of breath at the time of admission but now reports that this has resolved. Denies orthopnea, PND, chest pain hemoptysis, cough, sputum production, fever or chills. Chest Xray showed a small right pleural effusion. otherwise clear lung fields  Evaluated by Pulmonary- No intervention required from the pulmonary standpoint. Outpatient follow up with Dr Red  On admsion AST>ALT suggestive of alcohol hepatitis Sonogram  and hepatic profile were normal.  Possibly acute alcoholic hepatitis Conservative management is recommended by gastroenterology  Noted on admission to have a creatinine of 1.89mg/dL and a BUN of 50mg/dL. A renal consultation was requested.   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 No proteinuria on recent urinalyses (which argues against his CKD being from DM). Stable function. 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 Abdominal ultrasound revealed no Hydronephrosis. Dose new meds for GFR 25-35ml/min (present dosing is acceptable)  Patient with symptoms resolved and stable lab work / glucoses on 11/12/17.  Medically cleared for discharge.

## 2017-11-12 NOTE — PROGRESS NOTE ADULT - SUBJECTIVE AND OBJECTIVE BOX
Patient is a 89y old  Male who presents with a chief complaint of weakness (09 Nov 2017 17:12)      SUBJECTIVE / OVERNIGHT EVENTS: no new events, bgms are below 150 mostly, has been off oral agents for 3 days. wants to go home today    MEDICATIONS  (STANDING):  aspirin enteric coated 81 milliGRAM(s) Oral daily  atorvastatin 10 milliGRAM(s) Oral at bedtime  dextrose 5%. 1000 milliLiter(s) (50 mL/Hr) IV Continuous <Continuous>  dextrose 50% Injectable 12.5 Gram(s) IV Push once  dextrose 50% Injectable 25 Gram(s) IV Push once  dextrose 50% Injectable 25 Gram(s) IV Push once  furosemide    Tablet 20 milliGRAM(s) Oral daily  heparin  Injectable 5000 Unit(s) SubCutaneous every 12 hours  insulin lispro (HumaLOG) corrective regimen sliding scale   SubCutaneous at bedtime  insulin lispro (HumaLOG) corrective regimen sliding scale   SubCutaneous three times a day before meals  losartan 25 milliGRAM(s) Oral daily    MEDICATIONS  (PRN):  dextrose Gel 1 Dose(s) Oral once PRN Blood Glucose LESS THAN 70 milliGRAM(s)/deciliter  glucagon  Injectable 1 milliGRAM(s) IntraMuscular once PRN Glucose LESS THAN 70 milligrams/deciliter      Vital Signs Last 24 Hrs  T(F): 98.4 (11-12-17 @ 04:50), Max: 98.4 (11-12-17 @ 04:50)  HR: 74 (11-12-17 @ 04:50) (64 - 86)  BP: 119/74 (11-12-17 @ 04:50) (119/59 - 145/77)  RR: 18 (11-12-17 @ 04:50) (17 - 18)  SpO2: 96% (11-12-17 @ 04:50) (96% - 99%)  Telemetry:   CAPILLARY BLOOD GLUCOSE      POCT Blood Glucose.: 111 mg/dL (12 Nov 2017 07:29)  POCT Blood Glucose.: 164 mg/dL (11 Nov 2017 20:54)  POCT Blood Glucose.: 165 mg/dL (11 Nov 2017 16:38)  POCT Blood Glucose.: 192 mg/dL (11 Nov 2017 11:33)    I&O's Summary    11 Nov 2017 07:01  -  12 Nov 2017 07:00  --------------------------------------------------------  IN: 1670 mL / OUT: 0 mL / NET: 1670 mL        PHYSICAL EXAM:  GENERAL: NAD, well-developed  HEAD:  Atraumatic, Normocephalic  EYES: EOMI, PERRLA, conjunctiva and sclera clear  NECK: Supple, No JVD  CHEST/LUNG: Clear to auscultation bilaterally; No wheeze  HEART: Regular rate and rhythm; No murmurs, rubs, or gallops  ABDOMEN: Soft, Nontender, Nondistended; Bowel sounds present  EXTREMITIES:  2+ Peripheral Pulses, No clubbing, cyanosis, or edema  PSYCH: AAOx3  NEUROLOGY: non-focal  SKIN: No rashes or lesions    LABS:    11-12    138  |  108  |  41<H>  ----------------------------<  118<H>  6.0<H>   |  18<L>  |  1.71<H>    Ca    9.0      12 Nov 2017 06:18                RADIOLOGY & ADDITIONAL TESTS:    Imaging Personally Reviewed:    Consultant(s) Notes Reviewed:      Care Discussed with Consultants/Other Providers:

## 2017-11-12 NOTE — DISCHARGE NOTE ADULT - ADDITIONAL INSTRUCTIONS
Make appointments to follow up with your physicians  See the nephrologist ("kidney" doctor) in 1- 2 months  See your primary physician and endocrinologist next week  Bring all discharge paperwork including discharge medication list to follow up appointments

## 2017-11-12 NOTE — DISCHARGE NOTE ADULT - SECONDARY DIAGNOSIS.
Type 2 diabetes mellitus with hypoglycemia without coma, without long-term current use of insulin Transaminitis CKD (chronic kidney disease)

## 2017-11-12 NOTE — DISCHARGE NOTE ADULT - CARE PROVIDERS DIRECT ADDRESSES
,DirectAddress_Unknown,DirectAddress_Unknown,[email or web address],DirectAddress_Unknown

## 2017-11-12 NOTE — PROGRESS NOTE ADULT - PROBLEM SELECTOR PLAN 1
hold all oral agents. ENdo input appreciated. cont insulin coverage. all BGMs are under 200, but for the most part under 150. ptn doesn't want to use INSULIN , would opt for only Prandin at mealtime and F/U closely w outptn ENDO.. would not cont actos or glimepiride or onglyza. ptn wants to go home today. if no further recommendations from endo will plan as above. had teachings w use of glucometer yesterday.  Nutritionist follow up outptn basis

## 2017-11-12 NOTE — DISCHARGE NOTE ADULT - PATIENT PORTAL LINK FT
“You can access the [facility] Patient Portal, offered by [facility], by registering with the following website: [email or web address]”

## 2017-11-12 NOTE — DISCHARGE NOTE ADULT - FINDINGS/TREATMENT
11/9/17: CT Scan - Head: FINDINGS:  There is no hydrocephalus, mass effect, midline shift, or acute intracranial hemorrhage.  There is no CT evidence of acute territorial infarct.  There is moderate white matter microvascular ischemic disease.   Left maxillary sinus mucosal thickening. Remaining visualized paranasal sinuses and mastoid air cells clear.  IMPRESSION: No acute intracranial hemorrhage, mass effect, or evidence of acute territorial infarct.  Moderate white matter microvascular ischemic disease. 11/10/17: Abdominal Ultrasound: Liver: Within normal limits.  Bile ducts: Normal caliber. Common bile duct measures 4 mm. Gallbladder: Within normal limits.      Pancreas: Visualized portions are within normal limits. Spleen: 10 cm. Within normal limits.  Right kidney: 10 cm. No hydronephrosis.    Left kidney: 10 cm.  No hydronephrosis.      Ascites: None. Aorta and IVC: Visualized portions are within normal limits.  Right pleural effusion.  IMPRESSION: No gallstones.

## 2017-11-12 NOTE — DISCHARGE NOTE ADULT - MEDICATION SUMMARY - MEDICATIONS TO STOP TAKING
I will STOP taking the medications listed below when I get home from the hospital:    Onglyza 2.5 mg oral tablet  -- 1 tab(s) by mouth once a day    pioglitazone 30 mg oral tablet  -- 1 tab(s) by mouth once a day    glimepiride 2 mg oral tablet  -- 1 tab(s) by mouth once a day

## 2017-11-12 NOTE — DISCHARGE NOTE ADULT - MEDICATION SUMMARY - MEDICATIONS TO CHANGE
I will SWITCH the dose or number of times a day I take the medications listed below when I get home from the hospital:    irbesartan 150 mg oral tablet  -- 1 tab(s) by mouth once a day

## 2017-11-15 NOTE — PROGRESS NOTE ADULT - PROBLEM SELECTOR PROBLEM 1
Hypertension, unspecified type
Type 2 diabetes mellitus with hypoglycemia without coma, without long-term current use of insulin
Attending Only

## 2017-11-20 ENCOUNTER — INPATIENT (INPATIENT)
Facility: HOSPITAL | Age: 82
LOS: 2 days | Discharge: HOME | DRG: 194 | End: 2017-11-23
Attending: INTERNAL MEDICINE | Admitting: INTERNAL MEDICINE
Payer: MEDICARE

## 2017-11-20 VITALS
RESPIRATION RATE: 18 BRPM | OXYGEN SATURATION: 93 % | DIASTOLIC BLOOD PRESSURE: 51 MMHG | HEART RATE: 91 BPM | SYSTOLIC BLOOD PRESSURE: 104 MMHG | TEMPERATURE: 98 F

## 2017-11-20 DIAGNOSIS — R04.2 HEMOPTYSIS: ICD-10-CM

## 2017-11-20 LAB
ALBUMIN SERPL ELPH-MCNC: 3.9 G/DL — SIGNIFICANT CHANGE UP (ref 3.3–5)
ALP SERPL-CCNC: 160 U/L — HIGH (ref 40–120)
ALT FLD-CCNC: 182 U/L RC — HIGH (ref 10–45)
ANION GAP SERPL CALC-SCNC: 15 MMOL/L — SIGNIFICANT CHANGE UP (ref 5–17)
APTT BLD: 27 SEC — LOW (ref 27.5–37.4)
AST SERPL-CCNC: 244 U/L — HIGH (ref 10–40)
BASE EXCESS BLDV CALC-SCNC: -6.6 MMOL/L — LOW (ref -2–2)
BASOPHILS # BLD AUTO: 0 K/UL — SIGNIFICANT CHANGE UP (ref 0–0.2)
BASOPHILS NFR BLD AUTO: 0.3 % — SIGNIFICANT CHANGE UP (ref 0–2)
BILIRUB SERPL-MCNC: 0.6 MG/DL — SIGNIFICANT CHANGE UP (ref 0.2–1.2)
BUN SERPL-MCNC: 69 MG/DL — HIGH (ref 7–23)
CA-I SERPL-SCNC: 1.23 MMOL/L — SIGNIFICANT CHANGE UP (ref 1.12–1.3)
CALCIUM SERPL-MCNC: 8.8 MG/DL — SIGNIFICANT CHANGE UP (ref 8.4–10.5)
CHLORIDE BLDV-SCNC: 108 MMOL/L — SIGNIFICANT CHANGE UP (ref 96–108)
CHLORIDE SERPL-SCNC: 104 MMOL/L — SIGNIFICANT CHANGE UP (ref 96–108)
CK MB BLD-MCNC: 1.5 % — SIGNIFICANT CHANGE UP (ref 0–3.5)
CK MB CFR SERPL CALC: 36.2 NG/ML — HIGH (ref 0–6.7)
CK SERPL-CCNC: 1735 U/L — HIGH (ref 30–200)
CK SERPL-CCNC: 2382 U/L — HIGH (ref 30–200)
CO2 BLDV-SCNC: 21 MMOL/L — LOW (ref 22–30)
CO2 SERPL-SCNC: 18 MMOL/L — LOW (ref 22–31)
CREAT SERPL-MCNC: 1.72 MG/DL — HIGH (ref 0.5–1.3)
EOSINOPHIL # BLD AUTO: 0 K/UL — SIGNIFICANT CHANGE UP (ref 0–0.5)
EOSINOPHIL NFR BLD AUTO: 0.1 % — SIGNIFICANT CHANGE UP (ref 0–6)
GAS PNL BLDV: 134 MMOL/L — LOW (ref 136–145)
GAS PNL BLDV: SIGNIFICANT CHANGE UP
GAS PNL BLDV: SIGNIFICANT CHANGE UP
GLUCOSE BLDV-MCNC: 266 MG/DL — HIGH (ref 70–99)
GLUCOSE SERPL-MCNC: 273 MG/DL — HIGH (ref 70–99)
HCO3 BLDV-SCNC: 20 MMOL/L — LOW (ref 21–29)
HCT VFR BLD CALC: 35.3 % — LOW (ref 39–50)
HCT VFR BLDA CALC: 44 % — SIGNIFICANT CHANGE UP (ref 39–50)
HGB BLD CALC-MCNC: 14.3 G/DL — SIGNIFICANT CHANGE UP (ref 13–17)
HGB BLD-MCNC: 11.5 G/DL — LOW (ref 13–17)
INR BLD: 1.2 RATIO — HIGH (ref 0.88–1.16)
LACTATE BLDV-MCNC: 1.5 MMOL/L — SIGNIFICANT CHANGE UP (ref 0.7–2)
LYMPHOCYTES # BLD AUTO: 0.4 K/UL — LOW (ref 1–3.3)
LYMPHOCYTES # BLD AUTO: 3.5 % — LOW (ref 13–44)
MCHC RBC-ENTMCNC: 29.4 PG — SIGNIFICANT CHANGE UP (ref 27–34)
MCHC RBC-ENTMCNC: 32.7 GM/DL — SIGNIFICANT CHANGE UP (ref 32–36)
MCV RBC AUTO: 89.9 FL — SIGNIFICANT CHANGE UP (ref 80–100)
MONOCYTES # BLD AUTO: 0.6 K/UL — SIGNIFICANT CHANGE UP (ref 0–0.9)
MONOCYTES NFR BLD AUTO: 5.8 % — SIGNIFICANT CHANGE UP (ref 2–14)
NEUTROPHILS # BLD AUTO: 9.9 K/UL — HIGH (ref 1.8–7.4)
NEUTROPHILS NFR BLD AUTO: 90.4 % — HIGH (ref 43–77)
NT-PROBNP SERPL-SCNC: 6771 PG/ML — HIGH (ref 0–300)
PCO2 BLDV: 43 MMHG — SIGNIFICANT CHANGE UP (ref 35–50)
PH BLDV: 7.28 — LOW (ref 7.35–7.45)
PLAT MORPH BLD: NORMAL — SIGNIFICANT CHANGE UP
PLATELET # BLD AUTO: 169 K/UL — SIGNIFICANT CHANGE UP (ref 150–400)
PO2 BLDV: 32 MMHG — SIGNIFICANT CHANGE UP (ref 25–45)
POTASSIUM BLDV-SCNC: 5.4 MMOL/L — HIGH (ref 3.5–5)
POTASSIUM SERPL-MCNC: 5.7 MMOL/L — HIGH (ref 3.5–5.3)
POTASSIUM SERPL-SCNC: 5.7 MMOL/L — HIGH (ref 3.5–5.3)
PROT SERPL-MCNC: 6.6 G/DL — SIGNIFICANT CHANGE UP (ref 6–8.3)
PROTHROM AB SERPL-ACNC: 13.1 SEC — HIGH (ref 9.8–12.7)
RAPID RVP RESULT: SIGNIFICANT CHANGE UP
RBC # BLD: 3.93 M/UL — LOW (ref 4.2–5.8)
RBC # FLD: 14 % — SIGNIFICANT CHANGE UP (ref 10.3–14.5)
RBC BLD AUTO: SIGNIFICANT CHANGE UP
SAO2 % BLDV: 51 % — LOW (ref 67–88)
SODIUM SERPL-SCNC: 137 MMOL/L — SIGNIFICANT CHANGE UP (ref 135–145)
TROPONIN T SERPL-MCNC: 0.21 NG/ML — HIGH (ref 0–0.06)
TROPONIN T SERPL-MCNC: 0.25 NG/ML — HIGH (ref 0–0.06)
WBC # BLD: 12.1 K/UL — HIGH (ref 3.8–10.5)
WBC # FLD AUTO: 12.1 K/UL — HIGH (ref 3.8–10.5)

## 2017-11-20 PROCEDURE — 93971 EXTREMITY STUDY: CPT | Mod: 26

## 2017-11-20 PROCEDURE — 93010 ELECTROCARDIOGRAM REPORT: CPT

## 2017-11-20 PROCEDURE — 99284 EMERGENCY DEPT VISIT MOD MDM: CPT | Mod: 25,GC

## 2017-11-20 PROCEDURE — 93308 TTE F-UP OR LMTD: CPT | Mod: 26

## 2017-11-20 PROCEDURE — 99223 1ST HOSP IP/OBS HIGH 75: CPT

## 2017-11-20 PROCEDURE — 78582 LUNG VENTILAT&PERFUS IMAGING: CPT | Mod: 26

## 2017-11-20 PROCEDURE — 71250 CT THORAX DX C-: CPT | Mod: 26

## 2017-11-20 PROCEDURE — 71010: CPT | Mod: 26

## 2017-11-20 RX ORDER — PIPERACILLIN AND TAZOBACTAM 4; .5 G/20ML; G/20ML
3.38 INJECTION, POWDER, LYOPHILIZED, FOR SOLUTION INTRAVENOUS ONCE
Qty: 0 | Refills: 0 | Status: COMPLETED | OUTPATIENT
Start: 2017-11-20 | End: 2017-11-20

## 2017-11-20 RX ORDER — ASPIRIN/CALCIUM CARB/MAGNESIUM 324 MG
162 TABLET ORAL ONCE
Qty: 0 | Refills: 0 | Status: COMPLETED | OUTPATIENT
Start: 2017-11-20 | End: 2017-11-20

## 2017-11-20 RX ORDER — VANCOMYCIN HCL 1 G
1000 VIAL (EA) INTRAVENOUS ONCE
Qty: 0 | Refills: 0 | Status: COMPLETED | OUTPATIENT
Start: 2017-11-20 | End: 2017-11-20

## 2017-11-20 RX ORDER — AZITHROMYCIN 500 MG/1
500 TABLET, FILM COATED ORAL ONCE
Qty: 0 | Refills: 0 | Status: COMPLETED | OUTPATIENT
Start: 2017-11-20 | End: 2017-11-20

## 2017-11-20 RX ORDER — IPRATROPIUM/ALBUTEROL SULFATE 18-103MCG
3 AEROSOL WITH ADAPTER (GRAM) INHALATION ONCE
Qty: 0 | Refills: 0 | Status: COMPLETED | OUTPATIENT
Start: 2017-11-20 | End: 2017-11-20

## 2017-11-20 RX ORDER — ASPIRIN/CALCIUM CARB/MAGNESIUM 324 MG
324 TABLET ORAL DAILY
Qty: 0 | Refills: 0 | Status: DISCONTINUED | OUTPATIENT
Start: 2017-11-20 | End: 2017-11-21

## 2017-11-20 RX ORDER — SODIUM CHLORIDE 9 MG/ML
3 INJECTION INTRAMUSCULAR; INTRAVENOUS; SUBCUTANEOUS ONCE
Qty: 0 | Refills: 0 | Status: COMPLETED | OUTPATIENT
Start: 2017-11-20 | End: 2017-11-20

## 2017-11-20 RX ADMIN — Medication 162 MILLIGRAM(S): at 20:04

## 2017-11-20 RX ADMIN — SODIUM CHLORIDE 3 MILLILITER(S): 9 INJECTION INTRAMUSCULAR; INTRAVENOUS; SUBCUTANEOUS at 15:58

## 2017-11-20 RX ADMIN — Medication 3 MILLILITER(S): at 17:04

## 2017-11-20 RX ADMIN — AZITHROMYCIN 250 MILLIGRAM(S): 500 TABLET, FILM COATED ORAL at 17:26

## 2017-11-20 RX ADMIN — Medication 250 MILLIGRAM(S): at 19:40

## 2017-11-20 RX ADMIN — PIPERACILLIN AND TAZOBACTAM 200 GRAM(S): 4; .5 INJECTION, POWDER, LYOPHILIZED, FOR SOLUTION INTRAVENOUS at 17:04

## 2017-11-20 NOTE — ED ADULT NURSE NOTE - OBJECTIVE STATEMENT
89 yr old male with h/o dm , present to the ER for coughing up blood. Pt's wife report that pt has a chronic persistent cough, however today when he cough he noticed blood in his sputum. Pt report that he went to his pulmonologist , who told pt to come to the ER for further evaluation.

## 2017-11-20 NOTE — ED PROVIDER NOTE - PHYSICAL EXAMINATION
Attending Weston: Gen: NAD, heent: atrauamtic, eomi, perrla, mmm, op pink, uvula midline, neck; nttp, no nuchal rigidity, chest: nttp, no crepitus, cv: rrr, +murmur, lungsdiffuse rhonchi and wheezing, abd: soft, nontender, nondistended, no peritoneal signs, +BS, no guarding, ext: wwp, neg homans, pitting edema on right, skin: no rash, neuro: awake and alert, following commands, speech clear, sensation and strength intact, no focal deficits

## 2017-11-20 NOTE — ED PROVIDER NOTE - PROGRESS NOTE DETAILS
Attending Enrico: xray showed evidence of possible PNA. blood work showed evidence of troponin leak. concern for possible PE and possible pulmonary infarct. will obtain vq scan Patient deneis chest pain. No active hemoptysis. Dr.. Mata aware VQ pending. Will hold heparin now given history of hemoptysis today. Cardiology consulted for elevated troponins. Will admit to tele. pulmonary paged, awaiting response Cardiology reports that patient does not need telemetry at this time. Will admit to med/surg.

## 2017-11-20 NOTE — ED PROVIDER NOTE - OBJECTIVE STATEMENT
Attending Weston: 89 y/o male h/o recent thoracentesis presenting with cough and sob. pt states today complained of worsening cough and hemoptysis. no fevers or chills. not on blood thinners. not normally on oxygen. no fevers. not on antibiotics currently. no abdominal pain or chest discomfort. did report increased weakness today. per family pt is on lasix. no reported history of chf

## 2017-11-20 NOTE — ED PROVIDER NOTE - MEDICAL DECISION MAKING DETAILS
Attending Enrico: 89y/o male presenting with hemoptysis. upon arrival hypoxic requiring supplemental oxygen. pocus shows no sig pericaridal effusion, scant B lines and pleural effusion, worse on right.  concern for possible PNA vs PE, as pt recently hospitalized. pt cultured, plan for labs, cardiac monitoring, consider cta vs vq scan. pt will need admission

## 2017-11-20 NOTE — CONSULT NOTE ADULT - SUBJECTIVE AND OBJECTIVE BOX
MRN-[id number]    CHIEF COMPLAINT:  Hemoptysis    HISTORY OF PRESENT ILLNESS:  JODY WILKINS is a 89y Male patient with past medical history of diastolic heart failure, CKD (baseline 1.7), HTN, HLD, Type II DM, presenting with blood tinged sputum that began today. He has been coughing the last day or so quite aggressively during which he had the blood tinged sputum. He denies chest pain, palpitations, orthopnea, PND, trace lower extremity. ECG with LBBB, stable. Troponin elevated at 0.25 which has gradually been increasing over the course fo several weeks. BNP 6771 which is roughly similar to several months ago. He had a thoracentesis 2-3 months ago with improvement in his symtpoms. Lastly, he reports that he had blood tinged sputum over a year ago that resolved with a course of antibiotics azithromycin. He had a V/Q scan that demonstrated very low probability of PE and CT chest with Bilateral patchy airspace opacities/consolidation consistent with inflammatory/infectious or pulmonary hemorrhage. Cardiology consulted for further management in the setting of elevated troponin.     His cardiology team is from [facility], Dr. Joel Goldberg, ([phone]), and that group has followed him during prior admissions.     Allergies  No Known Allergies    PAST MEDICAL & SURGICAL HISTORY:  Type 2 diabetes mellitus with retinopathy, macular edema presence unspecified, unspecified long term insulin use status, unspecified retinopathy severity  HLD (hyperlipidemia)  HTN (hypertension)  No significant past surgical history    FAMILY HISTORY:  No pertinent family history in first degree relatives    SOCIAL HISTORY:    Non-smoker currently.     REVIEW OF SYSTEMS:  CONSTITUTIONAL: No fever, weight loss  EYES: No eye pain, visual disturbances, or discharge  ENMT:  No difficulty hearing, tinnitus, vertigo; No sinus or throat pain  NECK: No pain or stiffness  RESPIRATORY: No chills. Positive cough and hemoptysis.   CARDIOVASCULAR: No chest pain, palpitations, passing out, dizziness. Trace edema.   GASTROINTESTINAL: No abdominal or epigastric pain. No nausea, vomiting, or hematemesis; No diarrhea or constipation.  GENITOURINARY: No dysuria, frequency, hematuria, or incontinence  NEUROLOGICAL: No headaches, loss of strength, numbness, or tremors  SKIN: No itching, burning, rashes, or lesions   LYMPH Nodes: No enlarged glands  ENDOCRINE: No heat or cold intolerance  MUSCULOSKELETAL: No joint pain or swelling;  PSYCHIATRIC: No depression, anxiety, mood swings  HEME/LYMPH: No easy bruising, or bleeding gums  ALLERY AND IMMUNOLOGIC: No hives or eczema	    PHYSICAL EXAM:  Vital Signs Last 24 Hrs  T(C): 36.9 (20 Nov 2017 22:19), Max: 36.9 (20 Nov 2017 15:27)  T(F): 98.4 (20 Nov 2017 22:19), Max: 98.4 (20 Nov 2017 15:27)  HR: 74 (20 Nov 2017 22:19) (74 - 91)  BP: 107/60 (20 Nov 2017 22:19) (102/53 - 121/70)    RR: 20 (20 Nov 2017 22:19) (18 - 27)  SpO2: 97% (20 Nov 2017 22:19) (93% - 98%)    Appearance: Normal	  HEENT:   Normal oral mucosa  Lymphatic: No lymphadenopathy  Cardiovascular: Normal S1 S2, No JVD, Trace edema   Respiratory: Reduced breath sounds, minimal wheezing.   Psychiatry: A & O x 3,   Gastrointestinal:  Soft, Non-tender  Skin: No rashes, No ecchymoses, No cyanosis	  Neurologic: Non-focal  Extremities: No clubbing, cyanosis. Trace edema  Vascular: Peripheral pulses palpable 2+ bilaterally    MEDICATIONS:  MEDICATIONS  (STANDING):  aspirin  chewable 324 milliGRAM(s) Oral daily    LABS:	 	  CBC Full  -  ( 20 Nov 2017 16:14 )  WBC Count : 12.1 K/uL  Hemoglobin : 11.5 g/dL  Hematocrit : 35.3 %  Platelet Count - Automated : 169 K/uL  Mean Cell Volume : 89.9 fl  Mean Cell Hemoglobin : 29.4 pg  Mean Cell Hemoglobin Concentration : 32.7 gm/dL  Auto Neutrophil # : 9.9 K/uL  Auto Lymphocyte # : 0.4 K/uL  Auto Monocyte # : 0.6 K/uL  Auto Eosinophil # : 0.0 K/uL  Auto Basophil # : 0.0 K/uL  Auto Neutrophil % : 90.4 %  Auto Lymphocyte % : 3.5 %  Auto Monocyte % : 5.8 %  Auto Eosinophil % : 0.1 %  Auto Basophil % : 0.3 %    11-20    137  |  104  |  69<H>  ----------------------------<  273<H>  5.7<H>   |  18<L>  |  1.72<H>    Ca    8.8      20 Nov 2017 16:14    TPro  6.6  /  Alb  3.9  /  TBili  0.6  /  DBili  x   /  AST  244<H>  /  ALT  182<H>  /  AlkPhos  160<H>  11-20    PT/INR - ( 20 Nov 2017 16:14 )   PT: 13.1 sec;   INR: 1.20 ratio      PTT - ( 20 Nov 2017 16:14 )  PTT:27.0 sec  CARDIAC MARKERS ( 20 Nov 2017 16:14 )  x     / 0.25 ng/mL / 2382 U/L / x     / 36.2 ng/mL    proBNP: 6771    ECG: LBBB    RADIOLOGY:  CT Chest 11/20/2017  IMPRESSION:   Bilateral patchy airspace opacities/consolidation may be related to   infectious/inflammatory etiologies versus pulmonary hemorrhage. Clinical   correlation is recommended. Three-month follow-up CT needed for complete   evaluation.    V/Q Scan 11/20/2017:   Very low probability of PE    CARDIAC TESTING/STUDIES:    Echocardiogram 8/30/2017:  Conclusions:  1. Mitral annular calcification. Mild mitral regurgitation.  2. Calcified trileaflet aortic valve with normal opening.  3. Severely dilated left atrium.  LA volume index = 58 cc/m2.  4. Mild concentric left ventricular hypertrophy.  5. Normal left ventricular systolic function. No segmental  wall motion abnormalities.  6. Normal right ventricular size and function.  7. EF 56%  	  ASSESSMENT/PLAN: 	  89y Male patient with past medical history of diastolic heart failure, CKD (baseline 1.7), HTN, HLD, Type II DM, presenting with upper respiratory infection/inflammation and blood tinged sputum with incidental non-thrombotic troponin elevation.     1) Non-thrombotic troponin elevation  Troponin 0.25 up from 0.17 from October 2017  ECHO preserved EF 8/2017  ECG LBBB stable.   Asymptomatic   Supply demand mismatch in the setting of URI/PNU as well as CKD.     ·	Continue medical management with aspirin 81 mg daily.  ·	Consider ischemic evaluation in the near future, defer to primary cardiologist team [facility]. Longstanding troponin elevation since at least October.     2) Diastolic heart failure   BNP 6771 similar to prior levels.     ·	Continue medical management with maintenance diuresis 20 mg furosemide     3) Blood Tinged Sputum  In the setting of aggressive coughing.  CT with evidence of infectious/inflammatory changes    ·	Continue medical management with antibiotics and symptomatic anti-tussive support.     4) HTN   Well controlled.     ·	Continue medical management with lifestyle modification/diet.    5) HLD   Reviewed lipid panel.  Diabetic    ·	Continue medical management with pravastatin 40 mg nightly.     Rosa Lee MD, MPH, CATINA  Cardiovascular Specialist Attending  [facility]  C: [phone]  E: [email or web address]  (Cardiology Nocturnist cell number available 7 pm - 7 am every night; available daytime week days for follow-up only; daytime weekends covered by general cardiology consult service)

## 2017-11-21 DIAGNOSIS — R74.8 ABNORMAL LEVELS OF OTHER SERUM ENZYMES: ICD-10-CM

## 2017-11-21 DIAGNOSIS — E11.8 TYPE 2 DIABETES MELLITUS WITH UNSPECIFIED COMPLICATIONS: ICD-10-CM

## 2017-11-21 DIAGNOSIS — E78.5 HYPERLIPIDEMIA, UNSPECIFIED: ICD-10-CM

## 2017-11-21 DIAGNOSIS — I10 ESSENTIAL (PRIMARY) HYPERTENSION: ICD-10-CM

## 2017-11-21 DIAGNOSIS — R04.2 HEMOPTYSIS: ICD-10-CM

## 2017-11-21 DIAGNOSIS — Z29.9 ENCOUNTER FOR PROPHYLACTIC MEASURES, UNSPECIFIED: ICD-10-CM

## 2017-11-21 LAB
ANION GAP SERPL CALC-SCNC: 13 MMOL/L — SIGNIFICANT CHANGE UP (ref 5–17)
APPEARANCE UR: CLEAR — SIGNIFICANT CHANGE UP
BACTERIA # UR AUTO: ABNORMAL /HPF
BILIRUB UR-MCNC: NEGATIVE — SIGNIFICANT CHANGE UP
BUN SERPL-MCNC: 66 MG/DL — HIGH (ref 7–23)
CALCIUM SERPL-MCNC: 9.3 MG/DL — SIGNIFICANT CHANGE UP (ref 8.4–10.5)
CHLORIDE SERPL-SCNC: 105 MMOL/L — SIGNIFICANT CHANGE UP (ref 96–108)
CK MB BLD-MCNC: 1.5 % — SIGNIFICANT CHANGE UP (ref 0–3.5)
CK MB CFR SERPL CALC: 26.6 NG/ML — HIGH (ref 0–6.7)
CO2 SERPL-SCNC: 20 MMOL/L — LOW (ref 22–31)
COLOR SPEC: YELLOW — SIGNIFICANT CHANGE UP
CREAT SERPL-MCNC: 1.85 MG/DL — HIGH (ref 0.5–1.3)
DIFF PNL FLD: ABNORMAL
EPI CELLS # UR: SIGNIFICANT CHANGE UP /HPF
GLUCOSE BLDC GLUCOMTR-MCNC: 161 MG/DL — HIGH (ref 70–99)
GLUCOSE BLDC GLUCOMTR-MCNC: 180 MG/DL — HIGH (ref 70–99)
GLUCOSE BLDC GLUCOMTR-MCNC: 185 MG/DL — HIGH (ref 70–99)
GLUCOSE BLDC GLUCOMTR-MCNC: 214 MG/DL — HIGH (ref 70–99)
GLUCOSE SERPL-MCNC: 218 MG/DL — HIGH (ref 70–99)
GLUCOSE UR QL: 50
HCT VFR BLD CALC: 37.6 % — LOW (ref 39–50)
HGB BLD-MCNC: 11.3 G/DL — LOW (ref 13–17)
KETONES UR-MCNC: NEGATIVE — SIGNIFICANT CHANGE UP
LEUKOCYTE ESTERASE UR-ACNC: NEGATIVE — SIGNIFICANT CHANGE UP
MAGNESIUM SERPL-MCNC: 2 MG/DL — SIGNIFICANT CHANGE UP (ref 1.6–2.6)
MCHC RBC-ENTMCNC: 27.2 PG — SIGNIFICANT CHANGE UP (ref 27–34)
MCHC RBC-ENTMCNC: 30.1 GM/DL — LOW (ref 32–36)
MCV RBC AUTO: 90.5 FL — SIGNIFICANT CHANGE UP (ref 80–100)
NITRITE UR-MCNC: NEGATIVE — SIGNIFICANT CHANGE UP
PH UR: 5.5 — SIGNIFICANT CHANGE UP (ref 5–8)
PLATELET # BLD AUTO: 176 K/UL — SIGNIFICANT CHANGE UP (ref 150–400)
POTASSIUM SERPL-MCNC: 5.4 MMOL/L — HIGH (ref 3.5–5.3)
POTASSIUM SERPL-SCNC: 5.4 MMOL/L — HIGH (ref 3.5–5.3)
PROCALCITONIN SERPL-MCNC: 14.48 NG/ML — HIGH (ref 0–0.04)
PROCALCITONIN SERPL-MCNC: 15.74 NG/ML — HIGH (ref 0–0.04)
PROT UR-MCNC: SIGNIFICANT CHANGE UP
RBC # BLD: 4.15 M/UL — LOW (ref 4.2–5.8)
RBC # FLD: 14.1 % — SIGNIFICANT CHANGE UP (ref 10.3–14.5)
RBC CASTS # UR COMP ASSIST: SIGNIFICANT CHANGE UP /HPF (ref 0–2)
SODIUM SERPL-SCNC: 138 MMOL/L — SIGNIFICANT CHANGE UP (ref 135–145)
SP GR SPEC: 1.02 — SIGNIFICANT CHANGE UP (ref 1.01–1.02)
UROBILINOGEN FLD QL: NEGATIVE — SIGNIFICANT CHANGE UP
WBC # BLD: 10.3 K/UL — SIGNIFICANT CHANGE UP (ref 3.8–10.5)
WBC # FLD AUTO: 10.3 K/UL — SIGNIFICANT CHANGE UP (ref 3.8–10.5)
WBC UR QL: SIGNIFICANT CHANGE UP /HPF (ref 0–5)

## 2017-11-21 RX ORDER — DEXTROSE 50 % IN WATER 50 %
12.5 SYRINGE (ML) INTRAVENOUS ONCE
Qty: 0 | Refills: 0 | Status: DISCONTINUED | OUTPATIENT
Start: 2017-11-21 | End: 2017-11-23

## 2017-11-21 RX ORDER — LOSARTAN POTASSIUM 100 MG/1
25 TABLET, FILM COATED ORAL DAILY
Qty: 0 | Refills: 0 | Status: DISCONTINUED | OUTPATIENT
Start: 2017-11-21 | End: 2017-11-21

## 2017-11-21 RX ORDER — INSULIN LISPRO 100/ML
VIAL (ML) SUBCUTANEOUS AT BEDTIME
Qty: 0 | Refills: 0 | Status: DISCONTINUED | OUTPATIENT
Start: 2017-11-21 | End: 2017-11-23

## 2017-11-21 RX ORDER — IPRATROPIUM/ALBUTEROL SULFATE 18-103MCG
3 AEROSOL WITH ADAPTER (GRAM) INHALATION EVERY 6 HOURS
Qty: 0 | Refills: 0 | Status: DISCONTINUED | OUTPATIENT
Start: 2017-11-21 | End: 2017-11-23

## 2017-11-21 RX ORDER — INSULIN LISPRO 100/ML
VIAL (ML) SUBCUTANEOUS
Qty: 0 | Refills: 0 | Status: DISCONTINUED | OUTPATIENT
Start: 2017-11-21 | End: 2017-11-23

## 2017-11-21 RX ORDER — AZITHROMYCIN 500 MG/1
500 TABLET, FILM COATED ORAL DAILY
Qty: 0 | Refills: 0 | Status: DISCONTINUED | OUTPATIENT
Start: 2017-11-21 | End: 2017-11-23

## 2017-11-21 RX ORDER — CEFEPIME 1 G/1
2000 INJECTION, POWDER, FOR SOLUTION INTRAMUSCULAR; INTRAVENOUS EVERY 12 HOURS
Qty: 0 | Refills: 0 | Status: DISCONTINUED | OUTPATIENT
Start: 2017-11-21 | End: 2017-11-23

## 2017-11-21 RX ORDER — DEXTROSE 50 % IN WATER 50 %
25 SYRINGE (ML) INTRAVENOUS ONCE
Qty: 0 | Refills: 0 | Status: DISCONTINUED | OUTPATIENT
Start: 2017-11-21 | End: 2017-11-23

## 2017-11-21 RX ORDER — SODIUM CHLORIDE 9 MG/ML
1000 INJECTION, SOLUTION INTRAVENOUS
Qty: 0 | Refills: 0 | Status: DISCONTINUED | OUTPATIENT
Start: 2017-11-21 | End: 2017-11-23

## 2017-11-21 RX ORDER — DEXTROSE 50 % IN WATER 50 %
1 SYRINGE (ML) INTRAVENOUS ONCE
Qty: 0 | Refills: 0 | Status: DISCONTINUED | OUTPATIENT
Start: 2017-11-21 | End: 2017-11-23

## 2017-11-21 RX ORDER — FUROSEMIDE 40 MG
20 TABLET ORAL DAILY
Qty: 0 | Refills: 0 | Status: DISCONTINUED | OUTPATIENT
Start: 2017-11-21 | End: 2017-11-23

## 2017-11-21 RX ADMIN — Medication 3 MILLILITER(S): at 18:24

## 2017-11-21 RX ADMIN — Medication 3 MILLILITER(S): at 13:05

## 2017-11-21 RX ADMIN — LOSARTAN POTASSIUM 25 MILLIGRAM(S): 100 TABLET, FILM COATED ORAL at 08:19

## 2017-11-21 RX ADMIN — Medication 2: at 08:40

## 2017-11-21 RX ADMIN — CEFEPIME 100 MILLIGRAM(S): 1 INJECTION, POWDER, FOR SOLUTION INTRAMUSCULAR; INTRAVENOUS at 08:19

## 2017-11-21 RX ADMIN — Medication 3 MILLILITER(S): at 23:18

## 2017-11-21 RX ADMIN — Medication 20 MILLIGRAM(S): at 08:19

## 2017-11-21 RX ADMIN — Medication 2: at 13:04

## 2017-11-21 RX ADMIN — AZITHROMYCIN 500 MILLIGRAM(S): 500 TABLET, FILM COATED ORAL at 13:05

## 2017-11-21 RX ADMIN — CEFEPIME 100 MILLIGRAM(S): 1 INJECTION, POWDER, FOR SOLUTION INTRAMUSCULAR; INTRAVENOUS at 18:24

## 2017-11-21 RX ADMIN — Medication 2: at 18:22

## 2017-11-21 NOTE — CONSULT NOTE ADULT - SUBJECTIVE AND OBJECTIVE BOX
HPI: Mr. Deluca is an 89 year-old man with history of hypertension, diabetes mellitus, and stage 3 chronic kidney disease, s/p recent admission at [facility] for symptomatic hypoglycemia, and seen by me as recently as last Thursday (17) at my office. He presented yesterday to the [facility] ER with cough productive of bloody sputum for less than 1 day. He was diagnosed with bronchitis; in the ER, he received IV Zosyn, Zithromax, and Vancomycin, as well as nebulizer therapy and ASA. His serum potassium was 5.7meq/L in the ER; it was 6.0meq/L at my office on 17; I tried to reach him yesterday by phone regarding the hyperkalemia but by that time he had already presented to the ER. Also of note, his urinalysis from my office showed moderate blood. That being said, his urinalysis today is negative for blood.      PAST MEDICAL & SURGICAL HISTORY:  Type 2 diabetes mellitus with retinopathy, macular edema presence unspecified, unspecified long term insulin use status, unspecified retinopathy severity  HLD (hyperlipidemia)  HTN (hypertension)  No significant past surgical history    Allergies  No Known Allergies    SOCIAL HISTORY:  Denies ETOh,Smoking,     FAMILY HISTORY:  No pertinent family history in first degree relatives    REVIEW OF SYSTEMS:  CONSTITUTIONAL: No weakness, fevers or chills  EYES/ENT: No visual changes;  No vertigo or throat pain   NECK: No pain or stiffness  RESPIRATORY:(+)cough, (+)hemoptysis; no shortness of breath  CARDIOVASCULAR: No chest pain or palpitations  GASTROINTESTINAL: No abdominal or epigastric pain. No nausea, vomiting, or hematemesis; No diarrhea or constipation. No melena or hematochezia.  GENITOURINARY: No dysuria, frequency or hematuria  NEUROLOGICAL: No numbness or weakness  SKIN: No itching, burning, rashes, or lesions   All other review of systems is negative unless indicated above.    VITAL:  T(C): , Max: 37.1 (17 @ 22:57)  T(F): , Max: 98.8 (17 @ 22:57)  HR: 74 (17 @ 14:30)  BP: 108/68 (17 @ 14:30)  BP(mean): --  RR: 18 (17 @ 14:30)  SpO2: 95% (17 @ 14:30)  Wt(kg): --    PHYSICAL EXAM:  Constitutional: NAD, Alert  HEENT: NCAT, MMM  Neck: Supple, No JVD  Respiratory: CTA-b/l  Cardiovascular: RRR s1s2, no m/r/g  Gastrointestinal: BS+, soft, NT/ND  Extremities: No peripheral edema b/l  Neurological: no focal deficits; strength grossly intact  Psychiatric: Normal mood, normal affect  Back: no CVAT b/l  Skin: No rashes, no nevi    LABS:                        11.3   10.3  )-----------( 176      ( 2017 07:26 )             37.6     Na(138)/K(5.4)/Cl(105)/HCO3(20)/BUN(66)/Cr(1.85)Glu(218)/Ca(9.3)/Mg(2.0)/PO4(--)     @ 07:26  Na(137)/K(5.7)/Cl(104)/HCO3(18)/BUN(69)/Cr(1.72)Glu(273)/Ca(8.8)/Mg(--)/PO4(--)     @ 16:14    Urinalysis Basic - ( 2017 09:10 )  Color: Yellow / Appearance: Clear / S.017 / pH: x  Gluc: x / Ketone: Negative  / Bili: Negative / Urobili: Negative   Blood: x / Protein: Trace / Nitrite: Negative   Leuk Esterase: Negative / RBC: 0-2 /HPF / WBC 0-2 /HPF   Sq Epi: x / Non Sq Epi: Occasional /HPF / Bacteria: Few /HPF      IMAGING:    < from: CT Chest No Cont (17 @ 19:21) >  IMPRESSION:   Bilateral patchy airspace opacities/consolidation may be related to   infectious/inflammatory etiologies versus pulmonary hemorrhage. Clinical   correlation is recommended. Three-month follow-up CT needed for complete   evaluation.      ASSESSMENT:  (1)Renal - CKD3 - diabetic nephropathy? Has longstanding diabetes with other known microvascular complications, so this diagnosis would make sense...but he has minimal proteinuria. Could as well be from hypertension.    (2)Hyperkalemia - suboptimal control of late. Was on Avapro at home; we must discontinue ARB therapy for now. Indicated as well that he limit dietary potassium intake, and that he take a vitamin that does not contain K+ (was taking Centrum as outpatient).    (3)Hematuria - etiology? Appears to have resolved based on the urinalysis from admission     (4)Bronchitis    RECOMMEND:  (1)Urine: protein, creatinine  (2)No further ACEI/ARB  (3)Limit dietary potassium intake (Discussed with patient)  (4)If to take a vitamin, the vitamin should not contain K+  (5)Management of bronchitis per primary team  (6)Dose new meds for GFR 30-40ml/min    Thank you for involving [facility] in this patient's care.    With warm regards,    Elvin Weston MD   [facility]  [phone] HPI: Mr. Deluca is an 89 year-old man with history of hypertension, diabetes mellitus, and stage 3 chronic kidney disease, s/p recent admission at [facility] for symptomatic hypoglycemia, and seen by me as recently as last Thursday (17) at my office. He presented yesterday to the [facility] ER with cough productive of bloody sputum for less than 1 day. He was diagnosed with bronchitis; in the ER, he received IV Zosyn, Zithromax, and Vancomycin, as well as nebulizer therapy and ASA. His serum potassium was 5.7meq/L in the ER; it was 6.0meq/L at my office on 17; I tried to reach him yesterday by phone regarding the hyperkalemia but by that time he had already presented to the ER. Also of note, his urinalysis from my office showed moderate blood. That being said, his urinalysis today is negative for blood.    When I saw Mr. Deluca at the office, he informed me that he was intermittently using NSAIDs for headaches; I advised him to stop doing so, and to instead use Tylenol/Excedrin. Mr. Deluca today denies intake of bananas, oranges, orange juice, prunes, prune juice, or potatoes. He eats tomatoes every day.    When I saw Mr. Deluca at the office, his wife brought up that he needed physical therapy. They were having difficulty getting outpatient PT set up, she informed me.      PAST MEDICAL & SURGICAL HISTORY:  Type 2 diabetes mellitus with retinopathy, macular edema presence unspecified, unspecified long term insulin use status, unspecified retinopathy severity  HLD (hyperlipidemia)  HTN (hypertension)  No significant past surgical history    Allergies  No Known Allergies    SOCIAL HISTORY:  Denies ETOh,Smoking,     FAMILY HISTORY:  No pertinent family history in first degree relatives    REVIEW OF SYSTEMS:  CONSTITUTIONAL: No weakness, fevers or chills  EYES/ENT: No visual changes;  No vertigo or throat pain   NECK: No pain or stiffness  RESPIRATORY:(+)cough, (+)hemoptysis; no shortness of breath  CARDIOVASCULAR: No chest pain or palpitations  GASTROINTESTINAL: No abdominal or epigastric pain. No nausea, vomiting, or hematemesis; No diarrhea or constipation. No melena or hematochezia.  GENITOURINARY: No dysuria, frequency or hematuria  NEUROLOGICAL: No numbness or weakness  SKIN: No itching, burning, rashes, or lesions   All other review of systems is negative unless indicated above.    VITAL:  T(C): , Max: 37.1 (17 @ 22:57)  T(F): , Max: 98.8 (17 @ 22:57)  HR: 74 (17 @ 14:30)  BP: 108/68 (17 @ 14:30)  BP(mean): --  RR: 18 (17 @ 14:30)  SpO2: 95% (17 @ 14:30)  Wt(kg): --    PHYSICAL EXAM:  Constitutional: NAD, Alert  HEENT: NCAT, DMM  Neck: Supple, No JVD  Respiratory: (+)diffuse rhonchi  Cardiovascular: RRR s1s2, no m/r/g  Gastrointestinal: BS+, soft, NT; (+)distension  Extremities: No peripheral edema b/l  Neurological: reduced generalized strength  Psychiatric: Normal mood, normal affect  Back: no CVAT b/l  Skin: No rashes, no nevi    LABS:                        11.3   10.3  )-----------( 176      ( 2017 07:26 )             37.6     Na(138)/K(5.4)/Cl(105)/HCO3(20)/BUN(66)/Cr(1.85)Glu(218)/Ca(9.3)/Mg(2.0)/PO4(--)     @ 07:26  Na(137)/K(5.7)/Cl(104)/HCO3(18)/BUN(69)/Cr(1.72)Glu(273)/Ca(8.8)/Mg(--)/PO4(--)     @ 16:14    Urinalysis Basic - ( 2017 09:10 )  Color: Yellow / Appearance: Clear / S.017 / pH: x  Gluc: x / Ketone: Negative  / Bili: Negative / Urobili: Negative   Blood: x / Protein: Trace / Nitrite: Negative   Leuk Esterase: Negative / RBC: 0-2 /HPF / WBC 0-2 /HPF   Sq Epi: x / Non Sq Epi: Occasional /HPF / Bacteria: Few /HPF      IMAGING:    < from: CT Chest No Cont (17 @ 19:21) >  IMPRESSION:   Bilateral patchy airspace opacities/consolidation may be related to   infectious/inflammatory etiologies versus pulmonary hemorrhage. Clinical   correlation is recommended. Three-month follow-up CT needed for complete   evaluation.      ASSESSMENT:  (1)Renal - CKD3 - diabetic nephropathy? Has longstanding diabetes with other known microvascular complications, so this diagnosis would make sense...but he has minimal proteinuria. Could as well be from hypertension.    (2)Hyperkalemia - suboptimal control of late. Was on Avapro at home; we must discontinue ARB therapy for now. Indicated as well that he limit dietary potassium intake, and that he take a vitamin that does not contain K+ (was taking Centrum as outpatient).    (3)Hematuria - etiology? Appears to have resolved based on the urinalysis from admission     (4)PM+R - needs PT    (5)Bronchitis    RECOMMEND:  (1)Urine: protein, creatinine  (2)No further ACEI/ARB  (3)Advised patient (and wife) that he should cut back on tomato intake by 50%  (4)If to take a vitamin, the vitamin should not contain K+  (5)Management of bronchitis per primary team  (6)Dose new meds for GFR 30-40ml/min  (7)PT  (8)Follow up at my office 2-4 weeks post discharge - at that time, I may look to get coverage for Veltassa in effort to better control his serum potassium.    Thank you for involving [facility] in this patient's care.    With warm regards,    Elvin Wetson MD   [facility]  [phone] HPI: Mr. Deluca is an 89 year-old man with history of hypertension, diabetes mellitus, and stage 3 chronic kidney disease, s/p recent admission at [facility] for symptomatic hypoglycemia, and seen by me as recently as last Thursday (17) at my office. He presented yesterday to the [facility] ER with cough productive of bloody sputum for less than 1 day. He was diagnosed with bronchitis; in the ER, he received IV Zosyn, Zithromax, and Vancomycin, as well as nebulizer therapy and ASA. His serum potassium was 5.7meq/L in the ER; it was 6.0meq/L at my office on 17; I tried to reach him yesterday by phone regarding the hyperkalemia but by that time he had already presented to the ER. Also of note, his urinalysis from my office showed moderate blood. That being said, his urinalysis today is negative for blood.    When I saw Mr. Deluca at the office, he informed me that he was intermittently using NSAIDs for headaches; I advised him to stop doing so, and to instead use Tylenol/Excedrin. Mr. Deluca today denies intake of bananas, oranges, orange juice, prunes, prune juice, or potatoes. He eats tomatoes every day.    When I saw Mr. Deluca at the office, his wife brought up that he needed physical therapy. They were having difficulty getting outpatient PT set up, she informed me.      PAST MEDICAL & SURGICAL HISTORY:  Type 2 diabetes mellitus with retinopathy, macular edema presence unspecified, unspecified long term insulin use status, unspecified retinopathy severity  HLD (hyperlipidemia)  HTN (hypertension)  No significant past surgical history    Allergies  No Known Allergies    SOCIAL HISTORY:  Denies ETOh,Smoking,     FAMILY HISTORY:  No pertinent family history in first degree relatives    REVIEW OF SYSTEMS:  CONSTITUTIONAL: No weakness, fevers or chills  EYES/ENT: No visual changes;  No vertigo or throat pain   NECK: No pain or stiffness  RESPIRATORY:(+)cough, (+)hemoptysis; no shortness of breath  CARDIOVASCULAR: No chest pain or palpitations  GASTROINTESTINAL: No abdominal or epigastric pain. No nausea, vomiting, or hematemesis; No diarrhea or constipation. No melena or hematochezia.  GENITOURINARY: No dysuria, frequency or hematuria  NEUROLOGICAL: No numbness or weakness  SKIN: No itching, burning, rashes, or lesions   All other review of systems is negative unless indicated above.    VITAL:  T(C): , Max: 37.1 (17 @ 22:57)  T(F): , Max: 98.8 (17 @ 22:57)  HR: 74 (17 @ 14:30)  BP: 108/68 (17 @ 14:30)  BP(mean): --  RR: 18 (17 @ 14:30)  SpO2: 95% (17 @ 14:30)  Wt(kg): --    PHYSICAL EXAM:  Constitutional: NAD, Alert  HEENT: NCAT, DMM  Neck: Supple, No JVD  Respiratory: (+)diffuse rhonchi  Cardiovascular: RRR s1s2, no m/r/g  Gastrointestinal: BS+, soft, NT; (+)distension  Extremities: No peripheral edema b/l  Neurological: reduced generalized strength  Psychiatric: Normal mood, normal affect  Back: no CVAT b/l  Skin: No rashes, no nevi    LABS:                        11.3   10.3  )-----------( 176      ( 2017 07:26 )             37.6     Na(138)/K(5.4)/Cl(105)/HCO3(20)/BUN(66)/Cr(1.85)Glu(218)/Ca(9.3)/Mg(2.0)/PO4(--)     @ 07:26  Na(137)/K(5.7)/Cl(104)/HCO3(18)/BUN(69)/Cr(1.72)Glu(273)/Ca(8.8)/Mg(--)/PO4(--)     @ 16:14    Urinalysis Basic - ( 2017 09:10 )  Color: Yellow / Appearance: Clear / S.017 / pH: x  Gluc: x / Ketone: Negative  / Bili: Negative / Urobili: Negative   Blood: x / Protein: Trace / Nitrite: Negative   Leuk Esterase: Negative / RBC: 0-2 /HPF / WBC 0-2 /HPF   Sq Epi: x / Non Sq Epi: Occasional /HPF / Bacteria: Few /HPF      IMAGING:    < from: CT Chest No Cont (17 @ 19:21) >  IMPRESSION:   Bilateral patchy airspace opacities/consolidation may be related to   infectious/inflammatory etiologies versus pulmonary hemorrhage. Clinical   correlation is recommended. Three-month follow-up CT needed for complete   evaluation.      ASSESSMENT:  (1)Renal - CKD3 - diabetic nephropathy? Has longstanding diabetes with other known microvascular complications, so this diagnosis would make sense...but he has minimal proteinuria. Could as well be from hypertension.    (2)Hyperkalemia - suboptimal control of late. Was on Avapro at home; we must discontinue ARB therapy for now. Indicated as well that he limit dietary potassium intake, and that he take a vitamin that does not contain K+ (was taking Centrum as outpatient).    (3)Hematuria - etiology? Appears to have resolved based on the urinalysis from admission     (4)PM+R - needs PT    (5)Bronchitis    RECOMMEND:  (1)Urine: protein, creatinine  (2)No further ACEI/ARB; discontinue Cozaar  (3)Advised patient (and wife) that he should cut back on tomato intake by 50%  (4)If to take a vitamin, the vitamin should not contain K+  (5)Management of bronchitis per primary team  (6)Dose new meds for GFR 30-40ml/min  (7)PT  (8)Follow up at my office 2-4 weeks post discharge - at that time, I may look to get coverage for Veltassa in effort to better control his serum potassium.    Thank you for involving [facility] in this patient's care.    With warm regards,    Elvin Weston MD   [facility]  [phone]

## 2017-11-21 NOTE — CONSULT NOTE ADULT - SUBJECTIVE AND OBJECTIVE BOX
HPI:  Late entry, Pt seen and examined 11:36PM 17  89M DM2, HTN, HLD p/w hemoptysis. Pt states he was recently admitted to [facility] with hypoglycemia. Discharged home recently and pt states he was in his usual state of health. Pt states starting last night he developed a mild dry cough. Then this AM he experienced a mildly bloody cough which was productive of bloody sputum roughly once an hour. He denies any fevers, chills or chest pain. He endorses a mild SOB which did not notably affect his functional status. He spoke to his pulmonlogist who recommended he seek further evaluation in the ER. Pt denies any history of hemoptysis in the past, any recent travel, any history of autoimmune disease or any sick contacts.    In ER: Given ASA, azithromycin, zosyn, vanc, Nebs and seen by cardiology. (2017 00:32)      PAST MEDICAL & SURGICAL HISTORY:  Type 2 diabetes mellitus with retinopathy, macular edema presence unspecified, unspecified long term insulin use status, unspecified retinopathy severity  HLD (hyperlipidemia)  HTN (hypertension)  No significant past surgical history      FAMILY HISTORY:  No pertinent family history in first degree relatives      Social History:    Marital Status:  (   )    (   ) Single    (   )    (  )   Occupation:   Lives with: (  ) alone  (  ) children   (  ) spouse   (  ) parents  (  ) other    Substance Use (street drugs): (  ) never used  (  ) other:  Tobacco Usage:  (   ) never smoked   (   ) former smoker   (   ) current smoker  (     ) pack year  (        ) last cigarette date  Alcohol Usage:      Allergies    No Known Allergies    Intolerances        MEDICATIONS  (STANDING):  ALBUTerol/ipratropium for Nebulization 3 milliLiter(s) Nebulizer every 6 hours  azithromycin   Tablet 500 milliGRAM(s) Oral daily  cefepime  IVPB 2000 milliGRAM(s) IV Intermittent every 12 hours  dextrose 5%. 1000 milliLiter(s) (50 mL/Hr) IV Continuous <Continuous>  dextrose 50% Injectable 12.5 Gram(s) IV Push once  dextrose 50% Injectable 25 Gram(s) IV Push once  furosemide    Tablet 20 milliGRAM(s) Oral daily  insulin lispro (HumaLOG) corrective regimen sliding scale   SubCutaneous three times a day before meals  insulin lispro (HumaLOG) corrective regimen sliding scale   SubCutaneous at bedtime  losartan 25 milliGRAM(s) Oral daily    MEDICATIONS  (PRN):  dextrose Gel 1 Dose(s) Oral once PRN Blood Glucose LESS THAN 70 milliGRAM(s)/deciliter  guaiFENesin/dextromethorphan  Syrup 5 milliLiter(s) Oral every 6 hours PRN Cough        LABS:    CBC Full  -  ( 2017 07:26 )  WBC Count : 10.3 K/uL  Hemoglobin : 11.3 g/dL  Hematocrit : 37.6 %  Platelet Count - Automated : 176 K/uL  Mean Cell Volume : 90.5 fl  Mean Cell Hemoglobin : 27.2 pg  Mean Cell Hemoglobin Concentration : 30.1 gm/dL  Auto Neutrophil # : x  Auto Lymphocyte # : x  Auto Monocyte # : x  Auto Eosinophil # : x  Auto Basophil # : x  Auto Neutrophil % : x  Auto Lymphocyte % : x  Auto Monocyte % : x  Auto Eosinophil % : x  Auto Basophil % : x        138  |  105  |  66<H>  ----------------------------<  218<H>  5.4<H>   |  20<L>  |  1.85<H>    Ca    9.3      2017 07:26  Mg     2.0         TPro  6.6  /  Alb  3.9  /  TBili  0.6  /  DBili  x   /  AST  244<H>  /  ALT  182<H>  /  AlkPhos  160<H>      PT/INR - ( 2017 16:14 )   PT: 13.1 sec;   INR: 1.20 ratio         PTT - ( 2017 16:14 )  PTT:27.0 sec    Urinalysis Basic - ( 2017 09:10 )    Color: Yellow / Appearance: Clear / S.017 / pH: x  Gluc: x / Ketone: Negative  / Bili: Negative / Urobili: Negative   Blood: x / Protein: Trace / Nitrite: Negative   Leuk Esterase: Negative / RBC: 0-2 /HPF / WBC 0-2 /HPF   Sq Epi: x / Non Sq Epi: Occasional /HPF / Bacteria: Few /HPF      Blood Gas Venous - Lactate: 1.5 mmoL/L ( @ 16:14)    Procalcitonin, Serum: 14.48 ng/mL ( @ 09:35)  Procalcitonin, Serum: 15.74 ng/mL ( @ 07:26)            Cultures:  RECENT CULTURES:          Imaging Studies:    Vital Signs:   Vital Signs Last 24 Hrs  T(C): 36.8 (17 @ 14:30), Max: 37.1 (17 @ 22:57)  T(F): 98.3 (17 @ 14:30), Max: 98.8 (17 @ 22:57)  HR: 74 (17 @ 14:30) (70 - 84)  BP: 108/68 (17 @ 14:30) (102/53 - 132/66)  BP(mean): --  RR: 18 (17 @ 14:30) (18 - 26)  SpO2: 95% (17 @ 14:30) (95% - 99%) HPI:        PAST MEDICAL & SURGICAL HISTORY:  Type 2 diabetes mellitus with retinopathy, macular edema presence unspecified, unspecified long term insulin use status, unspecified retinopathy severity  HLD (hyperlipidemia)  HTN (hypertension)  No significant past surgical history      FAMILY HISTORY:  No pertinent family history in first degree relatives      Social Hx: No smoking, no ETOH, from home      Allergies    No Known Allergies    MEDICATIONS  (STANDING):  ALBUTerol/ipratropium for Nebulization 3 milliLiter(s) Nebulizer every 6 hours  azithromycin   Tablet 500 milliGRAM(s) Oral daily  cefepime  IVPB 2000 milliGRAM(s) IV Intermittent every 12 hours  dextrose 5%. 1000 milliLiter(s) (50 mL/Hr) IV Continuous <Continuous>  dextrose 50% Injectable 12.5 Gram(s) IV Push once  dextrose 50% Injectable 25 Gram(s) IV Push once  furosemide    Tablet 20 milliGRAM(s) Oral daily  insulin lispro (HumaLOG) corrective regimen sliding scale   SubCutaneous three times a day before meals  insulin lispro (HumaLOG) corrective regimen sliding scale   SubCutaneous at bedtime  losartan 25 milliGRAM(s) Oral daily    MEDICATIONS  (PRN):  dextrose Gel 1 Dose(s) Oral once PRN Blood Glucose LESS THAN 70 milliGRAM(s)/deciliter  guaiFENesin/dextromethorphan  Syrup 5 milliLiter(s) Oral every 6 hours PRN Cough        LABS:    CBC Full  -  ( 2017 07:26 )  WBC Count : 10.3 K/uL  Hemoglobin : 11.3 g/dL  Hematocrit : 37.6 %  Platelet Count - Automated : 176 K/uL  Mean Cell Volume : 90.5 fl  Mean Cell Hemoglobin : 27.2 pg  Mean Cell Hemoglobin Concentration : 30.1 gm/dL  Auto Neutrophil # : x  Auto Lymphocyte # : x  Auto Monocyte # : x  Auto Eosinophil # : x  Auto Basophil # : x  Auto Neutrophil % : x  Auto Lymphocyte % : x  Auto Monocyte % : x  Auto Eosinophil % : x  Auto Basophil % : x    11-    138  |  105  |  66<H>  ----------------------------<  218<H>  5.4<H>   |  20<L>  |  1.85<H>    Ca    9.3      2017 07:26  Mg     2.0     -    TPro  6.6  /  Alb  3.9  /  TBili  0.6  /  DBili  x   /  AST  244<H>  /  ALT  182<H>  /  AlkPhos  160<H>  11    PT/INR - ( 2017 16:14 )   PT: 13.1 sec;   INR: 1.20 ratio         PTT - ( 2017 16:14 )  PTT:27.0 sec    Urinalysis Basic - ( 2017 09:10 )    Color: Yellow / Appearance: Clear / S.017 / pH: x  Gluc: x / Ketone: Negative  / Bili: Negative / Urobili: Negative   Blood: x / Protein: Trace / Nitrite: Negative   Leuk Esterase: Negative / RBC: 0-2 /HPF / WBC 0-2 /HPF   Sq Epi: x / Non Sq Epi: Occasional /HPF / Bacteria: Few /HPF      Blood Gas Venous - Lactate: 1.5 mmoL/L ( @ 16:14)    Procalcitonin, Serum: 14.48 ng/mL ( @ 09:35)  Procalcitonin, Serum: 15.74 ng/mL ( @ 07:26)            Imaging Studies: CT chest: Bilateral patchy airspace opacities/consolidation may be related to   infectious/inflammatory etiologies versus pulmonary hemorrhage. Clinical   correlation is recommended. Three-month follow-up CT needed for complete evaluation.    Vital Signs:   Vital Signs Last 24 Hrs  T(C): 36.8 (17 @ 14:30), Max: 37.1 (17 @ 22:57)  T(F): 98.3 (17 @ 14:30), Max: 98.8 (17 @ 22:57)  HR: 74 (17 @ 14:30) (70 - 84)  BP: 108/68 (17 @ 14:30) (102/53 - 132/66)  RR: 18 (17 @ 14:30) (18 - 26)  SpO2: 95% (17 @ 14:30) (95% - 99%)

## 2017-11-21 NOTE — H&P ADULT - PROBLEM SELECTOR PLAN 3
Appreciate cardiology recommendation. Likely demand ischemia.  -Cont ASA  -possible ischemia evaluation in future

## 2017-11-21 NOTE — PROGRESS NOTE ADULT - SUBJECTIVE AND OBJECTIVE BOX
Patient is a 89y old  Male who presents with a chief complaint of Bloody Cough (2017 00:32)      SUBJECTIVE / OVERNIGHT EVENTS: looks comfortable, no dyspnea, wife at bedside    MEDICATIONS  (STANDING):  ALBUTerol/ipratropium for Nebulization 3 milliLiter(s) Nebulizer every 6 hours  azithromycin   Tablet 500 milliGRAM(s) Oral daily  cefepime  IVPB 2000 milliGRAM(s) IV Intermittent every 12 hours  dextrose 5%. 1000 milliLiter(s) (50 mL/Hr) IV Continuous <Continuous>  dextrose 50% Injectable 12.5 Gram(s) IV Push once  dextrose 50% Injectable 25 Gram(s) IV Push once  furosemide    Tablet 20 milliGRAM(s) Oral daily  insulin lispro (HumaLOG) corrective regimen sliding scale   SubCutaneous three times a day before meals  insulin lispro (HumaLOG) corrective regimen sliding scale   SubCutaneous at bedtime  losartan 25 milliGRAM(s) Oral daily    MEDICATIONS  (PRN):  dextrose Gel 1 Dose(s) Oral once PRN Blood Glucose LESS THAN 70 milliGRAM(s)/deciliter  guaiFENesin/dextromethorphan  Syrup 5 milliLiter(s) Oral every 6 hours PRN Cough      Vital Signs Last 24 Hrs  T(F): 98.3 (17 @ 14:30), Max: 98.8 (17 @ 22:57)  HR: 74 (17 @ 14:30) (70 - 91)  BP: 108/68 (17 @ 14:30) (102/53 - 132/66)  RR: 18 (17 @ 14:30) (18 - 27)  SpO2: 95% (17 @ 14:30) (93% - 99%)  Telemetry:   CAPILLARY BLOOD GLUCOSE      POCT Blood Glucose.: 161 mg/dL (2017 11:31)  POCT Blood Glucose.: 180 mg/dL (2017 07:27)    I&O's Summary    2017 07:01  -  2017 07:00  --------------------------------------------------------  IN: 290 mL / OUT: 0 mL / NET: 290 mL    2017 07:01  -  2017 15:14  --------------------------------------------------------  IN: 360 mL / OUT: 0 mL / NET: 360 mL        PHYSICAL EXAM:  GENERAL: NAD, well-developed  HEAD:  Atraumatic, Normocephalic  EYES: EOMI, PERRLA, conjunctiva and sclera clear  NECK: Supple, No JVD  CHEST/LUNG: Clear to auscultation bilaterally; No wheeze  HEART: Regular rate and rhythm; No murmurs, rubs, or gallops  ABDOMEN: Soft, Nontender, Nondistended; Bowel sounds present  EXTREMITIES:  2+ Peripheral Pulses, No clubbing, cyanosis, or edema  PSYCH: AAOx3  NEUROLOGY: non-focal  SKIN: No rashes or lesions    LABS:                        11.3   10.3  )-----------( 176      ( 2017 07:26 )             37.6     11-    138  |  105  |  66<H>  ----------------------------<  218<H>  5.4<H>   |  20<L>  |  1.85<H>    Ca    9.3      2017 07:26  Mg     2.0     11-    TPro  6.6  /  Alb  3.9  /  TBili  0.6  /  DBili  x   /  AST  244<H>  /  ALT  182<H>  /  AlkPhos  160<H>  11-20    PT/INR - ( 2017 16:14 )   PT: 13.1 sec;   INR: 1.20 ratio         PTT - ( 2017 16:14 )  PTT:27.0 sec  CARDIAC MARKERS ( 2017 22:04 )  x     / 0.21 ng/mL / 1735 U/L / x     / 26.6 ng/mL  CARDIAC MARKERS ( 2017 16:14 )  x     / 0.25 ng/mL / 2382 U/L / x     / 36.2 ng/mL      Urinalysis Basic - ( 2017 09:10 )    Color: Yellow / Appearance: Clear / S.017 / pH: x  Gluc: x / Ketone: Negative  / Bili: Negative / Urobili: Negative   Blood: x / Protein: Trace / Nitrite: Negative   Leuk Esterase: Negative / RBC: 0-2 /HPF / WBC 0-2 /HPF   Sq Epi: x / Non Sq Epi: Occasional /HPF / Bacteria: Few /HPF        RADIOLOGY & ADDITIONAL TESTS:    Imaging Personally Reviewed:    Consultant(s) Notes Reviewed:      Care Discussed with Consultants/Other Providers:

## 2017-11-21 NOTE — CONSULT NOTE ADULT - ASSESSMENT
A/P: 89M DM2, HTN, HLD p/w hemoptysis. Pt states he was recently admitted to [facility] with hypoglycemia. Pt states 11/19/17 night he developed  mild dry cough. on 11/20/17 he experienced a mildly bloody cough which was productive of bloody sputum roughly once an hour. He denies any fevers, chills or chest pain. He endorses a mild SOB which did not notably affect his functional status. He spoke to his pulmonlogist who recommended he seek further evaluation in the ER. Pt denies any history of hemoptysis in the past, any recent travel, any history of autoimmune disease or any sick contacts.  CT chest: Bilateral patchy airspace opacities/consolidation may be related to infectious/inflammatory etiologies versus pulmonary hemorrhage. Clinical   correlation is recommended. Pt with elevated LFTs and high procalcitonin 15.7. Creatinine 1.8.  In ER: Given ASA, azithromycin, zosyn, vanc.  Currently on azithromycin and cefepime.  Will continue current ABs for PNA.

## 2017-11-21 NOTE — H&P ADULT - PROBLEM SELECTOR PLAN 1
Unclear etiology as patient not endorsing much a of a prodrome. Possibly infection although there is lack of fever. Mild leukocytosis. Does not appear to be massive hemoptysis based on hx and sample by bedside although more than simple streaking. CT chest negative for PE.  -Cont. coverage for HAP overnight with cefepime  -F/u BCx  -Pulm evaluation in AM  -Pt had autoimmune work up previous admission which was weakly positive. Pt denies history of autoimmune disease, will defer to pulm if additional work up is recommended. Unclear etiology as patient not endorsing much a of a prodrome. Possibly infection although there is lack of fever. Mild leukocytosis. Does not appear to be massive hemoptysis based on hx and sample by bedside although more than simple streaking. CT chest negative for PE.  -Cont. coverage for HAP overnight with cefepime  -F/u BCx  -Pulm evaluation in AM  -Pt had autoimmune work up previous admission which was weakly positive. Pt denies history of autoimmune disease, will defer to pulm if additional work up is recommended.  -Procalcitonin  -Add vancomycin if pt spikes fever

## 2017-11-21 NOTE — PROGRESS NOTE ADULT - SUBJECTIVE AND OBJECTIVE BOX
PULMONARY PROGRESS NOTE    JODY WILKINS  MRN-[id number]    Patient is a 89y old  Male who presents with a chief complaint of Bloody Cough (21 Nov 2017 00:32)      89M DM2, HTN, HLD p/w hemoptysis. Pt states he was recently admitted to [facility] with hypoglycemia. No pulmonary symptoms at that time.  Discharged home recently and pt states he was in his usual state of health. Pt states starting last night he developed a mild dry cough. Then this AM he experienced a mildly bloody cough which was productive of bloody sputum roughly once an hour. He denies any fevers, chills or chest pain. He endorses a mild SOB which did not notably affect his functional status. Seen in office, CXR new left upper lobe infiltrate.  Pt denies any history of hemoptysis in the past, any recent travel, any history of autoimmune disease or any sick contacts. Pt works as ; no recent contact with animals.     In ER: Given ASA, azithromycin, zosyn, vanc, Nebs and seen by cardiology.   89M DM2, HTN, HLD p/w hemoptysis. Pt states he was recently admitted to [facility] with hypoglycemia. Discharged home recently and pt states he was in his usual state of health. Pt states starting last night he developed a mild dry cough. Then this AM he experienced a mildly bloody cough which was productive of bloody sputum roughly once an hour. He denies any fevers, chills or chest pain. He endorses a mild SOB which did not notably affect his functional status. He spoke to his pulmonlogist who recommended he seek further evaluation in the ER. Pt denies any history of hemoptysis in the past, any recent travel, any history of autoimmune disease or any sick contacts.    In ER: Given ASA, azithromycin, zosyn, vanc, Nebs and seen by cardiology.     Review of Systems:  · Negative General Symptoms	no fever; no chills; no sweating	  · Negative Skin Symptoms	no rash; no itching	  · Negative Ophthalmologic Symptoms	no photophobia	  · Negative Respiratory and Thorax Symptoms	no wheezing; no dyspnea; no pleuritic chest pain	  · Respiratory and Thorax Symptoms	cough  hemoptysis	  · Negative Cardiovascular Symptoms	no chest pain; no palpitations; no dyspnea on exertion	  · Negative Gastrointestinal Symptoms	no nausea; no vomiting; no abdominal pain	  · Negative General Genitourinary Symptoms	no dysuria	  · Negative Musculoskeletal Symptoms	no arthralgia	  · Negative Neurological Symptoms	no transient paralysis; no weakness	  · Negative Endocrine Symptoms	no cold intolerance; no heat intolerance	      Allergies and Intolerances:        Allergies:  	No Known Allergies:     Home Medications:   * Patient Currently Takes Medications as of 21-Nov-2017 00:03 documented in Structured Notes  · 	alcohol pads:   · 	lancets for finger sticks:   · 	glucometer test strips:   · 	Glucometer:   · 	Prandin 0.5 mg oral tablet: 1 tab(s) orally 3 times a day (before meals) - DO NOT TAKE IF YOU ARE NOT EATING  · 	Avapro 75 mg oral tablet: 1 tab(s) orally once a day  · 	aspirin 81 mg oral tablet: 2 tab(s) orally once a day  · 	Centrum Silver Men's: 1 tab(s) orally once a day  · 	pravastatin 40 mg oral tablet: 1 tab(s) orally once a day  · 	furosemide 20 mg oral tablet: 1 tab(s) orally once a day    Patient History:   Past Medical History:  HLD (hyperlipidemia)    HTN (hypertension)    Type 2 diabetes mellitus with retinopathy, macular edema presence unspecified, unspecified long term insulin use status, unspecified retinopathy severity.    Past Surgical History:  No significant past surgical history.    Family History:  No pertinent family history in first degree relatives.    Social History:  Social History (marital status, living situation, occupation, tobacco use, alcohol and drug use, and sexual history): Lives with wife at home. Denies significant tobacco history. Uses ETOH occasionally on weekend. Holocaust survivor. 	  -  -    ROS: No hemoptysis overnight.  -  -    ACTIVE MEDICATION LIST:  MEDICATIONS  (STANDING):  aspirin  chewable 324 milliGRAM(s) Oral daily  azithromycin   Tablet 500 milliGRAM(s) Oral daily  cefepime  IVPB 2000 milliGRAM(s) IV Intermittent every 12 hours  dextrose 5%. 1000 milliLiter(s) (50 mL/Hr) IV Continuous <Continuous>  dextrose 50% Injectable 12.5 Gram(s) IV Push once  dextrose 50% Injectable 25 Gram(s) IV Push once  furosemide    Tablet 20 milliGRAM(s) Oral daily  insulin lispro (HumaLOG) corrective regimen sliding scale   SubCutaneous three times a day before meals  insulin lispro (HumaLOG) corrective regimen sliding scale   SubCutaneous at bedtime  losartan 25 milliGRAM(s) Oral daily    MEDICATIONS  (PRN):  dextrose Gel 1 Dose(s) Oral once PRN Blood Glucose LESS THAN 70 milliGRAM(s)/deciliter  guaiFENesin/dextromethorphan  Syrup 5 milliLiter(s) Oral every 6 hours PRN Cough      EXAM:  Vital Signs Last 24 Hrs  T(C): 36.8 (21 Nov 2017 01:39), Max: 37.1 (20 Nov 2017 22:57)  T(F): 98.3 (21 Nov 2017 01:39), Max: 98.8 (20 Nov 2017 22:57)  HR: 76 (21 Nov 2017 01:39) (73 - 91)  BP: 115/65 (21 Nov 2017 01:39) (102/53 - 121/70)  BP(mean): --  RR: 18 (21 Nov 2017 01:39) (18 - 27)  SpO2: 98% (21 Nov 2017 01:39) (93% - 99%)    GENERAL: The patient is awake and alert in no apparent distress.     SKIN: Warm, dry, no rashes    LUNGS: Wheezing bilateral L>R    HEART: Regular rate and rhythm without murmur.    ABDOMEN: +BS, Soft, Nontender    EXTREMITIES: No clubbing, cyanosis, edema                              11.3   10.3  )-----------( 176      ( 21 Nov 2017 07:26 )             37.6       11-21    138  |  105  |  66<H>  ----------------------------<  218<H>  5.4<H>   |  20<L>  |  1.85<H>    Ca    9.3      21 Nov 2017 07:26  Mg     2.0     11-21    TPro  6.6  /  Alb  3.9  /  TBili  0.6  /  DBili  x   /  AST  244<H>  /  ALT  182<H>  /  AlkPhos  160<H>  11-20      LIVER FUNCTIONS - ( 20 Nov 2017 16:14 )  Alb: 3.9 g/dL / Pro: 6.6 g/dL / ALK PHOS: 160 U/L / ALT: 182 U/L RC / AST: 244 U/L / GGT: x           CT Chest:  Bilateral patchy airspace opacities/consolidation may be related to   infectious/inflammatory etiologies versus pulmonary hemorrhage. Clinical   correlation is recommended. Three-month follow-up CT needed for complete   evaluation. Densest consolidation SHARMILA      VQ scan: Abnormal ventilation/perfusion lung scan.    Very low probability of pulmonary embolus.        PROBLEM LIST:  89y Male with HEALTH ISSUES - PROBLEM Dx:  Prophylactic measure: Prophylactic measure  Hyperlipidemia, unspecified hyperlipidemia type: Hyperlipidemia, unspecified hyperlipidemia type  Essential hypertension: Essential hypertension  Elevated troponin: Elevated troponin  Type 2 diabetes mellitus with complication, without long-term current use of insulin: Type 2 diabetes mellitus with complication, without long-term current use of insulin  Hemoptysis: Hemoptysis-likely secondary to pneumonia. Would treat as HCAP considering recent hospitalization.         RECS: Continue cefipime; consider continuation of vancomycin.  Check pro-calcitonin level.  If hemoptysis recurs may consider bronchoscopy.   Start nebs for wheezing    Primitivo Poole MD  [phone]

## 2017-11-21 NOTE — H&P ADULT - HISTORY OF PRESENT ILLNESS
89M DM2, HTN, HLD p/w hemoptysis. Pt states he was recently admitted to [facility] with hypoglycemia. Discharged home recently and pt states he was in his usual state of health. Pt states starting last night he developed a mild dry cough. Then this AM he experienced a mildly bloody cough which was productive of bloody sputum roughly once an hour. He denies any fevers, chills or chest pain. He endorses a mild SOB which did not notably affect his functional status. He spoke to his pulmonlogist who recommended he seek further evaluation in the ER. Pt denies any history of hemoptysis in the past, any recent travel, any history of autoimmune disease or any sick contacts.    In ER: Given ASA, azithromycin, zosyn, vanc, Nebs and seen by cardiology. Late entry, Pt seen and examined 11:36PM 11/20/17  89M DM2, HTN, HLD p/w hemoptysis. Pt states he was recently admitted to [facility] with hypoglycemia. Discharged home recently and pt states he was in his usual state of health. Pt states starting last night he developed a mild dry cough. Then this AM he experienced a mildly bloody cough which was productive of bloody sputum roughly once an hour. He denies any fevers, chills or chest pain. He endorses a mild SOB which did not notably affect his functional status. He spoke to his pulmonlogist who recommended he seek further evaluation in the ER. Pt denies any history of hemoptysis in the past, any recent travel, any history of autoimmune disease or any sick contacts.    In ER: Given ASA, azithromycin, zosyn, vanc, Nebs and seen by cardiology.

## 2017-11-21 NOTE — H&P ADULT - NSHPPHYSICALEXAM_GEN_ALL_CORE
Vital Signs Last 24 Hrs  T(C): 37.1 (11-20-17 @ 22:57), Max: 37.1 (11-20-17 @ 22:57)  T(F): 98.8 (11-20-17 @ 22:57), Max: 98.8 (11-20-17 @ 22:57)  HR: 73 (11-20-17 @ 22:57) (73 - 91)  BP: 108/69 (11-20-17 @ 22:57) (102/53 - 121/70)  BP(mean): --  RR: 18 (11-20-17 @ 22:57) (18 - 27)  SpO2: 99% (11-20-17 @ 22:57) (93% - 99%)

## 2017-11-21 NOTE — CONSULT NOTE ADULT - SUBJECTIVE AND OBJECTIVE BOX
Patient is a 89y old  Male who presents with a chief complaint of Bloody Cough (21 Nov 2017 00:32)    89 year old man well known to Dr. Goldberg and our service with Type 2 DM with CRI, HTN, diastolic CHF, with multiple recent admissions for hypoglycemia, chronic trop elevation due to CRI, now sent in by Dr. Red for hemoptysis, without dyspnea.  CT chest with BL airspace opacity nonspecific, infectious vs. inflammatory vs. pulmonary hemorrhage.  V/Q scan very low probability for PE.       PAST MEDICAL & SURGICAL HISTORY:  Type 2 diabetes mellitus with retinopathy, macular edema presence unspecified, unspecified long term insulin use status, unspecified retinopathy severity  HLD (hyperlipidemia)  HTN (hypertension)  No significant past surgical history    Allergies    No Known Allergies    MEDICATIONS  (STANDING):  ALBUTerol/ipratropium for Nebulization 3 milliLiter(s) Nebulizer every 6 hours  aspirin  chewable 324 milliGRAM(s) Oral daily  azithromycin   Tablet 500 milliGRAM(s) Oral daily  cefepime  IVPB 2000 milliGRAM(s) IV Intermittent every 12 hours  dextrose 5%. 1000 milliLiter(s) (50 mL/Hr) IV Continuous <Continuous>  dextrose 50% Injectable 12.5 Gram(s) IV Push once  dextrose 50% Injectable 25 Gram(s) IV Push once  furosemide    Tablet 20 milliGRAM(s) Oral daily  insulin lispro (HumaLOG) corrective regimen sliding scale   SubCutaneous three times a day before meals  insulin lispro (HumaLOG) corrective regimen sliding scale   SubCutaneous at bedtime  losartan 25 milliGRAM(s) Oral daily    MEDICATIONS  (PRN):  dextrose Gel 1 Dose(s) Oral once PRN Blood Glucose LESS THAN 70 milliGRAM(s)/deciliter  guaiFENesin/dextromethorphan  Syrup 5 milliLiter(s) Oral every 6 hours PRN Cough      General: Denies weight loss, fevers, rash, decreased hearing  Cardiac: Denies chest pain, SOB, SORTO, orthopnea, PND, claudication, edema, snoring, daytime somnolence, palpitations, syncope  Resp: See HPI  GI: Denies change in bowel habits, diarrhea, weight loss, melena, tarry stools,   nausea, vomiting, jaundice, abdominal pain, dysphagia  : Denies dysuria, nocturia, hematuria  Neuro: Denies tinnitus, headache, visual changes, weakness, dizziness or vertigo  Musculoskeletal: Denies neck pain back pain joint pain.  Skin: Denies rash, itching, dryness.  Endocrine: Denies polydipsia, polyuria  Psychiatric: Denies depression, anxiety  All other review of systems is negative unless indicated above.    PHYSICAL EXAM:  Vital Signs Last 24 Hrs  T(C): 36.7 (21 Nov 2017 08:00), Max: 37.1 (20 Nov 2017 22:57)  T(F): 98.1 (21 Nov 2017 08:00), Max: 98.8 (20 Nov 2017 22:57)  HR: 74 (21 Nov 2017 08:00) (73 - 91)  BP: 132/66 (21 Nov 2017 08:00) (102/53 - 132/66)  BP(mean): --  RR: 18 (21 Nov 2017 08:00) (18 - 27)  SpO2: 95% (21 Nov 2017 08:00) (93% - 99%)  I&O's Summary    20 Nov 2017 07:01  -  21 Nov 2017 07:00  --------------------------------------------------------  IN: 290 mL / OUT: 0 mL / NET: 290 mL    General Appearance: 	 Alert, cooperative, no distress  HEENT: normocephalic, atraumatic, PERRLA, EOMI, conjunctiva normal, sclera anicteric,   Neck: no JVD,  carotid 2+  bilaterally without bruits, thyroid normal to inspection and palpation, no adenopathy, trachea midline  Lungs:  clear to auscultation and percussion bilaterally  Cor:  pmi 5th ICS MCL, regular rate and rhythm, S1 normal intensity, S2 normal intensity, no gallops, mumrus or rubs  Abdomen:	 soft, non-tender; bowel sounds normal; no masses,  no organomegaly  Extremities: without cyanosis, clubbing or edema  Vasc: 2-+ PT and DP pulses; no varicosities  Neurologic: A&O x 3 (time, place, person). Symmetric strength; limited exam  Musculoskeletal: no kyphosis, scoliosis; normal gait, normal tone  Skin: no rashes; limited exam    EKG:  SR, first degree AV block, LBBB  Telemetry:    LABS:                        11.3   10.3  )-----------( 176      ( 21 Nov 2017 07:26 )             37.6     11-21    138  |  105  |  66<H>  ----------------------------<  218<H>  5.4<H>   |  20<L>  |  1.85<H>    Ca    9.3      21 Nov 2017 07:26  Mg     2.0     11-21    TPro  6.6  /  Alb  3.9  /  TBili  0.6  /  DBili  x   /  AST  244<H>  /  ALT  182<H>  /  AlkPhos  160<H>  11-20    RVP negative  BNP 6771  Elevated CK negative MB  Trop 0.25 to 0.21      POCT Blood Glucose.: 180 mg/dL (21 Nov 2017 07:27)    PT/INR - ( 20 Nov 2017 16:14 )   PT: 13.1 sec;   INR: 1.20 ratio         PTT - ( 20 Nov 2017 16:14 )  PTT:27.0 sec      CXR: L opacity, possible pneumonia    Impression/Plan: Hemoptysis, pneumonia vs. inflammatory vs. pulmonary hemorrhage  Mildly elevated troponin/Increased CK without MB elevation, without evolutionary pattern is due to chronic renal insufficiency    Morro Martins MD  [facility] Patient is a 89y old  Male who presents with a chief complaint of Bloody Cough (21 Nov 2017 00:32)    89 year old man well known to Dr. Goldberg and our service with Type 2 DM with CRI, HTN, diastolic CHF, with multiple recent admissions for hypoglycemia, chronic trop elevation due to CRI, now sent in by Dr. Red for hemoptysis, with mild increased dyspnea above baseline, also with chills but no fevers, no chest pain.  CT chest with BL airspace opacity nonspecific, infectious vs. inflammatory vs. pulmonary hemorrhage.  V/Q scan very low probability for PE.       PAST MEDICAL & SURGICAL HISTORY:  Type 2 diabetes mellitus with retinopathy, macular edema presence unspecified, unspecified long term insulin use status, unspecified retinopathy severity  HLD (hyperlipidemia)  HTN (hypertension)  No significant past surgical history    Allergies    No Known Allergies    MEDICATIONS  (STANDING):  ALBUTerol/ipratropium for Nebulization 3 milliLiter(s) Nebulizer every 6 hours  aspirin  chewable 324 milliGRAM(s) Oral daily  azithromycin   Tablet 500 milliGRAM(s) Oral daily  cefepime  IVPB 2000 milliGRAM(s) IV Intermittent every 12 hours  dextrose 5%. 1000 milliLiter(s) (50 mL/Hr) IV Continuous <Continuous>  dextrose 50% Injectable 12.5 Gram(s) IV Push once  dextrose 50% Injectable 25 Gram(s) IV Push once  furosemide    Tablet 20 milliGRAM(s) Oral daily  insulin lispro (HumaLOG) corrective regimen sliding scale   SubCutaneous three times a day before meals  insulin lispro (HumaLOG) corrective regimen sliding scale   SubCutaneous at bedtime  losartan 25 milliGRAM(s) Oral daily    MEDICATIONS  (PRN):  dextrose Gel 1 Dose(s) Oral once PRN Blood Glucose LESS THAN 70 milliGRAM(s)/deciliter  guaiFENesin/dextromethorphan  Syrup 5 milliLiter(s) Oral every 6 hours PRN Cough      General: Denies weight loss, fevers, rash, decreased hearing, Had chills  Cardiac: Denies chest pain, orthopnea, PND, claudication, edema, snoring, daytime somnolence, palpitations, syncope  Resp: See HPI  GI: Denies change in bowel habits, diarrhea, weight loss, melena, tarry stools,   nausea, vomiting, jaundice, abdominal pain, dysphagia  : Denies dysuria, nocturia, hematuria  Neuro: Denies tinnitus, headache, visual changes, weakness, dizziness or vertigo  Musculoskeletal: Denies neck pain back pain joint pain.  Skin: Denies rash, itching, dryness.  Endocrine: Denies polydipsia, polyuria  Psychiatric: Denies depression, anxiety  All other review of systems is negative unless indicated above.    PHYSICAL EXAM:  Vital Signs Last 24 Hrs  T(C): 36.7 (21 Nov 2017 08:00), Max: 37.1 (20 Nov 2017 22:57)  T(F): 98.1 (21 Nov 2017 08:00), Max: 98.8 (20 Nov 2017 22:57)  HR: 74 (21 Nov 2017 08:00) (73 - 91)  BP: 132/66 (21 Nov 2017 08:00) (102/53 - 132/66)  BP(mean): --  RR: 18 (21 Nov 2017 08:00) (18 - 27)  SpO2: 95% (21 Nov 2017 08:00) (93% - 99%)  I&O's Summary    20 Nov 2017 07:01  -  21 Nov 2017 07:00  --------------------------------------------------------  IN: 290 mL / OUT: 0 mL / NET: 290 mL    General Appearance: 	 Alert, cooperative, no distress  HEENT: normocephalic, atraumatic, PERRLA, EOMI, conjunctiva normal, sclera anicteric,   Neck: no JVD  Lungs:  Bibasilar crackles  Cor:  pmi 5th ICS MCL, regular rate and rhythm, S1 normal intensity, S2 normal intensity, no gallops, mumrus or rubs  Abdomen:	 soft, non-tender; bowel sounds normal; no masses,  no organomegaly  Extremities: without cyanosis, clubbing or edema      EKG:  SR, first degree AV block, LBBB  Telemetry:    LABS:                        11.3   10.3  )-----------( 176      ( 21 Nov 2017 07:26 )             37.6     11-21    138  |  105  |  66<H>  ----------------------------<  218<H>  5.4<H>   |  20<L>  |  1.85<H>    Ca    9.3      21 Nov 2017 07:26  Mg     2.0     11-21    TPro  6.6  /  Alb  3.9  /  TBili  0.6  /  DBili  x   /  AST  244<H>  /  ALT  182<H>  /  AlkPhos  160<H>  11-20    RVP negative  BNP 6771  Elevated CK negative MB  Trop 0.25 to 0.21      POCT Blood Glucose.: 180 mg/dL (21 Nov 2017 07:27)    PT/INR - ( 20 Nov 2017 16:14 )   PT: 13.1 sec;   INR: 1.20 ratio         PTT - ( 20 Nov 2017 16:14 )  PTT:27.0 sec      CXR: L opacity, possible pneumonia    Impression/Plan: Hemoptysis, probable pneumonia vs. inflammatory vs. pulmonary hemorrhage.   Mildly elevated troponin/Increased CK without MB elevation, without evolutionary pattern is due to chronic renal insufficiency  Stable from cardiac standpoint.    Plan:  IV antibiotics/O2/nebs as per pulmonary  Hold aspirin  If clinically deteriorates, bronchonscopy    Morro Martins MD  [facility]

## 2017-11-22 ENCOUNTER — TRANSCRIPTION ENCOUNTER (OUTPATIENT)
Age: 82
End: 2017-11-22

## 2017-11-22 LAB
ANION GAP SERPL CALC-SCNC: 15 MMOL/L — SIGNIFICANT CHANGE UP (ref 5–17)
BUN SERPL-MCNC: 63 MG/DL — HIGH (ref 7–23)
CALCIUM SERPL-MCNC: 9.1 MG/DL — SIGNIFICANT CHANGE UP (ref 8.4–10.5)
CHLORIDE SERPL-SCNC: 103 MMOL/L — SIGNIFICANT CHANGE UP (ref 96–108)
CO2 SERPL-SCNC: 19 MMOL/L — LOW (ref 22–31)
CREAT ?TM UR-MCNC: 85 MG/DL — SIGNIFICANT CHANGE UP
CREAT SERPL-MCNC: 2.03 MG/DL — HIGH (ref 0.5–1.3)
CULTURE RESULTS: NO GROWTH — SIGNIFICANT CHANGE UP
GLUCOSE BLDC GLUCOMTR-MCNC: 134 MG/DL — HIGH (ref 70–99)
GLUCOSE BLDC GLUCOMTR-MCNC: 153 MG/DL — HIGH (ref 70–99)
GLUCOSE BLDC GLUCOMTR-MCNC: 184 MG/DL — HIGH (ref 70–99)
GLUCOSE BLDC GLUCOMTR-MCNC: 207 MG/DL — HIGH (ref 70–99)
GLUCOSE SERPL-MCNC: 231 MG/DL — HIGH (ref 70–99)
HCT VFR BLD CALC: 36 % — LOW (ref 39–50)
HGB BLD-MCNC: 11.4 G/DL — LOW (ref 13–17)
MCHC RBC-ENTMCNC: 28.8 PG — SIGNIFICANT CHANGE UP (ref 27–34)
MCHC RBC-ENTMCNC: 31.7 GM/DL — LOW (ref 32–36)
MCV RBC AUTO: 90.8 FL — SIGNIFICANT CHANGE UP (ref 80–100)
PLATELET # BLD AUTO: 177 K/UL — SIGNIFICANT CHANGE UP (ref 150–400)
POTASSIUM SERPL-MCNC: 5 MMOL/L — SIGNIFICANT CHANGE UP (ref 3.5–5.3)
POTASSIUM SERPL-SCNC: 5 MMOL/L — SIGNIFICANT CHANGE UP (ref 3.5–5.3)
PROT ?TM UR-MCNC: 9 MG/DL — SIGNIFICANT CHANGE UP (ref 0–12)
PROT/CREAT UR-RTO: 0.1 RATIO — SIGNIFICANT CHANGE UP (ref 0–0.2)
RBC # BLD: 3.97 M/UL — LOW (ref 4.2–5.8)
RBC # FLD: 14.1 % — SIGNIFICANT CHANGE UP (ref 10.3–14.5)
SODIUM SERPL-SCNC: 137 MMOL/L — SIGNIFICANT CHANGE UP (ref 135–145)
SPECIMEN SOURCE: SIGNIFICANT CHANGE UP
WBC # BLD: 9.4 K/UL — SIGNIFICANT CHANGE UP (ref 3.8–10.5)
WBC # FLD AUTO: 9.4 K/UL — SIGNIFICANT CHANGE UP (ref 3.8–10.5)

## 2017-11-22 RX ADMIN — CEFEPIME 100 MILLIGRAM(S): 1 INJECTION, POWDER, FOR SOLUTION INTRAMUSCULAR; INTRAVENOUS at 17:21

## 2017-11-22 RX ADMIN — Medication 20 MILLIGRAM(S): at 06:01

## 2017-11-22 RX ADMIN — Medication 3 MILLILITER(S): at 06:01

## 2017-11-22 RX ADMIN — Medication 3 MILLILITER(S): at 17:21

## 2017-11-22 RX ADMIN — Medication 3 MILLILITER(S): at 12:12

## 2017-11-22 RX ADMIN — AZITHROMYCIN 500 MILLIGRAM(S): 500 TABLET, FILM COATED ORAL at 12:12

## 2017-11-22 RX ADMIN — CEFEPIME 100 MILLIGRAM(S): 1 INJECTION, POWDER, FOR SOLUTION INTRAMUSCULAR; INTRAVENOUS at 06:01

## 2017-11-22 RX ADMIN — Medication 2: at 08:01

## 2017-11-22 RX ADMIN — Medication 2: at 18:06

## 2017-11-22 RX ADMIN — Medication 3 MILLILITER(S): at 23:15

## 2017-11-22 NOTE — DISCHARGE NOTE ADULT - PLAN OF CARE
HgA1C this admission.  Make sure you get your HgA1c checked every three months.  If you take oral diabetes medications, check your blood glucose two times a day.  If you take insulin, check your blood glucose before meals and at bedtime.  It's important not to skip any meals.  Keep a log of your blood glucose results and always take it with you to your doctor appointments.  Keep a list of your current medications including injectables and over the counter medications and bring this medication list with you to all your doctor appointments.  If you have not seen your ophthalmologist this year call for appointment.  Check your feet daily for redness, sores, or openings. Do not self treat. If no improvement in two days call your primary care physician for an appointment.  Low blood sugar (hypoglycemia) is a blood sugar below 70mg/dl. Check your blood sugar if you feel signs/symptoms of hypoglycemia. If your blood sugar is below 70 take 15 grams of carbohydrates (ex 4 oz of apple juice, 3-4 glucose tablets, or 4-6 oz of regular soda) wait 15 minutes and repeat blood sugar to make sure it comes up above 70.  If your blood sugar is above 70 and you are due for a meal, have a meal.  If you are not due for a meal have a snack.  This snack helps keeps your blood sugar at a safe range. stable c/w antibiotics per ID . You have been cleared by pulmonology for discharge Follow up with your medical doctor to establish long term blood pressure treatment goals. c/w current meds

## 2017-11-22 NOTE — PROGRESS NOTE ADULT - PROBLEM SELECTOR PLAN 1
Mild leukocytosis. mild cough, some wheezing on exam, procalcitonin elevated, nontoxic on exam. ID and Pulm input appreciated. cont ABx as per ID. DC planning when cleared by ID.  CT chest negative for PE, pos for b/l infiltrates. Abx for HCAP  F/u BCx
Mild leukocytosis. mild cough, some wheezing on exam, procalcitonin elevated, nontoxic on exam. ID and Pulm input appreciated. cont ABx as per ID. DC planning when cleared by ID.  CT chest negative for PE, pos for b/l infiltrates. Abx for HCAP  F/u BCx

## 2017-11-22 NOTE — PHYSICAL THERAPY INITIAL EVALUATION ADULT - PERTINENT HX OF CURRENT PROBLEM, REHAB EVAL
Pt is a 89 y.o. male p/w hemoptysis. Pt states he was recently admitted to [facility] with hypoglycemia. Discharged home recently and pt states he was in his usual state of health. Pt states starting last night he developed a mild dry cough. He experienced a mildly bloody cough which was productive of bloody sputum roughly once an hour. He endorses a mild SOB. He spoke to his pulmonlogist who recommended he seek further evaluation in the ER. (-) US TTE 11/20/17. (-) US Duplex Ext Vein, RLE 11/20/17.

## 2017-11-22 NOTE — PROGRESS NOTE ADULT - PROBLEM SELECTOR PLAN 5
Holding statin given elevated liver enzymes past admission
Holding statin given elevated liver enzymes past admission

## 2017-11-22 NOTE — DISCHARGE NOTE ADULT - HOME CARE AGENCY
[facility], [phone] to start visit the day after discharge. for WAYNE GEE, Home PT, disease and medication management

## 2017-11-22 NOTE — DISCHARGE NOTE ADULT - CARE PLAN
Principal Discharge DX:	Hemoptysis  Goal:	stable  Instructions for follow-up, activity and diet:	c/w antibiotics per ID . You have been cleared by pulmonology for discharge  Secondary Diagnosis:	Essential hypertension  Goal:	stable  Instructions for follow-up, activity and diet:	Follow up with your medical doctor to establish long term blood pressure treatment goals.  Secondary Diagnosis:	HLD (hyperlipidemia)  Goal:	stable  Instructions for follow-up, activity and diet:	c/w current meds  Secondary Diagnosis:	Type 2 diabetes mellitus with complication, without long-term current use of insulin  Goal:	stable  Instructions for follow-up, activity and diet:	HgA1C this admission.  Make sure you get your HgA1c checked every three months.  If you take oral diabetes medications, check your blood glucose two times a day.  If you take insulin, check your blood glucose before meals and at bedtime.  It's important not to skip any meals.  Keep a log of your blood glucose results and always take it with you to your doctor appointments.  Keep a list of your current medications including injectables and over the counter medications and bring this medication list with you to all your doctor appointments.  If you have not seen your ophthalmologist this year call for appointment.  Check your feet daily for redness, sores, or openings. Do not self treat. If no improvement in two days call your primary care physician for an appointment.  Low blood sugar (hypoglycemia) is a blood sugar below 70mg/dl. Check your blood sugar if you feel signs/symptoms of hypoglycemia. If your blood sugar is below 70 take 15 grams of carbohydrates (ex 4 oz of apple juice, 3-4 glucose tablets, or 4-6 oz of regular soda) wait 15 minutes and repeat blood sugar to make sure it comes up above 70.  If your blood sugar is above 70 and you are due for a meal, have a meal.  If you are not due for a meal have a snack.  This snack helps keeps your blood sugar at a safe range.

## 2017-11-22 NOTE — DISCHARGE NOTE ADULT - HOSPITAL COURSE
89M DM2, HTN, HLD p/w hemoptysis. You have been treated with iv antibiotics and have been prescribed a course of oral antibiotics to cover for pneumonia. You have been cleared by ID and pulmonary for discharge. 89M DM2, HTN, HLD p/w hemoptysis. You have been treated with iv antibiotics and have been prescribed a course of oral antibiotics to cover for pneumonia. You have been cleared by ID and pulmonary for discharge. Your creatinine has been at baseline you are to f/u with nephrology Dr. Weston in 1 week.

## 2017-11-22 NOTE — DISCHARGE NOTE ADULT - SECONDARY DIAGNOSIS.
Essential hypertension HLD (hyperlipidemia) Type 2 diabetes mellitus with complication, without long-term current use of insulin

## 2017-11-22 NOTE — PROGRESS NOTE ADULT - PROBLEM SELECTOR PLAN 4
-Trend vital signs  -Cont. losartan for irbesartan conversion
-Trend vital signs  -Cont. losartan for irbesartan conversion

## 2017-11-22 NOTE — PROGRESS NOTE ADULT - SUBJECTIVE AND OBJECTIVE BOX
COVERING FOR DR. LUX    Patient is a 89y old  Male who presents with a chief complaint of Bloody Cough (21 Nov 2017 00:32)      SUBJECTIVE / OVERNIGHT EVENTS: looks comfortable, no dyspnea, wife at bedside    MEDICATIONS  (STANDING):  ALBUTerol/ipratropium for Nebulization 3 milliLiter(s) Nebulizer every 6 hours  azithromycin   Tablet 500 milliGRAM(s) Oral daily  cefepime  IVPB 2000 milliGRAM(s) IV Intermittent every 12 hours  dextrose 5%. 1000 milliLiter(s) (50 mL/Hr) IV Continuous <Continuous>  dextrose 50% Injectable 12.5 Gram(s) IV Push once  dextrose 50% Injectable 25 Gram(s) IV Push once  furosemide    Tablet 20 milliGRAM(s) Oral daily  insulin lispro (HumaLOG) corrective regimen sliding scale   SubCutaneous three times a day before meals  insulin lispro (HumaLOG) corrective regimen sliding scale   SubCutaneous at bedtime    MEDICATIONS  (PRN):  dextrose Gel 1 Dose(s) Oral once PRN Blood Glucose LESS THAN 70 milliGRAM(s)/deciliter  guaiFENesin/dextromethorphan  Syrup 5 milliLiter(s) Oral every 6 hours PRN Cough        Vital Signs Last 24 Hrs  T(C): 37 (22 Nov 2017 13:16), Max: 37 (22 Nov 2017 13:16)  T(F): 98.6 (22 Nov 2017 13:16), Max: 98.6 (22 Nov 2017 13:16)  HR: 76 (22 Nov 2017 13:16) (72 - 76)  BP: 106/65 (22 Nov 2017 13:16) (102/60 - 117/53)  BP(mean): --  RR: 18 (22 Nov 2017 13:16) (18 - 18)  SpO2: 96% (22 Nov 2017 13:16) (93% - 98%)        PHYSICAL EXAM:  GENERAL: NAD, well-developed  HEAD:  Atraumatic, Normocephalic  EYES: EOMI, PERRLA, conjunctiva and sclera clear  NECK: Supple, No JVD  CHEST/LUNG: Clear to auscultation bilaterally; No wheeze  HEART: Regular rate and rhythm; No murmurs, rubs, or gallops  ABDOMEN: Soft, Nontender, Nondistended; Bowel sounds present  EXTREMITIES:  2+ Peripheral Pulses, No clubbing, cyanosis, or edema  PSYCH: AAOx3  NEUROLOGY: non-focal  SKIN: No rashes or lesions    LABS:                                   11.4   9.4   )-----------( 177      ( 22 Nov 2017 15:08 )             36.0   11-22    137  |  103  |  63<H>  ----------------------------<  231<H>  5.0   |  19<L>  |  2.03<H>    Ca    9.1      22 Nov 2017 15:08  Mg     2.0     11-21

## 2017-11-22 NOTE — PROGRESS NOTE ADULT - SUBJECTIVE AND OBJECTIVE BOX
Patient seen and examined sitting in chair with no new complaints.  Breathing evenly & non-labored.    REVIEW OF SYSTEMS:  As per HPI, otherwise 8 full 10 ROS were unremarkable    MEDICATIONS  (STANDING):  ALBUTerol/ipratropium for Nebulization 3 milliLiter(s) Nebulizer every 6 hours  azithromycin   Tablet 500 milliGRAM(s) Oral daily  cefepime  IVPB 2000 milliGRAM(s) IV Intermittent every 12 hours  dextrose 5%. 1000 milliLiter(s) (50 mL/Hr) IV Continuous <Continuous>  dextrose 50% Injectable 12.5 Gram(s) IV Push once  dextrose 50% Injectable 25 Gram(s) IV Push once  furosemide    Tablet 20 milliGRAM(s) Oral daily  insulin lispro (HumaLOG) corrective regimen sliding scale   SubCutaneous three times a day before meals  insulin lispro (HumaLOG) corrective regimen sliding scale   SubCutaneous at bedtime      VITAL:  T(C): , Max: 37 (17 @ 13:16)  T(F): , Max: 98.6 (17 @ 13:16)  HR: 76 (17 @ 13:16)  BP: 106/65 (17 @ 13:16)  BP(mean): --  RR: 18 (17 @ 13:16)  SpO2: 96% (17 @ 13:16)  Wt(kg): --    I and O's:     @ 07:01  -   @ 07:00  --------------------------------------------------------  IN: 940 mL / OUT: 250 mL / NET: 690 mL     @ 07:01  -   @ 14:22  --------------------------------------------------------  IN: 720 mL / OUT: 400 mL / NET: 320 mL          PHYSICAL EXAM:    Constitutional: NAD  HEENT: PERRLA, EOMI,  MMM  Neck: No LAD, No JVD  Respiratory: rhonchi to auscultation   Cardiovascular: S1 and S2  Gastrointestinal: BS+, soft, NT/ND  Extremities: No peripheral edema  Neurological: A/O x 3, no focal deficits  Psychiatric: Normal mood, normal affect  : No James  Skin: No rashes  Access: Not applicable    LABS:                        11.3   10.3  )-----------( 176      ( 2017 07:26 )             37.6         138  |  105  |  66<H>  ----------------------------<  218<H>  5.4<H>   |  20<L>  |  1.85<H>    Ca    9.3      2017 07:26  Mg     2.0         TPro  6.6  /  Alb  3.9  /  TBili  0.6  /  DBili  x   /  AST  244<H>  /  ALT  182<H>  /  AlkPhos  160<H>        Urine Studies:  Urinalysis Basic - ( 2017 09:10 )    Color: Yellow / Appearance: Clear / S.017 / pH: x  Gluc: x / Ketone: Negative  / Bili: Negative / Urobili: Negative   Blood: x / Protein: Trace / Nitrite: Negative   Leuk Esterase: Negative / RBC: 0-2 /HPF / WBC 0-2 /HPF   Sq Epi: x / Non Sq Epi: Occasional /HPF / Bacteria: Few /HPF    Creatinine, Random Urine: 85 mg/dL ( @ 21:51)  Protein/Creatinine Ratio Calculation: 0.1 Ratio ( @ 21:51)    ASSESSMENT:  (1)Renal - CKD3 - likely diabetic nephropathy +/- longstanding diabetes with other known microvascular complications, so this diagnosis would make sense...but he has minimal proteinuria. Could as well be from hypertension.    (2)Hyperkalemia - patient has had K+ with suboptimal control as of late; was on Avapro at home;  no new labs today    (3)Hematuria - etiology? denies at this time; urine culture negative    (4)PM+R - needs PT    (5)Pneumonia:  on PO Zithromax & Cefepime IV; pulmonary on board    RECOMMEND:  1.  F/u BMP today  2.  Continue to defer ACEi/ARB for now  3.  Maintain K+ restrictive diet  4.  Continue Lasix 20mg PO daily  5.  Continue Abx per pulmonary  6.  Dose new meds for GFR 30-40ml/min  7.  Increase activity as able Patient seen and examined sitting in chair with no new complaints.  Breathing evenly & non-labored.    REVIEW OF SYSTEMS:  As per HPI, otherwise 8 full 10 ROS were unremarkable    MEDICATIONS  (STANDING):  ALBUTerol/ipratropium for Nebulization 3 milliLiter(s) Nebulizer every 6 hours  azithromycin   Tablet 500 milliGRAM(s) Oral daily  cefepime  IVPB 2000 milliGRAM(s) IV Intermittent every 12 hours  dextrose 5%. 1000 milliLiter(s) (50 mL/Hr) IV Continuous <Continuous>  dextrose 50% Injectable 12.5 Gram(s) IV Push once  dextrose 50% Injectable 25 Gram(s) IV Push once  furosemide    Tablet 20 milliGRAM(s) Oral daily  insulin lispro (HumaLOG) corrective regimen sliding scale   SubCutaneous three times a day before meals  insulin lispro (HumaLOG) corrective regimen sliding scale   SubCutaneous at bedtime      VITAL:  T(C): , Max: 37 (17 @ 13:16)  T(F): , Max: 98.6 (17 @ 13:16)  HR: 76 (17 @ 13:16)  BP: 106/65 (17 @ 13:16)  BP(mean): --  RR: 18 (17 @ 13:16)  SpO2: 96% (17 @ 13:16)  Wt(kg): --    I and O's:     @ 07:01  -   @ 07:00  --------------------------------------------------------  IN: 940 mL / OUT: 250 mL / NET: 690 mL     @ 07:01  -   @ 14:22  --------------------------------------------------------  IN: 720 mL / OUT: 400 mL / NET: 320 mL          PHYSICAL EXAM:    Constitutional: NAD  HEENT: PERRLA, EOMI,  MMM  Neck: No LAD, No JVD  Respiratory: rhonchi to auscultation   Cardiovascular: S1 and S2  Gastrointestinal: BS+, soft, NT/ND  Extremities: No peripheral edema  Neurological: A/O x 3, no focal deficits  Psychiatric: Normal mood, normal affect  : No James  Skin: No rashes  Access: Not applicable    LABS:                        11.3   10.3  )-----------( 176      ( 2017 07:26 )             37.6         138  |  105  |  66<H>  ----------------------------<  218<H>  5.4<H>   |  20<L>  |  1.85<H>    Ca    9.3      2017 07:26  Mg     2.0         TPro  6.6  /  Alb  3.9  /  TBili  0.6  /  DBili  x   /  AST  244<H>  /  ALT  182<H>  /  AlkPhos  160<H>        Urine Studies:  Urinalysis Basic - ( 2017 09:10 )    Color: Yellow / Appearance: Clear / S.017 / pH: x  Gluc: x / Ketone: Negative  / Bili: Negative / Urobili: Negative   Blood: x / Protein: Trace / Nitrite: Negative   Leuk Esterase: Negative / RBC: 0-2 /HPF / WBC 0-2 /HPF   Sq Epi: x / Non Sq Epi: Occasional /HPF / Bacteria: Few /HPF    Creatinine, Random Urine: 85 mg/dL ( @ 21:51)  Protein/Creatinine Ratio Calculation: 0.1 Ratio ( @ 21:51)    ASSESSMENT:  (1)Renal - CKD3 - likely diabetic nephropathy +/- HTN nephrosclerosis; no new labs today    (2)Hyperkalemia - patient has had K+ with suboptimal control as of late; was on Avapro at home;  no new labs today    (3)Hematuria - etiology? denies at this time; urine culture negative    (4)PM+R - needs PT    (5)Pneumonia:  on PO Zithromax & Cefepime IV; pulmonary on board    RECOMMEND:  1.  F/u BMP today  2.  Continue to defer ACEi/ARB for now  3.  Maintain K+ restrictive diet  4.  Continue Lasix 20mg PO daily  5.  Continue Abx per pulmonary  6.  Dose new meds for GFR 30-40ml/min  7.  Increase activity as able

## 2017-11-22 NOTE — DISCHARGE NOTE ADULT - MEDICATION SUMMARY - MEDICATIONS TO TAKE
I will START or STAY ON the medications listed below when I get home from the hospital:    Rolling walker  -- Indication: For walker     aspirin 81 mg oral tablet  -- 2 tab(s) by mouth once a day  -- Indication: For Cad     Avapro 75 mg oral tablet  -- 1 tab(s) by mouth once a day  -- Indication: For Essential hypertension    Prandin 0.5 mg oral tablet  -- 1 tab(s) by mouth 3 times a day (before meals) - DO NOT TAKE IF YOU ARE NOT EATING  -- Indication: For diabetes mellitus     pravastatin 40 mg oral tablet  -- 1 tab(s) by mouth once a day  -- Indication: For Hyperlipidemia, unspecified hyperlipidemia type    Ceftin 250 mg oral tablet  -- 1 tab(s) by mouth 2 times a day   -- Finish all this medication unless otherwise directed by prescriber.  Medication should be taken with plenty of water.  Take with food or milk.    -- Indication: For abx     furosemide 20 mg oral tablet  -- 1 tab(s) by mouth once a day  -- Indication: For Heart failure      azithromycin 500 mg oral tablet  -- 1 tab(s) by mouth once a day  -- Indication: For abx     dextromethorphan-guaifenesin 10 mg-100 mg/5 mL oral liquid  -- 5 milliliter(s) by mouth every 6 hours, As needed, Cough  -- Indication: For cough     Centrum Silver Men's  -- 1 tab(s) by mouth once a day  -- Indication: For CENTRUM

## 2017-11-22 NOTE — PROGRESS NOTE ADULT - SUBJECTIVE AND OBJECTIVE BOX
Allergies    No Known Allergies    Intolerances        MEDICATIONS  (STANDING):  ALBUTerol/ipratropium for Nebulization 3 milliLiter(s) Nebulizer every 6 hours  azithromycin   Tablet 500 milliGRAM(s) Oral daily  cefepime  IVPB 2000 milliGRAM(s) IV Intermittent every 12 hours  dextrose 5%. 1000 milliLiter(s) (50 mL/Hr) IV Continuous <Continuous>  dextrose 50% Injectable 12.5 Gram(s) IV Push once  dextrose 50% Injectable 25 Gram(s) IV Push once  furosemide    Tablet 20 milliGRAM(s) Oral daily  insulin lispro (HumaLOG) corrective regimen sliding scale   SubCutaneous three times a day before meals  insulin lispro (HumaLOG) corrective regimen sliding scale   SubCutaneous at bedtime    MEDICATIONS  (PRN):  dextrose Gel 1 Dose(s) Oral once PRN Blood Glucose LESS THAN 70 milliGRAM(s)/deciliter  guaiFENesin/dextromethorphan  Syrup 5 milliLiter(s) Oral every 6 hours PRN Cough          Antimicrobials:  azithromycin   Tablet 500 milliGRAM(s) Oral daily  cefepime  IVPB 2000 milliGRAM(s) IV Intermittent every 12 hours        LABS:  CBC Full  -  ( 2017 15:08 )  WBC Count : 9.4 K/uL  Hemoglobin : 11.4 g/dL  Hematocrit : 36.0 %  Platelet Count - Automated : 177 K/uL  Mean Cell Volume : 90.8 fl  Mean Cell Hemoglobin : 28.8 pg  Mean Cell Hemoglobin Concentration : 31.7 gm/dL  Auto Neutrophil # : x  Auto Lymphocyte # : x  Auto Monocyte # : x  Auto Eosinophil # : x  Auto Basophil # : x  Auto Neutrophil % : x  Auto Lymphocyte % : x  Auto Monocyte % : x  Auto Eosinophil % : x  Auto Basophil % : x        138  |  105  |  66<H>  ----------------------------<  218<H>  5.4<H>   |  20<L>  |  1.85<H>    Ca    9.3      2017 07:26  Mg     2.0     -    TPro  6.6  /  Alb  3.9  /  TBili  0.6  /  DBili  x   /  AST  244<H>  /  ALT  182<H>  /  AlkPhos  160<H>  11    PT/INR - ( 2017 16:14 )   PT: 13.1 sec;   INR: 1.20 ratio         PTT - ( 2017 16:14 )  PTT:27.0 sec    Urinalysis Basic - ( 2017 09:10 )    Color: Yellow / Appearance: Clear / S.017 / pH: x  Gluc: x / Ketone: Negative  / Bili: Negative / Urobili: Negative   Blood: x / Protein: Trace / Nitrite: Negative   Leuk Esterase: Negative / RBC: 0-2 /HPF / WBC 0-2 /HPF   Sq Epi: x / Non Sq Epi: Occasional /HPF / Bacteria: Few /HPF      Blood Gas Venous - Lactate: 1.5 mmoL/L ( @ 16:14)    Procalcitonin, Serum: 14.48 ng/mL ( @ 09:35)  Procalcitonin, Serum: 15.74 ng/mL ( @ 07:26)          Cultures:  RECENT CULTURES:   @ 12:23 .Urine Clean Catch (Midstream)                No growth     @ 23:11 .Blood Blood-Peripheral                No growth to date.              Imaging Studies:    Vital Signs:    Vital Signs Last 24 Hrs  T(C): 37 (2017 13:16), Max: 37 (2017 13:16)  T(F): 98.6 (2017 13:16), Max: 98.6 (2017 13:16)  HR: 76 (2017 13:16) (72 - 76)  BP: 106/65 (2017 13:16) (102/60 - 117/53)  BP(mean): --  RR: 18 (2017 13:16) (18 - 18)  SpO2: 96% (2017 13:16) (93% - 98%) Afebrile. No leukocytosis   No new somatic complaints at present time   Mild cough with scant brownish sputum    Allergies    No Known Allergies      MEDICATIONS  (STANDING):  ALBUTerol/ipratropium for Nebulization 3 milliLiter(s) Nebulizer every 6 hours  azithromycin   Tablet 500 milliGRAM(s) Oral daily  cefepime  IVPB 2000 milliGRAM(s) IV Intermittent every 12 hours  dextrose 5%. 1000 milliLiter(s) (50 mL/Hr) IV Continuous <Continuous>  dextrose 50% Injectable 12.5 Gram(s) IV Push once  dextrose 50% Injectable 25 Gram(s) IV Push once  furosemide    Tablet 20 milliGRAM(s) Oral daily  insulin lispro (HumaLOG) corrective regimen sliding scale   SubCutaneous three times a day before meals  insulin lispro (HumaLOG) corrective regimen sliding scale   SubCutaneous at bedtime    MEDICATIONS  (PRN):  dextrose Gel 1 Dose(s) Oral once PRN Blood Glucose LESS THAN 70 milliGRAM(s)/deciliter  guaiFENesin/dextromethorphan  Syrup 5 milliLiter(s) Oral every 6 hours PRN Cough      Antimicrobials:  azithromycin   Tablet 500 milliGRAM(s) Oral daily  cefepime  IVPB 2000 milliGRAM(s) IV Intermittent every 12 hours        LABS:  CBC Full  -  ( 2017 15:08 )  WBC Count : 9.4 K/uL  Hemoglobin : 11.4 g/dL  Hematocrit : 36.0 %  Platelet Count - Automated : 177 K/uL  Mean Cell Volume : 90.8 fl  Mean Cell Hemoglobin : 28.8 pg  Mean Cell Hemoglobin Concentration : 31.7 gm/dL  Auto Neutrophil # : x  Auto Lymphocyte # : x  Auto Monocyte # : x  Auto Eosinophil # : x  Auto Basophil # : x  Auto Neutrophil % : x  Auto Lymphocyte % : x  Auto Monocyte % : x  Auto Eosinophil % : x  Auto Basophil % : x    11    138  |  105  |  66<H>  ----------------------------<  218<H>  5.4<H>   |  20<L>  |  1.85<H>    Ca    9.3      2017 07:26  Mg     2.0         TPro  6.6  /  Alb  3.9  /  TBili  0.6  /  DBili  x   /  AST  244<H>  /  ALT  182<H>  /  AlkPhos  160<H>  11-20    PT/INR - ( 2017 16:14 )   PT: 13.1 sec;   INR: 1.20 ratio         PTT - ( 2017 16:14 )  PTT:27.0 sec    Urinalysis Basic - ( 2017 09:10 )    Color: Yellow / Appearance: Clear / S.017 / pH: x  Gluc: x / Ketone: Negative  / Bili: Negative / Urobili: Negative   Blood: x / Protein: Trace / Nitrite: Negative   Leuk Esterase: Negative / RBC: 0-2 /HPF / WBC 0-2 /HPF   Sq Epi: x / Non Sq Epi: Occasional /HPF / Bacteria: Few /HPF      Blood Gas Venous - Lactate: 1.5 mmoL/L ( @ 16:14)    Procalcitonin, Serum: 14.48 ng/mL ( @ 09:35)  Procalcitonin, Serum: 15.74 ng/mL ( @ 07:26    Cultures:  RECENT CULTURES:   @ 12:23 .Urine Clean Catch (Midstream)  No growth     @ 23:11 .Blood Blood-Peripheral   No growth to date.        Vital Signs:    Vital Signs Last 24 Hrs  T(C): 37 (2017 13:16), Max: 37 (2017 13:16)  T(F): 98.6 (2017 13:16), Max: 98.6 (2017 13:16)  HR: 76 (2017 13:16) (72 - 76)  BP: 106/65 (2017 13:16) (102/60 - 117/53  RR: 18 (2017 13:16) (18 - 18)  SpO2: 96% (2017 13:16) (93% - 98%)

## 2017-11-22 NOTE — DISCHARGE NOTE ADULT - CARE PROVIDER_API CALL
Goldberg, Joel (MD), Cardiovascular Disease; Internal Medicine  [address] [place] [address]  Phone: [phone]  Fax: [phone] Goldberg, Joel (MD), Cardiovascular Disease; Internal Medicine  [address]  [place] [address]  Phone: [phone]  Fax: [phone]    Elvin Weston), Internal Medicine; Nephrology  [address]  [place] [address]  Phone: [phone]  Fax: [phone]

## 2017-11-22 NOTE — PROGRESS NOTE ADULT - SUBJECTIVE AND OBJECTIVE BOX
PULMONARY PROGRESS NOTE    JODY WILKINS  MRN-[id number]    Patient is a 89y old  Male who presents with a chief complaint of Bloody Cough (21 Nov 2017 00:32)      HPI:  -Appears comfortable. Has brownish phlegm now. No hemoptysis  -    ROS:   -    ACTIVE MEDICATION LIST:  MEDICATIONS  (STANDING):  ALBUTerol/ipratropium for Nebulization 3 milliLiter(s) Nebulizer every 6 hours  azithromycin   Tablet 500 milliGRAM(s) Oral daily  cefepime  IVPB 2000 milliGRAM(s) IV Intermittent every 12 hours  dextrose 5%. 1000 milliLiter(s) (50 mL/Hr) IV Continuous <Continuous>  dextrose 50% Injectable 12.5 Gram(s) IV Push once  dextrose 50% Injectable 25 Gram(s) IV Push once  furosemide    Tablet 20 milliGRAM(s) Oral daily  insulin lispro (HumaLOG) corrective regimen sliding scale   SubCutaneous three times a day before meals  insulin lispro (HumaLOG) corrective regimen sliding scale   SubCutaneous at bedtime    MEDICATIONS  (PRN):  dextrose Gel 1 Dose(s) Oral once PRN Blood Glucose LESS THAN 70 milliGRAM(s)/deciliter  guaiFENesin/dextromethorphan  Syrup 5 milliLiter(s) Oral every 6 hours PRN Cough      EXAM:  Vital Signs Last 24 Hrs  T(C): 36.9 (22 Nov 2017 05:59), Max: 36.9 (21 Nov 2017 21:56)  T(F): 98.4 (22 Nov 2017 05:59), Max: 98.4 (21 Nov 2017 21:56)  HR: 72 (22 Nov 2017 05:59) (70 - 75)  BP: 102/60 (22 Nov 2017 05:59) (102/60 - 115/71)  BP(mean): --  RR: 18 (22 Nov 2017 05:59) (18 - 18)  SpO2: 98% (22 Nov 2017 05:59) (95% - 98%)    GENERAL: The patient is awake and alert in no apparent distress.     LUNGS: Clear to auscultation without wheezing, rales or rhonchi; respirations unlabored    HEART: Regular rate and rhythm without murmur.    Labs:                        11.3   10.3  )-----------( 176      ( 21 Nov 2017 07:26 )             37.6   11-21    138  |  105  |  66<H>  ----------------------------<  218<H>  5.4<H>   |  20<L>  |  1.85<H>    Ca    9.3      21 Nov 2017 07:26  Mg     2.0     11-21    TPro  6.6  /  Alb  3.9  /  TBili  0.6  /  DBili  x   /  AST  244<H>  /  ALT  182<H>  /  AlkPhos  160<H>  11-20    PROBLEM LIST:  89y Male with HEALTH ISSUES - PROBLEM Dx:  Prophylactic measure: Prophylactic measure  Hyperlipidemia, unspecified hyperlipidemia type: Hyperlipidemia, unspecified hyperlipidemia type  Essential hypertension: Essential hypertension  Elevated troponin: Elevated troponin  Type 2 diabetes mellitus with complication, without long-term current use of insulin: Type 2 diabetes mellitus with complication, without long-term current use of insulin  Hemoptysis: Hemoptysis            RECS:  O2  Abx  f/u labs    Jessica Shelton DO   [phone]

## 2017-11-22 NOTE — PHYSICAL THERAPY INITIAL EVALUATION ADULT - ADDITIONAL COMMENTS
+CXR 11/20/17: Left midlung opacity concerning for pneumonia. +Chest CT 11/20/17: Bilateral patchy airspace opacities/consolidation may be related to infectious/inflammatory etiologies versus pulmonary hemorrhage.    Pt states he lives in private house with his wife with 3 steps to enter, lynnette HRs. Pt states he owns a wheelchair but doesn't use it. Pt states his wife is available to assist when needed.

## 2017-11-22 NOTE — PROGRESS NOTE ADULT - PROBLEM SELECTOR PLAN 2
On PO medications at home  -Fingersticks and sliding scale for now inpatient.
On PO medications at home  -Fingersticks and sliding scale for now inpatient.

## 2017-11-22 NOTE — DISCHARGE NOTE ADULT - PATIENT PORTAL LINK FT
“You can access the [facility] Patient Portal, offered by [facility], by registering with the following website: [email or web address]”

## 2017-11-22 NOTE — PROGRESS NOTE ADULT - SUBJECTIVE AND OBJECTIVE BOX
JODY WILKINS    Patient is a 89y old  Male who presents with a chief complaint of Bloody Cough (2017 10:35)    No further hemoptysis.  Brownish sputum today.  Breathing comfortably. On RA. Ambulating.  No fevers.    Allergies    No Known Allergies    MEDICATIONS  (STANDING):  ALBUTerol/ipratropium for Nebulization 3 milliLiter(s) Nebulizer every 6 hours  azithromycin   Tablet 500 milliGRAM(s) Oral daily  cefepime  IVPB 2000 milliGRAM(s) IV Intermittent every 12 hours  dextrose 5%. 1000 milliLiter(s) (50 mL/Hr) IV Continuous <Continuous>  dextrose 50% Injectable 12.5 Gram(s) IV Push once  dextrose 50% Injectable 25 Gram(s) IV Push once  furosemide    Tablet 20 milliGRAM(s) Oral daily  insulin lispro (HumaLOG) corrective regimen sliding scale   SubCutaneous three times a day before meals  insulin lispro (HumaLOG) corrective regimen sliding scale   SubCutaneous at bedtime    MEDICATIONS  (PRN):  dextrose Gel 1 Dose(s) Oral once PRN Blood Glucose LESS THAN 70 milliGRAM(s)/deciliter  guaiFENesin/dextromethorphan  Syrup 5 milliLiter(s) Oral every 6 hours PRN Cough    PHYSICAL EXAM:  Vital Signs Last 24 Hrs  T(C): 36.6 (2017 09:00), Max: 36.9 (2017 21:56)  T(F): 97.9 (2017 09:00), Max: 98.4 (2017 21:56)  HR: 74 (2017 09:36) (72 - 75)  BP: 103/56 (2017 09:36) (102/60 - 117/53)  BP(mean): --  RR: 18 (2017 09:00) (18 - 18)  SpO2: 93% (2017 09:36) (93% - 98%)  Daily     Daily   I&O's Summary    2017 07:01  -  2017 07:00  --------------------------------------------------------  IN: 940 mL / OUT: 250 mL / NET: 690 mL    2017 07:01  -  2017 11:36  --------------------------------------------------------  IN: 360 mL / OUT: 400 mL / NET: -40 mL    General Appearance: 	 Alert, cooperative, no distress  HEENT: normocephalic, atraumatic, PERRLA, EOMI, conjunctiva normal, sclera anicteric,   Neck: no JVD  Lungs:  clear to auscultation and percussion bilaterally  Cor:  pmi 5th ICS MCL, regular rate and rhythm, S1 normal intensity, S2 normal intensity  Abdomen:	 soft, non-tender; bowel sounds normal; no masses,  no organomegaly  Extremities: without cyanosis, clubbing or edema    EKG:  Telemetry:    Labs:  CBC Full  -  ( 2017 07:26 )  WBC Count : 10.3 K/uL  Hemoglobin : 11.3 g/dL  Hematocrit : 37.6 %  Platelet Count - Automated : 176 K/uL  Mean Cell Volume : 90.5 fl  Mean Cell Hemoglobin : 27.2 pg  Mean Cell Hemoglobin Concentration : 30.1 gm/dL  Auto Neutrophil # : x  Auto Lymphocyte # : x  Auto Monocyte # : x  Auto Eosinophil # : x  Auto Basophil # : x  Auto Neutrophil % : x  Auto Lymphocyte % : x  Auto Monocyte % : x  Auto Eosinophil % : x  Auto Basophil % : x    CARDIAC MARKERS ( 2017 22:04 )  x     / 0.21 ng/mL / 1735 U/L / x     / 26.6 ng/mL  CARDIAC MARKERS ( 2017 16:14 )  x     / 0.25 ng/mL / 2382 U/L / x     / 36.2 ng/mL      PT/INR - ( 2017 16:14 )   PT: 13.1 sec;   INR: 1.20 ratio         PTT - ( 2017 16:14 )  PTT:27.0 sec  Urinalysis Basic - ( 2017 09:10 )    Color: Yellow / Appearance: Clear / S.017 / pH: x  Gluc: x / Ketone: Negative  / Bili: Negative / Urobili: Negative   Blood: x / Protein: Trace / Nitrite: Negative   Leuk Esterase: Negative / RBC: 0-2 /HPF / WBC 0-2 /HPF   Sq Epi: x / Non Sq Epi: Occasional /HPF / Bacteria: Few /HPF      Impression/Plan: Pneumonia vs. Bronchitis, hemoptysis resolved  Discharge home on oral antibiotics if cleared by pulmonary    Morro Martins MD, formerly [facility]  [facility]

## 2017-11-23 VITALS
DIASTOLIC BLOOD PRESSURE: 71 MMHG | HEART RATE: 70 BPM | SYSTOLIC BLOOD PRESSURE: 133 MMHG | OXYGEN SATURATION: 99 % | TEMPERATURE: 98 F | RESPIRATION RATE: 18 BRPM

## 2017-11-23 LAB
ANION GAP SERPL CALC-SCNC: 16 MMOL/L — SIGNIFICANT CHANGE UP (ref 5–17)
BUN SERPL-MCNC: 65 MG/DL — HIGH (ref 7–23)
CALCIUM SERPL-MCNC: 9.2 MG/DL — SIGNIFICANT CHANGE UP (ref 8.4–10.5)
CHLORIDE SERPL-SCNC: 105 MMOL/L — SIGNIFICANT CHANGE UP (ref 96–108)
CO2 SERPL-SCNC: 17 MMOL/L — LOW (ref 22–31)
CREAT SERPL-MCNC: 1.72 MG/DL — HIGH (ref 0.5–1.3)
GLUCOSE BLDC GLUCOMTR-MCNC: 164 MG/DL — HIGH (ref 70–99)
GLUCOSE SERPL-MCNC: 162 MG/DL — HIGH (ref 70–99)
HCT VFR BLD CALC: 31.1 % — LOW (ref 39–50)
HGB BLD-MCNC: 10.1 G/DL — LOW (ref 13–17)
MCHC RBC-ENTMCNC: 29.3 PG — SIGNIFICANT CHANGE UP (ref 27–34)
MCHC RBC-ENTMCNC: 32.5 GM/DL — SIGNIFICANT CHANGE UP (ref 32–36)
MCV RBC AUTO: 90.2 FL — SIGNIFICANT CHANGE UP (ref 80–100)
PLATELET # BLD AUTO: 158 K/UL — SIGNIFICANT CHANGE UP (ref 150–400)
POTASSIUM SERPL-MCNC: 4.4 MMOL/L — SIGNIFICANT CHANGE UP (ref 3.5–5.3)
POTASSIUM SERPL-SCNC: 4.4 MMOL/L — SIGNIFICANT CHANGE UP (ref 3.5–5.3)
RBC # BLD: 3.44 M/UL — LOW (ref 4.2–5.8)
RBC # FLD: 14.2 % — SIGNIFICANT CHANGE UP (ref 10.3–14.5)
SODIUM SERPL-SCNC: 138 MMOL/L — SIGNIFICANT CHANGE UP (ref 135–145)
WBC # BLD: 6.6 K/UL — SIGNIFICANT CHANGE UP (ref 3.8–10.5)
WBC # FLD AUTO: 6.6 K/UL — SIGNIFICANT CHANGE UP (ref 3.8–10.5)

## 2017-11-23 RX ORDER — GUAIFENESIN/DEXTROMETHORPHAN 600MG-30MG
5 TABLET, EXTENDED RELEASE 12 HR ORAL
Qty: 0 | Refills: 0 | COMMUNITY
Start: 2017-11-23

## 2017-11-23 RX ORDER — CEFUROXIME AXETIL 250 MG
1 TABLET ORAL
Qty: 8 | Refills: 0 | OUTPATIENT
Start: 2017-11-23 | End: 2017-11-27

## 2017-11-23 RX ORDER — AZITHROMYCIN 500 MG/1
1 TABLET, FILM COATED ORAL
Qty: 1 | Refills: 0 | OUTPATIENT
Start: 2017-11-23 | End: 2017-11-24

## 2017-11-23 RX ADMIN — CEFEPIME 100 MILLIGRAM(S): 1 INJECTION, POWDER, FOR SOLUTION INTRAMUSCULAR; INTRAVENOUS at 06:06

## 2017-11-23 RX ADMIN — Medication 3 MILLILITER(S): at 06:05

## 2017-11-23 RX ADMIN — Medication 20 MILLIGRAM(S): at 06:05

## 2017-11-23 RX ADMIN — Medication 2: at 09:13

## 2017-11-23 RX ADMIN — AZITHROMYCIN 500 MILLIGRAM(S): 500 TABLET, FILM COATED ORAL at 11:47

## 2017-11-23 NOTE — PROGRESS NOTE ADULT - SUBJECTIVE AND OBJECTIVE BOX
Allergies    No Known Allergies    Intolerances        MEDICATIONS  (STANDING):  ALBUTerol/ipratropium for Nebulization 3 milliLiter(s) Nebulizer every 6 hours  azithromycin   Tablet 500 milliGRAM(s) Oral daily  cefepime  IVPB 2000 milliGRAM(s) IV Intermittent every 12 hours  dextrose 5%. 1000 milliLiter(s) (50 mL/Hr) IV Continuous <Continuous>  dextrose 50% Injectable 12.5 Gram(s) IV Push once  dextrose 50% Injectable 25 Gram(s) IV Push once  furosemide    Tablet 20 milliGRAM(s) Oral daily  insulin lispro (HumaLOG) corrective regimen sliding scale   SubCutaneous three times a day before meals  insulin lispro (HumaLOG) corrective regimen sliding scale   SubCutaneous at bedtime    MEDICATIONS  (PRN):  dextrose Gel 1 Dose(s) Oral once PRN Blood Glucose LESS THAN 70 milliGRAM(s)/deciliter  guaiFENesin/dextromethorphan  Syrup 5 milliLiter(s) Oral every 6 hours PRN Cough          Antimicrobials:  azithromycin   Tablet 500 milliGRAM(s) Oral daily  cefepime  IVPB 2000 milliGRAM(s) IV Intermittent every 12 hours        LABS:  CBC Full  -  ( 23 Nov 2017 06:19 )  WBC Count : 6.6 K/uL  Hemoglobin : 10.1 g/dL  Hematocrit : 31.1 %  Platelet Count - Automated : 158 K/uL  Mean Cell Volume : 90.2 fl  Mean Cell Hemoglobin : 29.3 pg  Mean Cell Hemoglobin Concentration : 32.5 gm/dL  Auto Neutrophil # : x  Auto Lymphocyte # : x  Auto Monocyte # : x  Auto Eosinophil # : x  Auto Basophil # : x  Auto Neutrophil % : x  Auto Lymphocyte % : x  Auto Monocyte % : x  Auto Eosinophil % : x  Auto Basophil % : x    11-23    138  |  105  |  65<H>  ----------------------------<  162<H>  4.4   |  17<L>  |  1.72<H>    Ca    9.2      23 Nov 2017 06:19            Blood Gas Venous - Lactate: 1.5 mmoL/L (11-20 @ 16:14)    Procalcitonin, Serum: 14.48 ng/mL (11-21 @ 09:35)  Procalcitonin, Serum: 15.74 ng/mL (11-21 @ 07:26)          Cultures:  RECENT CULTURES:  11-21 @ 12:23 .Urine Clean Catch (Midstream)                No growth    11-20 @ 23:11 .Blood Blood-Peripheral                No growth to date.              Imaging Studies:    Vital Signs:    Vital Signs Last 24 Hrs  T(C): 36.7 (23 Nov 2017 09:17), Max: 37 (22 Nov 2017 13:16)  T(F): 98 (23 Nov 2017 09:17), Max: 98.6 (22 Nov 2017 13:16)  HR: 72 (23 Nov 2017 09:17) (72 - 90)  BP: 116/64 (23 Nov 2017 09:17) (106/65 - 123/60)  BP(mean): --  RR: 18 (23 Nov 2017 09:17) (16 - 18)  SpO2: 94% (23 Nov 2017 09:17) (94% - 97%) Afebrile. No leukocytosis.  feeling better.  No hemoptysis.     Allergies    No Known Allergies        MEDICATIONS  (STANDING):  ALBUTerol/ipratropium for Nebulization 3 milliLiter(s) Nebulizer every 6 hours  azithromycin   Tablet 500 milliGRAM(s) Oral daily  cefepime  IVPB 2000 milliGRAM(s) IV Intermittent every 12 hours  dextrose 5%. 1000 milliLiter(s) (50 mL/Hr) IV Continuous <Continuous>  dextrose 50% Injectable 12.5 Gram(s) IV Push once  dextrose 50% Injectable 25 Gram(s) IV Push once  furosemide    Tablet 20 milliGRAM(s) Oral daily  insulin lispro (HumaLOG) corrective regimen sliding scale   SubCutaneous three times a day before meals  insulin lispro (HumaLOG) corrective regimen sliding scale   SubCutaneous at bedtime    MEDICATIONS  (PRN):  dextrose Gel 1 Dose(s) Oral once PRN Blood Glucose LESS THAN 70 milliGRAM(s)/deciliter  guaiFENesin/dextromethorphan  Syrup 5 milliLiter(s) Oral every 6 hours PRN Cough          Antimicrobials:  azithromycin   Tablet 500 milliGRAM(s) Oral daily  cefepime  IVPB 2000 milliGRAM(s) IV Intermittent every 12 hours        LABS:  CBC Full  -  ( 23 Nov 2017 06:19 )  WBC Count : 6.6 K/uL  Hemoglobin : 10.1 g/dL  Hematocrit : 31.1 %  Platelet Count - Automated : 158 K/uL  Mean Cell Volume : 90.2 fl  Mean Cell Hemoglobin : 29.3 pg  Mean Cell Hemoglobin Concentration : 32.5 gm/dL  Auto Neutrophil # : x  Auto Lymphocyte # : x  Auto Monocyte # : x  Auto Eosinophil # : x  Auto Basophil # : x  Auto Neutrophil % : x  Auto Lymphocyte % : x  Auto Monocyte % : x  Auto Eosinophil % : x  Auto Basophil % : x    11-23    138  |  105  |  65<H>  ----------------------------<  162<H>  4.4   |  17<L>  |  1.72<H>    Ca    9.2      23 Nov 2017 06:19            Blood Gas Venous - Lactate: 1.5 mmoL/L (11-20 @ 16:14)    Procalcitonin, Serum: 14.48 ng/mL (11-21 @ 09:35)  Procalcitonin, Serum: 15.74 ng/mL (11-21 @ 07:26)          Cultures:  RECENT CULTURES:  11-21 @ 12:23 .Urine Clean Catch (Midstream)    No growth    11-20 @ 23:11 .Blood Blood-Peripheral   No growth to date.      Vital Signs:    Vital Signs Last 24 Hrs  T(C): 36.7 (23 Nov 2017 09:17), Max: 37 (22 Nov 2017 13:16)  T(F): 98 (23 Nov 2017 09:17), Max: 98.6 (22 Nov 2017 13:16)  HR: 72 (23 Nov 2017 09:17) (72 - 90)  BP: 116/64 (23 Nov 2017 09:17) (106/65 - 123/60)  RR: 18 (23 Nov 2017 09:17) (16 - 18)  SpO2: 94% (23 Nov 2017 09:17) (94% - 97%)

## 2017-11-23 NOTE — PROGRESS NOTE ADULT - SUBJECTIVE AND OBJECTIVE BOX
CARDIOLOGY FOLLOW UP NOTE - DR. WEAVER (for dr. goldberg)    Subjective:    no cp, less sob, less cough      PHYSICAL EXAM:  T(C): 36.6 (11-23-17 @ 11:50), Max: 37 (11-22-17 @ 13:16)  HR: 70 (11-23-17 @ 11:50) (70 - 90)  BP: 133/71 (11-23-17 @ 11:50) (106/65 - 133/71)  RR: 18 (11-23-17 @ 11:50) (16 - 18)  SpO2: 99% (11-23-17 @ 11:50) (94% - 99%)  Wt(kg): --  I&O's Summary    22 Nov 2017 07:01  -  23 Nov 2017 07:00  --------------------------------------------------------  IN: 1610 mL / OUT: 900 mL / NET: 710 mL        Appearance: Normal	  Cardiovascular: Normal S1 S2,RRR, No JVD, No murmurs  Respiratory: Lungs clear to auscultation	  Gastrointestinal:  Soft, Non-tender, + BS	  Extremities: Normal range of motion, No clubbing, cyanosis or edema    MEDICATIONS  (STANDING):  ALBUTerol/ipratropium for Nebulization 3 milliLiter(s) Nebulizer every 6 hours  azithromycin   Tablet 500 milliGRAM(s) Oral daily  cefepime  IVPB 2000 milliGRAM(s) IV Intermittent every 12 hours  dextrose 5%. 1000 milliLiter(s) (50 mL/Hr) IV Continuous <Continuous>  dextrose 50% Injectable 12.5 Gram(s) IV Push once  dextrose 50% Injectable 25 Gram(s) IV Push once  furosemide    Tablet 20 milliGRAM(s) Oral daily  insulin lispro (HumaLOG) corrective regimen sliding scale   SubCutaneous three times a day before meals  insulin lispro (HumaLOG) corrective regimen sliding scale   SubCutaneous at bedtime      TELEMETRY: 	    ECG:  	  RADIOLOGY:   DIAGNOSTIC TESTING:  [ ] Echocardiogram:  [ ] Catheterization:  [ ] Stress Test:    OTHER: 	    LABS:	 	    CARDIAC MARKERS:                                10.1   6.6   )-----------( 158      ( 23 Nov 2017 06:19 )             31.1     11-23    138  |  105  |  65<H>  ----------------------------<  162<H>  4.4   |  17<L>  |  1.72<H>    Ca    9.2      23 Nov 2017 06:19      proBNP:     Lipid Profile:   HgA1c:

## 2017-11-23 NOTE — PROGRESS NOTE ADULT - SUBJECTIVE AND OBJECTIVE BOX
Patient seen and examined in bed with no new complaints.  No new events overnights.    REVIEW OF SYSTEMS:  As per HPI, otherwise 8 full 10 ROS were unremarkable    MEDICATIONS  (STANDING):  ALBUTerol/ipratropium for Nebulization 3 milliLiter(s) Nebulizer every 6 hours  azithromycin   Tablet 500 milliGRAM(s) Oral daily  cefepime  IVPB 2000 milliGRAM(s) IV Intermittent every 12 hours  dextrose 5%. 1000 milliLiter(s) (50 mL/Hr) IV Continuous <Continuous>  dextrose 50% Injectable 12.5 Gram(s) IV Push once  dextrose 50% Injectable 25 Gram(s) IV Push once  furosemide    Tablet 20 milliGRAM(s) Oral daily  insulin lispro (HumaLOG) corrective regimen sliding scale   SubCutaneous three times a day before meals  insulin lispro (HumaLOG) corrective regimen sliding scale   SubCutaneous at bedtime      VITAL:  T(C): , Max: 37 (17 @ 13:16)  T(F): , Max: 98.6 (17 @ 13:16)  HR: 90 (17 @ 06:04)  BP: 123/60 (17 @ 06:04)  BP(mean): --  RR: 16 (17 @ 06:04)  SpO2: 94% (17 @ 06:04)  Wt(kg): --    I and O's:     @ 07:01  -   @ 07:00  --------------------------------------------------------  IN: 1610 mL / OUT: 900 mL / NET: 710 mL          PHYSICAL EXAM:    Constitutional: NAD  HEENT: PERRLA, EOMI,  MMM  Neck: No LAD, No JVD  Respiratory: diminished b/l  Cardiovascular: S1 and S2  Gastrointestinal: BS+, soft, NT/ND  Extremities: No peripheral edema  Neurological: A/O x 3, no focal deficits  Psychiatric: Normal mood, normal affect  : No James  Skin: No rashes  Access: Not applicable    LABS:                        10.1   6.6   )-----------( 158      ( 2017 06:19 )             31.1         138  |  105  |  65<H>  ----------------------------<  162<H>  4.4   |  17<L>  |  1.72<H>    Ca    9.2      2017 06:19        Urine Studies:  Urinalysis Basic - ( 2017 09:10 )    Color: Yellow / Appearance: Clear / S.017 / pH: x  Gluc: x / Ketone: Negative  / Bili: Negative / Urobili: Negative   Blood: x / Protein: Trace / Nitrite: Negative   Leuk Esterase: Negative / RBC: 0-2 /HPF / WBC 0-2 /HPF   Sq Epi: x / Non Sq Epi: Occasional /HPF / Bacteria: Few /HPF      Creatinine, Random Urine: 85 mg/dL ( @ 21:51)  Protein/Creatinine Ratio Calculation: 0.1 Ratio ( @ 21:51)      ASSESSMENT:  (1)Renal - CKD3 - likely diabetic nephropathy +/- HTN nephrosclerosis; renal function further improved; remains overall stable    (2)Hyperkalemia - patient has had K+ with suboptimal control as of late; was on Avapro at home;  K+ much improved this AM; remains on K+ restrictive diet    (3)Hematuria - etiology? denies at this time; urine culture negative; slight downtrend in Hgb but overall stable    (4)PM+R - needs PT    (5)Pneumonia:  on PO Zithromax & Cefepime IV; pulmonary on board    RECOMMEND:  1.  BMP daily  2.  Continue Lasix 20mg PO daily  3.  Continue to maintain K+ restrictive diet  4.  Trend renal function; would continue to defer ACEi/ARB for now  5.  Continue abx tx per pulmonary recs  6.  Increase activity as able Patient seen and examined in bed with no new complaints.  No new events overnights.    REVIEW OF SYSTEMS:  As per HPI, otherwise 8 full 10 ROS were unremarkable    MEDICATIONS  (STANDING):  ALBUTerol/ipratropium for Nebulization 3 milliLiter(s) Nebulizer every 6 hours  azithromycin   Tablet 500 milliGRAM(s) Oral daily  cefepime  IVPB 2000 milliGRAM(s) IV Intermittent every 12 hours  dextrose 5%. 1000 milliLiter(s) (50 mL/Hr) IV Continuous <Continuous>  dextrose 50% Injectable 12.5 Gram(s) IV Push once  dextrose 50% Injectable 25 Gram(s) IV Push once  furosemide    Tablet 20 milliGRAM(s) Oral daily  insulin lispro (HumaLOG) corrective regimen sliding scale   SubCutaneous three times a day before meals  insulin lispro (HumaLOG) corrective regimen sliding scale   SubCutaneous at bedtime      VITAL:  T(C): , Max: 37 (17 @ 13:16)  T(F): , Max: 98.6 (17 @ 13:16)  HR: 90 (17 @ 06:04)  BP: 123/60 (17 @ 06:04)  BP(mean): --  RR: 16 (17 @ 06:04)  SpO2: 94% (17 @ 06:04)  Wt(kg): --    I and O's:     @ 07:01  -   @ 07:00  --------------------------------------------------------  IN: 1610 mL / OUT: 900 mL / NET: 710 mL          PHYSICAL EXAM:    Constitutional: NAD  HEENT: PERRLA, EOMI,  MMM  Neck: No LAD, No JVD  Respiratory: diminished b/l  Cardiovascular: S1 and S2  Gastrointestinal: BS+, soft, NT/ND  Extremities: No peripheral edema  Neurological: A/O x 3, no focal deficits  Psychiatric: Normal mood, normal affect  : No James  Skin: No rashes  Access: Not applicable    LABS:                        10.1   6.6   )-----------( 158      ( 2017 06:19 )             31.1         138  |  105  |  65<H>  ----------------------------<  162<H>  4.4   |  17<L>  |  1.72<H>    Ca    9.2      2017 06:19        Urine Studies:  Urinalysis Basic - ( 2017 09:10 )    Color: Yellow / Appearance: Clear / S.017 / pH: x  Gluc: x / Ketone: Negative  / Bili: Negative / Urobili: Negative   Blood: x / Protein: Trace / Nitrite: Negative   Leuk Esterase: Negative / RBC: 0-2 /HPF / WBC 0-2 /HPF   Sq Epi: x / Non Sq Epi: Occasional /HPF / Bacteria: Few /HPF      Creatinine, Random Urine: 85 mg/dL ( @ 21:51)  Protein/Creatinine Ratio Calculation: 0.1 Ratio ( @ 21:51)      ASSESSMENT:  (1)Renal - CKD3 - likely diabetic nephropathy +/- HTN nephrosclerosis; renal function further improved; remains overall stable    (2)Hyperkalemia - patient has had K+ with suboptimal control as of late; was on Avapro at home;  K+ much improved this AM; remains on K+ restrictive diet    (3)Hematuria - etiology? denies at this time; urine culture negative; slight downtrend in Hgb but overall stable    (4)PM+R - needs PT    (5)Pneumonia:  on PO Zithromax & Cefepime IV; pulmonary on board; patient denies cough and reports much improvement in breathing.    RECOMMEND:  1.  BMP daily  2.  Continue Lasix 20mg PO daily  3.  Continue to maintain K+ restrictive diet  4.  Trend renal function; would continue to defer ACEi/ARB for now  5.  Continue abx tx per pulmonary recs  6.  Increase activity as able  7.  Discharge planning per primary team; no renal objection for d/c if K+ and renal fxn remain stable; have patient f/u with Dr. Weston in office in 1 week.

## 2017-11-23 NOTE — PROGRESS NOTE ADULT - SUBJECTIVE AND OBJECTIVE BOX
PULMONARY PROGRESS NOTE    JODY WILKINS  MRN-[id number]    Patient is a 89y old  Male who presents with a chief complaint of Bloody Cough (22 Nov 2017 10:35)      HPI:  -Appears comfortable. NAD.  -    ROS:   -No hemoptysis or chest pain    ACTIVE MEDICATION LIST:  MEDICATIONS  (STANDING):  ALBUTerol/ipratropium for Nebulization 3 milliLiter(s) Nebulizer every 6 hours  azithromycin   Tablet 500 milliGRAM(s) Oral daily  cefepime  IVPB 2000 milliGRAM(s) IV Intermittent every 12 hours  dextrose 5%. 1000 milliLiter(s) (50 mL/Hr) IV Continuous <Continuous>  dextrose 50% Injectable 12.5 Gram(s) IV Push once  dextrose 50% Injectable 25 Gram(s) IV Push once  furosemide    Tablet 20 milliGRAM(s) Oral daily  insulin lispro (HumaLOG) corrective regimen sliding scale   SubCutaneous three times a day before meals  insulin lispro (HumaLOG) corrective regimen sliding scale   SubCutaneous at bedtime    MEDICATIONS  (PRN):  dextrose Gel 1 Dose(s) Oral once PRN Blood Glucose LESS THAN 70 milliGRAM(s)/deciliter  guaiFENesin/dextromethorphan  Syrup 5 milliLiter(s) Oral every 6 hours PRN Cough      EXAM:  Vital Signs Last 24 Hrs  T(C): 36.6 (23 Nov 2017 06:04), Max: 37 (22 Nov 2017 13:16)  T(F): 97.8 (23 Nov 2017 06:04), Max: 98.6 (22 Nov 2017 13:16)  HR: 90 (23 Nov 2017 06:04) (73 - 90)  BP: 123/60 (23 Nov 2017 06:04) (103/56 - 123/60)  BP(mean): --  RR: 16 (23 Nov 2017 06:04) (16 - 18)  SpO2: 94% (23 Nov 2017 06:04) (93% - 97%)    GENERAL: The patient is awake and alert in no apparent distress.     LUNGS: Clear to auscultation without wheezing, rales or rhonchi; respirations unlabored    HEART: Regular rate and rhythm without murmur.    Labs:                        10.1   6.6   )-----------( 158      ( 23 Nov 2017 06:19 )             31.1   11-23    138  |  105  |  65<H>  ----------------------------<  162<H>  4.4   |  17<L>  |  1.72<H>    Ca    9.2      23 Nov 2017 06:19  Mg     2.0     11-21      PROBLEM LIST:  89y Male with HEALTH ISSUES - PROBLEM Dx:  Prophylactic measure: Prophylactic measure  Hyperlipidemia, unspecified hyperlipidemia type: Hyperlipidemia, unspecified hyperlipidemia type  Essential hypertension: Essential hypertension  Elevated troponin: Elevated troponin  Type 2 diabetes mellitus with complication, without long-term current use of insulin: Type 2 diabetes mellitus with complication, without long-term current use of insulin  Hemoptysis: Hemoptysis(Resolved)            RECS:  O2  Abx as per ID  Pulm status is stable    Jessica Shelton DO   [phone]

## 2017-11-23 NOTE — PROGRESS NOTE ADULT - PROVIDER SPECIALTY LIST ADULT
Cardiology
Infectious Disease
Infectious Disease
Internal Medicine
Nephrology
Pulmonology
Pulmonology
Cardiology
Nephrology
Pulmonology
Internal Medicine

## 2017-11-23 NOTE — PROGRESS NOTE ADULT - ASSESSMENT
89M DM2, HTN, HLD p/w hemoptysis
Impression/Plan: Pneumonia vs. Bronchitis, hemoptysis resolved      stable for d/c from cv standpoint
A/P: 89M DM2, HTN, HLD p/w hemoptysis. Pt states he was recently admitted to [facility] with hypoglycemia. Pt states 11/19/17 night he developed  mild dry cough. on 11/20/17 he experienced a mildly bloody cough which was productive of bloody sputum roughly once an hour. He denies any fevers, chills or chest pain. He endorses a mild SOB which did not notably affect his functional status. He spoke to his pulmonlogist who recommended he seek further evaluation in the ER. Pt denies any history of hemoptysis in the past, any recent travel, any history of autoimmune disease or any sick contacts.  CT chest: Bilateral patchy airspace opacities/consolidation may be related to infectious/inflammatory etiologies versus pulmonary hemorrhage. Clinical   correlation is recommended. Pt with elevated LFTs and high procalcitonin 15.7. Creatinine 1.8.  In ER: Given ASA, azithromycin, zosyn, vanc.  Pt is feeling better today.  No BRB in sputum, just brownish discoloration.  Continue azithromycin and cefepime for PNA.
89M DM2, HTN, HLD p/w hemoptysis
A/P: 89M DM2, HTN, HLD p/w hemoptysis. Pt states he was recently admitted to [facility] with hypoglycemia. Pt states 11/19/17 night he developed  mild dry cough. on 11/20/17 he experienced a mildly bloody cough which was productive of bloody sputum roughly once an hour. He denies any fevers, chills or chest pain. He endorses a mild SOB which did not notably affect his functional status. He spoke to his pulmonlogist who recommended he seek further evaluation in the ER. Pt denies any history of hemoptysis in the past, any recent travel, any history of autoimmune disease or any sick contacts.  CT chest: Bilateral patchy airspace opacities/consolidation may be related to infectious/inflammatory etiologies versus pulmonary hemorrhage. Clinical   correlation is recommended. Pt with elevated LFTs and high procalcitonin 15.7. Creatinine 1.8.  On Cefepime, Zithromax  Pt is feeling better.  No hemoptysis.   D/c planning with Zithromax and Ceftin.

## 2017-11-26 LAB
CULTURE RESULTS: SIGNIFICANT CHANGE UP
CULTURE RESULTS: SIGNIFICANT CHANGE UP
SPECIMEN SOURCE: SIGNIFICANT CHANGE UP
SPECIMEN SOURCE: SIGNIFICANT CHANGE UP

## 2017-12-19 PROCEDURE — 83880 ASSAY OF NATRIURETIC PEPTIDE: CPT

## 2017-12-19 PROCEDURE — 71046 X-RAY EXAM CHEST 2 VIEWS: CPT

## 2017-12-19 PROCEDURE — 85014 HEMATOCRIT: CPT

## 2017-12-19 PROCEDURE — 80048 BASIC METABOLIC PNL TOTAL CA: CPT

## 2017-12-19 PROCEDURE — 71045 X-RAY EXAM CHEST 1 VIEW: CPT

## 2017-12-19 PROCEDURE — 87486 CHLMYD PNEUM DNA AMP PROBE: CPT

## 2017-12-19 PROCEDURE — 84132 ASSAY OF SERUM POTASSIUM: CPT

## 2017-12-19 PROCEDURE — 93971 EXTREMITY STUDY: CPT

## 2017-12-19 PROCEDURE — 82553 CREATINE MB FRACTION: CPT

## 2017-12-19 PROCEDURE — 85379 FIBRIN DEGRADATION QUANT: CPT

## 2017-12-19 PROCEDURE — 82947 ASSAY GLUCOSE BLOOD QUANT: CPT

## 2017-12-19 PROCEDURE — 87798 DETECT AGENT NOS DNA AMP: CPT

## 2017-12-19 PROCEDURE — 83605 ASSAY OF LACTIC ACID: CPT

## 2017-12-19 PROCEDURE — 82330 ASSAY OF CALCIUM: CPT

## 2017-12-19 PROCEDURE — 82435 ASSAY OF BLOOD CHLORIDE: CPT

## 2017-12-19 PROCEDURE — 83550 IRON BINDING TEST: CPT

## 2017-12-19 PROCEDURE — 80053 COMPREHEN METABOLIC PANEL: CPT

## 2017-12-19 PROCEDURE — 87040 BLOOD CULTURE FOR BACTERIA: CPT

## 2017-12-19 PROCEDURE — 85027 COMPLETE CBC AUTOMATED: CPT

## 2017-12-19 PROCEDURE — 85610 PROTHROMBIN TIME: CPT

## 2017-12-19 PROCEDURE — 83690 ASSAY OF LIPASE: CPT

## 2017-12-19 PROCEDURE — 85730 THROMBOPLASTIN TIME PARTIAL: CPT

## 2017-12-19 PROCEDURE — 83036 HEMOGLOBIN GLYCOSYLATED A1C: CPT

## 2017-12-19 PROCEDURE — 80061 LIPID PANEL: CPT

## 2017-12-19 PROCEDURE — 97162 PT EVAL MOD COMPLEX 30 MIN: CPT

## 2017-12-19 PROCEDURE — 84145 PROCALCITONIN (PCT): CPT

## 2017-12-19 PROCEDURE — 96375 TX/PRO/DX INJ NEW DRUG ADDON: CPT

## 2017-12-19 PROCEDURE — 99285 EMERGENCY DEPT VISIT HI MDM: CPT | Mod: 25

## 2017-12-19 PROCEDURE — 93005 ELECTROCARDIOGRAM TRACING: CPT

## 2017-12-19 PROCEDURE — 82962 GLUCOSE BLOOD TEST: CPT

## 2017-12-19 PROCEDURE — 83735 ASSAY OF MAGNESIUM: CPT

## 2017-12-19 PROCEDURE — 84156 ASSAY OF PROTEIN URINE: CPT

## 2017-12-19 PROCEDURE — 71250 CT THORAX DX C-: CPT

## 2017-12-19 PROCEDURE — 84295 ASSAY OF SERUM SODIUM: CPT

## 2017-12-19 PROCEDURE — 70450 CT HEAD/BRAIN W/O DYE: CPT

## 2017-12-19 PROCEDURE — 84443 ASSAY THYROID STIM HORMONE: CPT

## 2017-12-19 PROCEDURE — 80074 ACUTE HEPATITIS PANEL: CPT

## 2017-12-19 PROCEDURE — 78582 LUNG VENTILAT&PERFUS IMAGING: CPT

## 2017-12-19 PROCEDURE — A9567: CPT

## 2017-12-19 PROCEDURE — 86381 MITOCHONDRIAL ANTIBODY EACH: CPT

## 2017-12-19 PROCEDURE — 94640 AIRWAY INHALATION TREATMENT: CPT

## 2017-12-19 PROCEDURE — 87633 RESP VIRUS 12-25 TARGETS: CPT

## 2017-12-19 PROCEDURE — 93308 TTE F-UP OR LMTD: CPT

## 2017-12-19 PROCEDURE — 80076 HEPATIC FUNCTION PANEL: CPT

## 2017-12-19 PROCEDURE — 76700 US EXAM ABDOM COMPLETE: CPT

## 2017-12-19 PROCEDURE — 86255 FLUORESCENT ANTIBODY SCREEN: CPT

## 2017-12-19 PROCEDURE — 82803 BLOOD GASES ANY COMBINATION: CPT

## 2017-12-19 PROCEDURE — 87581 M.PNEUMON DNA AMP PROBE: CPT

## 2017-12-19 PROCEDURE — 82728 ASSAY OF FERRITIN: CPT

## 2017-12-19 PROCEDURE — 81003 URINALYSIS AUTO W/O SCOPE: CPT

## 2017-12-19 PROCEDURE — 87086 URINE CULTURE/COLONY COUNT: CPT

## 2017-12-19 PROCEDURE — 84100 ASSAY OF PHOSPHORUS: CPT

## 2017-12-19 PROCEDURE — 84484 ASSAY OF TROPONIN QUANT: CPT

## 2017-12-19 PROCEDURE — 81001 URINALYSIS AUTO W/SCOPE: CPT

## 2017-12-19 PROCEDURE — 96374 THER/PROPH/DIAG INJ IV PUSH: CPT

## 2017-12-19 PROCEDURE — 99291 CRITICAL CARE FIRST HOUR: CPT | Mod: 25

## 2017-12-19 PROCEDURE — 86038 ANTINUCLEAR ANTIBODIES: CPT

## 2017-12-19 PROCEDURE — A9540: CPT

## 2017-12-19 PROCEDURE — 82550 ASSAY OF CK (CPK): CPT

## 2018-02-16 NOTE — PATIENT PROFILE ADULT. - NS TRANSFER DISPOSITION PATIENT BELONGINGS
Patient was given a copy of the [employer] form and understands to bring it in once completed. with patient

## 2018-06-01 ENCOUNTER — INPATIENT (INPATIENT)
Facility: HOSPITAL | Age: 83
LOS: 4 days | Discharge: HOME | DRG: 308 | End: 2018-06-06
Attending: INTERNAL MEDICINE | Admitting: INTERNAL MEDICINE
Payer: MEDICARE

## 2018-06-01 VITALS
HEIGHT: 66 IN | TEMPERATURE: 98 F | RESPIRATION RATE: 18 BRPM | OXYGEN SATURATION: 97 % | HEART RATE: 120 BPM | SYSTOLIC BLOOD PRESSURE: 114 MMHG | DIASTOLIC BLOOD PRESSURE: 78 MMHG | WEIGHT: 167.99 LBS

## 2018-06-01 DIAGNOSIS — N17.9 ACUTE KIDNEY FAILURE, UNSPECIFIED: ICD-10-CM

## 2018-06-01 DIAGNOSIS — E87.5 HYPERKALEMIA: ICD-10-CM

## 2018-06-01 DIAGNOSIS — E87.1 HYPO-OSMOLALITY AND HYPONATREMIA: ICD-10-CM

## 2018-06-01 DIAGNOSIS — E78.5 HYPERLIPIDEMIA, UNSPECIFIED: ICD-10-CM

## 2018-06-01 DIAGNOSIS — R74.8 ABNORMAL LEVELS OF OTHER SERUM ENZYMES: ICD-10-CM

## 2018-06-01 DIAGNOSIS — I48.91 UNSPECIFIED ATRIAL FIBRILLATION: ICD-10-CM

## 2018-06-01 DIAGNOSIS — R73.9 HYPERGLYCEMIA, UNSPECIFIED: ICD-10-CM

## 2018-06-01 DIAGNOSIS — E87.2 ACIDOSIS: ICD-10-CM

## 2018-06-01 DIAGNOSIS — I10 ESSENTIAL (PRIMARY) HYPERTENSION: ICD-10-CM

## 2018-06-01 LAB
ALBUMIN SERPL ELPH-MCNC: 4.4 G/DL — SIGNIFICANT CHANGE UP (ref 3.3–5)
ALP SERPL-CCNC: 156 U/L — HIGH (ref 40–120)
ALT FLD-CCNC: 64 U/L — HIGH (ref 10–45)
ANION GAP SERPL CALC-SCNC: 14 MMOL/L — SIGNIFICANT CHANGE UP (ref 5–17)
ANION GAP SERPL CALC-SCNC: 15 MMOL/L — SIGNIFICANT CHANGE UP (ref 5–17)
ANION GAP SERPL CALC-SCNC: 17 MMOL/L — SIGNIFICANT CHANGE UP (ref 5–17)
APTT BLD: 30.1 SEC — SIGNIFICANT CHANGE UP (ref 27.5–37.4)
AST SERPL-CCNC: 74 U/L — HIGH (ref 10–40)
B-OH-BUTYR SERPL-SCNC: 0.3 MMOL/L — SIGNIFICANT CHANGE UP
BASE EXCESS BLDV CALC-SCNC: -3.3 MMOL/L — LOW (ref -2–2)
BASE EXCESS BLDV CALC-SCNC: -3.9 MMOL/L — LOW (ref -2–2)
BASOPHILS # BLD AUTO: 0 K/UL — SIGNIFICANT CHANGE UP (ref 0–0.2)
BASOPHILS NFR BLD AUTO: 0.2 % — SIGNIFICANT CHANGE UP (ref 0–2)
BILIRUB SERPL-MCNC: 0.6 MG/DL — SIGNIFICANT CHANGE UP (ref 0.2–1.2)
BUN SERPL-MCNC: 85 MG/DL — HIGH (ref 7–23)
BUN SERPL-MCNC: 86 MG/DL — HIGH (ref 7–23)
BUN SERPL-MCNC: 88 MG/DL — HIGH (ref 7–23)
CA-I SERPL-SCNC: 1.12 MMOL/L — SIGNIFICANT CHANGE UP (ref 1.12–1.3)
CA-I SERPL-SCNC: 1.2 MMOL/L — SIGNIFICANT CHANGE UP (ref 1.12–1.3)
CALCIUM SERPL-MCNC: 9.1 MG/DL — SIGNIFICANT CHANGE UP (ref 8.4–10.5)
CALCIUM SERPL-MCNC: 9.1 MG/DL — SIGNIFICANT CHANGE UP (ref 8.4–10.5)
CALCIUM SERPL-MCNC: 9.3 MG/DL — SIGNIFICANT CHANGE UP (ref 8.4–10.5)
CHLORIDE BLDV-SCNC: 96 MMOL/L — SIGNIFICANT CHANGE UP (ref 96–108)
CHLORIDE BLDV-SCNC: 99 MMOL/L — SIGNIFICANT CHANGE UP (ref 96–108)
CHLORIDE SERPL-SCNC: 91 MMOL/L — LOW (ref 96–108)
CHLORIDE SERPL-SCNC: 95 MMOL/L — LOW (ref 96–108)
CHLORIDE SERPL-SCNC: 96 MMOL/L — SIGNIFICANT CHANGE UP (ref 96–108)
CO2 BLDV-SCNC: 24 MMOL/L — SIGNIFICANT CHANGE UP (ref 22–30)
CO2 BLDV-SCNC: 25 MMOL/L — SIGNIFICANT CHANGE UP (ref 22–30)
CO2 SERPL-SCNC: 19 MMOL/L — LOW (ref 22–31)
CO2 SERPL-SCNC: 21 MMOL/L — LOW (ref 22–31)
CO2 SERPL-SCNC: 22 MMOL/L — SIGNIFICANT CHANGE UP (ref 22–31)
CREAT ?TM UR-MCNC: 69 MG/DL — SIGNIFICANT CHANGE UP
CREAT SERPL-MCNC: 1.87 MG/DL — HIGH (ref 0.5–1.3)
CREAT SERPL-MCNC: 1.95 MG/DL — HIGH (ref 0.5–1.3)
CREAT SERPL-MCNC: 2.05 MG/DL — HIGH (ref 0.5–1.3)
EOSINOPHIL # BLD AUTO: 0.1 K/UL — SIGNIFICANT CHANGE UP (ref 0–0.5)
EOSINOPHIL NFR BLD AUTO: 1 % — SIGNIFICANT CHANGE UP (ref 0–6)
GAS PNL BLDV: 125 MMOL/L — LOW (ref 136–145)
GAS PNL BLDV: 130 MMOL/L — LOW (ref 136–145)
GAS PNL BLDV: SIGNIFICANT CHANGE UP
GAS PNL BLDV: SIGNIFICANT CHANGE UP
GLUCOSE BLDC GLUCOMTR-MCNC: 159 MG/DL — HIGH (ref 70–99)
GLUCOSE BLDC GLUCOMTR-MCNC: 167 MG/DL — HIGH (ref 70–99)
GLUCOSE BLDC GLUCOMTR-MCNC: 223 MG/DL — HIGH (ref 70–99)
GLUCOSE BLDV-MCNC: 187 MG/DL — HIGH (ref 70–99)
GLUCOSE BLDV-MCNC: 258 MG/DL — HIGH (ref 70–99)
GLUCOSE SERPL-MCNC: 138 MG/DL — HIGH (ref 70–99)
GLUCOSE SERPL-MCNC: 194 MG/DL — HIGH (ref 70–99)
GLUCOSE SERPL-MCNC: 257 MG/DL — HIGH (ref 70–99)
HCO3 BLDV-SCNC: 23 MMOL/L — SIGNIFICANT CHANGE UP (ref 21–29)
HCO3 BLDV-SCNC: 23 MMOL/L — SIGNIFICANT CHANGE UP (ref 21–29)
HCT VFR BLD CALC: 42.6 % — SIGNIFICANT CHANGE UP (ref 39–50)
HCT VFR BLDA CALC: 40 % — SIGNIFICANT CHANGE UP (ref 39–50)
HCT VFR BLDA CALC: 42 % — SIGNIFICANT CHANGE UP (ref 39–50)
HGB BLD CALC-MCNC: 13.2 G/DL — SIGNIFICANT CHANGE UP (ref 13–17)
HGB BLD CALC-MCNC: 13.7 G/DL — SIGNIFICANT CHANGE UP (ref 13–17)
HGB BLD-MCNC: 14 G/DL — SIGNIFICANT CHANGE UP (ref 13–17)
INR BLD: 1.3 RATIO — HIGH (ref 0.88–1.16)
LACTATE BLDV-MCNC: 1.7 MMOL/L — SIGNIFICANT CHANGE UP (ref 0.7–2)
LACTATE BLDV-MCNC: 2.2 MMOL/L — HIGH (ref 0.7–2)
LYMPHOCYTES # BLD AUTO: 1.3 K/UL — SIGNIFICANT CHANGE UP (ref 1–3.3)
LYMPHOCYTES # BLD AUTO: 14.2 % — SIGNIFICANT CHANGE UP (ref 13–44)
MCHC RBC-ENTMCNC: 28.6 PG — SIGNIFICANT CHANGE UP (ref 27–34)
MCHC RBC-ENTMCNC: 32.9 GM/DL — SIGNIFICANT CHANGE UP (ref 32–36)
MCV RBC AUTO: 86.9 FL — SIGNIFICANT CHANGE UP (ref 80–100)
MONOCYTES # BLD AUTO: 0.8 K/UL — SIGNIFICANT CHANGE UP (ref 0–0.9)
MONOCYTES NFR BLD AUTO: 8.9 % — SIGNIFICANT CHANGE UP (ref 2–14)
NEUTROPHILS # BLD AUTO: 7 K/UL — SIGNIFICANT CHANGE UP (ref 1.8–7.4)
NEUTROPHILS NFR BLD AUTO: 75.8 % — SIGNIFICANT CHANGE UP (ref 43–77)
NT-PROBNP SERPL-SCNC: HIGH PG/ML (ref 0–300)
PCO2 BLDV: 49 MMHG — SIGNIFICANT CHANGE UP (ref 35–50)
PCO2 BLDV: 50 MMHG — SIGNIFICANT CHANGE UP (ref 35–50)
PH BLDV: 7.28 — LOW (ref 7.35–7.45)
PH BLDV: 7.29 — LOW (ref 7.35–7.45)
PHOSPHATE SERPL-MCNC: 3.8 MG/DL — SIGNIFICANT CHANGE UP (ref 2.5–4.5)
PLATELET # BLD AUTO: 159 K/UL — SIGNIFICANT CHANGE UP (ref 150–400)
PO2 BLDV: 21 MMHG — LOW (ref 25–45)
PO2 BLDV: 22 MMHG — LOW (ref 25–45)
POTASSIUM BLDV-SCNC: 3.9 MMOL/L — SIGNIFICANT CHANGE UP (ref 3.5–5)
POTASSIUM BLDV-SCNC: 6.9 MMOL/L — CRITICAL HIGH (ref 3.5–5)
POTASSIUM SERPL-MCNC: 4.1 MMOL/L — SIGNIFICANT CHANGE UP (ref 3.5–5.3)
POTASSIUM SERPL-MCNC: 4.1 MMOL/L — SIGNIFICANT CHANGE UP (ref 3.5–5.3)
POTASSIUM SERPL-MCNC: 5.7 MMOL/L — HIGH (ref 3.5–5.3)
POTASSIUM SERPL-SCNC: 4.1 MMOL/L — SIGNIFICANT CHANGE UP (ref 3.5–5.3)
POTASSIUM SERPL-SCNC: 4.1 MMOL/L — SIGNIFICANT CHANGE UP (ref 3.5–5.3)
POTASSIUM SERPL-SCNC: 5.7 MMOL/L — HIGH (ref 3.5–5.3)
PROT SERPL-MCNC: 7.7 G/DL — SIGNIFICANT CHANGE UP (ref 6–8.3)
PROTHROM AB SERPL-ACNC: 14.1 SEC — HIGH (ref 9.8–12.7)
RBC # BLD: 4.9 M/UL — SIGNIFICANT CHANGE UP (ref 4.2–5.8)
RBC # FLD: 13.7 % — SIGNIFICANT CHANGE UP (ref 10.3–14.5)
SAO2 % BLDV: 25 % — LOW (ref 67–88)
SAO2 % BLDV: 26 % — LOW (ref 67–88)
SODIUM SERPL-SCNC: 127 MMOL/L — LOW (ref 135–145)
SODIUM SERPL-SCNC: 131 MMOL/L — LOW (ref 135–145)
SODIUM SERPL-SCNC: 132 MMOL/L — LOW (ref 135–145)
SODIUM UR-SCNC: 29 MMOL/L — SIGNIFICANT CHANGE UP
TROPONIN T SERPL-MCNC: 0.17 NG/ML — HIGH (ref 0–0.06)
WBC # BLD: 9.3 K/UL — SIGNIFICANT CHANGE UP (ref 3.8–10.5)
WBC # FLD AUTO: 9.3 K/UL — SIGNIFICANT CHANGE UP (ref 3.8–10.5)

## 2018-06-01 PROCEDURE — 71045 X-RAY EXAM CHEST 1 VIEW: CPT | Mod: 26

## 2018-06-01 PROCEDURE — 99285 EMERGENCY DEPT VISIT HI MDM: CPT | Mod: 25,GC

## 2018-06-01 PROCEDURE — 93010 ELECTROCARDIOGRAM REPORT: CPT | Mod: GC

## 2018-06-01 RX ORDER — INSULIN HUMAN 100 [IU]/ML
6 INJECTION, SOLUTION SUBCUTANEOUS ONCE
Qty: 0 | Refills: 0 | Status: COMPLETED | OUTPATIENT
Start: 2018-06-01 | End: 2018-06-01

## 2018-06-01 RX ORDER — LOSARTAN POTASSIUM 100 MG/1
25 TABLET, FILM COATED ORAL DAILY
Qty: 0 | Refills: 0 | Status: DISCONTINUED | OUTPATIENT
Start: 2018-06-01 | End: 2018-06-01

## 2018-06-01 RX ORDER — INSULIN GLARGINE 100 [IU]/ML
10 INJECTION, SOLUTION SUBCUTANEOUS AT BEDTIME
Qty: 0 | Refills: 0 | Status: DISCONTINUED | OUTPATIENT
Start: 2018-06-01 | End: 2018-06-04

## 2018-06-01 RX ORDER — GLUCAGON INJECTION, SOLUTION 0.5 MG/.1ML
1 INJECTION, SOLUTION SUBCUTANEOUS ONCE
Qty: 0 | Refills: 0 | Status: DISCONTINUED | OUTPATIENT
Start: 2018-06-01 | End: 2018-06-06

## 2018-06-01 RX ORDER — DEXTROSE 50 % IN WATER 50 %
25 SYRINGE (ML) INTRAVENOUS ONCE
Qty: 0 | Refills: 0 | Status: DISCONTINUED | OUTPATIENT
Start: 2018-06-01 | End: 2018-06-06

## 2018-06-01 RX ORDER — SODIUM CHLORIDE 9 MG/ML
1000 INJECTION INTRAMUSCULAR; INTRAVENOUS; SUBCUTANEOUS ONCE
Qty: 0 | Refills: 0 | Status: COMPLETED | OUTPATIENT
Start: 2018-06-01 | End: 2018-06-01

## 2018-06-01 RX ORDER — MULTIVIT-MIN/FERROUS GLUCONATE 9 MG/15 ML
1 LIQUID (ML) ORAL DAILY
Qty: 0 | Refills: 0 | Status: DISCONTINUED | OUTPATIENT
Start: 2018-06-01 | End: 2018-06-02

## 2018-06-01 RX ORDER — SODIUM POLYSTYRENE SULFONATE 4.1 MEQ/G
15 POWDER, FOR SUSPENSION ORAL ONCE
Qty: 0 | Refills: 0 | Status: COMPLETED | OUTPATIENT
Start: 2018-06-01 | End: 2018-06-01

## 2018-06-01 RX ORDER — FUROSEMIDE 40 MG
20 TABLET ORAL ONCE
Qty: 0 | Refills: 0 | Status: COMPLETED | OUTPATIENT
Start: 2018-06-01 | End: 2018-06-01

## 2018-06-01 RX ORDER — ASPIRIN/CALCIUM CARB/MAGNESIUM 324 MG
81 TABLET ORAL DAILY
Qty: 0 | Refills: 0 | Status: DISCONTINUED | OUTPATIENT
Start: 2018-06-01 | End: 2018-06-06

## 2018-06-01 RX ORDER — APIXABAN 2.5 MG/1
5 TABLET, FILM COATED ORAL EVERY 12 HOURS
Qty: 0 | Refills: 0 | Status: DISCONTINUED | OUTPATIENT
Start: 2018-06-01 | End: 2018-06-05

## 2018-06-01 RX ORDER — ASPIRIN/CALCIUM CARB/MAGNESIUM 324 MG
324 TABLET ORAL ONCE
Qty: 0 | Refills: 0 | Status: COMPLETED | OUTPATIENT
Start: 2018-06-01 | End: 2018-06-01

## 2018-06-01 RX ORDER — DEXTROSE 50 % IN WATER 50 %
12.5 SYRINGE (ML) INTRAVENOUS ONCE
Qty: 0 | Refills: 0 | Status: DISCONTINUED | OUTPATIENT
Start: 2018-06-01 | End: 2018-06-06

## 2018-06-01 RX ORDER — ALBUTEROL 90 UG/1
10 AEROSOL, METERED ORAL
Qty: 0 | Refills: 0 | Status: DISCONTINUED | OUTPATIENT
Start: 2018-06-01 | End: 2018-06-01

## 2018-06-01 RX ORDER — SODIUM CHLORIDE 9 MG/ML
1000 INJECTION, SOLUTION INTRAVENOUS
Qty: 0 | Refills: 0 | Status: DISCONTINUED | OUTPATIENT
Start: 2018-06-01 | End: 2018-06-06

## 2018-06-01 RX ORDER — INSULIN LISPRO 100/ML
VIAL (ML) SUBCUTANEOUS
Qty: 0 | Refills: 0 | Status: DISCONTINUED | OUTPATIENT
Start: 2018-06-01 | End: 2018-06-06

## 2018-06-01 RX ORDER — ATORVASTATIN CALCIUM 80 MG/1
20 TABLET, FILM COATED ORAL AT BEDTIME
Qty: 0 | Refills: 0 | Status: DISCONTINUED | OUTPATIENT
Start: 2018-06-01 | End: 2018-06-06

## 2018-06-01 RX ORDER — METOPROLOL TARTRATE 50 MG
25 TABLET ORAL
Qty: 0 | Refills: 0 | Status: DISCONTINUED | OUTPATIENT
Start: 2018-06-01 | End: 2018-06-03

## 2018-06-01 RX ORDER — DEXTROSE 50 % IN WATER 50 %
15 SYRINGE (ML) INTRAVENOUS ONCE
Qty: 0 | Refills: 0 | Status: DISCONTINUED | OUTPATIENT
Start: 2018-06-01 | End: 2018-06-06

## 2018-06-01 RX ORDER — CALCIUM GLUCONATE 100 MG/ML
1 VIAL (ML) INTRAVENOUS ONCE
Qty: 0 | Refills: 0 | Status: DISCONTINUED | OUTPATIENT
Start: 2018-06-01 | End: 2018-06-01

## 2018-06-01 RX ORDER — INSULIN HUMAN 100 [IU]/ML
10 INJECTION, SOLUTION SUBCUTANEOUS ONCE
Qty: 0 | Refills: 0 | Status: DISCONTINUED | OUTPATIENT
Start: 2018-06-01 | End: 2018-06-01

## 2018-06-01 RX ADMIN — ATORVASTATIN CALCIUM 20 MILLIGRAM(S): 80 TABLET, FILM COATED ORAL at 21:26

## 2018-06-01 RX ADMIN — SODIUM POLYSTYRENE SULFONATE 15 GRAM(S): 4.1 POWDER, FOR SUSPENSION ORAL at 21:25

## 2018-06-01 RX ADMIN — Medication 20 MILLIGRAM(S): at 21:26

## 2018-06-01 RX ADMIN — APIXABAN 5 MILLIGRAM(S): 2.5 TABLET, FILM COATED ORAL at 21:26

## 2018-06-01 RX ADMIN — Medication 25 MILLIGRAM(S): at 21:26

## 2018-06-01 RX ADMIN — Medication 2: at 22:39

## 2018-06-01 RX ADMIN — INSULIN GLARGINE 10 UNIT(S): 100 INJECTION, SOLUTION SUBCUTANEOUS at 22:15

## 2018-06-01 RX ADMIN — SODIUM CHLORIDE 1000 MILLILITER(S): 9 INJECTION INTRAMUSCULAR; INTRAVENOUS; SUBCUTANEOUS at 17:10

## 2018-06-01 RX ADMIN — INSULIN HUMAN 6 UNIT(S): 100 INJECTION, SOLUTION SUBCUTANEOUS at 18:33

## 2018-06-01 NOTE — H&P ADULT - HISTORY OF PRESENT ILLNESS
89 yo M w/ PMH of 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, follows with endo Dr. Painting, with nephropathy, retinopathy s/p laser tx, and neuropathy last A1c at [facility] 8% (10/5/17), presenting w/ chief complaint of hyperglycemia, above 400 since yesterday. Patient noticed that his blood sugar was high yesterday, in the 400s. Patient called his endocrinologist, who recommended taking three, instead of two, of his Glibizide. Patient did this and blood sugar this am was in the 200s, but was in the 400s again this afternoon, prompting his family to bring him to the ED for further evaluation. Patient also endorses recent weight gain in the setting of anorexia. Denies headache, vision change, dizziness, lightheadedness, n/v/d/c, urinary symptoms, fevers/chills, cp, sob, ab pain.     Endo: Dr. Painting  PMD: Dr. Joel Goldberg

## 2018-06-01 NOTE — H&P ADULT - NSHPREVIEWOFSYSTEMS_GEN_ALL_CORE
no N/V, no HA, no blurred vision, no palps, no cp, no SOB, no abd pain. 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

## 2018-06-01 NOTE — CONSULT NOTE ADULT - SUBJECTIVE AND OBJECTIVE BOX
Patient seen and evaluated at bedside    Chief Complaint:  hyperglycemia     HPI:  89 yo M w/ PMH of 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, follows with endo Dr. Painting, with nephropathy, retinopathy s/p laser tx, and neuropathy last A1c at [facility] 8% (10/5/17), presenting w/ chief complaint of hyperglycemia, above 400 since yesterday. Patient noticed that his blood sugar was high yesterday, in the 400s. Patient called his endocrinologist, who recommended taking three, instead of two, of his Glibizide. Patient did this and blood sugar this am was in the 200s, but was in the 400s again this afternoon, prompting his family to bring him to the ED for further evaluation. Patient also endorses recent weight gain. Denies headache, vision change, dizziness, lightheadedness, n/v/d/c, urinary symptoms, fevers/chills, cp,, ab pain.     In the ER, he was noted to be in Afib with rates of 120-130s. Per patient this is new, and has no previous dx of Afib. Patient otherwise denies any palpitations. He reports of SOB with exertion. He denies any PND, orthopnea otherwise.       PMH:   Type 2 diabetes mellitus with retinopathy, macular edema presence unspecified, unspecified long term insulin use status, unspecified retinopathy severity  HLD (hyperlipidemia)  HTN (hypertension)  No pertinent past medical history      PSH:   No significant past surgical history      Medications:   apixaban 5 milliGRAM(s) Oral every 12 hours  aspirin  chewable 324 milliGRAM(s) Oral once  aspirin  chewable 81 milliGRAM(s) Oral daily  atorvastatin 20 milliGRAM(s) Oral at bedtime  dextrose 40% Gel 15 Gram(s) Oral once PRN  dextrose 5%. 1000 milliLiter(s) IV Continuous <Continuous>  dextrose 50% Injectable 12.5 Gram(s) IV Push once  dextrose 50% Injectable 25 Gram(s) IV Push once  dextrose 50% Injectable 25 Gram(s) IV Push once  glucagon  Injectable 1 milliGRAM(s) IntraMuscular once PRN  insulin glargine Injectable (LANTUS) 10 Unit(s) SubCutaneous at bedtime  insulin lispro (HumaLOG) corrective regimen sliding scale   SubCutaneous three times a day before meals  metoprolol tartrate 25 milliGRAM(s) Oral two times a day  multivitamin/minerals 1 Tablet(s) Oral daily      Allergies:  No Known Allergies      FAMILY HISTORY:  No pertinent family history in first degree relatives      Social History:  Smoking History:  Alcohol Use:  Drug Use:    Review of Systems:  REVIEW OF SYSTEMS:    CONSTITUTIONAL: No weakness, fevers or chills  EYES/ENT: No visual changes;  No dysphagia  NECK: No pain or stiffness  RESPIRATORY: No cough, wheezing, hemoptysis; No shortness of breath  CARDIOVASCULAR: No chest pain or palpitations; No lower extremity edema  GASTROINTESTINAL: No abdominal or epigastric pain. No nausea, vomiting, or hematemesis; No diarrhea or constipation. No melena or hematochezia.  BACK: No back pain  GENITOURINARY: No dysuria, frequency or hematuria  NEUROLOGICAL: No numbness or weakness  SKIN: No itching, burning, rashes, or lesions   All other review of systems is negative unless indicated above.    Physical Exam:  T(F): 97.4 (06-01), Max: 97.7 (06-01)  HR: 85 (06-01) (85 - 120)  BP: 117/70 (06-01) (114/78 - 117/70)  RR: 18 (06-01)  SpO2: 98% (06-01)    GENERAL: No acute distress, well-developed  HEAD:  Atraumatic, Normocephalic  ENT: EOMI, PERRLA, conjunctiva and sclera clear, Neck supple, No JVD, moist mucosa  CHEST/LUNG: Clear to auscultation bilaterally; No wheeze, equal breath sounds bilaterally   BACK: No spinal tenderness  HEART:irreg rate and rhythm, nl S1 nl S2,  No murmurs, rubs, or gallops  ABDOMEN: Soft, Nontender, Nondistended; Bowel sounds present  EXTREMITIES:  No clubbing, cyanosis, 2+ edema to knees.   PSYCH: Nl behavior, nl affect  NEUROLOGY: AAOx3, non-focal, cranial nerves intact  SKIN: Normal color, No rashes or lesions  LINES:    Cardiovascular Diagnostic Testing:    ECG: Personally reviewed  Afib with HR of 129, LBBB    Echo:  < from: Transthoracic Echocardiogram (08.30.17 @ 19:31) >  Conclusions:  1. Mitral annular calcification. Mild mitral regurgitation.    2. Calcified trileaflet aortic valve with normal opening.  3. Severely dilated left atrium.  LA volume index = 58  cc/m2.  4. Mild concentric left ventricular hypertrophy.  5. Normal left ventricular systolic function. No segmental  wall motion abnormalities.  6. Normal right ventricular size and function.    < end of copied text >      Labs: Personally reviewed                        14.0   9.3   )-----------( 159      ( 01 Jun 2018 15:38 )             42.6     06-01    127<L>  |  91<L>  |  88<H>  ----------------------------<  257<H>  5.7<H>   |  19<L>  |  1.87<H>    Ca    9.3      01 Jun 2018 15:38    TPro  7.7  /  Alb  4.4  /  TBili  0.6  /  DBili  x   /  AST  74<H>  /  ALT  64<H>  /  AlkPhos  156<H>  06-01    PT/INR - ( 01 Jun 2018 15:38 )   PT: 14.1 sec;   INR: 1.30 ratio         PTT - ( 01 Jun 2018 15:38 )  PTT:30.1 sec  CARDIAC MARKERS ( 01 Jun 2018 15:38 )  x     / 0.17 ng/mL / x     / x     / x

## 2018-06-01 NOTE — ED PROVIDER NOTE - MEDICAL DECISION MAKING DETAILS
89 yo M w/ PMH of DM II presenting w/ chief complaint of hyperglycemia x past few days, also w/ weight gain in the setting of anorexia without vomiting, as well as irregularly irregular rhythm w/ tachycardia on physical exam. Will obtain cbc, cmp, vbg, beta hydroxybutyrate, ekg, coags, troponin. Give fluids. Reassess.

## 2018-06-01 NOTE — ED ADULT NURSE NOTE - OBJECTIVE STATEMENT
89 y.o male c c/o hyperglycemia. BS in 400 range yesterday. Instructed to take glipizide 3x day instead of 2x/day. decreased appetite. No NVD. Denies CP/SOB. Afebrile. No chills. A&Ox3. Rapid HR upon exam. EKG completed. Takes ASA daily.

## 2018-06-01 NOTE — H&P ADULT - PROBLEM SELECTOR PLAN 2
ptn is volume depleted, hold diuretics, hydrate w NS @ 75 cc /hr for 10 hrs. endo called, on insulin  on a sliding scale., check HA1C

## 2018-06-01 NOTE — ED PROVIDER NOTE - PROGRESS NOTE DETAILS
Discussed with Dr Goldberg, recent cath/stress nl coronary arterites tba lauren Thomas MD, Facep Patient HR ranging from 71 to 114. Patient HR ranging from 71 to 114. Called Dr. Mata, but unable to reach. Called Medicine PA, but was indicated that did not do H & P, so could not comment on case.

## 2018-06-01 NOTE — H&P ADULT - PROBLEM SELECTOR PLAN 1
place on BB lopressor 25 bid, get 2decho, place on AC w eliquis( d/w dr Burks, covering Dr, J. Goldberg), EP consult. trop is chronically elevated 2/2 ckd. start AC w ELIQUIS

## 2018-06-01 NOTE — H&P ADULT - NSHPPHYSICALEXAM_GEN_ALL_CORE
wdwn male , NAD, smiling  114/ irreg irreg,- 98.6F-18-95%RA  HEENT  NECK  LUNGS  CARD  ABD  EXT  NEURO

## 2018-06-01 NOTE — CONSULT NOTE ADULT - SUBJECTIVE AND OBJECTIVE BOX
HPI:  89 yo M w/ PMH of 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, follows with sacha Painting, with nephropathy, retinopathy s/p laser tx, and neuropathy last A1c at [facility] 8% (10/5/17), presenting w/ chief complaint of hyperglycemia, above 400 since yesterday. Patient noticed that his blood sugar was high yesterday, in the 400s. Patient called his endocrinologist, who recommended taking three, instead of two, of his Glibizide. Patient did this and blood sugar this am was in the 200s, but was in the 400s again this afternoon, prompting his family to bring him to the ED for further evaluation. Patient also endorses recent weight gain in the setting of anorexia. Denies headache, vision change, dizziness, lightheadedness, n/v/d/c, urinary symptoms, fevers/chills, cp, sob, ab pain. Nephrology was consulted for elevated serum creatinine. The patient is well known to my partner, Dr. Elvin Weston. he denied any urinary sx. He has some SOB and has LE edema.   He received 1L NS and 8 units regular insulin in the ER      PAST MEDICAL & SURGICAL HISTORY:  Type 2 diabetes mellitus with retinopathy, macular edema presence unspecified, unspecified long term insulin use status, unspecified retinopathy severity  HLD (hyperlipidemia)  HTN (hypertension)  No significant past surgical history      MEDICATIONS  (STANDING):  apixaban 5 milliGRAM(s) Oral every 12 hours  aspirin  chewable 81 milliGRAM(s) Oral daily  atorvastatin 20 milliGRAM(s) Oral at bedtime  dextrose 5%. 1000 milliLiter(s) (50 mL/Hr) IV Continuous <Continuous>  dextrose 50% Injectable 12.5 Gram(s) IV Push once  dextrose 50% Injectable 25 Gram(s) IV Push once  dextrose 50% Injectable 25 Gram(s) IV Push once  insulin glargine Injectable (LANTUS) 10 Unit(s) SubCutaneous at bedtime  insulin lispro (HumaLOG) corrective regimen sliding scale   SubCutaneous three times a day before meals  metoprolol tartrate 25 milliGRAM(s) Oral two times a day  multivitamin/minerals 1 Tablet(s) Oral daily  sodium polystyrene sulfonate Suspension 15 Gram(s) Oral once      Allergies    No Known Allergies    Intolerances        SOCIAL HISTORY:  Denies ETOh,Smoking,     FAMILY HISTORY:  No pertinent family history in first degree relatives      REVIEW OF SYSTEMS:    As per HPI  All other review of systems is negative unless indicated above.    VITAL:  T(C): , Max: 36.5 (06-01-18 @ 13:57)  T(F): , Max: 97.7 (06-01-18 @ 13:57)  HR: 85 (06-01-18 @ 18:35)  BP: 117/70 (06-01-18 @ 18:35)  BP(mean): --  RR: 18 (06-01-18 @ 18:35)  SpO2: 98% (06-01-18 @ 18:35)  Wt(kg): --    I and O's:    Height (cm): 167.64 (06-01 @ 13:57)  Weight (kg): 76.2 (06-01 @ 13:57)  BMI (kg/m2): 27.1 (06-01 @ 13:57)  BSA (m2): 1.86 (06-01 @ 13:57)    PHYSICAL EXAM:    Constitutional: NAD  HEENT: PERRLA, EOMI,  MMM  Neck: No LAD, No JVD  Respiratory: Diminished  Cardiovascular: S1 and S2  Gastrointestinal: BS+, soft, NT/ND  Extremities: +1 LE edema  Neurological: A/O x 3, no focal deficits  Psychiatric: Normal mood, normal affect  : No James      LABS:                        14.0   9.3   )-----------( 159      ( 01 Jun 2018 15:38 )             42.6     06-01    127<L>  |  91<L>  |  88<H>  ----------------------------<  257<H>  5.7<H>   |  19<L>  |  1.87<H>    Ca    9.3      01 Jun 2018 15:38    TPro  7.7  /  Alb  4.4  /  TBili  0.6  /  DBili  x   /  AST  74<H>  /  ALT  64<H>  /  AlkPhos  156<H>  06-01      Urine Studies:          RADIOLOGY & ADDITIONAL STUDIES:        ASSESSMENT:    89 yo M w/ PMH of 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, follows with endo Dr. 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  -Cristi likely 2ry to pre-renal azotemia 2ry to CRS  - Hypertension controlled  - Volume status- appears hypervolemic  - Hyperkalemia s/p 1L NS and 8 units regular insulin IV    PLAN:   - Urinalysis, Urine lytes, Urine Urea, Urine Protein, Urine Creatinine  - BNP  - STAT BMP  - Phosphorus, PTH, 25OH vitamin D  - Renal US  - Kayexalate 15 g po x 1   - Defer further IVF  - Lasix 20 mg IV x 1   - Hold RAAS inhibitors  - Strict I/O  - Low K diet  - Renal dosing of meds to creatinine clearance of 15-20 ml/min  - Avoid nephrotoxins as able      Thank you for the courtesy of the referral    Shweta Schaffer MD  [facility]   [phone] HPI:  89 yo M w/ PMH of 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, follows with sacha Painting, with nephropathy, retinopathy s/p laser tx, and neuropathy last A1c at [facility] 8% (10/5/17), presenting w/ chief complaint of hyperglycemia, above 400 since yesterday. Patient noticed that his blood sugar was high yesterday, in the 400s. Patient called his endocrinologist, who recommended taking three, instead of two, of his Glibizide. Patient did this and blood sugar this am was in the 200s, but was in the 400s again this afternoon, prompting his family to bring him to the ED for further evaluation. Patient also endorses recent weight gain in the setting of anorexia. Denies headache, vision change, dizziness, lightheadedness, n/v/d/c, urinary symptoms, fevers/chills, cp, sob, ab pain. Nephrology was consulted for elevated serum creatinine. The patient is well known to my partner, Dr. Elvin Weston. he denied any urinary sx. He has some SOB and has LE edema.   He received 1L NS and 10 units IV regular insulin in the ER      PAST MEDICAL & SURGICAL HISTORY:  Type 2 diabetes mellitus with retinopathy, macular edema presence unspecified, unspecified long term insulin use status, unspecified retinopathy severity  HLD (hyperlipidemia)  HTN (hypertension)  No significant past surgical history      MEDICATIONS  (STANDING):  apixaban 5 milliGRAM(s) Oral every 12 hours  aspirin  chewable 81 milliGRAM(s) Oral daily  atorvastatin 20 milliGRAM(s) Oral at bedtime  dextrose 5%. 1000 milliLiter(s) (50 mL/Hr) IV Continuous <Continuous>  dextrose 50% Injectable 12.5 Gram(s) IV Push once  dextrose 50% Injectable 25 Gram(s) IV Push once  dextrose 50% Injectable 25 Gram(s) IV Push once  insulin glargine Injectable (LANTUS) 10 Unit(s) SubCutaneous at bedtime  insulin lispro (HumaLOG) corrective regimen sliding scale   SubCutaneous three times a day before meals  metoprolol tartrate 25 milliGRAM(s) Oral two times a day  multivitamin/minerals 1 Tablet(s) Oral daily  sodium polystyrene sulfonate Suspension 15 Gram(s) Oral once      Allergies    No Known Allergies    Intolerances        SOCIAL HISTORY:  Denies ETOh,Smoking,     FAMILY HISTORY:  No pertinent family history in first degree relatives      REVIEW OF SYSTEMS:    As per HPI  All other review of systems is negative unless indicated above.    VITAL:  T(C): , Max: 36.5 (06-01-18 @ 13:57)  T(F): , Max: 97.7 (06-01-18 @ 13:57)  HR: 85 (06-01-18 @ 18:35)  BP: 117/70 (06-01-18 @ 18:35)  BP(mean): --  RR: 18 (06-01-18 @ 18:35)  SpO2: 98% (06-01-18 @ 18:35)  Wt(kg): --    I and O's:    Height (cm): 167.64 (06-01 @ 13:57)  Weight (kg): 76.2 (06-01 @ 13:57)  BMI (kg/m2): 27.1 (06-01 @ 13:57)  BSA (m2): 1.86 (06-01 @ 13:57)    PHYSICAL EXAM:    Constitutional: NAD  HEENT: PERRLA, EOMI,  MMM  Neck: No LAD, No JVD  Respiratory: Diminished  Cardiovascular: S1 and S2  Gastrointestinal: BS+, soft, NT/ND  Extremities: +1 LE edema  Neurological: A/O x 3, no focal deficits  Psychiatric: Normal mood, normal affect  : No James      LABS:                        14.0   9.3   )-----------( 159      ( 01 Jun 2018 15:38 )             42.6     06-01    127<L>  |  91<L>  |  88<H>  ----------------------------<  257<H>  5.7<H>   |  19<L>  |  1.87<H>    Ca    9.3      01 Jun 2018 15:38    TPro  7.7  /  Alb  4.4  /  TBili  0.6  /  DBili  x   /  AST  74<H>  /  ALT  64<H>  /  AlkPhos  156<H>  06-01      Urine Studies:          RADIOLOGY & ADDITIONAL STUDIES:        ASSESSMENT:    89 yo M w/ PMH of 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, with nephropathy, retinopathy s/p laser tx, and neuropathy presenting w/ chief complaint of hyperglycemia and has new onset Afib      - 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 of  ~ 1.6 mg/dl 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  -Cristi likely 2ry to pre-renal azotemia 2ry to CRS  - Hypertension controlled  - Volume status- appears hypervolemic  - Hyperkalemia s/p 1L NS and 8 units regular insulin IV    PLAN:   - Urinalysis, Urine lytes, Urine Urea, Urine Protein, Urine Creatinine  - BNP  - STAT BMP  - Phosphorus, PTH, 25OH vitamin D  - Renal US  - Kayexalate 15 g po x 1   - Defer further IVF  - Lasix 20 mg IV x 1   - Hold RAAS inhibitors  - Strict I/O  - Low K diet  - Renal dosing of meds to creatinine clearance of 15-20 ml/min  - Avoid nephrotoxins as able      Thank you for the courtesy of the referral    Shweta Schaffer MD  [facility]  [phone]

## 2018-06-01 NOTE — CONSULT NOTE ADULT - ASSESSMENT
A/P:    89 yo M w/ PMH of 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, HTN, HLD presenting from home for elevated fingersticks and symptoms of shortness of breath, noted to be in new onset atrial fibrillation with rates in 120s. Patient is otherwise asymptomatic. His TTE from 2017 shows he has a dilated LA. On exam he has lower extremity edema. No clear infectious etiology of Afib at this time.     # Afib with RVR   - would start metoprolol 25 mg PO q8hr  - would start Apixaban 5 mg BID   - admit to telemetry   - glucose control per primary team  - repeat TTE non urgently.     -EP to follow     Pratik Callahan MD   cardiology fellow  x [phone]

## 2018-06-01 NOTE — ED PROVIDER NOTE - ATTENDING CONTRIBUTION TO CARE
Private Physician Joel Goldberg  89y male PMH DMT2, HTN, HLD, pt comes to ed complains of high glucose for past few days Max 463, Not on insulin. Spoke to pmd Garima yesterday and adjusted meds, Glibizide tid now. This am glu was 463 this am gained 7lb past few days with anorexia without vomiting, PE NCAT neck supple chest clear anterior & posterior abd soft +bs no mass guarding, CV tachy no rubs, gallops or murmurs, Neuro  no focal.   Milton Thomas MD, Facep

## 2018-06-01 NOTE — H&P ADULT - PROBLEM SELECTOR PLAN 9
chronic, though improved from prev values, follow up w PMD re recent abd US/work up chronic, though improved from prev values, follow up w PMD re recent abd US/work up. 10/17 had a nl abd US

## 2018-06-01 NOTE — ED PROVIDER NOTE - OBJECTIVE STATEMENT
89 yo M w/ PMH of 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, follows with endo Dr. Painting, with nephropathy, retinopathy s/p laser tx, and neuropathy last A1c 8% (10/5/17), presenting w/ chief complaint of hyperglycemia, max of 463 this afternoon. Patient noticed that his blood sugar was high yesterday, in the 400s. Patient called his endocrinologist, who recommended taking three, instead of two, of his Glibizide. Patient did this and blood sugar this am was in the 200s, but was in the 400s again this afternoon, prompting his family to bring him to the ED for further evaluation. Patient also endorses recent weight gain in the setting of anorexia. Denies headache, vision change, dizziness, lightheadedness, n/v/d/c, urinary symptoms, fevers/chills, cp, sob, ab pain.     Endo: Dr. Painting  Private Physician Joel Goldberg

## 2018-06-01 NOTE — PATIENT PROFILE ADULT. - VISION (WITH CORRECTIVE LENSES IF THE PATIENT USUALLY WEARS THEM):
Normal vision: sees adequately in most situations; can see medication labels, newsprint/wearing hearing aids bilateral

## 2018-06-02 DIAGNOSIS — E11.65 TYPE 2 DIABETES MELLITUS WITH HYPERGLYCEMIA: ICD-10-CM

## 2018-06-02 LAB
24R-OH-CALCIDIOL SERPL-MCNC: 65.5 NG/ML — SIGNIFICANT CHANGE UP (ref 30–80)
ANION GAP SERPL CALC-SCNC: 16 MMOL/L — SIGNIFICANT CHANGE UP (ref 5–17)
APPEARANCE UR: CLEAR — SIGNIFICANT CHANGE UP
BILIRUB UR-MCNC: NEGATIVE — SIGNIFICANT CHANGE UP
BUN SERPL-MCNC: 86 MG/DL — HIGH (ref 7–23)
CALCIUM SERPL-MCNC: 9.1 MG/DL — SIGNIFICANT CHANGE UP (ref 8.4–10.5)
CALCIUM SERPL-MCNC: 9.3 MG/DL — SIGNIFICANT CHANGE UP (ref 8.4–10.5)
CHLORIDE SERPL-SCNC: 94 MMOL/L — LOW (ref 96–108)
CHOLEST SERPL-MCNC: 133 MG/DL — SIGNIFICANT CHANGE UP (ref 10–199)
CK MB BLD-MCNC: 7.1 % — HIGH (ref 0–3.5)
CK MB BLD-MCNC: 7.2 % — HIGH (ref 0–3.5)
CK MB CFR SERPL CALC: 13.6 NG/ML — HIGH (ref 0–6.7)
CK MB CFR SERPL CALC: 14.1 NG/ML — HIGH (ref 0–6.7)
CK SERPL-CCNC: 160 U/L — SIGNIFICANT CHANGE UP (ref 30–200)
CK SERPL-CCNC: 189 U/L — SIGNIFICANT CHANGE UP (ref 30–200)
CK SERPL-CCNC: 199 U/L — SIGNIFICANT CHANGE UP (ref 30–200)
CO2 SERPL-SCNC: 21 MMOL/L — LOW (ref 22–31)
COLOR SPEC: YELLOW — SIGNIFICANT CHANGE UP
CREAT SERPL-MCNC: 2.05 MG/DL — HIGH (ref 0.5–1.3)
DIFF PNL FLD: NEGATIVE — SIGNIFICANT CHANGE UP
GGT SERPL-CCNC: 123 U/L — HIGH (ref 9–50)
GLUCOSE BLDC GLUCOMTR-MCNC: 133 MG/DL — HIGH (ref 70–99)
GLUCOSE BLDC GLUCOMTR-MCNC: 135 MG/DL — HIGH (ref 70–99)
GLUCOSE BLDC GLUCOMTR-MCNC: 180 MG/DL — HIGH (ref 70–99)
GLUCOSE BLDC GLUCOMTR-MCNC: 213 MG/DL — HIGH (ref 70–99)
GLUCOSE BLDC GLUCOMTR-MCNC: 217 MG/DL — HIGH (ref 70–99)
GLUCOSE BLDC GLUCOMTR-MCNC: 258 MG/DL — HIGH (ref 70–99)
GLUCOSE BLDC GLUCOMTR-MCNC: 65 MG/DL — LOW (ref 70–99)
GLUCOSE BLDC GLUCOMTR-MCNC: 66 MG/DL — LOW (ref 70–99)
GLUCOSE SERPL-MCNC: 219 MG/DL — HIGH (ref 70–99)
GLUCOSE UR QL: NEGATIVE MG/DL — SIGNIFICANT CHANGE UP
HBA1C BLD-MCNC: 8 % — HIGH (ref 4–5.6)
HCT VFR BLD CALC: 39.4 % — SIGNIFICANT CHANGE UP (ref 39–50)
HDLC SERPL-MCNC: 55 MG/DL — SIGNIFICANT CHANGE UP (ref 40–125)
HGB BLD-MCNC: 12.7 G/DL — LOW (ref 13–17)
KETONES UR-MCNC: NEGATIVE — SIGNIFICANT CHANGE UP
LEUKOCYTE ESTERASE UR-ACNC: NEGATIVE — SIGNIFICANT CHANGE UP
LIPID PNL WITH DIRECT LDL SERPL: 66 MG/DL — SIGNIFICANT CHANGE UP
MCHC RBC-ENTMCNC: 26.7 PG — LOW (ref 27–34)
MCHC RBC-ENTMCNC: 32.2 GM/DL — SIGNIFICANT CHANGE UP (ref 32–36)
MCV RBC AUTO: 82.8 FL — SIGNIFICANT CHANGE UP (ref 80–100)
MYOGLOBIN SERPL-MCNC: 181 NG/ML — HIGH (ref 16–96)
MYOGLOBIN SERPL-MCNC: 187 NG/ML — HIGH (ref 16–96)
NITRITE UR-MCNC: NEGATIVE — SIGNIFICANT CHANGE UP
OSMOLALITY SERPL: 307 MOS/KG — HIGH (ref 275–300)
OSMOLALITY UR: 414 MOS/KG — SIGNIFICANT CHANGE UP (ref 50–1200)
PH UR: 5.5 — SIGNIFICANT CHANGE UP (ref 5–8)
PLATELET # BLD AUTO: 186 K/UL — SIGNIFICANT CHANGE UP (ref 150–400)
POTASSIUM SERPL-MCNC: 4.4 MMOL/L — SIGNIFICANT CHANGE UP (ref 3.5–5.3)
POTASSIUM SERPL-SCNC: 4.4 MMOL/L — SIGNIFICANT CHANGE UP (ref 3.5–5.3)
PROT ?TM UR-MCNC: 10 MG/DL — SIGNIFICANT CHANGE UP (ref 0–12)
PROT UR-MCNC: NEGATIVE MG/DL — SIGNIFICANT CHANGE UP
PTH-INTACT FLD-MCNC: 70 PG/ML — HIGH (ref 15–65)
RBC # BLD: 4.76 M/UL — SIGNIFICANT CHANGE UP (ref 4.2–5.8)
RBC # FLD: 15.8 % — HIGH (ref 10.3–14.5)
SODIUM SERPL-SCNC: 131 MMOL/L — LOW (ref 135–145)
SP GR SPEC: 1.01 — SIGNIFICANT CHANGE UP (ref 1.01–1.02)
T4 FREE SERPL-MCNC: 1.6 NG/DL — SIGNIFICANT CHANGE UP (ref 0.9–1.8)
TOTAL CHOLESTEROL/HDL RATIO MEASUREMENT: 2.4 RATIO — LOW (ref 3.4–9.6)
TRIGL SERPL-MCNC: 60 MG/DL — SIGNIFICANT CHANGE UP (ref 10–149)
TROPONIN T SERPL-MCNC: 0.19 NG/ML — HIGH (ref 0–0.06)
TROPONIN T SERPL-MCNC: 0.19 NG/ML — HIGH (ref 0–0.06)
UROBILINOGEN FLD QL: NEGATIVE MG/DL — SIGNIFICANT CHANGE UP
WBC # BLD: 8.56 K/UL — SIGNIFICANT CHANGE UP (ref 3.8–10.5)
WBC # FLD AUTO: 8.56 K/UL — SIGNIFICANT CHANGE UP (ref 3.8–10.5)

## 2018-06-02 RX ORDER — FUROSEMIDE 40 MG
40 TABLET ORAL DAILY
Qty: 0 | Refills: 0 | Status: DISCONTINUED | OUTPATIENT
Start: 2018-06-02 | End: 2018-06-03

## 2018-06-02 RX ORDER — ONDANSETRON 8 MG/1
4 TABLET, FILM COATED ORAL ONCE
Qty: 0 | Refills: 0 | Status: COMPLETED | OUTPATIENT
Start: 2018-06-02 | End: 2018-06-02

## 2018-06-02 RX ORDER — FUROSEMIDE 40 MG
40 TABLET ORAL ONCE
Qty: 0 | Refills: 0 | Status: COMPLETED | OUTPATIENT
Start: 2018-06-02 | End: 2018-06-02

## 2018-06-02 RX ORDER — DEXTROSE 50 % IN WATER 50 %
15 SYRINGE (ML) INTRAVENOUS ONCE
Qty: 0 | Refills: 0 | Status: COMPLETED | OUTPATIENT
Start: 2018-06-02 | End: 2018-06-02

## 2018-06-02 RX ADMIN — Medication 40 MILLIGRAM(S): at 21:39

## 2018-06-02 RX ADMIN — Medication 6: at 12:30

## 2018-06-02 RX ADMIN — APIXABAN 5 MILLIGRAM(S): 2.5 TABLET, FILM COATED ORAL at 06:21

## 2018-06-02 RX ADMIN — ATORVASTATIN CALCIUM 20 MILLIGRAM(S): 80 TABLET, FILM COATED ORAL at 21:40

## 2018-06-02 RX ADMIN — Medication 15 GRAM(S): at 21:39

## 2018-06-02 RX ADMIN — ONDANSETRON 4 MILLIGRAM(S): 8 TABLET, FILM COATED ORAL at 05:58

## 2018-06-02 RX ADMIN — Medication 4: at 08:04

## 2018-06-02 RX ADMIN — Medication 25 MILLIGRAM(S): at 06:22

## 2018-06-02 RX ADMIN — Medication 40 MILLIGRAM(S): at 13:20

## 2018-06-02 RX ADMIN — Medication 25 MILLIGRAM(S): at 17:23

## 2018-06-02 RX ADMIN — Medication 81 MILLIGRAM(S): at 12:33

## 2018-06-02 RX ADMIN — APIXABAN 5 MILLIGRAM(S): 2.5 TABLET, FILM COATED ORAL at 17:23

## 2018-06-02 RX ADMIN — Medication 1 TABLET(S): at 12:33

## 2018-06-02 RX ADMIN — INSULIN GLARGINE 10 UNIT(S): 100 INJECTION, SOLUTION SUBCUTANEOUS at 22:00

## 2018-06-02 NOTE — CONSULT NOTE ADULT - SUBJECTIVE AND OBJECTIVE BOX
HPI:  89 yo M w/ PMH of 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, follows with endo Dr. Painting, with nephropathy, retinopathy s/p laser tx, and neuropathy last A1c at [facility] 8% (10/5/17), presenting w/ chief complaint of hyperglycemia, above 400 since yesterday. Patient noticed that his blood sugar was high yesterday, in the 400s. Patient called his endocrinologist, who recommended taking three, instead of two, of his Glibizide. Patient did this and blood sugar this am was in the 200s, but was in the 400s again this afternoon, prompting his family to bring him to the ED for further evaluation. Patient also endorses recent weight gain in the setting of anorexia. Denies headache, vision change, dizziness, lightheadedness, n/v/d/c, urinary symptoms, fevers/chills, cp, sob, ab pain.     Endo: Dr. Painting  PMD: Dr. Joel Goldberg (01 Jun 2018 17:43)      Admit Diagnosis  Atrial fibrillation      ENDOCRINE HPI: 89 y/o Type 2 DM 40 years noted with Hyperglycemia last 2 days prior to admission, noted with new onset rapid AF an admission.    Type 2 DM noted 40 years ago. Pt. followed by Dr. Painting. As per medication list he was on prandin 0.5 mg BID and Onglyza 2.5 mg daily. The patient and his wife say they are on Glipizide (Dose?) QD. He says he only checks FS at lunch time- usually 1115-180 mg/dL. HbA1c have been in the 8% range, no hypoglycemia.  He denies any recent fever or chills, but complains of nausea for the past 3 days. No other GI complaints. FS have been in the 400 mg/dL for the past 2 days prior to admission. On admission no infection noted, WBC 8.5, new onset rapid AF, mild increase in LFT's, and worsening renal failure and hyponatremia, and hyperkalemia. Lasix held, IV fluids added.  Patient started on lantus 10 units at bedtime and mid scale humalog FS here 260-130 mg/dL. PO intake good.       PAST MEDICAL & SURGICAL HISTORY:  Type 2 diabetes mellitus with retinopathy, macular edema presence unspecified, unspecified long term insulin use status, unspecified retinopathy severity  HLD (hyperlipidemia)  HTN (hypertension)  No significant past surgical history      FAMILY HISTORY:  No pertinent family history in first degree relatives      Social History: working, , non-smoker, no ETOH.    Outpatient Medications: ?    MEDICATIONS  (STANDING):  apixaban 5 milliGRAM(s) Oral every 12 hours  aspirin  chewable 81 milliGRAM(s) Oral daily  atorvastatin 20 milliGRAM(s) Oral at bedtime  dextrose 5%. 1000 milliLiter(s) (50 mL/Hr) IV Continuous <Continuous>  dextrose 50% Injectable 12.5 Gram(s) IV Push once  dextrose 50% Injectable 25 Gram(s) IV Push once  dextrose 50% Injectable 25 Gram(s) IV Push once  furosemide   Injectable 40 milliGRAM(s) IV Push daily  furosemide   Injectable 40 milliGRAM(s) IV Push once  insulin glargine Injectable (LANTUS) 10 Unit(s) SubCutaneous at bedtime  insulin lispro (HumaLOG) corrective regimen sliding scale   SubCutaneous three times a day before meals  metoprolol tartrate 25 milliGRAM(s) Oral two times a day  multivitamin/minerals 1 Tablet(s) Oral daily    MEDICATIONS  (PRN):  dextrose 40% Gel 15 Gram(s) Oral once PRN Blood Glucose LESS THAN 70 milliGRAM(s)/deciliter  glucagon  Injectable 1 milliGRAM(s) IntraMuscular once PRN Glucose LESS THAN 70 milligrams/deciliter      Allergies    No Known Allergies    Intolerances        Review of Systems:  Constitutional: No fever, no chills  Eyes: No blurry vision  Neuro: No tremors  HEENT: No pain  Cardiovascular: No chest pain, palpitations  Respiratory: No SOB, no cough  GI: mild nausea, no vomiting, no abdominal pain  : No dysuria  Skin: no rash  EXT: +1 edema  Psych: no depression    ALL OTHER SYSTEMS REVIEWED AND NEGATIVE      PHYSICAL EXAM:  VITALS: T(C): 36.6 (06-02-18 @ 12:29)  T(F): 97.9 (06-02-18 @ 12:29), Max: 97.9 (06-02-18 @ 12:29)  HR: 72 (06-02-18 @ 12:29) (72 - 120)  BP: 100/71 (06-02-18 @ 12:29) (100/71 - 135/82)  RR:  (17 - 18)  SpO2:  (92% - 96%)  Wt(lb): --180  GENERAL: NAD, well-groomed, well-developed  EYES: No proptosis, no lid lag, anicteric  HEENT:  Atraumatic, Normocephalic, moist mucous membranes  THYROID: Normal size, no palpable nodules  RESPIRATORY: Clear to auscultation bilaterally; decreased breath sounds bases, minimal rales.  CARDIOVASCULAR: Iregular rate; No murmurs; + peripheral edema  GI: Soft, nontender, non distended, normal bowel sounds  SKIN: Dry, intact, No rashes or lesions  MUSCULOSKELETAL: Full range of motion, normal strength  NEURO: sensation intact, extraocular movements intact, no tremor  PSYCH: Alert and oriented x 3, normal affect, normal mood    POCT Blood Glucose.: 133 mg/dL (06-02-18 @ 17:01)  POCT Blood Glucose.: 258 mg/dL (06-02-18 @ 11:56)  POCT Blood Glucose.: 217 mg/dL (06-02-18 @ 07:46)  POCT Blood Glucose.: 213 mg/dL (06-02-18 @ 05:03)  POCT Blood Glucose.: 159 mg/dL (06-01-18 @ 22:11)  POCT Blood Glucose.: 167 mg/dL (06-01-18 @ 21:02)  POCT Blood Glucose.: 223 mg/dL (06-01-18 @ 18:31)  POCT Blood Glucose.: 256 mg/dL (06-01-18 @ 14:02)                            12.7   8.56  )-----------( 186      ( 02 Jun 2018 07:56 )             39.4       06-02    131<L>  |  94<L>  |  86<H>  ----------------------------<  219<H>  4.4   |  21<L>  |  2.05<H>    Ca    9.1      02 Jun 2018 06:27  Phos  3.8     06-01    TPro  7.7  /  Alb  4.4  /  TBili  0.6  /  DBili  x   /  AST  74<H>  /  ALT  64<H>  /  AlkPhos  156<H>  06-01      Thyroid Function Tests:  06-02 @ 08:01 TSH -- FreeT4 1.6 T3 -- Anti TPO -- Anti Thyroglobulin Ab -- TSI --  06-01 @ 18:45 TSH 3.81 FreeT4 -- T3 -- Anti TPO -- Anti Thyroglobulin Ab -- TSI --      Hemoglobin A1C, Whole Blood: 8.0 % <H> [4.0 - 5.6] (06-02-18 @ 07:56)      06-02 Chol 133 LDL 66 HDL 55 Trig 60    Radiology:

## 2018-06-02 NOTE — CONSULT NOTE ADULT - ASSESSMENT
89 yo M w/ PMH of 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, HTN, HLD presenting from home for elevated fingersticks  noted to be in new onset atrial fibrillation with RVR.     1. New onset AFIB with RVR  etiology unclear, could be related to elevated BS, electrolyte imbalance, no clear infectious etiology   pt. still with episodes of RVR on tele up to 130s  bp stable, increase bb   CHADS 3 - continue eliquis  cv stable, s/p lasix ivp x 1, no evidence of chf on exam  TTE from 2017 shows he has a dilated LA  pending echo to evaluate LV function/valvular disease     2. DM type 2 (chronic)  pending endocrine consult  management per primary team     3. Hyperkalemia  resolved  renal f/u     dvt ppx 89 yo M w/ PMH of 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, HTN, HLD presenting from home for elevated fingersticks  noted to be in new onset atrial fibrillation with RVR.     1. New onset AFIB with RVR  etiology unclear, could be related to elevated BS, electrolyte imbalance, no clear infectious etiology   pt. still with episodes of RVR on tele up to 130s  bp stable, increase bb   CHADS 3 - continue eliquis  cv stable, s/p lasix ivp x 1,   TTE from 2017 shows he has a dilated LA  pending echo to evaluate LV function/valvular disease     2. DM type 2 (chronic)  pending endocrine consult  management per primary team     3. Hyperkalemia  resolved  renal f/u     dvt ppx 89 yo M w/ PMH of 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, HTN, HLD presenting from home for elevated fingersticks  noted to be in new onset atrial fibrillation with RVR.     1. New onset AFIB with RVR  etiology unclear, could be related to elevated BS, electrolyte imbalance, no clear infectious etiology   pt. still with episodes of RVR on tele up to 130s  bp stable, increase bb   CHADS 3 - continue eliquis  pt. still with some fluid overload on exam, start lasix 40mg IVP daily   TTE from 2017 shows he has a dilated LA  pending echo to evaluate LV function/valvular disease     2. DM type 2 (chronic)  pending endocrine consult  management per primary team     3. Hyperkalemia  resolved  renal f/u     dvt ppx

## 2018-06-02 NOTE — PROVIDER CONTACT NOTE (OTHER) - BACKGROUND
Pt admitted with dx. A-fib with RVR, hyperglycemia, hyperkalemia, hyponatremia. PMH: Type II DM. Currently on Humalog sliding scale ac & 10 units Lantus at .

## 2018-06-02 NOTE — PROGRESS NOTE ADULT - SUBJECTIVE AND OBJECTIVE BOX
Patient seen and examined  + SOB  + vomited post kayexalate    REVIEW OF SYSTEMS:  As per HPI, otherwise 8 full 10 ROS were unremarkable    MEDICATIONS  (STANDING):  apixaban 5 milliGRAM(s) Oral every 12 hours  aspirin  chewable 81 milliGRAM(s) Oral daily  atorvastatin 20 milliGRAM(s) Oral at bedtime  dextrose 5%. 1000 milliLiter(s) (50 mL/Hr) IV Continuous <Continuous>  dextrose 50% Injectable 12.5 Gram(s) IV Push once  dextrose 50% Injectable 25 Gram(s) IV Push once  dextrose 50% Injectable 25 Gram(s) IV Push once  insulin glargine Injectable (LANTUS) 10 Unit(s) SubCutaneous at bedtime  insulin lispro (HumaLOG) corrective regimen sliding scale   SubCutaneous three times a day before meals  metoprolol tartrate 25 milliGRAM(s) Oral two times a day  multivitamin/minerals 1 Tablet(s) Oral daily      VITAL:  T(C): , Max: 36.5 (18 @ 13:57)  T(F): , Max: 97.7 (18 @ 13:57)  HR: 90 (18 @ 06:16)  BP: 122/76 (18 @ 06:16)  BP(mean): --  RR: 17 (18 @ 06:16)  SpO2: 96% (18 @ 06:16)  Wt(kg): --    I and O's:     @ :  -   @ 07:00  --------------------------------------------------------  IN: 400 mL / OUT: 750 mL / NET: -350 mL     @ 07:01  -   @ 11:26  --------------------------------------------------------  IN: 0 mL / OUT: 400 mL / NET: -400 mL      Height (cm): 167.64 (06-01 @ 19:19)  Weight (kg): 82 ( @ 19:19)  BMI (kg/m2): 29.2 ( @ 19:19)  BSA (m2): 1.92 ( @ 19:19)    PHYSICAL EXAM:    Constitutional: NAD  HEENT: PERRLA, EOMI,  MMM  Neck: No LAD, No JVD  Respiratory: Diminished at the bases  Cardiovascular: S1 and S2  Gastrointestinal: BS+, soft, NT/ND  Extremities: +1 Le edema  Neurological: A/O x 3, no focal deficits      LABS:                        12.7   8.56  )-----------( 186      ( 2018 07:56 )             39.4         131<L>  |  94<L>  |  86<H>  ----------------------------<  219<H>  4.4   |  21<L>  |  2.05<H>    Ca    9.1      2018 06:27  Phos  3.8         TPro  7.7  /  Alb  4.4  /  TBili  0.6  /  DBili  x   /  AST  74<H>  /  ALT  64<H>  /  AlkPhos  156<H>        Urine Studies:  Urinalysis Basic - ( 2018 23:22 )    Color: Yellow / Appearance: Clear / S.013 / pH: x  Gluc: x / Ketone: Negative  / Bili: Negative / Urobili: Negative mg/dL   Blood: x / Protein: Negative mg/dL / Nitrite: Negative   Leuk Esterase: Negative / RBC: x / WBC x   Sq Epi: x / Non Sq Epi: x / Bacteria: x      Creatinine, Random Urine: 69 mg/dL ( @ 22:53)  Sodium, Random Urine: 29 mmol/L ( @ 22:53)        ASSESSMENT:    89 yo M w/ PMH of 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, with nephropathy, retinopathy s/p laser tx, and neuropathy presenting w/ chief complaint of hyperglycemia and has new onset Afib    - 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 of  ~ 1.6 mg/dl 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- no proteinuria   -Cristi likely 2ry to pre-renal azotemia 2ry to CRS- stable  - Hypertension controlled  - Volume status- appears hypervolemic  - Hyperkalemia s/p 1L NS and 8 units regular insulin IV- resolved    PLAN:   - a/w lasix 40 mg IV daily  - Will give lasix 40 mg IV x 1 extra dose  - Renal US pending  - Hold RAAS inhibitors  - Strict I/O  - Low K diet  - Renal dosing of meds to creatinine clearance of 15-20 ml/min  - Avoid nephrotoxins as able    Shweta Schaffer MD  [facility]  [phone] Patient seen and examined  + SOB  + vomited post kayexalate    REVIEW OF SYSTEMS:  As per HPI, otherwise 8 full 10 ROS were unremarkable    MEDICATIONS  (STANDING):  apixaban 5 milliGRAM(s) Oral every 12 hours  aspirin  chewable 81 milliGRAM(s) Oral daily  atorvastatin 20 milliGRAM(s) Oral at bedtime  dextrose 5%. 1000 milliLiter(s) (50 mL/Hr) IV Continuous <Continuous>  dextrose 50% Injectable 12.5 Gram(s) IV Push once  dextrose 50% Injectable 25 Gram(s) IV Push once  dextrose 50% Injectable 25 Gram(s) IV Push once  insulin glargine Injectable (LANTUS) 10 Unit(s) SubCutaneous at bedtime  insulin lispro (HumaLOG) corrective regimen sliding scale   SubCutaneous three times a day before meals  metoprolol tartrate 25 milliGRAM(s) Oral two times a day  multivitamin/minerals 1 Tablet(s) Oral daily      VITAL:  T(C): , Max: 36.5 (18 @ 13:57)  T(F): , Max: 97.7 (18 @ 13:57)  HR: 90 (18 @ 06:16)  BP: 122/76 (18 @ 06:16)  BP(mean): --  RR: 17 (18 @ 06:16)  SpO2: 96% (18 @ 06:16)  Wt(kg): --    I and O's:     @ :  -   @ 07:00  --------------------------------------------------------  IN: 400 mL / OUT: 750 mL / NET: -350 mL     @ 07:01  -   @ 11:26  --------------------------------------------------------  IN: 0 mL / OUT: 400 mL / NET: -400 mL      Height (cm): 167.64 (06-01 @ 19:19)  Weight (kg): 82 ( @ 19:19)  BMI (kg/m2): 29.2 ( @ 19:19)  BSA (m2): 1.92 ( @ 19:19)    PHYSICAL EXAM:    Constitutional: NAD  HEENT: PERRLA, EOMI,  MMM  Neck: No LAD, No JVD  Respiratory: Diminished at the bases  Cardiovascular: S1 and S2  Gastrointestinal: BS+, soft, NT/ND  Extremities: +1 Le edema  Neurological: A/O x 3, no focal deficits      LABS:                        12.7   8.56  )-----------( 186      ( 2018 07:56 )             39.4         131<L>  |  94<L>  |  86<H>  ----------------------------<  219<H>  4.4   |  21<L>  |  2.05<H>    Ca    9.1      2018 06:27  Phos  3.8         TPro  7.7  /  Alb  4.4  /  TBili  0.6  /  DBili  x   /  AST  74<H>  /  ALT  64<H>  /  AlkPhos  156<H>        Urine Studies:  Urinalysis Basic - ( 2018 23:22 )    Color: Yellow / Appearance: Clear / S.013 / pH: x  Gluc: x / Ketone: Negative  / Bili: Negative / Urobili: Negative mg/dL   Blood: x / Protein: Negative mg/dL / Nitrite: Negative   Leuk Esterase: Negative / RBC: x / WBC x   Sq Epi: x / Non Sq Epi: x / Bacteria: x      Creatinine, Random Urine: 69 mg/dL ( @ 22:53)  Sodium, Random Urine: 29 mmol/L ( @ 22:53)        ASSESSMENT:    89 yo M w/ PMH of 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, with nephropathy, retinopathy s/p laser tx, and neuropathy presenting w/ chief complaint of hyperglycemia and has new onset Afib    - 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

## 2018-06-02 NOTE — CONSULT NOTE ADULT - ASSESSMENT
89 y/o Type 2 DM 40 years, retinopathy, neuropathy, noted with Hyperglycemia last 2 days prior to admission, noted with new onset rapid AF an admission, and worsening renal failure.    Not clear what medications he was on for diabetes. The patient and wife talk about glipizide once daily, His medication list has prandin 0.5 mg BID and onglyza 2.5 mg daily. His pharmacy is now closed. His HbA1c of 8.0% shows he was not fully controlled, however far from the FS quoted on admission.    No recent acute illness to explain hyperglycemia, dehydration noted on admission may have worsened it. New onset AF is likely the cause or the result of another underlying issue. Worsening of renal function noted.   W/U for underlying cause as per medicine.  Thyroid function is normal- new onset AF.    I agree with Lantus 10 units and scale humalog for in-house diabetes control.  As renal function is deteriorating will D/C on prandin and onglyza most probably prandin 0.5 mg for breakfast and lunch, 1.0 mg (2 tabs) for dinner and onglyza 2.5 mg daily.

## 2018-06-02 NOTE — CONSULT NOTE ADULT - ATTENDING COMMENTS
Coverage for Dr. Goldberg    Patient seen and examined, agree with the above assessment and plan by AURELIA Knight.  Pt presenting with new onset afib w RVR, CHF and hyperglycemia  AF rates improving but not optimal  Increase BB dose  Still with pulm edema on exam  Start Lasix 40mg IV daily  ECHO  Endocrine eval  appreciate EPS eval  cont renal dose Eliquis

## 2018-06-02 NOTE — PROGRESS NOTE ADULT - ASSESSMENT
89 yo male w uncontrolled DM noted to be in new onset asymptomatic rapid AFIb, PARAS and hyperkalemia

## 2018-06-02 NOTE — CONSULT NOTE ADULT - SUBJECTIVE AND OBJECTIVE BOX
CARDIOLOGY CONSULT - Dr. Burks   Coverage for [facility] - dr. goldberg     CHIEF COMPLAINT: new onset afib    HPI:  89 yo M w/ PMH of 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 with nephropathy, retinopathy s/p laser tx, and neuropathy last A1c at [facility] 8% (10/5/17), presenting w/ chief complaint of hyperglycemia, above 400. Patient noticed that his blood sugar was high yesterday, in the 400s. Patient called his endocrinologist, who recommended taking three, instead of two, of his Glibizide. Patient did this and blood sugar this am was in the 200s, but increased again tothe 400s, prompting his family to bring him to the ED for further evaluation. Patient also endorses recent weight gain in the setting of anorexia. Denies headache, vision change, dizziness, lightheadedness, n/v/d/c, urinary symptoms, fevers/chills. Pt. denies cp, palpitations, c/o SOB with exertion that isn't new. He also states he started noticing some lower extremity swelling starting thursday.       PAST MEDICAL & SURGICAL HISTORY:  Type 2 diabetes mellitus with retinopathy, macular edema presence unspecified, unspecified long term insulin use status, unspecified retinopathy severity  HLD (hyperlipidemia)  HTN (hypertension)  No significant past surgical history          PREVIOUS DIAGNOSTIC TESTING:    [ ] Echocardiogram: < from: Transthoracic Echocardiogram (08.30.17 @ 19:31) >  Conclusions:  1. Mitral annular calcification. Mild mitral regurgitation.    2. Calcified trileaflet aortic valve with normal opening.  3. Severely dilated left atrium.  LA volume index = 58  cc/m2.  4. Mild concentric left ventricular hypertrophy.  5. Normal left ventricular systolic function. No segmental  wall motion abnormalities.  6. Normal right ventricular size and function.    < end of copied text >  < from: Transthoracic Echocardiogram (08.30.17 @ 19:31) >  EF (Teicholtz): 56 %    < end of copied text >    [ ]  Catheterization:   [ ] Stress Test:  	    MEDICATIONS:  MEDICATIONS  (STANDING):  apixaban 5 milliGRAM(s) Oral every 12 hours  aspirin  chewable 81 milliGRAM(s) Oral daily  atorvastatin 20 milliGRAM(s) Oral at bedtime  dextrose 5%. 1000 milliLiter(s) (50 mL/Hr) IV Continuous <Continuous>  dextrose 50% Injectable 12.5 Gram(s) IV Push once  dextrose 50% Injectable 25 Gram(s) IV Push once  dextrose 50% Injectable 25 Gram(s) IV Push once  insulin glargine Injectable (LANTUS) 10 Unit(s) SubCutaneous at bedtime  insulin lispro (HumaLOG) corrective regimen sliding scale   SubCutaneous three times a day before meals  metoprolol tartrate 25 milliGRAM(s) Oral two times a day  multivitamin/minerals 1 Tablet(s) Oral daily      FAMILY HISTORY:  No pertinent family history in first degree relatives      SOCIAL HISTORY:    [x] Non-smoker  [ ] Smoker  [ ] Alcohol    Allergies    No Known Allergies    Intolerances    	    REVIEW OF SYSTEMS:  CONSTITUTIONAL: No fever, weight loss, or fatigue, c/o weight gain   EYES: No eye pain, visual disturbances, or discharge  ENMT:  No difficulty hearing, tinnitus, vertigo; No sinus or throat pain  NECK: No pain or stiffness  RESPIRATORY: No cough, wheezing, chills or hemoptysis; c/o Shortness of Breath with exertion   CARDIOVASCULAR: No chest pain, palpitations, passing out, dizziness, c/o leg swelling  GASTROINTESTINAL: No abdominal or epigastric pain. No nausea, vomiting, or hematemesis; No diarrhea or constipation. No melena or hematochezia.  GENITOURINARY: No dysuria, frequency, hematuria, or incontinence  NEUROLOGICAL: No headaches, memory loss, loss of strength, numbness, or tremors  SKIN: No itching, burning, rashes, or lesions   	    [x] All others negative	  [ ] Unable to obtain    PHYSICAL EXAM:  T(C): 36.5 (06-02-18 @ 04:28), Max: 36.5 (06-01-18 @ 13:57)  HR: 90 (06-02-18 @ 06:16) (85 - 120)  BP: 122/76 (06-02-18 @ 06:16) (105/72 - 135/82)  RR: 17 (06-02-18 @ 06:16) (17 - 18)  SpO2: 96% (06-02-18 @ 06:16) (92% - 98%)  Wt(kg): --  I&O's Summary    01 Jun 2018 07:01  -  02 Jun 2018 07:00  --------------------------------------------------------  IN: 400 mL / OUT: 750 mL / NET: -350 mL        Appearance: Normal	  Psychiatry: A & O x 3, Mood & affect appropriate  HEENT:   Normal oral mucosa, PERRL, EOMI	  Lymphatic: No lymphadenopathy  Cardiovascular: Normal S1 S2,irregular, No JVD, No murmurs  Respiratory: Lungs clear to auscultation	  Gastrointestinal:  Soft, Non-tender, + BS	  Skin: No rashes, No ecchymoses, No cyanosis	  Neurologic: Non-focal  Extremities: Normal range of motion, No clubbing, +1 b/l LE edema   Vascular: Peripheral pulses palpable 2+ bilaterally    TELEMETRY: AFIB 	    ECG:  Afib with HR of 129, LBBB	  RADIOLOGY:   OTHER: 	  	  LABS:	 	    CARDIAC MARKERS:                                  14.0   9.3   )-----------( 159      ( 01 Jun 2018 15:38 )             42.6     06-02    131<L>  |  94<L>  |  86<H>  ----------------------------<  219<H>  4.4   |  21<L>  |  2.05<H>    Ca    9.1      02 Jun 2018 06:27  Phos  3.8     06-01    TPro  7.7  /  Alb  4.4  /  TBili  0.6  /  DBili  x   /  AST  74<H>  /  ALT  64<H>  /  AlkPhos  156<H>  06-01    PT/INR - ( 01 Jun 2018 15:38 )   PT: 14.1 sec;   INR: 1.30 ratio         PTT - ( 01 Jun 2018 15:38 )  PTT:30.1 sec  proBNP: Serum Pro-Brain Natriuretic Peptide: 84311 pg/mL (06-01 @ 21:54)    Lipid Profile:   HgA1c:   TSH: Thyroid Stimulating Hormone, Serum: 3.81 uIU/mL (06-01 @ 18:45)

## 2018-06-02 NOTE — PROVIDER CONTACT NOTE (OTHER) - ASSESSMENT
No change in LOC, remains A&O x 4 in relaxed mood, denies blurred vision/fatigue, no diphoresis, no ectopies on telemetry.  PO Glucose 15gran administered as protocol- repeat  after PO glucose administration. Pt states he did not finish his dinner plate completely.

## 2018-06-02 NOTE — PROGRESS NOTE ADULT - SUBJECTIVE AND OBJECTIVE BOX
Patient is a 89y old  Male who presents with a chief complaint of uncontrolled DM and new onset AFib (2018 17:43)      SUBJECTIVE / OVERNIGHT EVENTS: feels better, BGM improved, on tele AFib 70-80. earlier in the day c/o dyspnea and LE edema, now feels better post IV lasix    MEDICATIONS  (STANDING):  apixaban 5 milliGRAM(s) Oral every 12 hours  aspirin  chewable 81 milliGRAM(s) Oral daily  atorvastatin 20 milliGRAM(s) Oral at bedtime  dextrose 5%. 1000 milliLiter(s) (50 mL/Hr) IV Continuous <Continuous>  dextrose 50% Injectable 12.5 Gram(s) IV Push once  dextrose 50% Injectable 25 Gram(s) IV Push once  dextrose 50% Injectable 25 Gram(s) IV Push once  furosemide   Injectable 40 milliGRAM(s) IV Push daily  insulin glargine Injectable (LANTUS) 10 Unit(s) SubCutaneous at bedtime  insulin lispro (HumaLOG) corrective regimen sliding scale   SubCutaneous three times a day before meals  metoprolol tartrate 25 milliGRAM(s) Oral two times a day    MEDICATIONS  (PRN):  dextrose 40% Gel 15 Gram(s) Oral once PRN Blood Glucose LESS THAN 70 milliGRAM(s)/deciliter  glucagon  Injectable 1 milliGRAM(s) IntraMuscular once PRN Glucose LESS THAN 70 milligrams/deciliter      Vital Signs Last 24 Hrs  T(F): 97.7 (18 @ 21:00), Max: 97.9 (18 @ 12:29)  HR: 65 (18 @ 21:00) (65 - 112)  BP: 126/67 (18 @ 21:00) (100/71 - 132/83)  RR: 18 (18 @ 21:00) (17 - 18)  SpO2: 95% (18 @ 21:00) (92% - 96%)  Telemetry:   CAPILLARY BLOOD GLUCOSE      POCT Blood Glucose.: 135 mg/dL (2018 21:57)  POCT Blood Glucose.: 66 mg/dL (2018 21:23)  POCT Blood Glucose.: 65 mg/dL (2018 21:22)  POCT Blood Glucose.: 133 mg/dL (2018 17:01)  POCT Blood Glucose.: 258 mg/dL (2018 11:56)  POCT Blood Glucose.: 217 mg/dL (2018 07:46)  POCT Blood Glucose.: 213 mg/dL (2018 05:03)    I&O's Summary    2018 07:01  -  2018 07:00  --------------------------------------------------------  IN: 400 mL / OUT: 750 mL / NET: -350 mL    2018 07:01  -  2018 22:27  --------------------------------------------------------  IN: 530 mL / OUT: 1200 mL / NET: -670 mL        PHYSICAL EXAM:  GENERAL: NAD, well-developed  HEAD:  Atraumatic, Normocephalic  EYES: EOMI, PERRLA, conjunctiva and sclera clear  NECK: Supple, No JVD  CHEST/LUNG: Clear to auscultation bilaterally; No wheeze  HEART: Regular rate and rhythm; No murmurs, rubs, or gallops  ABDOMEN: Soft, Nontender, Nondistended; Bowel sounds present  EXTREMITIES:  2+ Peripheral Pulses, No clubbing, cyanosis, or edema  PSYCH: AAOx3  NEUROLOGY: non-focal  SKIN: No rashes or lesions    LABS:                        12.7   8.56  )-----------( 186      ( 2018 07:56 )             39.4     06-02    131<L>  |  94<L>  |  86<H>  ----------------------------<  219<H>  4.4   |  21<L>  |  2.05<H>    Ca    9.1      2018 06:27  Phos  3.8     06-01    TPro  7.7  /  Alb  4.4  /  TBili  0.6  /  DBili  x   /  AST  74<H>  /  ALT  64<H>  /  AlkPhos  156<H>  06-01    PT/INR - ( 2018 15:38 )   PT: 14.1 sec;   INR: 1.30 ratio         PTT - ( 2018 15:38 )  PTT:30.1 sec  CARDIAC MARKERS ( 2018 15:52 )  x     / x     / 160 U/L / x     / x      CARDIAC MARKERS ( 2018 06:42 )  x     / 0.19 ng/mL / 189 U/L / x     / 13.6 ng/mL  CARDIAC MARKERS ( 2018 00:16 )  x     / 0.19 ng/mL / 199 U/L / x     / 14.1 ng/mL  CARDIAC MARKERS ( 2018 15:38 )  x     / 0.17 ng/mL / x     / x     / x          Urinalysis Basic - ( 2018 23:22 )    Color: Yellow / Appearance: Clear / S.013 / pH: x  Gluc: x / Ketone: Negative  / Bili: Negative / Urobili: Negative mg/dL   Blood: x / Protein: Negative mg/dL / Nitrite: Negative   Leuk Esterase: Negative / RBC: x / WBC x   Sq Epi: x / Non Sq Epi: x / Bacteria: x        RADIOLOGY & ADDITIONAL TESTS:    Imaging Personally Reviewed:    Consultant(s) Notes Reviewed:      Care Discussed with Consultants/Other Providers:

## 2018-06-03 LAB
ANION GAP SERPL CALC-SCNC: 16 MMOL/L — SIGNIFICANT CHANGE UP (ref 5–17)
BUN SERPL-MCNC: 91 MG/DL — HIGH (ref 7–23)
CALCIUM SERPL-MCNC: 9.1 MG/DL — SIGNIFICANT CHANGE UP (ref 8.4–10.5)
CHLORIDE SERPL-SCNC: 96 MMOL/L — SIGNIFICANT CHANGE UP (ref 96–108)
CO2 SERPL-SCNC: 21 MMOL/L — LOW (ref 22–31)
CREAT SERPL-MCNC: 2.17 MG/DL — HIGH (ref 0.5–1.3)
GLUCOSE BLDC GLUCOMTR-MCNC: 125 MG/DL — HIGH (ref 70–99)
GLUCOSE BLDC GLUCOMTR-MCNC: 135 MG/DL — HIGH (ref 70–99)
GLUCOSE BLDC GLUCOMTR-MCNC: 164 MG/DL — HIGH (ref 70–99)
GLUCOSE BLDC GLUCOMTR-MCNC: 91 MG/DL — SIGNIFICANT CHANGE UP (ref 70–99)
GLUCOSE SERPL-MCNC: 91 MG/DL — SIGNIFICANT CHANGE UP (ref 70–99)
HCT VFR BLD CALC: 38.3 % — LOW (ref 39–50)
HGB BLD-MCNC: 12.5 G/DL — LOW (ref 13–17)
MCHC RBC-ENTMCNC: 27.1 PG — SIGNIFICANT CHANGE UP (ref 27–34)
MCHC RBC-ENTMCNC: 32.6 GM/DL — SIGNIFICANT CHANGE UP (ref 32–36)
MCV RBC AUTO: 82.9 FL — SIGNIFICANT CHANGE UP (ref 80–100)
PLATELET # BLD AUTO: 196 K/UL — SIGNIFICANT CHANGE UP (ref 150–400)
POTASSIUM SERPL-MCNC: 3.8 MMOL/L — SIGNIFICANT CHANGE UP (ref 3.5–5.3)
POTASSIUM SERPL-SCNC: 3.8 MMOL/L — SIGNIFICANT CHANGE UP (ref 3.5–5.3)
RBC # BLD: 4.62 M/UL — SIGNIFICANT CHANGE UP (ref 4.2–5.8)
RBC # FLD: 15.9 % — HIGH (ref 10.3–14.5)
SODIUM SERPL-SCNC: 133 MMOL/L — LOW (ref 135–145)
WBC # BLD: 9.59 K/UL — SIGNIFICANT CHANGE UP (ref 3.8–10.5)
WBC # FLD AUTO: 9.59 K/UL — SIGNIFICANT CHANGE UP (ref 3.8–10.5)

## 2018-06-03 PROCEDURE — 76770 US EXAM ABDO BACK WALL COMP: CPT | Mod: 26

## 2018-06-03 RX ORDER — POTASSIUM CHLORIDE 20 MEQ
20 PACKET (EA) ORAL ONCE
Qty: 0 | Refills: 0 | Status: COMPLETED | OUTPATIENT
Start: 2018-06-03 | End: 2018-06-03

## 2018-06-03 RX ORDER — METOPROLOL TARTRATE 50 MG
50 TABLET ORAL
Qty: 0 | Refills: 0 | Status: DISCONTINUED | OUTPATIENT
Start: 2018-06-03 | End: 2018-06-06

## 2018-06-03 RX ADMIN — INSULIN GLARGINE 10 UNIT(S): 100 INJECTION, SOLUTION SUBCUTANEOUS at 21:27

## 2018-06-03 RX ADMIN — Medication 20 MILLIEQUIVALENT(S): at 17:16

## 2018-06-03 RX ADMIN — APIXABAN 5 MILLIGRAM(S): 2.5 TABLET, FILM COATED ORAL at 17:16

## 2018-06-03 RX ADMIN — Medication 25 MILLIGRAM(S): at 05:05

## 2018-06-03 RX ADMIN — APIXABAN 5 MILLIGRAM(S): 2.5 TABLET, FILM COATED ORAL at 05:05

## 2018-06-03 RX ADMIN — Medication 50 MILLIGRAM(S): at 17:19

## 2018-06-03 RX ADMIN — Medication 81 MILLIGRAM(S): at 12:28

## 2018-06-03 RX ADMIN — ATORVASTATIN CALCIUM 20 MILLIGRAM(S): 80 TABLET, FILM COATED ORAL at 21:27

## 2018-06-03 RX ADMIN — Medication 40 MILLIGRAM(S): at 05:05

## 2018-06-03 NOTE — CHART NOTE - NSCHARTNOTEFT_GEN_A_CORE
Notified by RN that patient was hypoglycemic to 65. Hypoglycemia protocol was activated and patient received glucose gel with resolution of the blood glucose to 135. Patient seen at the bedside at approximately 10:15 PM. At the time when his blood sugar was low, patient was asymptomatic. Denied blurred vision, fatigue, diaphoresis     · Notification Details(SBAR)                                                 Situation	BG 65, with repeat BG 66 at bedtime glucose testing.  · Background	Pt admitted with dx. A-fib with RVR, hyperglycemia, hyperkalemia, hyponatremia. PMH: Type II DM. Currently on Humalog sliding scale ac & 10 units Lantus at HS.  · Assessment	No change in LOC, remains A&O x 4 in relaxed mood, denies blurred vision/fatigue, no diphoresis, no ectopies on telemetry.  PO Glucose 15gran administered as protocol- repeat  after PO glucose administration. Pt states he did not finish his dinner plate completely.  · Recommendations	PA made aware.  · Action/Treatment Ordered:	Repeat BG x 1, continue to monitor pt closely.    Electronic Signatures for Addendum Section:   Sae Kaba (WAYNE) (Signed Addendum 02-Jun-2018 23:40)  	6605- repeat , no change in pt's status. MARJORIE St aware, will continue to monitor pt closely. Notified by RN that patient was hypoglycemic to 65. Hypoglycemia protocol was activated and patient received glucose gel with resolution of the blood glucose to 135. Patient seen at the bedside at approximately 10:15 PM. At the time when his blood sugar was low, patient was asymptomatic. Denied blurred vision, fatigue, diaphoresis, increased urination or thirst. Instructed RN to repeat finger stick to ensure that patient is still above 100 and is not dropping. Repeat . Will continue to monitor overnight.     Shaniqua St PA-C  [id number]

## 2018-06-03 NOTE — PROGRESS NOTE ADULT - ATTENDING COMMENTS
Patient seen and examined, agree with the above assessment and plan by AURELIA Knight.  Volume status improving, net negative> 1.5liters after lasix  Change lasix to 40mg PO daily after IV dose today  Overall AF rates improved, cont to monitor, if rates remain high w ambulation increase metoprolol to 50mg BID  Cont Eliquis for CVA ppx  Glucose control per Endocrine  Med F/U

## 2018-06-03 NOTE — PROGRESS NOTE ADULT - SUBJECTIVE AND OBJECTIVE BOX
CARDIOLOGY FOLLOW UP - Dr. Burks    CC no cp/sob  coverage for [facility]       PHYSICAL EXAM:  T(C): 36.5 (06-03-18 @ 04:19), Max: 36.6 (06-02-18 @ 12:29)  HR: 80 (06-03-18 @ 04:19) (65 - 80)  BP: 125/86 (06-03-18 @ 04:19) (100/71 - 126/67)  RR: 18 (06-03-18 @ 04:19) (18 - 18)  SpO2: 95% (06-03-18 @ 04:19) (92% - 95%)  Wt(kg): --  I&O's Summary    02 Jun 2018 07:01  -  03 Jun 2018 07:00  --------------------------------------------------------  IN: 530 mL / OUT: 1850 mL / NET: -1320 mL        Appearance: Normal	  Cardiovascular: Normal S1 S2,irregular, No JVD, No murmurs  Respiratory: coarse   Gastrointestinal:  Soft, Non-tender, + BS	  Extremities: Normal range of motion, No clubbing, B/L le edema       MEDICATIONS  (STANDING):  apixaban 5 milliGRAM(s) Oral every 12 hours  aspirin  chewable 81 milliGRAM(s) Oral daily  atorvastatin 20 milliGRAM(s) Oral at bedtime  dextrose 5%. 1000 milliLiter(s) (50 mL/Hr) IV Continuous <Continuous>  dextrose 50% Injectable 12.5 Gram(s) IV Push once  dextrose 50% Injectable 25 Gram(s) IV Push once  dextrose 50% Injectable 25 Gram(s) IV Push once  furosemide   Injectable 40 milliGRAM(s) IV Push daily  insulin glargine Injectable (LANTUS) 10 Unit(s) SubCutaneous at bedtime  insulin lispro (HumaLOG) corrective regimen sliding scale   SubCutaneous three times a day before meals  metoprolol tartrate 25 milliGRAM(s) Oral two times a day      TELEMETRY: AFIB , brief episode of RVR 120s    ECG:  	  RADIOLOGY:   DIAGNOSTIC TESTING:  [ ] Echocardiogram:   [ ]  Catheterization:  [ ] Stress Test:    OTHER: 	    LABS:	 	                                12.7   8.56  )-----------( 186      ( 02 Jun 2018 07:56 )             39.4     06-02    131<L>  |  94<L>  |  86<H>  ----------------------------<  219<H>  4.4   |  21<L>  |  2.05<H>    Ca    9.1      02 Jun 2018 06:27  Phos  3.8     06-01    TPro  7.7  /  Alb  4.4  /  TBili  0.6  /  DBili  x   /  AST  74<H>  /  ALT  64<H>  /  AlkPhos  156<H>  06-01    PT/INR - ( 01 Jun 2018 15:38 )   PT: 14.1 sec;   INR: 1.30 ratio         PTT - ( 01 Jun 2018 15:38 )  PTT:30.1 sec

## 2018-06-03 NOTE — PROGRESS NOTE ADULT - ASSESSMENT
89 yo M w/ PMH of 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, HTN, HLD presenting from home for elevated fingersticks  noted to be in new onset atrial fibrillation with RVR.     1. New onset AFIB with RVR  etiology unclear, could be related to elevated BS, electrolyte imbalance, no clear infectious etiology, ?CHF   rate controlled with brief episode of RVR  if tachycardia persists, increase bb   CHADS 3 - continue eliquis  TTE from 2017 shows he has a dilated LA  pending echo to evaluate LV function/valvular disease     2. Acute CHF   pt. with some fluid overload on exam, +bl le edema  continue lasix 40mg IVP daily, likely change to PO tomorrow   continue bb  pending echo to evaluate LV function/valvular disease    3. DM type 2 (chronic)  pending endocrine consult  management per primary team     4. Hyperkalemia  resolved  renal f/u     dvt ppx 89 yo M w/ PMH of 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, HTN, HLD presenting from home for elevated fingersticks  noted to be in new onset atrial fibrillation with RVR.     1. New onset AFIB with RVR  etiology unclear, could be related to elevated BS, electrolyte imbalance, no clear infectious etiology, ?CHF   rate controlled with brief episode of RVR  if tachycardia persists, increase bb   CHADS 3 - continue eliquis  TTE from 2017 shows he has a dilated LA  pending echo to evaluate LV function/valvular disease     2. Acute CHF   pt. with some fluid overload on exam, +bl le edema  continue lasix 40mg IVP daily, change to PO tomorrow   continue bb  pending echo to evaluate LV function/valvular disease    3. DM type 2 (chronic)  endocrine f/u  management per primary team     4. Hyperkalemia  resolved  renal f/u     dvt ppx

## 2018-06-03 NOTE — PROGRESS NOTE ADULT - SUBJECTIVE AND OBJECTIVE BOX
NEPHROLOGY-[facility]  [phone]  Ritu Conrad NP      Patient seen and examined in bed. Events noted episode of hypoglycemic last night. Patient denies sob, chest pain, palpitations.   No nausea/vomiting, no abdominal pain. Feeling much better now.      MEDICATIONS  (STANDING):  apixaban 5 milliGRAM(s) Oral every 12 hours  aspirin  chewable 81 milliGRAM(s) Oral daily  atorvastatin 20 milliGRAM(s) Oral at bedtime  dextrose 5%. 1000 milliLiter(s) (50 mL/Hr) IV Continuous <Continuous>  dextrose 50% Injectable 12.5 Gram(s) IV Push once  dextrose 50% Injectable 25 Gram(s) IV Push once  dextrose 50% Injectable 25 Gram(s) IV Push once  furosemide   Injectable 40 milliGRAM(s) IV Push daily  insulin glargine Injectable (LANTUS) 10 Unit(s) SubCutaneous at bedtime  insulin lispro (HumaLOG) corrective regimen sliding scale   SubCutaneous three times a day before meals  metoprolol tartrate 25 milliGRAM(s) Oral two times a day      VITAL:  T(C): , Max: 36.6 (18 @ 12:29)  T(F): , Max: 97.9 (18 @ 12:29)  HR: 80 (18 @ 04:19)  BP: 125/86 (18 @ 04:19)  BP(mean): --  RR: 18 (18 @ 04:19)  SpO2: 95% (18 @ 04:19)  Wt(kg): --    I and O's:     @ 07:  -   @ 07:00  --------------------------------------------------------  IN: 530 mL / OUT: 1850 mL / NET: -1320 mL     @ 07:01  -   @ 11:18  --------------------------------------------------------  IN: 240 mL / OUT: 500 mL / NET: -260 mL          PHYSICAL EXAM:    Constitutional: NAD   Neck:  No JVD  Respiratory: Diminished at bases  Cardiovascular: S1 and S2  Gastrointestinal: BS+, soft, NT/ND  Extremities: 1+ B/L LE edema  : No James  Skin: No rashes  Access: Not applicable    LABS:                        12.5   9.59  )-----------( 196      ( 2018 08:41 )             38.3     2018 06:36    133<L>  |  96     |  91<H>  ----------------------------<  91     3.8     |  21<L>  |  2.17<H>  2018 06:27    131<L>  |  94<L>  |  86<H>  ----------------------------<  219<H>  4.4     |  21<L>  |  2.05<H>    Ca    9.1        2018 06:36  Ca    9.1        2018 06:27  Phos  3.8       2018 21:54    TPro  7.7    /  Alb  4.4    /  TBili  0.6    /  DBili  x      /  AST  74<H>  /  ALT  64<H>  /  AlkPhos  156<H>  2018 15:38        Urine Studies:  Urinalysis Basic - ( 2018 23:22 )    Color: Yellow / Appearance: Clear / S.013 / pH: x  Gluc: x / Ketone: Negative  / Bili: Negative / Urobili: Negative mg/dL   Blood: x / Protein: Negative mg/dL / Nitrite: Negative   Leuk Esterase: Negative / RBC: x / WBC x   Sq Epi: x / Non Sq Epi: x / Bacteria: x        RADIOLOGY & ADDITIONAL STUDIES:    Renal US results pending    ASSESSMENT:  89 year old male w/ PMH of 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, with nephropathy, retinopathy s/p laser tx, and neuropathy presenting w/ chief complaint of hyperglycemia and has new onset Afib    - 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

## 2018-06-03 NOTE — PROGRESS NOTE ADULT - SUBJECTIVE AND OBJECTIVE BOX
Patient is a 89y old  Male who presents with a chief complaint of uncontrolled DM and new onset AFib (2018 17:43)      SUBJECTIVE / OVERNIGHT EVENTS: no new events, on tele afib , had a run of WCT    MEDICATIONS  (STANDING):  apixaban 5 milliGRAM(s) Oral every 12 hours  aspirin  chewable 81 milliGRAM(s) Oral daily  atorvastatin 20 milliGRAM(s) Oral at bedtime  dextrose 5%. 1000 milliLiter(s) (50 mL/Hr) IV Continuous <Continuous>  dextrose 50% Injectable 12.5 Gram(s) IV Push once  dextrose 50% Injectable 25 Gram(s) IV Push once  dextrose 50% Injectable 25 Gram(s) IV Push once  insulin glargine Injectable (LANTUS) 10 Unit(s) SubCutaneous at bedtime  insulin lispro (HumaLOG) corrective regimen sliding scale   SubCutaneous three times a day before meals  metoprolol tartrate 25 milliGRAM(s) Oral two times a day  potassium chloride    Tablet ER 20 milliEquivalent(s) Oral once    MEDICATIONS  (PRN):  dextrose 40% Gel 15 Gram(s) Oral once PRN Blood Glucose LESS THAN 70 milliGRAM(s)/deciliter  glucagon  Injectable 1 milliGRAM(s) IntraMuscular once PRN Glucose LESS THAN 70 milligrams/deciliter      Vital Signs Last 24 Hrs  T(F): 97.6 (18 @ 12:06), Max: 97.7 (18 @ 21:00)  HR: 78 (18 @ 12:06) (65 - 80)  BP: 123/88 (18 @ 12:06) (123/88 - 126/67)  RR: 17 (18 @ 12:06) (17 - 18)  SpO2: 94% (18 @ 12:06) (94% - 95%)  Telemetry:   CAPILLARY BLOOD GLUCOSE      POCT Blood Glucose.: 135 mg/dL (2018 11:39)  POCT Blood Glucose.: 91 mg/dL (2018 07:41)  POCT Blood Glucose.: 180 mg/dL (2018 22:47)  POCT Blood Glucose.: 135 mg/dL (2018 21:57)  POCT Blood Glucose.: 66 mg/dL (2018 21:23)  POCT Blood Glucose.: 65 mg/dL (2018 21:22)  POCT Blood Glucose.: 133 mg/dL (2018 17:01)    I&O's Summary    2018 07:01  -  2018 07:00  --------------------------------------------------------  IN: 530 mL / OUT: 1850 mL / NET: -1320 mL    2018 07:01  -  2018 14:36  --------------------------------------------------------  IN: 600 mL / OUT: 500 mL / NET: 100 mL        PHYSICAL EXAM:  GENERAL: NAD, well-developed  HEAD:  Atraumatic, Normocephalic  EYES: EOMI, PERRLA, conjunctiva and sclera clear  NECK: Supple, No JVD  CHEST/LUNG: Clear to auscultation bilaterally; No wheeze  HEART: Regular rate and rhythm; No murmurs, rubs, or gallops  ABDOMEN: Soft, Nontender, Nondistended; Bowel sounds present  EXTREMITIES:  2+ Peripheral Pulses, No clubbing, cyanosis, less edema  PSYCH: AAOx3  NEUROLOGY: non-focal  SKIN: No rashes or lesions    LABS:                        12.5   9.59  )-----------( 196      ( 2018 08:41 )             38.3         133<L>  |  96  |  91<H>  ----------------------------<  91  3.8   |  21<L>  |  2.17<H>    Ca    9.1      2018 06:36  Phos  3.8         TPro  7.7  /  Alb  4.4  /  TBili  0.6  /  DBili  x   /  AST  74<H>  /  ALT  64<H>  /  AlkPhos  156<H>      PT/INR - ( 2018 15:38 )   PT: 14.1 sec;   INR: 1.30 ratio         PTT - ( 2018 15:38 )  PTT:30.1 sec  CARDIAC MARKERS ( 2018 15:52 )  x     / x     / 160 U/L / x     / x      CARDIAC MARKERS ( 2018 06:42 )  x     / 0.19 ng/mL / 189 U/L / x     / 13.6 ng/mL  CARDIAC MARKERS ( 2018 00:16 )  x     / 0.19 ng/mL / 199 U/L / x     / 14.1 ng/mL  CARDIAC MARKERS ( 2018 15:38 )  x     / 0.17 ng/mL / x     / x     / x          Urinalysis Basic - ( 2018 23:22 )    Color: Yellow / Appearance: Clear / S.013 / pH: x  Gluc: x / Ketone: Negative  / Bili: Negative / Urobili: Negative mg/dL   Blood: x / Protein: Negative mg/dL / Nitrite: Negative   Leuk Esterase: Negative / RBC: x / WBC x   Sq Epi: x / Non Sq Epi: x / Bacteria: x        RADIOLOGY & ADDITIONAL TESTS:    Imaging Personally Reviewed:    Consultant(s) Notes Reviewed:      Care Discussed with Consultants/Other Providers:

## 2018-06-04 ENCOUNTER — TRANSCRIPTION ENCOUNTER (OUTPATIENT)
Age: 83
End: 2018-06-04

## 2018-06-04 LAB
ANION GAP SERPL CALC-SCNC: 18 MMOL/L — HIGH (ref 5–17)
BUN SERPL-MCNC: 93 MG/DL — HIGH (ref 7–23)
CALCIUM SERPL-MCNC: 9.3 MG/DL — SIGNIFICANT CHANGE UP (ref 8.4–10.5)
CHLORIDE SERPL-SCNC: 95 MMOL/L — LOW (ref 96–108)
CO2 SERPL-SCNC: 20 MMOL/L — LOW (ref 22–31)
CREAT SERPL-MCNC: 2.18 MG/DL — HIGH (ref 0.5–1.3)
GLUCOSE BLDC GLUCOMTR-MCNC: 126 MG/DL — HIGH (ref 70–99)
GLUCOSE BLDC GLUCOMTR-MCNC: 139 MG/DL — HIGH (ref 70–99)
GLUCOSE BLDC GLUCOMTR-MCNC: 151 MG/DL — HIGH (ref 70–99)
GLUCOSE BLDC GLUCOMTR-MCNC: 160 MG/DL — HIGH (ref 70–99)
GLUCOSE BLDC GLUCOMTR-MCNC: 89 MG/DL — SIGNIFICANT CHANGE UP (ref 70–99)
GLUCOSE SERPL-MCNC: 95 MG/DL — SIGNIFICANT CHANGE UP (ref 70–99)
HCT VFR BLD CALC: 42.6 % — SIGNIFICANT CHANGE UP (ref 39–50)
HGB BLD-MCNC: 13.9 G/DL — SIGNIFICANT CHANGE UP (ref 13–17)
MAGNESIUM SERPL-MCNC: 2.4 MG/DL — SIGNIFICANT CHANGE UP (ref 1.6–2.6)
MCHC RBC-ENTMCNC: 27.3 PG — SIGNIFICANT CHANGE UP (ref 27–34)
MCHC RBC-ENTMCNC: 32.6 GM/DL — SIGNIFICANT CHANGE UP (ref 32–36)
MCV RBC AUTO: 83.7 FL — SIGNIFICANT CHANGE UP (ref 80–100)
PHOSPHATE SERPL-MCNC: 4.8 MG/DL — HIGH (ref 2.5–4.5)
PLATELET # BLD AUTO: 211 K/UL — SIGNIFICANT CHANGE UP (ref 150–400)
POTASSIUM SERPL-MCNC: 4 MMOL/L — SIGNIFICANT CHANGE UP (ref 3.5–5.3)
POTASSIUM SERPL-SCNC: 4 MMOL/L — SIGNIFICANT CHANGE UP (ref 3.5–5.3)
RBC # BLD: 5.09 M/UL — SIGNIFICANT CHANGE UP (ref 4.2–5.8)
RBC # FLD: 15.8 % — HIGH (ref 10.3–14.5)
SODIUM SERPL-SCNC: 133 MMOL/L — LOW (ref 135–145)
WBC # BLD: 10.2 K/UL — SIGNIFICANT CHANGE UP (ref 3.8–10.5)
WBC # FLD AUTO: 10.2 K/UL — SIGNIFICANT CHANGE UP (ref 3.8–10.5)

## 2018-06-04 PROCEDURE — 93306 TTE W/DOPPLER COMPLETE: CPT | Mod: 26

## 2018-06-04 PROCEDURE — 99233 SBSQ HOSP IP/OBS HIGH 50: CPT

## 2018-06-04 RX ORDER — DOCUSATE SODIUM 100 MG
100 CAPSULE ORAL THREE TIMES A DAY
Qty: 0 | Refills: 0 | Status: DISCONTINUED | OUTPATIENT
Start: 2018-06-04 | End: 2018-06-06

## 2018-06-04 RX ORDER — APIXABAN 2.5 MG/1
1 TABLET, FILM COATED ORAL
Qty: 60 | Refills: 0 | OUTPATIENT
Start: 2018-06-04 | End: 2018-07-03

## 2018-06-04 RX ORDER — INSULIN GLARGINE 100 [IU]/ML
6 INJECTION, SOLUTION SUBCUTANEOUS AT BEDTIME
Qty: 0 | Refills: 0 | Status: DISCONTINUED | OUTPATIENT
Start: 2018-06-04 | End: 2018-06-05

## 2018-06-04 RX ORDER — FUROSEMIDE 40 MG
20 TABLET ORAL DAILY
Qty: 0 | Refills: 0 | Status: DISCONTINUED | OUTPATIENT
Start: 2018-06-04 | End: 2018-06-05

## 2018-06-04 RX ADMIN — Medication 100 MILLIGRAM(S): at 21:01

## 2018-06-04 RX ADMIN — Medication 81 MILLIGRAM(S): at 12:03

## 2018-06-04 RX ADMIN — Medication 50 MILLIGRAM(S): at 05:23

## 2018-06-04 RX ADMIN — APIXABAN 5 MILLIGRAM(S): 2.5 TABLET, FILM COATED ORAL at 05:23

## 2018-06-04 RX ADMIN — ATORVASTATIN CALCIUM 20 MILLIGRAM(S): 80 TABLET, FILM COATED ORAL at 21:01

## 2018-06-04 RX ADMIN — INSULIN GLARGINE 6 UNIT(S): 100 INJECTION, SOLUTION SUBCUTANEOUS at 22:31

## 2018-06-04 RX ADMIN — Medication 20 MILLIGRAM(S): at 18:18

## 2018-06-04 RX ADMIN — Medication 2: at 18:18

## 2018-06-04 RX ADMIN — Medication 50 MILLIGRAM(S): at 18:18

## 2018-06-04 RX ADMIN — APIXABAN 5 MILLIGRAM(S): 2.5 TABLET, FILM COATED ORAL at 18:18

## 2018-06-04 NOTE — PROGRESS NOTE ADULT - SUBJECTIVE AND OBJECTIVE BOX
Chief Complaint: 89 y/o Type 2 DM 40 years noted with Hyperglycemia last 2 days prior to admission, noted with new onset rapid AF an admission.    No complaints. PO intake moderate.  FS trending lower, 89 this am.  CRF stable creat. 2.18    MEDICATIONS  (STANDING):  apixaban 5 milliGRAM(s) Oral every 12 hours  aspirin  chewable 81 milliGRAM(s) Oral daily  atorvastatin 20 milliGRAM(s) Oral at bedtime  dextrose 5%. 1000 milliLiter(s) (50 mL/Hr) IV Continuous <Continuous>  dextrose 50% Injectable 12.5 Gram(s) IV Push once  dextrose 50% Injectable 25 Gram(s) IV Push once  dextrose 50% Injectable 25 Gram(s) IV Push once  docusate sodium 100 milliGRAM(s) Oral three times a day  furosemide    Tablet 20 milliGRAM(s) Oral daily  insulin glargine Injectable (LANTUS) 10 Unit(s) SubCutaneous at bedtime  insulin lispro (HumaLOG) corrective regimen sliding scale   SubCutaneous three times a day before meals  metoprolol tartrate 50 milliGRAM(s) Oral two times a day    MEDICATIONS  (PRN):  dextrose 40% Gel 15 Gram(s) Oral once PRN Blood Glucose LESS THAN 70 milliGRAM(s)/deciliter  glucagon  Injectable 1 milliGRAM(s) IntraMuscular once PRN Glucose LESS THAN 70 milligrams/deciliter      Allergies    No Known Allergies    Intolerances        PHYSICAL EXAM:  VITALS: T(C): 36.6 (06-04-18 @ 12:36)  T(F): 97.8 (06-04-18 @ 12:36), Max: 98 (06-04-18 @ 04:44)  HR: 82 (06-04-18 @ 18:09) (80 - 86)  BP: 118/77 (06-04-18 @ 18:09) (105/76 - 121/77)  RR:  (18 - 18)  SpO2:  (93% - 97%)  GENERAL: NAD,   EYES: No proptosis,  HEENT:  Atraumatic, Normocephalic,   RESPIRATORY: Clear to auscultation bilaterally  CARDIOVASCULAR: Iregular rhythm; No murmurs; improving peripheral edema  GI: Soft, nontender, non distended, normal bowel sounds  SKIN: Dry, intact, No rashes or lesions  MUSCULOSKELETAL: normal strength  NEURO: extraocular movements intact, no tremor, decreased reflexes  PSYCH: Alert and oriented x 3, normal affect, normal mood      POCT Blood Glucose.: 151 mg/dL (06-04-18 @ 18:07)  POCT Blood Glucose.: 160 mg/dL (06-04-18 @ 16:34)  POCT Blood Glucose.: 139 mg/dL (06-04-18 @ 12:19)  POCT Blood Glucose.: 89 mg/dL (06-04-18 @ 07:46)  POCT Blood Glucose.: 164 mg/dL (06-03-18 @ 21:12)  POCT Blood Glucose.: 125 mg/dL (06-03-18 @ 16:28)  POCT Blood Glucose.: 135 mg/dL (06-03-18 @ 11:39)  POCT Blood Glucose.: 91 mg/dL (06-03-18 @ 07:41)  POCT Blood Glucose.: 180 mg/dL (06-02-18 @ 22:47)  POCT Blood Glucose.: 135 mg/dL (06-02-18 @ 21:57)  POCT Blood Glucose.: 66 mg/dL (06-02-18 @ 21:23)  POCT Blood Glucose.: 65 mg/dL (06-02-18 @ 21:22)  POCT Blood Glucose.: 133 mg/dL (06-02-18 @ 17:01)  POCT Blood Glucose.: 258 mg/dL (06-02-18 @ 11:56)  POCT Blood Glucose.: 217 mg/dL (06-02-18 @ 07:46)  POCT Blood Glucose.: 213 mg/dL (06-02-18 @ 05:03)  POCT Blood Glucose.: 159 mg/dL (06-01-18 @ 22:11)  POCT Blood Glucose.: 167 mg/dL (06-01-18 @ 21:02)                            13.9   10.20 )-----------( 211      ( 04 Jun 2018 07:39 )             42.6       06-04    133<L>  |  95<L>  |  93<H>  ----------------------------<  95  4.0   |  20<L>  |  2.18<H>    EGFR if : 30<L>  EGFR if non : 26<L>    Ca    9.3      06-04  Mg     2.4     06-04  Phos  4.8     06-04        Thyroid Function Tests:  06-02 @ 08:01 TSH -- FreeT4 1.6 T3 -- Anti TPO -- Anti Thyroglobulin Ab -- TSI --  06-01 @ 18:45 TSH 3.81 FreeT4 -- T3 -- Anti TPO -- Anti Thyroglobulin Ab -- TSI --      Hemoglobin A1C, Whole Blood: 8.0 % <H> [4.0 - 5.6] (06-02-18 @ 07:56)

## 2018-06-04 NOTE — DISCHARGE NOTE ADULT - MEDICATION SUMMARY - MEDICATIONS TO TAKE
I will START or STAY ON the medications listed below when I get home from the hospital:    aspirin 81 mg oral tablet  -- 2 tab(s) by mouth once a day  -- Indication: For prevention of CAD     Avapro 75 mg oral tablet  -- 1 tab(s) by mouth once a day  -- Indication: For blood pressure     apixaban 5 mg oral tablet  -- 1 tab(s) by mouth every 12 hours  -- Indication: For Blood Thinner for Afib     apixaban 2.5 mg oral tablet  -- 1 tab(s) by mouth every 12 hours  -- Indication: For Blood thinner for Afib     Prandin 0.5 mg oral tablet  -- 1 tab(s) by mouth 3 times a day (before meals) - DO NOT TAKE IF YOU ARE NOT EATING  -- Indication: For Diabete type 2     pravastatin 40 mg oral tablet  -- 1 tab(s) by mouth once a day  -- Indication: For Elevated cholesterol     metoprolol tartrate 50 mg oral tablet  -- 1 tab(s) by mouth 2 times a day  -- Indication: For blood pressure     furosemide 40 mg oral tablet  -- 1 tab(s) by mouth once a day  -- Indication: For diurectic     docusate sodium 100 mg oral capsule  -- 1 cap(s) by mouth 3 times a day  -- Indication: For stool softner     Centrum Silver Men's  -- 1 tab(s) by mouth once a day  -- Indication: For vitamin supplement I will START or STAY ON the medications listed below when I get home from the hospital:    aspirin 81 mg oral tablet  -- 2 tab(s) by mouth once a day  -- Indication: For prevention of CAD     Avapro 75 mg oral tablet  -- 1 tab(s) by mouth once a day  -- Indication: For blood pressure     apixaban 5 mg oral tablet  -- 1 tab(s) by mouth every 12 hours  -- Indication: For Blood Thinner for Afib     apixaban 2.5 mg oral tablet  -- 1 tab(s) by mouth every 12 hours  -- Indication: For Blood thinner for Afib     Prandin 0.5 mg oral tablet  -- 1 tab(s) by mouth 3 times a day (before meals) - DO NOT TAKE IF YOU ARE NOT EATING  -- Indication: For Diabete type 2     pravastatin 40 mg oral tablet  -- 1 tab(s) by mouth once a day  -- Indication: For Elevated cholesterol     metoprolol tartrate 50 mg oral tablet  -- 1 tab(s) by mouth 2 times a day  -- Indication: For blood pressure     Lasix 20 mg oral tablet  -- 1 tab(s) by mouth once a day  -- Indication: For diuretic/water pill    docusate sodium 100 mg oral capsule  -- 1 cap(s) by mouth 3 times a day  -- Indication: For stool softner     Centrum Silver Men's  -- 1 tab(s) by mouth once a day  -- Indication: For vitamin supplement I will START or STAY ON the medications listed below when I get home from the hospital:    aspirin 81 mg oral tablet  -- 2 tab(s) by mouth once a day  -- Indication: For prevention of CAD     apixaban 2.5 mg oral tablet  -- 1 tab(s) by mouth every 12 hours  -- Indication: For Blood thinner for Afib     Prandin 0.5 mg oral tablet  -- 1 tab(s) by mouth 2 times a day  Breakfast and lunch   -- Indication: For diabetes type 2    Prandin 1 mg oral tablet  -- 1 tab(s) by mouth once a day MDD:please give at dinner ana  -- Do not drink alcoholic beverages when taking this medication.  It is very important that you take or use this exactly as directed.  Do not skip doses or discontinue unless directed by your doctor.  Obtain medical advice before taking any non-prescription drugs as some may affect the action of this medication.    -- Indication: For diabetes type 2     Onglyza 2.5 mg oral tablet  -- 1 tab(s) by mouth once a day  -- Indication: For diabletes type 2    pravastatin 40 mg oral tablet  -- 1 tab(s) by mouth once a day  -- Indication: For Elevated cholesterol     metoprolol tartrate 50 mg oral tablet  -- 1 tab(s) by mouth 2 times a day  -- Indication: For blood pressure     Lasix 20 mg oral tablet  -- 1 tab(s) by mouth once a day  -- Indication: For diuretic/water pill    docusate sodium 100 mg oral capsule  -- 1 cap(s) by mouth 3 times a day  -- Indication: For stool softner     Centrum Silver Men's  -- 1 tab(s) by mouth once a day  -- Indication: For vitamin supplement I will START or STAY ON the medications listed below when I get home from the hospital:    aspirin 81 mg oral tablet  -- 2 tab(s) by mouth once a day  -- Indication: For prevention of CAD     Eliquis 5 mg oral tablet  -- 1 tab(s) by mouth 2 times a day  -- Indication: For Blood thinner for Afib     Prandin 0.5 mg oral tablet  -- 1 tab(s) by mouth 2 times a day  Breakfast and lunch   -- Indication: For diabetes type 2    Prandin 1 mg oral tablet  -- 1 tab(s) by mouth once a day MDD:please give at dinner ana  -- Do not drink alcoholic beverages when taking this medication.  It is very important that you take or use this exactly as directed.  Do not skip doses or discontinue unless directed by your doctor.  Obtain medical advice before taking any non-prescription drugs as some may affect the action of this medication.    -- Indication: For diabetes type 2     Onglyza 2.5 mg oral tablet  -- 1 tab(s) by mouth once a day  -- Indication: For diabletes type 2    pravastatin 40 mg oral tablet  -- 1 tab(s) by mouth once a day  -- Indication: For Elevated cholesterol     metoprolol tartrate 50 mg oral tablet  -- 1 tab(s) by mouth 2 times a day  -- Indication: For blood pressure     Lasix 20 mg oral tablet  -- 1 tab(s) by mouth once a day  -- Indication: For diuretic/water pill    docusate sodium 100 mg oral capsule  -- 1 cap(s) by mouth 3 times a day  -- Indication: For stool softner     Centrum Silver Men's  -- 1 tab(s) by mouth once a day  -- Indication: For vitamin supplement I will START or STAY ON the medications listed below when I get home from the hospital:    aspirin 81 mg oral tablet  -- 1 tab(s) by mouth once a day  -- Indication: For Coronary artery disease prevention     Eliquis 5 mg oral tablet  -- 1 tab(s) by mouth 2 times a day  -- Indication: For Blood thinner for Afib     Prandin 0.5 mg oral tablet  -- 1 tab(s) by mouth 2 times a day  Breakfast and lunch   -- Indication: For diabetes type 2    Prandin 1 mg oral tablet  -- 1 tab(s) by mouth once a day MDD:please give at dinner ana  -- Do not drink alcoholic beverages when taking this medication.  It is very important that you take or use this exactly as directed.  Do not skip doses or discontinue unless directed by your doctor.  Obtain medical advice before taking any non-prescription drugs as some may affect the action of this medication.    -- Indication: For diabetes type 2     Onglyza 2.5 mg oral tablet  -- 1 tab(s) by mouth once a day  -- Indication: For diabletes type 2    pravastatin 40 mg oral tablet  -- 1 tab(s) by mouth once a day  -- Indication: For Elevated cholesterol     metoprolol tartrate 50 mg oral tablet  -- 1 tab(s) by mouth 2 times a day  -- Indication: For blood pressure     Lasix 20 mg oral tablet  -- 1 tab(s) by mouth once a day  -- Indication: For diuretic/water pill    docusate sodium 100 mg oral capsule  -- 1 cap(s) by mouth 3 times a day  -- Indication: For stool softner     Centrum Silver Men's  -- 1 tab(s) by mouth once a day  -- Indication: For vitamin supplement

## 2018-06-04 NOTE — DIETITIAN INITIAL EVALUATION ADULT. - NS AS NUTRI INTERV ED CONTENT
Provided in-depth nutrition education for Type 2 Diabetes. Discussed foods that contain carbohydrates, keeping carbohydrate intake consistent, maintaining consistent meal patterns, controlling portion sizes, consuming protein with carbohydrates, avoiding concentrated sweets (sugar sweetened cereals) and monitoring blood glucose. Discussed the plate method for healthful, well balanced meals. Also discussed limiting saturated fats and avoiding high sodium foods. Reviewed signs/symptoms and treatment of hypoglycemia. Also discussed food high in K and Phos. Provided pt with information about the [facility] outpatient Diabetes wellness program with contact information. Provided written materials.

## 2018-06-04 NOTE — DISCHARGE NOTE ADULT - HOSPITAL COURSE
to be done 89 yo M w/ PMH of 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, follows with endo Dr. Paniting, with nephropathy, retinopathy s/p laser tx, and neuropathy last A1c at [facility] 8% (10/5/17), presenting w/ chief complaint of hyperglycemia, above 400 since yesterday. Patient noticed that his blood sugar was high yesterday, in the 400s. Patient called his endocrinologist, who recommended taking three, instead of two, of his Glibizide. Patient did his  and blood sugar this am was in the 200s, but was in the 400s again this afternoon, prompting his family to bring him to the ED for further evaluation. Patient also endorses recent weight gain in the setting of anorexia. Denies headache, vision change, dizziness, lightheadedness, n/v/d/c, urinary symptoms, fevers/chills, cp, sob, ab pain.     Problem/plan # 1: Hyperglycemia     - placed on Lantus and FS sliding scale while in hospital     - discharged on Prandin 0.5 mg po breakfast / Lunch     - Prandin 1 mg po at dinnet     - continue Onglyza 2.5 mg po daily    - Follow up with Dr. Butterfield  Problem/plan # 2:  New onset Afib      - Troponin negative x 3      -  Seen by EP team - Start Eliquis 5mg po BID     - Continue Aspirin 81 mg po daily      - Metprolol 50 mg po BID     - Follow up with Dr. Martins/Dr. Goldberg    Problem/plan # 3: CHF       - Diuresing with IV Lasix      - now on Lasix 20 mg po daily      - continue Metoprolol   Problem/plan # 4 PARAS w/ hyponatremia, hyperkalemia       - Hold ACE/ARB for now        - Creatine improving - (today 2.04) from 2.18        - Follow up with Nephrologist as outpatient

## 2018-06-04 NOTE — DIETITIAN INITIAL EVALUATION ADULT. - ADHERENCE
Pt's wife reports pt tries to follow a healthy diet and avoids sweets and limits carbohydrates. Pt's wife wasn't really sure of home medications for diabetes but provide list including Glimepiride, Onglyza, Pioglitazone and Repaglinide. Pt presented to hospital after having hyperglycemia with BG reported to be 400+, typically pt's wife reports his BG is 135-159mg/dL and up to 200mg/dL. Pt denies hypoglycemia at home. Hba1c 8.0%

## 2018-06-04 NOTE — DISCHARGE NOTE ADULT - ADDITIONAL INSTRUCTIONS
1. Please call to schedule an appointment with your Cardiologist within 1 week   2. please call to schedule an appointment with your Endocrinologist within 1-2 weeks   3  Please call to schedule an appointment with your Renal Doctor within 2 weeks

## 2018-06-04 NOTE — PROGRESS NOTE ADULT - ASSESSMENT
89 y/o Type 2 DM 40 years, retinopathy, neuropathy, noted with Hyperglycemia last 2 days prior to admission, noted with new onset rapid AF an admission, and worsening renal failure.    Patient on glimepiride 2 mg, onglyza 2.5 mg, and actos 30 mg daily. HbA1c of 8.0%.  Treatment changed to basal lantus 10 units and mid scale humalog, FS trending lower, mostly at am.    CHF improving, renal function stable, diuretics decreased.    Will reduce lantus to 6 units at HS daily, can D/C insulin on discharge.   As planned D/C on oral agents:  Prandin 0.5 mg for breakfast and lunch, 1.0 mg (2 tabs) for dinner and onglyza 2.5 mg daily.

## 2018-06-04 NOTE — DISCHARGE NOTE ADULT - VISION (WITH CORRECTIVE LENSES IF THE PATIENT USUALLY WEARS THEM):
wearing hearing aids bilateral/Normal vision: sees adequately in most situations; can see medication labels, newsprint

## 2018-06-04 NOTE — DIETITIAN INITIAL EVALUATION ADULT. - NS AS NUTRI INTERV MEALS SNACK
Consider adding low sodium and no concentrated Phos to Consistent Carbohydrate with snack diet. Monitor K and add modifier prn. Monitor po intake, GI tolerance, weight and lab values. Provide food preferences as requested by patient within therapeutic diet guidelines. RD to remain available for further nutritional interventions as indicated.

## 2018-06-04 NOTE — DISCHARGE NOTE ADULT - MEDICATION SUMMARY - MEDICATIONS TO STOP TAKING
I will STOP taking the medications listed below when I get home from the hospital:    furosemide 20 mg oral tablet  -- 1 tab(s) by mouth once a day    azithromycin 500 mg oral tablet  -- 1 tab(s) by mouth once a day    Ceftin 250 mg oral tablet  -- 1 tab(s) by mouth 2 times a day   -- Finish all this medication unless otherwise directed by prescriber.  Medication should be taken with plenty of water.  Take with food or milk.    dextromethorphan-guaifenesin 10 mg-100 mg/5 mL oral liquid  -- 5 milliliter(s) by mouth every 6 hours, As needed, Cough I will STOP taking the medications listed below when I get home from the hospital:    azithromycin 500 mg oral tablet  -- 1 tab(s) by mouth once a day    Ceftin 250 mg oral tablet  -- 1 tab(s) by mouth 2 times a day   -- Finish all this medication unless otherwise directed by prescriber.  Medication should be taken with plenty of water.  Take with food or milk.    dextromethorphan-guaifenesin 10 mg-100 mg/5 mL oral liquid  -- 5 milliliter(s) by mouth every 6 hours, As needed, Cough I will STOP taking the medications listed below when I get home from the hospital:    Avapro 75 mg oral tablet  -- 1 tab(s) by mouth once a day    azithromycin 500 mg oral tablet  -- 1 tab(s) by mouth once a day    Ceftin 250 mg oral tablet  -- 1 tab(s) by mouth 2 times a day   -- Finish all this medication unless otherwise directed by prescriber.  Medication should be taken with plenty of water.  Take with food or milk.    dextromethorphan-guaifenesin 10 mg-100 mg/5 mL oral liquid  -- 5 milliliter(s) by mouth every 6 hours, As needed, Cough

## 2018-06-04 NOTE — DISCHARGE NOTE ADULT - PATIENT PORTAL LINK FT
You can access the [facility] Patient Portal, offered by [facility], by registering with the following website: [email or web address]

## 2018-06-04 NOTE — PROGRESS NOTE ADULT - ATTENDING COMMENTS
seen and examined with NP. I agree with H & P, A & P.  Given substrate for AF there is a high likelihood for recurrence with an attempt at DCCV and I would favor continued rate control.  IF we cannot maintain adequate rate control with ambulation would reconsider fro CV and AA Rx (eg amio).

## 2018-06-04 NOTE — PROGRESS NOTE ADULT - ASSESSMENT
89 yo male w uncontrolled DM noted to be in new onset asymptomatic rapid AFIb, PARAS and hyperkalemia

## 2018-06-04 NOTE — DIETITIAN INITIAL EVALUATION ADULT. - PROBLEM SELECTOR PLAN 9
chronic, though improved from prev values, follow up w PMD re recent abd US/work up. 10/17 had a nl abd US

## 2018-06-04 NOTE — DIETITIAN INITIAL EVALUATION ADULT. - OTHER INFO
Nutrition consult for education per request of pt's wife. RD provided in-depth nutrition education with written materials. Pt reports good po intake during admission. No N+V. Pt reports having a small BM today, reports constipation and requests stool softener. No chewing or swallowing difficulties at this time. NKFA

## 2018-06-04 NOTE — PROGRESS NOTE ADULT - ASSESSMENT
89 yo M w/ PMH of 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, HTN, HLD presenting from home for elevated fingersticks  noted to be in new onset atrial fibrillation with RVR.     1. New onset AFIB with RVR     CHADS 3 - continue eliquis, and rate control with metoprolol  TTE from 2017 shows he has a dilated LA  pending echo to evaluate LV function/valvular disease   If he feels fine, may be discharged for followup and elective outpatient cardioversion    2. DM type 2 (chronic)  endocrine f/u  management per primary team     Morro Martins MD  [facility]

## 2018-06-04 NOTE — DISCHARGE NOTE ADULT - SECONDARY DIAGNOSIS.
Acute on chronic diastolic (congestive) heart failure Type 2 diabetes mellitus with hyperglycemia, with long-term current use of insulin

## 2018-06-04 NOTE — DISCHARGE NOTE ADULT - CARE PROVIDERS DIRECT ADDRESSES
,DirectAddress_Unknown,DirectAddress_Unknown,DirectAddress_Unknown,[email or web address]

## 2018-06-04 NOTE — DIETITIAN INITIAL EVALUATION ADULT. - NS FNS WEIGHT CHANGE REASON
unintentional/Pt reports usual wt of 163-165 pounds, noted per RD note on 10/22/17 pt weighed 163.5 pounds and pt's current admission wt was 180.7 pounds- pt unsure of reasons for wt gain- fluid shifts maybe involved. Pt has been diuresed, recommend checking current standing wt as feasible.

## 2018-06-04 NOTE — DIETITIAN INITIAL EVALUATION ADULT. - ORAL INTAKE PTA
Typical intake: honey nut cheerios with fruit such as strawberries, blueberries or raspberries for breakfast; salad or scrambled eggs or shrimp for lunch; chicken or fish with vegetables and a small portion of rice or potato. Pt reports taking Vitamin D3 and vitamin for kidneys that pt and pt's wife could not recall./good

## 2018-06-04 NOTE — DISCHARGE NOTE ADULT - PLAN OF CARE
ongoing Atrial fibrillation is the most common heart rhythm problem & has the risk of stroke & heart attack  It helps if you control your blood pressure, not drink more than 1-2 alcohol drinks per day, cut down on caffeine, getting treatment for over active thyroid gland, & getting exercise  Call your doctor if you feel your heart racing or beating unusually, chest tightness or pain, lightheaded, faint, shortness of breath especially with exercise  It is important to take your heart medication as prescribed  You may be on anticoagulation which is very important to take as directed - you may need blood work to monitor drug levels Weigh yourself daily.  If you gain 3lbs in 3 days, or 5lbs in a week call your Health Care Provider.  Do not eat or drink foods containing more than 2000mg of salt (sodium) in your diet every day.  Call your Health Care Provider if you have any swelling or increased swelling in your feet, ankles, and/or stomach.  Take all of your medication as directed.  If you become dizzy call your Health Care Provider. HgA1C this admission = 8.0   Make sure you get your HgA1c checked every three months.  If you take oral diabetes medications, check your blood glucose two times a day.  If you take insulin, check your blood glucose before meals and at bedtime.  It's important not to skip any meals.  Keep a log of your blood glucose results and always take it with you to your doctor appointments.  Keep a list of your current medications including injectables and over the counter medications and bring this medication list with you to all your doctor appointments.  If you have not seen your ophthalmologist this year call for appointment.  Check your feet daily for redness, sores, or openings. Do not self treat. If no improvement in two days call your primary care physician for an appointment.  Low blood sugar (hypoglycemia) is a blood sugar below 70mg/dl. Check your blood sugar if you feel signs/symptoms of hypoglycemia. If your blood sugar is below 70 take 15 grams of carbohydrates (ex 4 oz of apple juice, 3-4 glucose tablets, or 4-6 oz of regular soda) wait 15 minutes and repeat blood sugar to make sure it comes up above 70.  If your blood sugar is above 70 and you are due for a meal, have a meal.  If you are not due for a meal have a snack.  This snack helps keeps your blood sugar at a safe range. Eliquis : is used  for non-valvular Atrial Fibrillation to thin the blood  to prevent clots from forming .  Take this medication twice as prescribed by your health care provider.  Take this medication with food to prevent upset stomach.  If you miss a dose call your health care provider or pharmacist right away.  Tell your doctor you use this drug before you have a spinal or epidural procedure  Tell dentists, surgeon, and other doctors that you use this drug.  You may bleed more easily.  Be careful and avoid injury.  Use a soft toothbrush and an electric razor.

## 2018-06-04 NOTE — PROGRESS NOTE ADULT - SUBJECTIVE AND OBJECTIVE BOX
Patient is a 89y old  Male who presents with a chief complaint of uncontrolled DM and new onset AFib (04 Jun 2018 11:08)      SUBJECTIVE / OVERNIGHT EVENTS: feels well, no dyspnea, no palps, has a good appetite    MEDICATIONS  (STANDING):  apixaban 5 milliGRAM(s) Oral every 12 hours  aspirin  chewable 81 milliGRAM(s) Oral daily  atorvastatin 20 milliGRAM(s) Oral at bedtime  dextrose 5%. 1000 milliLiter(s) (50 mL/Hr) IV Continuous <Continuous>  dextrose 50% Injectable 12.5 Gram(s) IV Push once  dextrose 50% Injectable 25 Gram(s) IV Push once  dextrose 50% Injectable 25 Gram(s) IV Push once  insulin glargine Injectable (LANTUS) 10 Unit(s) SubCutaneous at bedtime  insulin lispro (HumaLOG) corrective regimen sliding scale   SubCutaneous three times a day before meals  metoprolol tartrate 50 milliGRAM(s) Oral two times a day    MEDICATIONS  (PRN):  dextrose 40% Gel 15 Gram(s) Oral once PRN Blood Glucose LESS THAN 70 milliGRAM(s)/deciliter  glucagon  Injectable 1 milliGRAM(s) IntraMuscular once PRN Glucose LESS THAN 70 milligrams/deciliter      Vital Signs Last 24 Hrs  T(F): 97.8 (06-04-18 @ 12:36), Max: 98 (06-04-18 @ 04:44)  HR: 80 (06-04-18 @ 12:36) (80 - 86)  BP: 112/72 (06-04-18 @ 12:36) (105/76 - 121/77)  RR: 18 (06-04-18 @ 12:36) (18 - 18)  SpO2: 97% (06-04-18 @ 12:36) (93% - 97%)  Telemetry:   CAPILLARY BLOOD GLUCOSE      POCT Blood Glucose.: 139 mg/dL (04 Jun 2018 12:19)  POCT Blood Glucose.: 89 mg/dL (04 Jun 2018 07:46)  POCT Blood Glucose.: 164 mg/dL (03 Jun 2018 21:12)  POCT Blood Glucose.: 125 mg/dL (03 Jun 2018 16:28)    I&O's Summary    03 Jun 2018 07:01  -  04 Jun 2018 07:00  --------------------------------------------------------  IN: 1200 mL / OUT: 1800 mL / NET: -600 mL        PHYSICAL EXAM:  GENERAL: NAD, well-developed  HEAD:  Atraumatic, Normocephalic  EYES: EOMI, PERRLA, conjunctiva and sclera clear  NECK: Supple, No JVD  CHEST/LUNG: Clear to auscultation bilaterally; No wheeze  HEART: Regular rate and rhythm; No murmurs, rubs, or gallops  ABDOMEN: Soft, Nontender, Nondistended; Bowel sounds present  EXTREMITIES:  2+ Peripheral Pulses, No clubbing, cyanosis, or edema  PSYCH: AAOx3  NEUROLOGY: non-focal  SKIN: No rashes or lesions    LABS:                        13.9   10.20 )-----------( 211      ( 04 Jun 2018 07:39 )             42.6     06-04    133<L>  |  95<L>  |  93<H>  ----------------------------<  95  4.0   |  20<L>  |  2.18<H>    Ca    9.3      04 Jun 2018 06:27  Phos  4.8     06-04  Mg     2.4     06-04        CARDIAC MARKERS ( 02 Jun 2018 15:52 )  x     / x     / 160 U/L / x     / x              RADIOLOGY & ADDITIONAL TESTS:    Imaging Personally Reviewed:    Consultant(s) Notes Reviewed:      Care Discussed with Consultants/Other Providers:

## 2018-06-04 NOTE — PROGRESS NOTE ADULT - SUBJECTIVE AND OBJECTIVE BOX
JODY WILKINS    Patient is a 89y old  Male who presents with a chief complaint of uncontrolled DM and new onset AFib (04 Jun 2018 11:08)    Pt not examined as off the floor.  Tele reveals rate controlled AF 70-90s.    Allergies    No Known Allergies    MEDICATIONS  (STANDING):  apixaban 5 milliGRAM(s) Oral every 12 hours  aspirin  chewable 81 milliGRAM(s) Oral daily  atorvastatin 20 milliGRAM(s) Oral at bedtime  dextrose 5%. 1000 milliLiter(s) (50 mL/Hr) IV Continuous <Continuous>  dextrose 50% Injectable 12.5 Gram(s) IV Push once  dextrose 50% Injectable 25 Gram(s) IV Push once  dextrose 50% Injectable 25 Gram(s) IV Push once  insulin glargine Injectable (LANTUS) 10 Unit(s) SubCutaneous at bedtime  insulin lispro (HumaLOG) corrective regimen sliding scale   SubCutaneous three times a day before meals  metoprolol tartrate 50 milliGRAM(s) Oral two times a day    MEDICATIONS  (PRN):  dextrose 40% Gel 15 Gram(s) Oral once PRN Blood Glucose LESS THAN 70 milliGRAM(s)/deciliter  glucagon  Injectable 1 milliGRAM(s) IntraMuscular once PRN Glucose LESS THAN 70 milligrams/deciliter    PHYSICAL EXAM:  Vital Signs Last 24 Hrs  T(C): 36.7 (04 Jun 2018 04:44), Max: 36.7 (04 Jun 2018 04:44)  T(F): 98 (04 Jun 2018 04:44), Max: 98 (04 Jun 2018 04:44)  HR: 84 (04 Jun 2018 04:44) (78 - 86)  BP: 121/77 (04 Jun 2018 04:44) (105/76 - 123/88)  BP(mean): --  RR: 18 (04 Jun 2018 04:44) (17 - 18)  SpO2: 94% (04 Jun 2018 04:44) (93% - 94%)  Daily     Daily   I&O's Summary    03 Jun 2018 07:01  -  04 Jun 2018 07:00  --------------------------------------------------------  IN: 1200 mL / OUT: 1800 mL / NET: -600 mL    EKG:  Telemetry:  AF 70-90s    Labs:  CBC Full  -  ( 04 Jun 2018 07:39 )  WBC Count : 10.20 K/uL  Hemoglobin : 13.9 g/dL  Hematocrit : 42.6 %  Platelet Count - Automated : 211 K/uL  Mean Cell Volume : 83.7 fl  Mean Cell Hemoglobin : 27.3 pg  Mean Cell Hemoglobin Concentration : 32.6 gm/dL  Auto Neutrophil # : x  Auto Lymphocyte # : x  Auto Monocyte # : x  Auto Eosinophil # : x  Auto Basophil # : x  Auto Neutrophil % : x  Auto Lymphocyte % : x  Auto Monocyte % : x  Auto Eosinophil % : x  Auto Basophil % : x    CARDIAC MARKERS ( 02 Jun 2018 15:52 )  x     / x     / 160 U/L / x     / x

## 2018-06-04 NOTE — DISCHARGE NOTE ADULT - CARE PLAN
Principal Discharge DX:	Atrial fibrillation with RVR  Goal:	ongoing  Assessment and plan of treatment:	Atrial fibrillation is the most common heart rhythm problem & has the risk of stroke & heart attack  It helps if you control your blood pressure, not drink more than 1-2 alcohol drinks per day, cut down on caffeine, getting treatment for over active thyroid gland, & getting exercise  Call your doctor if you feel your heart racing or beating unusually, chest tightness or pain, lightheaded, faint, shortness of breath especially with exercise  It is important to take your heart medication as prescribed  You may be on anticoagulation which is very important to take as directed - you may need blood work to monitor drug levels  Secondary Diagnosis:	Acute on chronic diastolic (congestive) heart failure  Assessment and plan of treatment:	Weigh yourself daily.  If you gain 3lbs in 3 days, or 5lbs in a week call your Health Care Provider.  Do not eat or drink foods containing more than 2000mg of salt (sodium) in your diet every day.  Call your Health Care Provider if you have any swelling or increased swelling in your feet, ankles, and/or stomach.  Take all of your medication as directed.  If you become dizzy call your Health Care Provider.  Secondary Diagnosis:	Type 2 diabetes mellitus with hyperglycemia, with long-term current use of insulin  Assessment and plan of treatment:	HgA1C this admission = 8.0   Make sure you get your HgA1c checked every three months.  If you take oral diabetes medications, check your blood glucose two times a day.  If you take insulin, check your blood glucose before meals and at bedtime.  It's important not to skip any meals.  Keep a log of your blood glucose results and always take it with you to your doctor appointments.  Keep a list of your current medications including injectables and over the counter medications and bring this medication list with you to all your doctor appointments.  If you have not seen your ophthalmologist this year call for appointment.  Check your feet daily for redness, sores, or openings. Do not self treat. If no improvement in two days call your primary care physician for an appointment.  Low blood sugar (hypoglycemia) is a blood sugar below 70mg/dl. Check your blood sugar if you feel signs/symptoms of hypoglycemia. If your blood sugar is below 70 take 15 grams of carbohydrates (ex 4 oz of apple juice, 3-4 glucose tablets, or 4-6 oz of regular soda) wait 15 minutes and repeat blood sugar to make sure it comes up above 70.  If your blood sugar is above 70 and you are due for a meal, have a meal.  If you are not due for a meal have a snack.  This snack helps keeps your blood sugar at a safe range.  Assessment and plan of treatment:	Eliquis : is used  for non-valvular Atrial Fibrillation to thin the blood  to prevent clots from forming .  Take this medication twice as prescribed by your health care provider.  Take this medication with food to prevent upset stomach.  If you miss a dose call your health care provider or pharmacist right away.  Tell your doctor you use this drug before you have a spinal or epidural procedure  Tell dentists, surgeon, and other doctors that you use this drug.  You may bleed more easily.  Be careful and avoid injury.  Use a soft toothbrush and an electric razor.

## 2018-06-04 NOTE — PROGRESS NOTE ADULT - ASSESSMENT
89 yo M w/ PMH T2DM HTN, HLD, CKD, pleural effusion s/p thoracentesis a/w very high fingersticks, edema  SOB  in new onset atrial fibrillation w RVR 120s.

## 2018-06-04 NOTE — PROGRESS NOTE ADULT - SUBJECTIVE AND OBJECTIVE BOX
HPI: feeling hungry    MEDICATIONS  (STANDING):  apixaban 5 milliGRAM(s) Oral every 12 hours  aspirin  chewable 81 milliGRAM(s) Oral daily  atorvastatin 20 milliGRAM(s) Oral at bedtime  dextrose 5%. 1000 milliLiter(s) (50 mL/Hr) IV Continuous <Continuous>  dextrose 50% Injectable 12.5 Gram(s) IV Push once  dextrose 50% Injectable 25 Gram(s) IV Push once  dextrose 50% Injectable 25 Gram(s) IV Push once  insulin glargine Injectable (LANTUS) 10 Unit(s) SubCutaneous at bedtime  insulin lispro (HumaLOG) corrective regimen sliding scale   SubCutaneous three times a day before meals  metoprolol tartrate 50 milliGRAM(s) Oral two times a day    MEDICATIONS  (PRN):  dextrose 40% Gel 15 Gram(s) Oral once PRN Blood Glucose LESS THAN 70 milliGRAM(s)/deciliter  glucagon  Injectable 1 milliGRAM(s) IntraMuscular once PRN Glucose LESS THAN 70 milligrams/deciliter    Allergies No Known Allergies    REVIEW OF SYSTEM:    Constitutional: denies fever, chills, fatigue  Neuro: denies headache, numbness, weakness, dizziness  Resp: denies cough, wheezing, decreased shortness of breath  CVS: denies chest pain, palpitations, + leg swelling  GI: denies abdominal pain, nausea, vomiting, diarrhea   : denies dysuria, frequency, incontinence  Skin: denies itching, burning, rashes, or lesions   Msk: denies joint pain     Vital Signs Last 24 Hrs  T(C): 36.7 (04 Jun 2018 04:44), Max: 36.7 (04 Jun 2018 04:44)  T(F): 98 (04 Jun 2018 04:44), Max: 98 (04 Jun 2018 04:44)  HR: 84 (04 Jun 2018 04:44) (78 - 86)  BP: 121/77 (04 Jun 2018 04:44) (105/76 - 123/88)  RR: 18 (04 Jun 2018 04:44) (17 - 18)  SpO2: 94% (04 Jun 2018 04:44) (93% - 94%)    Physical Exam:  General : elderly  well nourished,  and no acute distress  Neuro : Alert and oriented x 3, no focal deficits  HEENT : Sclera clear, no JVD,  no carotid bruits, neck supple, EOMI  Lungs:  diminished throughout right greater than left    Cardiovascular : irregular irreg  no murmurs, no rubs  GI : abdomen soft, NT, obese  + BS   : no suprapubic tenderness  Extremities : 2 + pitting ankles and lower calves  feet cool to touch   Skin : intact     TELE: AFib 60 -90    LABS:                        13.9   10.20 )-----------( 211      ( 04 Jun 2018 07:39 )             42.6     06-04    133<L>  |  95<L>  |  93<H>  ----------------------------<  95  4.0   |  20<L>  |  2.18<H>    Ca    9.3      04 Jun 2018 06:27  Phos  4.8     06-04  Mg     2.4     06-04   RADIOLOGY & ADDITIONAL TESTS:     Transthoracic Echocardiogram (08.30.17 @ 19:31) >  Conclusions: EF 56 %  1. Mitral annular calcification. Mild mitral regurgitation.  2. Calcified trileaflet aortic valve with normal opening.  3. Severely dilated left atrium.  LA volume index = 58  cc/m2.  4. Mild concentric left ventricular hypertrophy.  5. Normal left ventricular systolic function. No segmental  wall motion abnormalities.  6. Normal right ventricular size and function.

## 2018-06-04 NOTE — PROGRESS NOTE ADULT - SUBJECTIVE AND OBJECTIVE BOX
No pain, no shortness of breath      VITAL:  T(C): , Max: 36.7 (06-04-18 @ 04:44)  T(F): , Max: 98 (06-04-18 @ 04:44)  HR: 80 (06-04-18 @ 12:36)  BP: 112/72 (06-04-18 @ 12:36)  BP(mean): --  RR: 18 (06-04-18 @ 12:36)  SpO2: 97% (06-04-18 @ 12:36)  Wt(kg): --      PHYSICAL EXAM:    Constitutional: NAD; Alert  HEENT:  NCAT; DMM  Neck: No JVD; supple  Respiratory: distant BS b/l  Cardiac: RRR s1s2  Gastrointestinal: BS+, soft, NT/ND  Urologic: No thorpe  Extremities: 1+ b/l LE edema  Back: No CVAT b/l    LABS:                        13.9   10.20 )-----------( 211      ( 04 Jun 2018 07:39 )             42.6     Na(133)/K(4.0)/Cl(95)/HCO3(20)/BUN(93)/Cr(2.18)Glu(95)/Ca(9.3)/Mg(2.4)/PO4(4.8)    06-04 @ 06:27  Na(133)/K(3.8)/Cl(96)/HCO3(21)/BUN(91)/Cr(2.17)Glu(91)/Ca(9.1)/Mg(--)/PO4(--)    06-03 @ 06:36  Na(131)/K(4.4)/Cl(94)/HCO3(21)/BUN(86)/Cr(2.05)Glu(219)/Ca(9.1)/Mg(--)/PO4(--)    06-02 @ 06:27  Na(132)/K(4.1)/Cl(96)/HCO3(21)/BUN(86)/Cr(2.05)Glu(138)/Ca(9.1)/Mg(--)/PO4(3.8)    06-01 @ 21:54  Na(131)/K(4.1)/Cl(95)/HCO3(22)/BUN(85)/Cr(1.95)Glu(194)/Ca(9.1)/Mg(--)/PO4(--)    06-01 @ 19:19  Na(127)/K(5.7)/Cl(91)/HCO3(19)/BUN(88)/Cr(1.87)Glu(257)/Ca(9.3)/Mg(--)/PO4(--)    06-01 @ 15:38      ASSESSMENT: 89M w/ CKD, and DM2 with associated, retinopathy and neuropathy, 6/1/18 a/w new-onset AFib    (1)Renal - CKD4 - rather stable function over the past few days - GFR 15-25ml/min. Due to DM nephropathy? Seems not to be the case, given the lack of proteinuria.    (2)Lytes - acceptable for now    (3)New AFib      PLAN:   (1)No renal objection to use of Eliquis  (2)No further ACEI/ARB  (3)Could add back patient's outpatient Lasix (20qd)                Elvin Weston MD  [facility]  [phone] No pain, no shortness of breath      VITAL:  T(C): , Max: 36.7 (06-04-18 @ 04:44)  T(F): , Max: 98 (06-04-18 @ 04:44)  HR: 80 (06-04-18 @ 12:36)  BP: 112/72 (06-04-18 @ 12:36)  BP(mean): --  RR: 18 (06-04-18 @ 12:36)  SpO2: 97% (06-04-18 @ 12:36)  Wt(kg): --      PHYSICAL EXAM:    Constitutional: NAD; Alert  HEENT:  NCAT; DMM  Neck: No JVD; supple  Respiratory: distant BS b/l  Cardiac: RRR s1s2  Gastrointestinal: BS+, soft, NT/ND  Urologic: No thorpe  Extremities: 1+ b/l LE edema  Back: No CVAT b/l    LABS:                        13.9   10.20 )-----------( 211      ( 04 Jun 2018 07:39 )             42.6     Na(133)/K(4.0)/Cl(95)/HCO3(20)/BUN(93)/Cr(2.18)Glu(95)/Ca(9.3)/Mg(2.4)/PO4(4.8)    06-04 @ 06:27  Na(133)/K(3.8)/Cl(96)/HCO3(21)/BUN(91)/Cr(2.17)Glu(91)/Ca(9.1)/Mg(--)/PO4(--)    06-03 @ 06:36  Na(131)/K(4.4)/Cl(94)/HCO3(21)/BUN(86)/Cr(2.05)Glu(219)/Ca(9.1)/Mg(--)/PO4(--)    06-02 @ 06:27  Na(132)/K(4.1)/Cl(96)/HCO3(21)/BUN(86)/Cr(2.05)Glu(138)/Ca(9.1)/Mg(--)/PO4(3.8)    06-01 @ 21:54  Na(131)/K(4.1)/Cl(95)/HCO3(22)/BUN(85)/Cr(1.95)Glu(194)/Ca(9.1)/Mg(--)/PO4(--)    06-01 @ 19:19  Na(127)/K(5.7)/Cl(91)/HCO3(19)/BUN(88)/Cr(1.87)Glu(257)/Ca(9.3)/Mg(--)/PO4(--)    06-01 @ 15:38      ASSESSMENT: 89M w/ CKD, and DM2 with associated, retinopathy and neuropathy, 6/1/18 a/w new-onset AFib    (1)Renal - CKD4 - rather stable function over the past few days - GFR 15-25ml/min. Due to DM nephropathy? Seems not to be the case, given the lack of proteinuria.    (2)Lytes - acceptable for now    (3)New AFib      PLAN:   (1)No renal objection to use of Eliquis  (2)No further ACEI/ARB  (3)Could add back patient's outpatient Lasix (20qd)                Elvin Weston MD  [facility]  [phone]

## 2018-06-04 NOTE — DIETITIAN INITIAL EVALUATION ADULT. - ENERGY NEEDS
Ht: 66“, Wt: 180.7lbs, BMI: 29.2kg/m2, IBW: 142 lbs (+/-10%)  Pertinent Information: Pt admitted with hyperglycemia and new onset Afib. Pt with CKD 4-stable per nephrology.   2+ lynnette. ankle edema, no pressure injury

## 2018-06-04 NOTE — DISCHARGE NOTE ADULT - CARE PROVIDER_API CALL
Elvin Weston), Internal Medicine; Nephrology  [address]  [place] [address]  Phone: [phone]  Fax: [phone]    Xavier Horton), EndocrinologyMetabDiabetes; Internal Medicine  [address]  [address]  [place] [address]  Phone: [phone]  Fax: [phone]    Morro Martins), Cardiovascular Disease  [address]  [address]  [place] [address]  Phone: [phone]  Fax: [phone]    Kyree Barber), Vascular Surgery  [address]  [address]  [place] [address]  Phone: [phone]  Fax: [phone]

## 2018-06-05 DIAGNOSIS — R20.9 UNSPECIFIED DISTURBANCES OF SKIN SENSATION: ICD-10-CM

## 2018-06-05 LAB
ANION GAP SERPL CALC-SCNC: 17 MMOL/L — SIGNIFICANT CHANGE UP (ref 5–17)
BUN SERPL-MCNC: 90 MG/DL — HIGH (ref 7–23)
CALCIUM SERPL-MCNC: 9.1 MG/DL — SIGNIFICANT CHANGE UP (ref 8.4–10.5)
CHLORIDE SERPL-SCNC: 93 MMOL/L — LOW (ref 96–108)
CO2 SERPL-SCNC: 22 MMOL/L — SIGNIFICANT CHANGE UP (ref 22–31)
CREAT SERPL-MCNC: 2.15 MG/DL — HIGH (ref 0.5–1.3)
GLUCOSE BLDC GLUCOMTR-MCNC: 121 MG/DL — HIGH (ref 70–99)
GLUCOSE BLDC GLUCOMTR-MCNC: 133 MG/DL — HIGH (ref 70–99)
GLUCOSE BLDC GLUCOMTR-MCNC: 152 MG/DL — HIGH (ref 70–99)
GLUCOSE BLDC GLUCOMTR-MCNC: 153 MG/DL — HIGH (ref 70–99)
GLUCOSE BLDC GLUCOMTR-MCNC: 65 MG/DL — LOW (ref 70–99)
GLUCOSE BLDC GLUCOMTR-MCNC: 67 MG/DL — LOW (ref 70–99)
GLUCOSE BLDC GLUCOMTR-MCNC: 69 MG/DL — LOW (ref 70–99)
GLUCOSE SERPL-MCNC: 69 MG/DL — LOW (ref 70–99)
MAGNESIUM SERPL-MCNC: 2.4 MG/DL — SIGNIFICANT CHANGE UP (ref 1.6–2.6)
PHOSPHATE SERPL-MCNC: 4.2 MG/DL — SIGNIFICANT CHANGE UP (ref 2.5–4.5)
POTASSIUM SERPL-MCNC: 4.3 MMOL/L — SIGNIFICANT CHANGE UP (ref 3.5–5.3)
POTASSIUM SERPL-SCNC: 4.3 MMOL/L — SIGNIFICANT CHANGE UP (ref 3.5–5.3)
SODIUM SERPL-SCNC: 132 MMOL/L — LOW (ref 135–145)

## 2018-06-05 PROCEDURE — 99232 SBSQ HOSP IP/OBS MODERATE 35: CPT

## 2018-06-05 PROCEDURE — 93923 UPR/LXTR ART STDY 3+ LVLS: CPT | Mod: 26

## 2018-06-05 RX ORDER — FUROSEMIDE 40 MG
20 TABLET ORAL ONCE
Qty: 0 | Refills: 0 | Status: COMPLETED | OUTPATIENT
Start: 2018-06-05 | End: 2018-06-05

## 2018-06-05 RX ORDER — APIXABAN 2.5 MG/1
2.5 TABLET, FILM COATED ORAL EVERY 12 HOURS
Qty: 0 | Refills: 0 | Status: DISCONTINUED | OUTPATIENT
Start: 2018-06-05 | End: 2018-06-06

## 2018-06-05 RX ORDER — FUROSEMIDE 40 MG
40 TABLET ORAL DAILY
Qty: 0 | Refills: 0 | Status: DISCONTINUED | OUTPATIENT
Start: 2018-06-06 | End: 2018-06-06

## 2018-06-05 RX ADMIN — Medication 50 MILLIGRAM(S): at 18:14

## 2018-06-05 RX ADMIN — Medication 100 MILLIGRAM(S): at 13:33

## 2018-06-05 RX ADMIN — Medication 20 MILLIGRAM(S): at 06:40

## 2018-06-05 RX ADMIN — ATORVASTATIN CALCIUM 20 MILLIGRAM(S): 80 TABLET, FILM COATED ORAL at 21:04

## 2018-06-05 RX ADMIN — Medication 81 MILLIGRAM(S): at 11:58

## 2018-06-05 RX ADMIN — Medication 2: at 12:16

## 2018-06-05 RX ADMIN — APIXABAN 5 MILLIGRAM(S): 2.5 TABLET, FILM COATED ORAL at 18:14

## 2018-06-05 RX ADMIN — Medication 50 MILLIGRAM(S): at 06:40

## 2018-06-05 RX ADMIN — APIXABAN 5 MILLIGRAM(S): 2.5 TABLET, FILM COATED ORAL at 06:40

## 2018-06-05 RX ADMIN — Medication 2: at 18:14

## 2018-06-05 RX ADMIN — Medication 100 MILLIGRAM(S): at 06:41

## 2018-06-05 RX ADMIN — Medication 20 MILLIGRAM(S): at 11:58

## 2018-06-05 NOTE — PROGRESS NOTE ADULT - SUBJECTIVE AND OBJECTIVE BOX
Patient is a 89y old  Male who presents with a chief complaint of uncontrolled DM and new onset AFib (04 Jun 2018 11:08)      SUBJECTIVE / OVERNIGHT EVENTS: no dyspnea, wants to go home , denies pain. BGM     MEDICATIONS  (STANDING):  apixaban 5 milliGRAM(s) Oral every 12 hours  aspirin  chewable 81 milliGRAM(s) Oral daily  atorvastatin 20 milliGRAM(s) Oral at bedtime  dextrose 5%. 1000 milliLiter(s) (50 mL/Hr) IV Continuous <Continuous>  dextrose 50% Injectable 12.5 Gram(s) IV Push once  dextrose 50% Injectable 25 Gram(s) IV Push once  dextrose 50% Injectable 25 Gram(s) IV Push once  docusate sodium 100 milliGRAM(s) Oral three times a day  insulin lispro (HumaLOG) corrective regimen sliding scale   SubCutaneous three times a day before meals  metoprolol tartrate 50 milliGRAM(s) Oral two times a day    MEDICATIONS  (PRN):  dextrose 40% Gel 15 Gram(s) Oral once PRN Blood Glucose LESS THAN 70 milliGRAM(s)/deciliter  glucagon  Injectable 1 milliGRAM(s) IntraMuscular once PRN Glucose LESS THAN 70 milligrams/deciliter      Vital Signs Last 24 Hrs  T(F): 96.9 (06-05-18 @ 11:46), Max: 97.5 (06-04-18 @ 20:05)  HR: 87 (06-05-18 @ 13:31) (70 - 87)  BP: 124/85 (06-05-18 @ 13:31) (109/69 - 124/85)  RR: 18 (06-05-18 @ 13:31) (18 - 18)  SpO2: 96% (06-05-18 @ 13:31) (96% - 98%)  Telemetry:   CAPILLARY BLOOD GLUCOSE      POCT Blood Glucose.: 153 mg/dL (05 Jun 2018 12:06)  POCT Blood Glucose.: 121 mg/dL (05 Jun 2018 08:33)  POCT Blood Glucose.: 69 mg/dL (05 Jun 2018 08:11)  POCT Blood Glucose.: 67 mg/dL (05 Jun 2018 07:46)  POCT Blood Glucose.: 65 mg/dL (05 Jun 2018 07:45)  POCT Blood Glucose.: 126 mg/dL (04 Jun 2018 21:29)  POCT Blood Glucose.: 151 mg/dL (04 Jun 2018 18:07)    I&O's Summary    04 Jun 2018 07:01  -  05 Jun 2018 07:00  --------------------------------------------------------  IN: 540 mL / OUT: 1560 mL / NET: -1020 mL    05 Jun 2018 07:01  -  05 Jun 2018 17:54  --------------------------------------------------------  IN: 570 mL / OUT: 700 mL / NET: -130 mL        PHYSICAL EXAM:  GENERAL: NAD, well-developed  HEAD:  Atraumatic, Normocephalic  EYES: EOMI, PERRLA, conjunctiva and sclera clear  NECK: Supple, No JVD  CHEST/LUNG: Clear to auscultation bilaterally; No wheeze  HEART: Regular rate and rhythm; No murmurs, rubs, or gallops  ABDOMEN: Soft, Nontender, Nondistended; Bowel sounds present  EXTREMITIES:  2+ Peripheral Pulses, No clubbing, cyanosis, or edema  PSYCH: AAOx3  NEUROLOGY: non-focal  SKIN: No rashes or lesions    LABS:                        13.9   10.20 )-----------( 211      ( 04 Jun 2018 07:39 )             42.6     06-05    132<L>  |  93<L>  |  90<H>  ----------------------------<  69<L>  4.3   |  22  |  2.15<H>    Ca    9.1      05 Jun 2018 06:37  Phos  4.2     06-05  Mg     2.4     06-05                RADIOLOGY & ADDITIONAL TESTS:    Imaging Personally Reviewed:    Consultant(s) Notes Reviewed:      Care Discussed with Consultants/Other Providers:

## 2018-06-05 NOTE — PROGRESS NOTE ADULT - ASSESSMENT
89 yo M w/ PMH T2DM HTN, HLD, CKD, pleural effusion s/p thoracentesis. Admitted with uncontrolled DM type 2, acute on chronic renal disease and found with new  Afib with RVR  which has been rate controlled on betablocker therapy.  - continue rate control with lopressor 50mg po bid  - [id number] score of 4: continue with Eliquis   - monitor and replete electrolytes as necessary  - Vascular w/u/  per Primary  - Monitor on  telemetry    [id number] 89 yo M w/ PMH T2DM HTN, HLD, CKD, pleural effusion s/p thoracentesis. Admitted with uncontrolled DM type 2, acute on chronic renal disease and found with new  Afib with RVR  which has been rate controlled on betablocker therapy.  - continue rate control with lopressor 50mg po bid  - [id number] score of 4: continue with Eliquis   - monitor and replete electrolytes as necessary  - Vascular w/u/  per Primary  - Monitor/ resume  telemetry    [id number]

## 2018-06-05 NOTE — PROVIDER CONTACT NOTE (OTHER) - ACTION/TREATMENT ORDERED:
No intervention at this time. Pt on Metoprolol 50mg BID. Continue to monitor.
Repeat BG x 1, continue to monitor pt closely.
will continue to monitor, will contact with further orders

## 2018-06-05 NOTE — PROGRESS NOTE ADULT - SUBJECTIVE AND OBJECTIVE BOX
No pain, no shortness of breath      VITAL:  T(C): , Max: 36.6 (06-04-18 @ 12:36)  T(F): , Max: 97.8 (06-04-18 @ 12:36)  HR: 84 (06-05-18 @ 06:39)  BP: 124/82 (06-05-18 @ 06:39)  BP(mean): --  RR: 18 (06-05-18 @ 04:24)  SpO2: 97% (06-05-18 @ 04:24)      PHYSICAL EXAM:  Constitutional: NAD; Alert  HEENT:  NCAT; DMM  Neck: No JVD; supple  Respiratory: distant BS b/l  Cardiac: RRR s1s2  Gastrointestinal: BS+, soft, NT/ND  Urologic: No thorpe  Extremities: 1+ b/l LE edema  Back: No CVAT b/l      LABS:                        13.9   10.20 )-----------( 211      ( 04 Jun 2018 07:39 )             42.6     Na(132)/K(4.3)/Cl(93)/HCO3(22)/BUN(90)/Cr(2.15)Glu(69)/Ca(9.1)/Mg(2.4)/PO4(4.2)    06-05 @ 06:37  Na(133)/K(4.0)/Cl(95)/HCO3(20)/BUN(93)/Cr(2.18)Glu(95)/Ca(9.3)/Mg(2.4)/PO4(4.8)    06-04 @ 06:27  Na(133)/K(3.8)/Cl(96)/HCO3(21)/BUN(91)/Cr(2.17)Glu(91)/Ca(9.1)/Mg(--)/PO4(--)    06-03 @ 06:36      ASSESSMENT: 89M w/ CKD, and DM2 with associated, retinopathy and neuropathy, 6/1/18 a/w new-onset AFib    (1)Renal - CKD4 - stable function over the past few days - GFR 15-25ml/min. Due to DM nephropathy? Seems not to be the case, given the lack of proteinuria.    (2)Lytes - acceptable for now    (3)CV - BP/volume acceptable - back on outpatient Lasix dosage. No indication for Spironolactone for now. Would avoid ACEI/ARB, given that he is not proteinuric and in effort to maximize GFR in setting of being on Eliquis.      PLAN:   (1)Eliquis as ordered  (2)No further ACEI/ARB  (3)Could f/u at my office 3-6 weeks after discharge                Elvin Weston MD  [facility]  [phone]

## 2018-06-05 NOTE — PROGRESS NOTE ADULT - ATTENDING COMMENTS
seen and examined with NP. I agree with H & P, A & P.  agree with strategy of rate control. seen and examined with NP. I agree with H & P, A & P.  agree with strategy of rate control.  age > 80 and creat > 1.5 would dec Eliquis to 2.5 mg BID or change to COumadin

## 2018-06-05 NOTE — PROGRESS NOTE ADULT - ASSESSMENT
89 yo M w/ PMH of 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, HTN, HLD presenting from home for elevated fingersticks  noted to be in new onset atrial fibrillation with RVR.     1. New onset AFIB with RVR     CHADS 3 - continue eliquis, and rate control with metoprolol  Echo with moderate to severe LV dysfunction, last stress in office EF 44%, ? accuracy of echo LV function assessment in AF  Would not pursue ischemic workup in hospital, as he had recent normal perfusion, and small trop elevation without evolutionary pattern in setting of increased Cr and no chest pain.  EKG is nondiagnostic due to LBBB  Volume status is increased today, would increase lasix to 40 mg daily.  Cr is overall mildly up above baseline.    Decreased pulses with cool L foot and discoloration obtain SANTI, arterial ultrasound although he denies symptoms and his advanced age and renal function may preclude intervention. Consider vascular consult.  Discussed with Dr. Adolfo Martins MD  [facility]

## 2018-06-05 NOTE — PROGRESS NOTE ADULT - SUBJECTIVE AND OBJECTIVE BOX
FRANKY JODY    Patient is a 89y old  Male who presents with a chief complaint of uncontrolled DM and new onset AFib (2018 11:08)    Remains in AF with reasonable rate control.  Tele discontinued.  Denies chest pain or dyspnea.  Cr about the same.  Echo results noted moderate LV dysfunction.  Nuclear perfusion in office  normal perfusion EF 44%.  L toes cold and bluish.    Allergies    No Known Allergies    Intolerances      MEDICATIONS  (STANDING):  apixaban 5 milliGRAM(s) Oral every 12 hours  aspirin  chewable 81 milliGRAM(s) Oral daily  atorvastatin 20 milliGRAM(s) Oral at bedtime  dextrose 5%. 1000 milliLiter(s) (50 mL/Hr) IV Continuous <Continuous>  dextrose 50% Injectable 12.5 Gram(s) IV Push once  dextrose 50% Injectable 25 Gram(s) IV Push once  dextrose 50% Injectable 25 Gram(s) IV Push once  docusate sodium 100 milliGRAM(s) Oral three times a day  furosemide    Tablet 20 milliGRAM(s) Oral daily  insulin glargine Injectable (LANTUS) 6 Unit(s) SubCutaneous at bedtime  insulin lispro (HumaLOG) corrective regimen sliding scale   SubCutaneous three times a day before meals  metoprolol tartrate 50 milliGRAM(s) Oral two times a day    MEDICATIONS  (PRN):  dextrose 40% Gel 15 Gram(s) Oral once PRN Blood Glucose LESS THAN 70 milliGRAM(s)/deciliter  glucagon  Injectable 1 milliGRAM(s) IntraMuscular once PRN Glucose LESS THAN 70 milligrams/deciliter    PHYSICAL EXAM:  Vital Signs Last 24 Hrs  T(C): 36.4 (2018 04:24), Max: 36.6 (2018 12:36)  T(F): 97.5 (2018 04:24), Max: 97.8 (2018 12:36)  HR: 84 (2018 06:39) (70 - 84)  BP: 124/82 (2018 06:39) (112/72 - 124/82)  BP(mean): --  RR: 18 (2018 04:24) (18 - 18)  SpO2: 97% (2018 04:24) (97% - 98%)  Daily     Daily Weight in k.9 (2018 14:20)  I&O's Summary    2018 07:01  -  2018 07:00  --------------------------------------------------------  IN: 540 mL / OUT: 1560 mL / NET: -1020 mL        General Appearance: 	 Alert, cooperative, no distress  HEENT: normocephalic, atraumatic  Neck: JVP elevated at 14 cm  Lungs:  clear to auscultation and percussion bilaterally  Cor:  pmi 5th ICS MCL, Irregular rate and rhythm, S1 normal intensity, S2 normal intensity, CLAUDIA  Abdomen:	 soft, non-tender; bowel sounds normal; no masses,  no organomegaly  Extremities: 2+ BL edema, L toes cold bluish discoloration, decreased distal pulses bilaterally      EKG:  Telemetry:    Labs:  CBC Full  -  ( 2018 07:39 )  WBC Count : 10.20 K/uL  Hemoglobin : 13.9 g/dL  Hematocrit : 42.6 %  Platelet Count - Automated : 211 K/uL  Mean Cell Volume : 83.7 fl  Mean Cell Hemoglobin : 27.3 pg  Mean Cell Hemoglobin Concentration : 32.6 gm/dL  Auto Neutrophil # : x  Auto Lymphocyte # : x  Auto Monocyte # : x  Auto Eosinophil # : x  Auto Basophil # : x  Auto Neutrophil % : x  Auto Lymphocyte % : x  Auto Monocyte % : x  Auto Eosinophil % : x  Auto Basophil % : x

## 2018-06-05 NOTE — PROGRESS NOTE ADULT - SUBJECTIVE AND OBJECTIVE BOX
INTERVAL HPI/OVERNIGHT EVENTS: No significant overnight cardiac events reported. Patient denies chest pain/sob/dizziness/ palpitations.     MEDICATIONS  (STANDING):  apixaban 5 milliGRAM(s) Oral every 12 hours  aspirin  chewable 81 milliGRAM(s) Oral daily  atorvastatin 20 milliGRAM(s) Oral at bedtime  dextrose 5%. 1000 milliLiter(s) (50 mL/Hr) IV Continuous <Continuous>  dextrose 50% Injectable 12.5 Gram(s) IV Push once  dextrose 50% Injectable 25 Gram(s) IV Push once  dextrose 50% Injectable 25 Gram(s) IV Push once  docusate sodium 100 milliGRAM(s) Oral three times a day  insulin glargine Injectable (LANTUS) 6 Unit(s) SubCutaneous at bedtime  insulin lispro (HumaLOG) corrective regimen sliding scale   SubCutaneous three times a day before meals  metoprolol tartrate 50 milliGRAM(s) Oral two times a day    MEDICATIONS  (PRN):  dextrose 40% Gel 15 Gram(s) Oral once PRN Blood Glucose LESS THAN 70 milliGRAM(s)/deciliter  glucagon  Injectable 1 milliGRAM(s) IntraMuscular once PRN Glucose LESS THAN 70 milligrams/deciliter      Allergies  No Known Allergies    ROS:  General: Pt denies fevers/chills/ constitutional discomfort.  Cardiovascular: denies chest pain/palpitations  Respiratory and Thorax: denies SOB  Gastrointestinal: denies abdominal pain/diarrhea/bloody stool  Genitourinary: denies dysuria/hematuria  Musculoskeletal:denies restricted motion  Hematologic: denies abnormal bleeding    Vital Signs Last 24 Hrs  T(C): 36.1 (05 Jun 2018 11:46), Max: 36.6 (04 Jun 2018 12:36)  T(F): 96.9 (05 Jun 2018 11:46), Max: 97.8 (04 Jun 2018 12:36)  HR: 79 (05 Jun 2018 11:46) (70 - 84)  BP: 109/69 (05 Jun 2018 11:46) (109/69 - 124/82)  BP(mean): --  RR: 18 (05 Jun 2018 11:46) (18 - 18)  SpO2: 97% (05 Jun 2018 11:46) (97% - 98%)    Physical Exam:  Constitutional: well developed, well nourished,  and no acute distress  Neurological: Alert & Oriented x 3,  no focal deficits  Respiratory: Breathing nonlabored;  CTA bilaterally.  Cardiovascular: irregularly irregular  Gastrointestinal: softly distended, NT/ no rebound;  (+) BS  Extremities: cool bilateral upper and lower extremities. + 1 bilateral radial pulses, cap refill <3 seconds. 2+ bilateral  pedal edema.     LABS:                        13.9   10.20 )-----------( 211      ( 04 Jun 2018 07:39 )             42.6     06-05    132<L>  |  93<L>  |  90<H>  ----------------------------<  69<L>  4.3   |  22  |  2.15<H>    Ca    9.1      05 Jun 2018 06:37  Phos  4.2     06-05  Mg     2.4     06-05        RADIOLOGY & ADDITIONAL TESTS: < from: Transthoracic Echocardiogram (06.04.18 @ 10:22) >  EF (Teicholtz): 34 %  < from: Transthoracic Echocardiogram (06.04.18 @ 10:22) >  Severe concentric left ventricular hypertrophy.  2. Severe global left ventricular systolic dysfunction.  3. Normal right ventricular size with severely decreased  right ventricular systolic function. TAPSE 0.9cm.  4. Estimated pulmonary artery systolic pressure equals 58  mm Hg, assuming right atrial pressure equals 8 mm Hg,  consistent with moderate pulmonary pressures.  *** Compared with echocardiogram of 8/30/2017, interval  decline in biventricular function.      TELE: currently discontinued . AFib 70's to 90's previously

## 2018-06-06 VITALS
DIASTOLIC BLOOD PRESSURE: 72 MMHG | TEMPERATURE: 98 F | HEART RATE: 73 BPM | SYSTOLIC BLOOD PRESSURE: 117 MMHG | RESPIRATION RATE: 18 BRPM | OXYGEN SATURATION: 95 %

## 2018-06-06 LAB
ANION GAP SERPL CALC-SCNC: 17 MMOL/L — SIGNIFICANT CHANGE UP (ref 5–17)
BUN SERPL-MCNC: 85 MG/DL — HIGH (ref 7–23)
CALCIUM SERPL-MCNC: 9.2 MG/DL — SIGNIFICANT CHANGE UP (ref 8.4–10.5)
CHLORIDE SERPL-SCNC: 94 MMOL/L — LOW (ref 96–108)
CO2 SERPL-SCNC: 23 MMOL/L — SIGNIFICANT CHANGE UP (ref 22–31)
CREAT SERPL-MCNC: 2.04 MG/DL — HIGH (ref 0.5–1.3)
GLUCOSE BLDC GLUCOMTR-MCNC: 154 MG/DL — HIGH (ref 70–99)
GLUCOSE BLDC GLUCOMTR-MCNC: 71 MG/DL — SIGNIFICANT CHANGE UP (ref 70–99)
GLUCOSE SERPL-MCNC: 83 MG/DL — SIGNIFICANT CHANGE UP (ref 70–99)
HCT VFR BLD CALC: 41.6 % — SIGNIFICANT CHANGE UP (ref 39–50)
HGB BLD-MCNC: 13.7 G/DL — SIGNIFICANT CHANGE UP (ref 13–17)
MCHC RBC-ENTMCNC: 27.5 PG — SIGNIFICANT CHANGE UP (ref 27–34)
MCHC RBC-ENTMCNC: 32.9 GM/DL — SIGNIFICANT CHANGE UP (ref 32–36)
MCV RBC AUTO: 83.4 FL — SIGNIFICANT CHANGE UP (ref 80–100)
PLATELET # BLD AUTO: 202 K/UL — SIGNIFICANT CHANGE UP (ref 150–400)
POTASSIUM SERPL-MCNC: 4 MMOL/L — SIGNIFICANT CHANGE UP (ref 3.5–5.3)
POTASSIUM SERPL-SCNC: 4 MMOL/L — SIGNIFICANT CHANGE UP (ref 3.5–5.3)
RBC # BLD: 4.99 M/UL — SIGNIFICANT CHANGE UP (ref 4.2–5.8)
RBC # FLD: 16.3 % — HIGH (ref 10.3–14.5)
SODIUM SERPL-SCNC: 134 MMOL/L — LOW (ref 135–145)
WBC # BLD: 9.39 K/UL — SIGNIFICANT CHANGE UP (ref 3.8–10.5)
WBC # FLD AUTO: 9.39 K/UL — SIGNIFICANT CHANGE UP (ref 3.8–10.5)

## 2018-06-06 PROCEDURE — 82803 BLOOD GASES ANY COMBINATION: CPT

## 2018-06-06 PROCEDURE — 84443 ASSAY THYROID STIM HORMONE: CPT

## 2018-06-06 PROCEDURE — 84439 ASSAY OF FREE THYROXINE: CPT

## 2018-06-06 PROCEDURE — 80053 COMPREHEN METABOLIC PANEL: CPT

## 2018-06-06 PROCEDURE — 83874 ASSAY OF MYOGLOBIN: CPT

## 2018-06-06 PROCEDURE — 80061 LIPID PANEL: CPT

## 2018-06-06 PROCEDURE — 82977 ASSAY OF GGT: CPT

## 2018-06-06 PROCEDURE — 82306 VITAMIN D 25 HYDROXY: CPT

## 2018-06-06 PROCEDURE — 76770 US EXAM ABDO BACK WALL COMP: CPT

## 2018-06-06 PROCEDURE — 83605 ASSAY OF LACTIC ACID: CPT

## 2018-06-06 PROCEDURE — 82310 ASSAY OF CALCIUM: CPT

## 2018-06-06 PROCEDURE — 84295 ASSAY OF SERUM SODIUM: CPT

## 2018-06-06 PROCEDURE — 83735 ASSAY OF MAGNESIUM: CPT

## 2018-06-06 PROCEDURE — 71045 X-RAY EXAM CHEST 1 VIEW: CPT

## 2018-06-06 PROCEDURE — 83880 ASSAY OF NATRIURETIC PEPTIDE: CPT

## 2018-06-06 PROCEDURE — 83036 HEMOGLOBIN GLYCOSYLATED A1C: CPT

## 2018-06-06 PROCEDURE — 83935 ASSAY OF URINE OSMOLALITY: CPT

## 2018-06-06 PROCEDURE — 93923 UPR/LXTR ART STDY 3+ LVLS: CPT

## 2018-06-06 PROCEDURE — 81003 URINALYSIS AUTO W/O SCOPE: CPT

## 2018-06-06 PROCEDURE — 82553 CREATINE MB FRACTION: CPT

## 2018-06-06 PROCEDURE — 85610 PROTHROMBIN TIME: CPT

## 2018-06-06 PROCEDURE — 83970 ASSAY OF PARATHORMONE: CPT

## 2018-06-06 PROCEDURE — 82947 ASSAY GLUCOSE BLOOD QUANT: CPT

## 2018-06-06 PROCEDURE — 82435 ASSAY OF BLOOD CHLORIDE: CPT

## 2018-06-06 PROCEDURE — 84132 ASSAY OF SERUM POTASSIUM: CPT

## 2018-06-06 PROCEDURE — 82962 GLUCOSE BLOOD TEST: CPT

## 2018-06-06 PROCEDURE — 82570 ASSAY OF URINE CREATININE: CPT

## 2018-06-06 PROCEDURE — 93005 ELECTROCARDIOGRAM TRACING: CPT

## 2018-06-06 PROCEDURE — 85027 COMPLETE CBC AUTOMATED: CPT

## 2018-06-06 PROCEDURE — 84484 ASSAY OF TROPONIN QUANT: CPT

## 2018-06-06 PROCEDURE — 82330 ASSAY OF CALCIUM: CPT

## 2018-06-06 PROCEDURE — 83930 ASSAY OF BLOOD OSMOLALITY: CPT

## 2018-06-06 PROCEDURE — 80048 BASIC METABOLIC PNL TOTAL CA: CPT

## 2018-06-06 PROCEDURE — 84100 ASSAY OF PHOSPHORUS: CPT

## 2018-06-06 PROCEDURE — 82010 KETONE BODYS QUAN: CPT

## 2018-06-06 PROCEDURE — 93306 TTE W/DOPPLER COMPLETE: CPT

## 2018-06-06 PROCEDURE — 99285 EMERGENCY DEPT VISIT HI MDM: CPT | Mod: 25

## 2018-06-06 PROCEDURE — 84156 ASSAY OF PROTEIN URINE: CPT

## 2018-06-06 PROCEDURE — 85730 THROMBOPLASTIN TIME PARTIAL: CPT

## 2018-06-06 PROCEDURE — 84300 ASSAY OF URINE SODIUM: CPT

## 2018-06-06 PROCEDURE — 85014 HEMATOCRIT: CPT

## 2018-06-06 PROCEDURE — 82550 ASSAY OF CK (CPK): CPT

## 2018-06-06 RX ORDER — METOPROLOL TARTRATE 50 MG
1 TABLET ORAL
Qty: 60 | Refills: 0 | OUTPATIENT
Start: 2018-06-06 | End: 2018-07-05

## 2018-06-06 RX ORDER — FUROSEMIDE 40 MG
1 TABLET ORAL
Qty: 30 | Refills: 0 | OUTPATIENT
Start: 2018-06-06 | End: 2018-07-05

## 2018-06-06 RX ORDER — REPAGLINIDE 1 MG/1
1 TABLET ORAL
Qty: 30 | Refills: 0 | OUTPATIENT
Start: 2018-06-06 | End: 2018-07-05

## 2018-06-06 RX ORDER — FUROSEMIDE 40 MG
1 TABLET ORAL
Qty: 0 | Refills: 0 | COMMUNITY

## 2018-06-06 RX ORDER — DOCUSATE SODIUM 100 MG
1 CAPSULE ORAL
Qty: 90 | Refills: 0 | OUTPATIENT
Start: 2018-06-06 | End: 2018-07-05

## 2018-06-06 RX ORDER — SAXAGLIPTIN 5 MG/1
1 TABLET, FILM COATED ORAL
Qty: 30 | Refills: 0 | OUTPATIENT
Start: 2018-06-06 | End: 2018-07-05

## 2018-06-06 RX ORDER — ASPIRIN/CALCIUM CARB/MAGNESIUM 324 MG
2 TABLET ORAL
Qty: 0 | Refills: 0 | COMMUNITY

## 2018-06-06 RX ORDER — APIXABAN 2.5 MG/1
1 TABLET, FILM COATED ORAL
Qty: 0 | Refills: 0 | COMMUNITY
Start: 2018-06-06

## 2018-06-06 RX ORDER — REPAGLINIDE 1 MG/1
1 TABLET ORAL
Qty: 60 | Refills: 0 | OUTPATIENT
Start: 2018-06-06 | End: 2018-07-05

## 2018-06-06 RX ADMIN — Medication 40 MILLIGRAM(S): at 05:48

## 2018-06-06 RX ADMIN — Medication 100 MILLIGRAM(S): at 05:48

## 2018-06-06 RX ADMIN — Medication 50 MILLIGRAM(S): at 05:49

## 2018-06-06 RX ADMIN — Medication 2: at 12:01

## 2018-06-06 RX ADMIN — Medication 81 MILLIGRAM(S): at 12:01

## 2018-06-06 RX ADMIN — APIXABAN 2.5 MILLIGRAM(S): 2.5 TABLET, FILM COATED ORAL at 05:48

## 2018-06-06 NOTE — CONSULT NOTE ADULT - SUBJECTIVE AND OBJECTIVE BOX
cc: skin discoloration of bilateral lower extremities    HPI:  89 yo male with history of htn, hld, dm, afib admitted with uncontrolled dm and new afib was found to have skin discoloration of b/l lower extremities.  pt without any complaints.  pt denies any pain of the lower extremities.  pt denies any history of lower extremity wounds.  pt denies any history of difficulty walking/claudication like symptoms    Vital Signs Last 24 Hrs  T(C): 36.6 (06 Jun 2018 04:51), Max: 36.6 (06 Jun 2018 04:51)  T(F): 97.9 (06 Jun 2018 04:51), Max: 97.9 (06 Jun 2018 04:51)  HR: 73 (06 Jun 2018 04:51) (68 - 95)  BP: 117/72 (06 Jun 2018 04:51) (111/75 - 123/67)  BP(mean): --  RR: 18 (06 Jun 2018 04:51) (17 - 18)  SpO2: 95% (06 Jun 2018 04:51) (95% - 99%)    PHYSICAL EXAM:  gen: pt sitting in chair comfortably, NAD  PULSE EXAM:                                                 LEFT                        RIGHT  Radial:                    [2+ ]                              [2+ ]   DP:                        [ ]                              [ ]   PT:                         [ ]                              [ ]     Lower extremities:  b/l lower extremity with mild edema, skin intact, no ulceration, no gangrene, violatious skin discoloration of b/l lower extremities which improves with elevation.  b/l feet are warm with brisk capillary refill.    Neuro: sensation intact b/l lower extremities  Motor Lower: motor grossly intact b/l lower extremities      LABS:                        13.7   9.39  )-----------( 202      ( 06 Jun 2018 08:22 )             41.6     06-06    134<L>  |  94<L>  |  85<H>  ----------------------------<  83  4.0   |  23  |  2.04<H>    Ca    9.2      06 Jun 2018 06:32  Phos  4.2     06-05  Mg     2.4     06-05          MEDICATIONS:  apixaban 2.5 milliGRAM(s) Oral every 12 hours  aspirin  chewable 81 milliGRAM(s) Oral daily      RADIOLOGY & ADDITIONAL TESTS:  girish/pvr reviewed with mildly flattened waveforms

## 2018-06-06 NOTE — PROGRESS NOTE ADULT - PROBLEM SELECTOR PLAN 2
improved, seen by endo, off IVF now, post ivf developed acute on chronic diastolic chf, now improved post IV lasix
improved, seen by endo, off IVF now, post ivf developed acute on chronic diastolic chf, now resolved post IV lasix.
improved, seen by endo, off IVF now, post ivf developed acute on chronic diastolic chf, now resolved post IV lasix. dc home on prandin and onglyza
improved, seen by endo, off IVF now, post ivf developed acute on chronic diastolic chf, now resolved post IV lasix. dc home on prandin and onglyza
improved, seen by endo, off IVF now, post ivf developed acute on chronic diastolic chf, now resolved post IV lasix.

## 2018-06-06 NOTE — PROGRESS NOTE ADULT - PROBLEM SELECTOR PLAN 5
resolved post IV Lasix, awaiting echo results. anasarca and right pl effusion on ct scan, asymptomatic. resume po lasix. any role for Aldactone?
cont  lopressor, BP controlled
cont  lopressor, BP controlled
resolved post IV Lasix, anasarca and right pl effusion on US, asymptomatic. cont lasix 40 po, has good urine output.
resolved post IV Lasix, anasarca and right pl effusion on US, asymptomatic. resume po lasix 40.

## 2018-06-06 NOTE — PROGRESS NOTE ADULT - SUBJECTIVE AND OBJECTIVE BOX
Patient is a 89y old  Male who presents with a chief complaint of uncontrolled DM and new onset AFib (04 Jun 2018 11:08)      SUBJECTIVE / OVERNIGHT EVENTS: asymptomatic, states urinated a lot , Is and Os net -1230/24 hrs, denies LE pain/paresthesias, denies CP/palps/Sob    MEDICATIONS  (STANDING):  apixaban 2.5 milliGRAM(s) Oral every 12 hours  aspirin  chewable 81 milliGRAM(s) Oral daily  atorvastatin 20 milliGRAM(s) Oral at bedtime  dextrose 5%. 1000 milliLiter(s) (50 mL/Hr) IV Continuous <Continuous>  dextrose 50% Injectable 12.5 Gram(s) IV Push once  dextrose 50% Injectable 25 Gram(s) IV Push once  dextrose 50% Injectable 25 Gram(s) IV Push once  docusate sodium 100 milliGRAM(s) Oral three times a day  furosemide    Tablet 40 milliGRAM(s) Oral daily  insulin lispro (HumaLOG) corrective regimen sliding scale   SubCutaneous three times a day before meals  metoprolol tartrate 50 milliGRAM(s) Oral two times a day    MEDICATIONS  (PRN):  dextrose 40% Gel 15 Gram(s) Oral once PRN Blood Glucose LESS THAN 70 milliGRAM(s)/deciliter  glucagon  Injectable 1 milliGRAM(s) IntraMuscular once PRN Glucose LESS THAN 70 milligrams/deciliter      Vital Signs Last 24 Hrs  T(F): 97.9 (06-06-18 @ 04:51), Max: 97.9 (06-06-18 @ 04:51)  HR: 73 (06-06-18 @ 04:51) (68 - 95)  BP: 117/72 (06-06-18 @ 04:51) (109/69 - 124/85)  RR: 18 (06-06-18 @ 04:51) (17 - 18)  SpO2: 95% (06-06-18 @ 04:51) (95% - 99%)  Telemetry:   CAPILLARY BLOOD GLUCOSE      POCT Blood Glucose.: 71 mg/dL (06 Jun 2018 08:03)  POCT Blood Glucose.: 133 mg/dL (05 Jun 2018 21:09)  POCT Blood Glucose.: 152 mg/dL (05 Jun 2018 17:59)  POCT Blood Glucose.: 153 mg/dL (05 Jun 2018 12:06)  POCT Blood Glucose.: 121 mg/dL (05 Jun 2018 08:33)    I&O's Summary    05 Jun 2018 07:01  -  06 Jun 2018 07:00  --------------------------------------------------------  IN: 570 mL / OUT: 1800 mL / NET: -1230 mL        PHYSICAL EXAM:  GENERAL: NAD, well-developed  HEAD:  Atraumatic, Normocephalic  EYES: EOMI, PERRLA, conjunctiva and sclera clear  NECK: Supple, No JVD  CHEST/LUNG: Clear to auscultation bilaterally; No wheeze  HEART: Regular rate and rhythm; No murmurs, rubs, or gallops  ABDOMEN: Soft, Nontender, Nondistended; Bowel sounds present  EXTREMITIES:  2+ Peripheral Pulses, No clubbing, cyanosis, or edema  PSYCH: AAOx3  NEUROLOGY: non-focal  SKIN: No rashes or lesions    LABS:    06-06    134<L>  |  94<L>  |  85<H>  ----------------------------<  83  4.0   |  23  |  2.04<H>    Ca    9.2      06 Jun 2018 06:32  Phos  4.2     06-05  Mg     2.4     06-05                RADIOLOGY & ADDITIONAL TESTS:    Imaging Personally Reviewed:    Consultant(s) Notes Reviewed:      Care Discussed with Consultants/Other Providers:

## 2018-06-06 NOTE — PROGRESS NOTE ADULT - PROBLEM SELECTOR PROBLEM 7
Hyperlipidemia, unspecified hyperlipidemia type
Hyponatremia
Hyponatremia

## 2018-06-06 NOTE — PROGRESS NOTE ADULT - PROBLEM SELECTOR PROBLEM 1
Type 2 diabetes mellitus with hyperglycemia, with long-term current use of insulin
Atrial fibrillation with RVR

## 2018-06-06 NOTE — PROGRESS NOTE ADULT - PROBLEM SELECTOR PLAN 10
chronic, though improved from prev values, follow up w PMD re recent abd US/work up. 10/17 had a nl abd US
SANTI ordered, has faint but present pulses.
SANTI suggestive of small vessel dz, vascular consult ordered, id like to send bella home today w the rest of the work up on outptn basis. Been ambulating without difficulty.

## 2018-06-06 NOTE — PROGRESS NOTE ADULT - SUBJECTIVE AND OBJECTIVE BOX
INTERVAL HPI/OVERNIGHT EVENTS: No significant overnight cardiac events reported. Patient denies chest pain/sob/dizziness/palpitations.     MEDICATIONS  (STANDING):  apixaban 2.5 milliGRAM(s) Oral every 12 hours  aspirin  chewable 81 milliGRAM(s) Oral daily  atorvastatin 20 milliGRAM(s) Oral at bedtime  dextrose 5%. 1000 milliLiter(s) (50 mL/Hr) IV Continuous <Continuous>  dextrose 50% Injectable 12.5 Gram(s) IV Push once  dextrose 50% Injectable 25 Gram(s) IV Push once  dextrose 50% Injectable 25 Gram(s) IV Push once  docusate sodium 100 milliGRAM(s) Oral three times a day  furosemide    Tablet 40 milliGRAM(s) Oral daily  insulin lispro (HumaLOG) corrective regimen sliding scale   SubCutaneous three times a day before meals  metoprolol tartrate 50 milliGRAM(s) Oral two times a day    MEDICATIONS  (PRN):  dextrose 40% Gel 15 Gram(s) Oral once PRN Blood Glucose LESS THAN 70 milliGRAM(s)/deciliter  glucagon  Injectable 1 milliGRAM(s) IntraMuscular once PRN Glucose LESS THAN 70 milligrams/deciliter      Allergies  No Known Allergies    ROS:  General: Pt denies fevers/chills  Cardiovascular: denies chest pain/palpitations  Respiratory and Thorax: denies SOB  Gastrointestinal: denies abdominal pain/diarrhea/bloody stool  Musculoskeletal:  denies restricted motion  Hematologic: denies abnormal bleeding    Vital Signs Last 24 Hrs  T(C): 36.6 (06 Jun 2018 04:51), Max: 36.6 (06 Jun 2018 04:51)  T(F): 97.9 (06 Jun 2018 04:51), Max: 97.9 (06 Jun 2018 04:51)  HR: 73 (06 Jun 2018 04:51) (68 - 95)  BP: 117/72 (06 Jun 2018 04:51) (109/69 - 124/85)  BP(mean): --  RR: 18 (06 Jun 2018 04:51) (17 - 18)  SpO2: 95% (06 Jun 2018 04:51) (95% - 99%)    Physical Exam:  Constitutional: well developed, well nourished and in no acute distress  Neurological: Alert & Oriented x 3,  no focal deficits, [place]  Respiratory: Breathing nonlabored; CTA bilaterally.  Cardiovascular: (+) S1 & S2, irregularly irregular  Gastrointestinal: soft, NT, nondistended, (+) BS  Extremities: +1 bilateral radial pulses, cap refill<3 seconds;  trace bilateral  pedal edema.       LABS:                        13.7   9.39  )-----------( 202      ( 06 Jun 2018 08:22 )             41.6     06-06    134<L>  |  94<L>  |  85<H>  ----------------------------<  83  4.0   |  23  |  2.04<H>    Ca    9.2      06 Jun 2018 06:32  Phos  4.2     06-05  Mg     2.4     06-05        RADIOLOGY & ADDITIONAL TESTS: < from: Transthoracic Echocardiogram (06.04.18 @ 10:22) >  EF (Teicholtz): 34 %  < from: Transthoracic Echocardiogram (06.04.18 @ 10:22) >   Severe concentric left ventricular hypertrophy.  2. Severe global left ventricular systolic dysfunction.  3. Normal right ventricular size with severely decreased  right ventricular systolic function. TAPSE 0.9cm.  4. Estimated pulmonary artery systolic pressure equals 58  mm Hg, assuming right atrial pressure equals 8 mm Hg,  consistent with moderate pulmonary pressures.          TELE: Afib 70's to 90's           8 beats NSVT during the evening yesterday

## 2018-06-06 NOTE — PROGRESS NOTE ADULT - PROBLEM SELECTOR PROBLEM 6
Hypertension, unspecified type
Hyperlipidemia, unspecified hyperlipidemia type
Hyperlipidemia, unspecified hyperlipidemia type
Hypertension, unspecified type
Hypertension, unspecified type

## 2018-06-06 NOTE — PROGRESS NOTE ADULT - PROBLEM SELECTOR PLAN 4
gfr back at baseline as per renal.  hold ARBs/nephrotoxics,  trend creat. resume Lasix 20 mg.
gfr back at baseline as per renal.  hold ARBs/ACE-I//nephrotoxics,  trend creat. cont Lasix 40 mg. has good urine output
gfr back at baseline as per renal.  hold ARBs/ACE-I//nephrotoxics,  trend creat. resume Lasix 40 mg.
prob pre-renal 2/2 volume depletion. hold Lasix, hold ARBs/nephrotoxics,  trend creat. d/w card
prob pre-renal 2/2 volume depletion. hydrate, hold ARBs/nephrotoxics,  trend creat

## 2018-06-06 NOTE — PROGRESS NOTE ADULT - PROBLEM SELECTOR PLAN 1
As above
monitor telemetry  Keep K+ >4 , MG+>2  c/w metoprolol 50 mg BID rates are controlled with symptoms improved  c/w Eliquis for AC, [id number] VASc 4    [id number]
on BB lopressor 25 bid, rate controlled, get 2decho, cont w AC w eliquis( d/w dr Burks, covering Dr, J. Goldberg), EP consult. trop is chronically elevated 2/2 ckd.  TTE pending
on BB lopressor 25 bid, rate controlled,but had a WCT run, awaiting 2decho, cont w AC w eliquis( d/w dr Burks, covering Dr, J. Goldberg), EP consult reviewed. trop is chronically elevated 2/2 ckd.  will raise metoprolol dose to 50 bid
on BB lopressor 50 bid, rate controlled, s/p echo, mod-severe systolic LF dysfunction, no ischemic work up on this admission as per cardiology. 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 trop is chronically elevated 2/2 ckd.
on BB lopressor 50 bid, rate controlled, s/p echo, mod-severe systolic LF dysfunction, no ischemic work up on this admission as per cardiology. CP free, not dyspneic, asympt while in Rapid afib, now controlled., cont w AC w eliquis,. trop is chronically elevated 2/2 ckd.
on BB lopressor 50 bid, rate controlled, s/p echo, results pending, cont w AC w eliquis,. trop is chronically elevated 2/2 ckd.

## 2018-06-06 NOTE — PROGRESS NOTE ADULT - PROBLEM SELECTOR PROBLEM 4
PARAS (acute kidney injury)

## 2018-06-06 NOTE — PROGRESS NOTE ADULT - PROBLEM SELECTOR PROBLEM 9
Metabolic acidosis
Elevated liver enzymes
Elevated liver enzymes
Metabolic acidosis
Metabolic acidosis

## 2018-06-06 NOTE — PROGRESS NOTE ADULT - PROBLEM SELECTOR PLAN 9
improved, has santos on ckd
chronic, though improved from prev values, follow up w PMD re recent abd US/work up. 10/17 had a nl abd US
chronic, though improved from prev values, follow up w PMD re recent abd US/work up. 10/17 had a nl abd US
improved, has santos on ckd
improved, has santos on ckd

## 2018-06-06 NOTE — CONSULT NOTE ADULT - ASSESSMENT
89 yo male with history of htn, hld, dm, afib admitted with uncontrolled dm and new afib was found to have skin discoloration of b/l lower extremities with flattened waveforms on pvr.  pt currently asymptotic no history of claudications.  pt ok for discharge home, no vascular surgical intervention at this time.  would continue with medical management

## 2018-06-06 NOTE — PROGRESS NOTE ADULT - ASSESSMENT
89 yo M w/ PMH T2DM HTN, HLD, CKD, pleural effusion s/p thoracentesis. Admitted with uncontrolled DM type 2, acute on chronic renal disease and found with new  Afib with RVR  which has been rate controlled on betablocker therapy. Also with 8 beats NSVT 6/5/18.  EF 34%;   # Afib w/ RVR  # NSVT  - continue with lopressor 50mg po bid  - [id number] score of 4 with a serum Cr of 2.0: continue with Eliquis at lower dose 2.5 mg po bid   - monitor and replete electrolytes as necessary  will sign off, please reconsult as needed.     [phone]

## 2018-06-06 NOTE — PROGRESS NOTE ADULT - PROBLEM SELECTOR PLAN 6
cont  lopressor, BP controlled
cont statin
cont statin

## 2018-06-06 NOTE — PROGRESS NOTE ADULT - PROBLEM SELECTOR PROBLEM 3
Hyperkalemia, diminished renal excretion

## 2018-06-06 NOTE — PROGRESS NOTE ADULT - PROBLEM SELECTOR PLAN 3
resolved, treated in the ed w insulin, calcium, albuterol, kayexalate

## 2018-06-06 NOTE — PROGRESS NOTE ADULT - NSHPATTENDINGPLANDISCUSS_GEN_ALL_CORE
Dr Espinosa
card, ptn, wife, renal
card, ptn, wife, renal, NP, vascular

## 2018-06-06 NOTE — PROGRESS NOTE ADULT - PROBLEM SELECTOR PROBLEM 5
R/O Acute on chronic diastolic (congestive) heart failure
Hypertension, unspecified type
Hypertension, unspecified type
R/O Acute on chronic diastolic (congestive) heart failure
R/O Acute on chronic diastolic (congestive) heart failure

## 2018-07-13 ENCOUNTER — RECORD ABSTRACTING (OUTPATIENT)
Age: 83
End: 2018-07-13

## 2018-07-13 RX ORDER — SAXAGLIPTIN 2.5 MG/1
2.5 TABLET, FILM COATED ORAL
Refills: 0 | Status: ACTIVE | COMMUNITY

## 2018-07-13 RX ORDER — PRAVASTATIN SODIUM 40 MG/1
40 TABLET ORAL
Refills: 0 | Status: ACTIVE | COMMUNITY

## 2018-07-13 RX ORDER — IRBESARTAN 150 MG/1
150 TABLET ORAL
Refills: 0 | Status: ACTIVE | COMMUNITY

## 2018-07-13 RX ORDER — FLUTICASONE FUROATE AND VILANTEROL TRIFENATATE 100; 25 UG/1; UG/1
100-25 POWDER RESPIRATORY (INHALATION)
Refills: 0 | Status: ACTIVE | COMMUNITY

## 2018-07-13 RX ORDER — GLIMEPIRIDE 2 MG/1
2 TABLET ORAL
Refills: 0 | Status: ACTIVE | COMMUNITY

## 2018-08-01 ENCOUNTER — APPOINTMENT (OUTPATIENT)
Dept: PULMONOLOGY | Facility: CLINIC | Age: 83
End: 2018-08-01
Payer: MEDICARE

## 2018-08-01 VITALS
SYSTOLIC BLOOD PRESSURE: 116 MMHG | DIASTOLIC BLOOD PRESSURE: 73 MMHG | OXYGEN SATURATION: 92 % | BODY MASS INDEX: 25.88 KG/M2 | WEIGHT: 161 LBS | HEART RATE: 104 BPM | TEMPERATURE: 98 F | RESPIRATION RATE: 16 BRPM | HEIGHT: 66 IN

## 2018-08-01 DIAGNOSIS — Z86.39 PERSONAL HISTORY OF OTHER ENDOCRINE, NUTRITIONAL AND METABOLIC DISEASE: ICD-10-CM

## 2018-08-01 PROCEDURE — 94060 EVALUATION OF WHEEZING: CPT

## 2018-08-01 PROCEDURE — 99213 OFFICE O/P EST LOW 20 MIN: CPT | Mod: 25

## 2018-08-01 PROCEDURE — 71046 X-RAY EXAM CHEST 2 VIEWS: CPT

## 2018-08-01 RX ORDER — METOLAZONE 5 MG/1
5 TABLET ORAL
Qty: 10 | Refills: 0 | Status: ACTIVE | COMMUNITY
Start: 2018-06-22

## 2018-08-01 RX ORDER — BLOOD SUGAR DIAGNOSTIC
STRIP MISCELLANEOUS
Qty: 200 | Refills: 0 | Status: ACTIVE | COMMUNITY
Start: 2018-06-20

## 2018-08-01 RX ORDER — FUROSEMIDE 40 MG/1
40 TABLET ORAL
Qty: 90 | Refills: 0 | Status: DISCONTINUED | COMMUNITY
Start: 2018-05-10

## 2018-08-01 RX ORDER — INSULIN DEGLUDEC INJECTION 200 U/ML
200 INJECTION, SOLUTION SUBCUTANEOUS
Qty: 9 | Refills: 0 | Status: ACTIVE | COMMUNITY
Start: 2018-06-19

## 2018-08-01 RX ORDER — REPAGLINIDE 1 MG/1
1 TABLET ORAL
Qty: 30 | Refills: 0 | Status: ACTIVE | COMMUNITY
Start: 2018-06-06

## 2018-08-01 RX ORDER — FUROSEMIDE 20 MG/1
20 TABLET ORAL
Qty: 180 | Refills: 0 | Status: ACTIVE | COMMUNITY
Start: 2018-03-26

## 2018-08-01 RX ORDER — METOPROLOL TARTRATE 50 MG/1
50 TABLET, FILM COATED ORAL
Qty: 60 | Refills: 0 | Status: ACTIVE | COMMUNITY
Start: 2018-07-02

## 2018-08-01 RX ORDER — NITROGLYCERIN 40 MG/1
0.2 PATCH TRANSDERMAL
Qty: 30 | Refills: 0 | Status: ACTIVE | COMMUNITY
Start: 2018-06-08

## 2018-08-01 RX ORDER — PIOGLITAZONE HYDROCHLORIDE 30 MG/1
30 TABLET ORAL
Qty: 90 | Refills: 0 | Status: ACTIVE | COMMUNITY
Start: 2018-02-08

## 2018-08-01 RX ORDER — APIXABAN 5 MG/1
5 TABLET, FILM COATED ORAL
Qty: 60 | Refills: 0 | Status: ACTIVE | COMMUNITY
Start: 2018-06-04

## 2018-08-01 RX ORDER — REPAGLINIDE 0.5 MG/1
0.5 TABLET ORAL
Qty: 180 | Refills: 0 | Status: ACTIVE | COMMUNITY
Start: 2017-12-28

## 2018-08-01 RX ORDER — ALPRAZOLAM 0.25 MG/1
0.25 TABLET ORAL
Qty: 14 | Refills: 0 | Status: ACTIVE | COMMUNITY
Start: 2018-07-23

## 2018-08-01 RX ORDER — PEN NEEDLE, DIABETIC 32 GX 1/4"
32G X 6 MM NEEDLE, DISPOSABLE MISCELLANEOUS
Qty: 100 | Refills: 0 | Status: ACTIVE | COMMUNITY
Start: 2018-06-19

## 2018-08-21 ENCOUNTER — FORM ENCOUNTER (OUTPATIENT)
Age: 83
End: 2018-08-21

## 2018-08-22 ENCOUNTER — APPOINTMENT (OUTPATIENT)
Dept: CT IMAGING | Facility: IMAGING CENTER | Age: 83
End: 2018-08-22
Payer: MEDICARE

## 2018-08-22 ENCOUNTER — OUTPATIENT (OUTPATIENT)
Dept: OUTPATIENT SERVICES | Facility: HOSPITAL | Age: 83
LOS: 1 days | End: 2018-08-22
Payer: MEDICARE

## 2018-08-22 DIAGNOSIS — R06.00 DYSPNEA, UNSPECIFIED: ICD-10-CM

## 2018-08-22 DIAGNOSIS — R05 COUGH: ICD-10-CM

## 2018-08-22 PROCEDURE — 71250 CT THORAX DX C-: CPT

## 2018-08-22 PROCEDURE — 71250 CT THORAX DX C-: CPT | Mod: 26

## 2018-09-25 ENCOUNTER — APPOINTMENT (OUTPATIENT)
Dept: PULMONOLOGY | Facility: CLINIC | Age: 83
End: 2018-09-25
Payer: MEDICARE

## 2018-09-25 VITALS
WEIGHT: 161 LBS | BODY MASS INDEX: 25.88 KG/M2 | RESPIRATION RATE: 15 BRPM | OXYGEN SATURATION: 99 % | TEMPERATURE: 97.4 F | DIASTOLIC BLOOD PRESSURE: 66 MMHG | HEIGHT: 66 IN | SYSTOLIC BLOOD PRESSURE: 99 MMHG | HEART RATE: 89 BPM

## 2018-09-25 DIAGNOSIS — Z23 ENCOUNTER FOR IMMUNIZATION: ICD-10-CM

## 2018-09-25 DIAGNOSIS — R91.8 OTHER NONSPECIFIC ABNORMAL FINDING OF LUNG FIELD: ICD-10-CM

## 2018-09-25 DIAGNOSIS — R06.00 DYSPNEA, UNSPECIFIED: ICD-10-CM

## 2018-09-25 DIAGNOSIS — R93.8 ABNORMAL FINDINGS ON DIAGNOSTIC IMAGING OF OTHER SPECIFIED BODY STRUCTURES: ICD-10-CM

## 2018-09-25 PROCEDURE — 71046 X-RAY EXAM CHEST 2 VIEWS: CPT

## 2018-09-25 PROCEDURE — 94060 EVALUATION OF WHEEZING: CPT

## 2018-09-25 PROCEDURE — 90662 IIV NO PRSV INCREASED AG IM: CPT

## 2018-09-25 PROCEDURE — G0008: CPT

## 2018-09-25 PROCEDURE — 36415 COLL VENOUS BLD VENIPUNCTURE: CPT

## 2018-09-25 PROCEDURE — 99213 OFFICE O/P EST LOW 20 MIN: CPT | Mod: 25

## 2018-10-01 ENCOUNTER — APPOINTMENT (OUTPATIENT)
Dept: PULMONOLOGY | Facility: CLINIC | Age: 83
End: 2018-10-01
Payer: MEDICARE

## 2018-10-01 DIAGNOSIS — R04.2 HEMOPTYSIS: ICD-10-CM

## 2018-10-01 DIAGNOSIS — Z87.09 PERSONAL HISTORY OF OTHER DISEASES OF THE RESPIRATORY SYSTEM: ICD-10-CM

## 2018-10-01 PROCEDURE — 36415 COLL VENOUS BLD VENIPUNCTURE: CPT

## 2018-10-01 PROCEDURE — 71046 X-RAY EXAM CHEST 2 VIEWS: CPT

## 2018-10-01 PROCEDURE — 99213 OFFICE O/P EST LOW 20 MIN: CPT | Mod: 25

## 2018-10-03 LAB
APTT BLD: 34.8 SEC
APTT BLD: 36 SEC
BASOPHILS # BLD AUTO: 0.04 K/UL
BASOPHILS NFR BLD AUTO: 0.6 %
EOSINOPHIL # BLD AUTO: 0.21 K/UL
EOSINOPHIL NFR BLD AUTO: 3.2 %
HCT VFR BLD CALC: 42.1 %
HGB BLD-MCNC: 13.5 G/DL
IMM GRANULOCYTES NFR BLD AUTO: 0.2 %
INR PPP: 1.66 RATIO
INR PPP: 2.13 RATIO
LYMPHOCYTES # BLD AUTO: 1.18 K/UL
LYMPHOCYTES NFR BLD AUTO: 18.2 %
MAN DIFF?: NORMAL
MCHC RBC-ENTMCNC: 28 PG
MCHC RBC-ENTMCNC: 32.1 GM/DL
MCV RBC AUTO: 87.3 FL
MONOCYTES # BLD AUTO: 0.47 K/UL
MONOCYTES NFR BLD AUTO: 7.2 %
NEUTROPHILS # BLD AUTO: 4.58 K/UL
NEUTROPHILS NFR BLD AUTO: 70.6 %
PLATELET # BLD AUTO: 147 K/UL
PT BLD: 19 SEC
PT BLD: 24.5 SEC
RBC # BLD: 4.82 M/UL
RBC # FLD: 15.9 %
WBC # FLD AUTO: 6.49 K/UL

## 2018-10-11 ENCOUNTER — APPOINTMENT (OUTPATIENT)
Dept: ULTRASOUND IMAGING | Facility: HOSPITAL | Age: 83
End: 2018-10-11

## 2018-10-15 ENCOUNTER — APPOINTMENT (OUTPATIENT)
Dept: PULMONOLOGY | Facility: CLINIC | Age: 83
End: 2018-10-15

## 2018-11-02 ENCOUNTER — APPOINTMENT (OUTPATIENT)
Dept: PULMONOLOGY | Facility: CLINIC | Age: 83
End: 2018-11-02
Payer: MEDICARE

## 2018-11-02 VITALS
DIASTOLIC BLOOD PRESSURE: 60 MMHG | BODY MASS INDEX: 25.82 KG/M2 | SYSTOLIC BLOOD PRESSURE: 100 MMHG | WEIGHT: 160 LBS | HEART RATE: 88 BPM | RESPIRATION RATE: 14 BRPM

## 2018-11-02 DIAGNOSIS — Z87.09 PERSONAL HISTORY OF OTHER DISEASES OF THE RESPIRATORY SYSTEM: ICD-10-CM

## 2018-11-02 PROCEDURE — 71046 X-RAY EXAM CHEST 2 VIEWS: CPT

## 2018-11-02 PROCEDURE — 99213 OFFICE O/P EST LOW 20 MIN: CPT | Mod: 25

## 2018-11-02 RX ORDER — TORSEMIDE 100 MG/1
100 TABLET ORAL
Qty: 30 | Refills: 0 | Status: ACTIVE | COMMUNITY
Start: 2018-10-18

## 2018-11-05 ENCOUNTER — MEDICATION RENEWAL (OUTPATIENT)
Age: 83
End: 2018-11-05

## 2018-11-20 ENCOUNTER — APPOINTMENT (OUTPATIENT)
Dept: PULMONOLOGY | Facility: CLINIC | Age: 83
End: 2018-11-20
Payer: MEDICARE

## 2018-11-20 VITALS
SYSTOLIC BLOOD PRESSURE: 110 MMHG | RESPIRATION RATE: 12 BRPM | TEMPERATURE: 97.6 F | DIASTOLIC BLOOD PRESSURE: 73 MMHG | HEART RATE: 79 BPM | OXYGEN SATURATION: 100 %

## 2018-11-20 PROCEDURE — 99213 OFFICE O/P EST LOW 20 MIN: CPT

## 2018-11-20 RX ORDER — PEN NEEDLE, DIABETIC 32 GX 1/6"
32G X 4 MM NEEDLE, DISPOSABLE MISCELLANEOUS
Qty: 100 | Refills: 0 | Status: ACTIVE | COMMUNITY
Start: 2018-11-12

## 2018-11-20 RX ORDER — AMOXICILLIN AND CLAVULANATE POTASSIUM 500; 125 MG/1; MG/1
500-125 TABLET, FILM COATED ORAL
Qty: 2 | Refills: 0 | Status: DISCONTINUED | COMMUNITY
Start: 2018-11-02 | End: 2018-11-20

## 2018-11-20 RX ORDER — POTASSIUM CHLORIDE 1500 MG/1
20 TABLET, EXTENDED RELEASE ORAL
Qty: 90 | Refills: 0 | Status: ACTIVE | COMMUNITY
Start: 2018-11-02

## 2018-11-26 ENCOUNTER — APPOINTMENT (OUTPATIENT)
Dept: VASCULAR SURGERY | Facility: CLINIC | Age: 83
End: 2018-11-26
Payer: MEDICARE

## 2018-11-26 ENCOUNTER — MEDICATION RENEWAL (OUTPATIENT)
Age: 83
End: 2018-11-26

## 2018-11-26 VITALS
DIASTOLIC BLOOD PRESSURE: 73 MMHG | TEMPERATURE: 97.6 F | SYSTOLIC BLOOD PRESSURE: 108 MMHG | WEIGHT: 156 LBS | HEART RATE: 76 BPM | BODY MASS INDEX: 25.99 KG/M2 | HEIGHT: 65 IN

## 2018-11-26 VITALS — HEART RATE: 80 BPM | SYSTOLIC BLOOD PRESSURE: 114 MMHG | DIASTOLIC BLOOD PRESSURE: 74 MMHG

## 2018-11-26 PROCEDURE — 99203 OFFICE O/P NEW LOW 30 MIN: CPT

## 2018-11-26 PROCEDURE — 93923 UPR/LXTR ART STDY 3+ LVLS: CPT

## 2018-11-26 RX ORDER — AMOXICILLIN AND CLAVULANATE POTASSIUM 875; 125 MG/1; MG/1
875-125 TABLET, COATED ORAL
Qty: 20 | Refills: 1 | Status: DISCONTINUED | COMMUNITY
Start: 2018-11-05 | End: 2018-11-26

## 2018-11-26 RX ORDER — MOXIFLOXACIN HYDROCHLORIDE TABLETS, 400 MG 400 MG/1
400 TABLET, FILM COATED ORAL DAILY
Qty: 10 | Refills: 0 | Status: DISCONTINUED | COMMUNITY
Start: 2018-08-01 | End: 2018-11-26

## 2018-11-26 RX ORDER — CEFUROXIME AXETIL 250 MG/1
250 TABLET ORAL
Qty: 20 | Refills: 0 | Status: DISCONTINUED | COMMUNITY
Start: 2018-10-01 | End: 2018-11-26

## 2018-11-29 ENCOUNTER — OUTPATIENT (OUTPATIENT)
Dept: OUTPATIENT SERVICES | Facility: HOSPITAL | Age: 83
LOS: 1 days | End: 2018-11-29
Payer: MEDICARE

## 2018-11-29 VITALS
RESPIRATION RATE: 16 BRPM | TEMPERATURE: 99 F | HEIGHT: 63 IN | OXYGEN SATURATION: 95 % | HEART RATE: 69 BPM | DIASTOLIC BLOOD PRESSURE: 70 MMHG | WEIGHT: 149.91 LBS | SYSTOLIC BLOOD PRESSURE: 100 MMHG

## 2018-11-29 DIAGNOSIS — G47.33 OBSTRUCTIVE SLEEP APNEA (ADULT) (PEDIATRIC): ICD-10-CM

## 2018-11-29 DIAGNOSIS — I70.262 ATHEROSCLEROSIS OF NATIVE ARTERIES OF EXTREMITIES WITH GANGRENE, LEFT LEG: ICD-10-CM

## 2018-11-29 DIAGNOSIS — Z98.49 CATARACT EXTRACTION STATUS, UNSPECIFIED EYE: Chronic | ICD-10-CM

## 2018-11-29 DIAGNOSIS — E11.9 TYPE 2 DIABETES MELLITUS WITHOUT COMPLICATIONS: ICD-10-CM

## 2018-11-29 LAB
ALBUMIN SERPL ELPH-MCNC: 4.2 G/DL — SIGNIFICANT CHANGE UP (ref 3.3–5)
ALP SERPL-CCNC: 169 U/L — HIGH (ref 40–120)
ALT FLD-CCNC: 28 U/L — SIGNIFICANT CHANGE UP (ref 4–41)
AST SERPL-CCNC: 54 U/L — HIGH (ref 4–40)
BILIRUB SERPL-MCNC: 0.9 MG/DL — SIGNIFICANT CHANGE UP (ref 0.2–1.2)
BLD GP AB SCN SERPL QL: NEGATIVE — SIGNIFICANT CHANGE UP
BUN SERPL-MCNC: 116 MG/DL — HIGH (ref 7–23)
CALCIUM SERPL-MCNC: 9.9 MG/DL — SIGNIFICANT CHANGE UP (ref 8.4–10.5)
CHLORIDE SERPL-SCNC: 86 MMOL/L — LOW (ref 98–107)
CO2 SERPL-SCNC: 34 MMOL/L — HIGH (ref 22–31)
CREAT SERPL-MCNC: 2.62 MG/DL — HIGH (ref 0.5–1.3)
GLUCOSE SERPL-MCNC: 148 MG/DL — HIGH (ref 70–99)
HBA1C BLD-MCNC: 6.7 % — HIGH (ref 4–5.6)
HCT VFR BLD CALC: 39.9 % — SIGNIFICANT CHANGE UP (ref 39–50)
HGB BLD-MCNC: 12.4 G/DL — LOW (ref 13–17)
MCHC RBC-ENTMCNC: 26.4 PG — LOW (ref 27–34)
MCHC RBC-ENTMCNC: 31.1 % — LOW (ref 32–36)
MCV RBC AUTO: 84.9 FL — SIGNIFICANT CHANGE UP (ref 80–100)
NRBC # FLD: 0 — SIGNIFICANT CHANGE UP
PLATELET # BLD AUTO: 164 K/UL — SIGNIFICANT CHANGE UP (ref 150–400)
PMV BLD: 13 FL — SIGNIFICANT CHANGE UP (ref 7–13)
POTASSIUM SERPL-MCNC: 3.3 MMOL/L — LOW (ref 3.5–5.3)
POTASSIUM SERPL-SCNC: 3.3 MMOL/L — LOW (ref 3.5–5.3)
PROT SERPL-MCNC: 7.7 G/DL — SIGNIFICANT CHANGE UP (ref 6–8.3)
RBC # BLD: 4.7 M/UL — SIGNIFICANT CHANGE UP (ref 4.2–5.8)
RBC # FLD: 15.1 % — HIGH (ref 10.3–14.5)
RH IG SCN BLD-IMP: POSITIVE — SIGNIFICANT CHANGE UP
SODIUM SERPL-SCNC: 137 MMOL/L — SIGNIFICANT CHANGE UP (ref 135–145)
WBC # BLD: 7.82 K/UL — SIGNIFICANT CHANGE UP (ref 3.8–10.5)
WBC # FLD AUTO: 7.82 K/UL — SIGNIFICANT CHANGE UP (ref 3.8–10.5)

## 2018-11-29 PROCEDURE — 93010 ELECTROCARDIOGRAM REPORT: CPT

## 2018-11-29 RX ORDER — MULTIVIT-MIN/FERROUS GLUCONATE 9 MG/15 ML
1 LIQUID (ML) ORAL
Qty: 0 | Refills: 0 | COMMUNITY

## 2018-11-29 RX ORDER — CHOLECALCIFEROL (VITAMIN D3) 125 MCG
1 CAPSULE ORAL
Qty: 0 | Refills: 0 | COMMUNITY

## 2018-11-29 RX ORDER — APIXABAN 2.5 MG/1
1 TABLET, FILM COATED ORAL
Qty: 0 | Refills: 0 | COMMUNITY

## 2018-11-29 RX ORDER — ASPIRIN/CALCIUM CARB/MAGNESIUM 324 MG
1 TABLET ORAL
Qty: 0 | Refills: 0 | COMMUNITY

## 2018-11-29 NOTE — H&P PST ADULT - NEGATIVE RESPIRATORY AND THORAX SYMPTOMS
no hemoptysis/no cough/no pleuritic chest pain/no dyspnea/no wheezing no pleuritic chest pain/no cough/no wheezing/no dyspnea

## 2018-11-29 NOTE — H&P PST ADULT - NEGATIVE ENMT SYMPTOMS
no nasal obstruction/no nasal congestion/no dry mouth/no recurrent cold sores/no throat pain/no dysphagia/no nasal discharge/no sinus symptoms/no post-nasal discharge/no abnormal taste sensation/no gum bleeding

## 2018-11-29 NOTE — H&P PST ADULT - SOURCE OF INFORMATION, PROFILE
Visit Information Date & Time Provider Department Dept. Phone Encounter #  
 5/1/2017  9:45 AM Edilberto Santiago, [address] [facility] [id number] Upcoming Health Maintenance Date Due DTaP/Tdap/Td series (1 - Tdap) 4/23/1965 FOBT Q 1 YEAR AGE 50-75 4/23/1994 ZOSTER VACCINE AGE 60> 4/23/2004 GLAUCOMA SCREENING Q2Y 4/23/2009 Pneumococcal 65+ High/Highest Risk (1 of 2 - PCV13) 4/23/2009 MEDICARE YEARLY EXAM 4/23/2009 INFLUENZA AGE 9 TO ADULT 8/1/2017 Allergies as of 5/1/2017  Review Complete On: 5/1/2017 By: Edilberto Santiago MD  
 No Known Allergies Current Immunizations  Never Reviewed No immunizations on file. Not reviewed this visit You Were Diagnosed With   
  
 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  
 165/84 (BP 1 Location: Left arm, BP Patient Position: Sitting) 70 6' (1.829 m) 190 lb (86.2 kg) 96% 25.77 kg/m2 Smoking Status Never Smoker Vitals History BMI and BSA Data Body Mass Index Body Surface Area 25.77 kg/m 2 2.09 m 2 Preferred Pharmacy Pharmacy Name Phone Joanna CLAROS [facility], [address] [place] [address] Road [phone] Your Updated Medication List  
  
   
This list is accurate as of: 5/1/17 10:24 AM.  Always use your most recent med list.  
  
  
  
  
 finasteride 5 mg tablet Commonly known as:  PROSCAR Take 1 Tab by mouth daily. HYDROcodone-acetaminophen  mg tablet Commonly known as:  Taniya Javed Take 1 Tab by mouth every six (6) hours as needed for Pain. Max Daily Amount: 4 Tabs.  
  
 latanoprost 0.005 % ophthalmic solution Commonly known as:  Sarah Singh Administer 1 Drop to left eye nightly. lisinopril 5 mg tablet Commonly known as:  Ezequiel Karen Take  by mouth daily. PriLOSEC 10 mg capsule Generic drug:  omeprazole Take 10 mg by mouth daily. tamsulosin 0.4 mg capsule Commonly known as:  FLOMAX One cap q d pc ZOCOR 10 mg tablet Generic drug:  simvastatin Take  by mouth nightly. We Performed the Following AMB POC URINALYSIS DIP STICK AUTO W/O MICRO [81388 CPT(R)] CYSTOSCOPY [27548 CPT(R)] To-Do List   
 05/01/2017 Pathology:  CYTOLOGY NON-GYN Patient Instructions Cystoscopy: Care Instructions Your Care Instructions Cystoscopy is a test. It uses a thin, lighted tube called a cystoscope to see the inside of the bladder and the urethra. The urethra is the tube that carries urine out of the body. This test is helpful because it lets your doctor see areas of your bladder and urethra that are hard to see on X-rays. It can help your doctor find bladder stones, tumors, bleeding, and infection. During this test, your doctor also can take tissue and urine samples. And if your doctor finds small stones or growths, he or she can remove them. In most cases the scope is in the bladder for less than 10 minutes. But the entire test may take 45 minutes or longer. You will probably get local anesthesia. This numbs a small part of your body. Or you may get spinal anesthesia, which numbs more of your body. Once in a while, doctors use general anesthesia. It makes you sleep during surgery. If you get a local anesthetic, you may be able to get up right after the test. But if you had spinal or general anesthesia, you will stay in the recovery room until you are able to walk or you have feeling again in your lower body. This usually takes about an hour. Your doctor may be able to tell you some of the results right after the test. But the complete results may take several days. Follow-up care is a key part of your treatment and safety.  Be sure to make and go to all appointments, and call your doctor if you are having problems. It's also a good idea to know your test results and keep a list of the medicines you take. How can you care for yourself at home? Before the test 
· If you are having a local anesthetic, you can eat and drink before the test. 
· If you are having a spinal or general anesthetic, do not eat or drink anything for at least 8 hours before the test. Tell your doctor what medicines you take. · If you are not staying overnight in the hospital, make sure you have someone who can drive you home after the test. 
After the test 
· If your doctor prescribed antibiotics, take them as directed. Do not stop taking them just because you feel better. You need to take the full course of antibiotics. · You may have some burning when you urinate for a day or two after the test. You may feel better if you drink more fluids. This may also help prevent an infection. · Your urine may have a pinkish color for a few days after the test. 
When should you call for help? Call your doctor now or seek immediate medical care if: 
· Your urine is still red or you see blood clots after you have urinated several times. · You cannot pass urine 8 hours after the test. 
· You get a fever or chills. · You have pain in your belly or your back just below your rib cage. This is also called flank pain. Watch closely for changes in your health, and be sure to contact your doctor if: 
· You have pain or burning when you urinate. A burning sensation is normal for a day or two after the test. But call if it does not get better. · You have a frequent urge to urinate but can pass only small amounts of urine. · Your urine is pink, red, or cloudy or smells bad. It is normal for the urine to have a pinkish color for a few days after the test. But call if it does not get better. · You do not get better as expected. Where can you learn more? Go to http://good-jerod.info/. Enter V750 in the search box to learn more about \"Cystoscopy: Care Instructions. \" Current as of: August 12, 2016 Content Version: 11.2 © 0345-6497 "[employer]. Care instructions adapted under license by [employer] (which disclaims liability or warranty for this information). If you have questions about a medical condition or this instruction, always ask your healthcare professional. [address] any warranty or liability for your use of this information. Please provide this summary of care documentation to your next provider. Your primary care clinician is listed as Franko David. If you have any questions after today's visit, please call [phone]. patient/family

## 2018-11-29 NOTE — H&P PST ADULT - NEGATIVE GASTROINTESTINAL SYMPTOMS
no steatorrhea/no hiccoughs/no nausea/no change in bowel habits/no flatulence/no abdominal pain/no diarrhea/no hematochezia/no vomiting/no melena/no jaundice

## 2018-11-29 NOTE — H&P PST ADULT - PROBLEM SELECTOR PLAN 6
cont statin Scheduled for a left angiogram, possible angioplasty, possible stent on 12/11/18. Preop instructions provided including NPO status, verbalized understanding.   Pt sees pulmonologist and seen 1 wk ago due to hemoptysis and SOB, pt will see pulmonologist tomorrow @ 11 am since called office for recent note and was told pt needs to see specialist again, will not release note or clear pt w/o evaluating pt. Clarification of condition was unable to clarify over the phone,  states not noted on record. Form provided to family.

## 2018-11-29 NOTE — H&P PST ADULT - PROBLEM SELECTOR PROBLEM 6
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

## 2018-11-29 NOTE — H&P PST ADULT - ASSESSMENT
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 Scheduled for a left angiogram, possible angioplasty, possible stent on 12/11/18.

## 2018-11-29 NOTE — H&P PST ADULT - NSANTHOSAYNRD_GEN_A_CORE
never tested/No. KARYN screening performed.  STOP BANG Legend: 0-2 = LOW Risk; 3-4 = INTERMEDIATE Risk; 5-8 = HIGH Risk

## 2018-11-29 NOTE — H&P PST ADULT - NEGATIVE CARDIOVASCULAR SYMPTOMS
no peripheral edema/no chest pain/no dyspnea on exertion/no orthopnea/no palpitations/no paroxysmal nocturnal dyspnea/no claudication

## 2018-11-29 NOTE — H&P PST ADULT - RS GEN PE MLT RESP DETAILS PC
clear to auscultation bilaterally/respirations non-labored/no rhonchi/airway patent/breath sounds equal/no wheezes/no rales/no subcutaneous emphysema/good air movement

## 2018-11-29 NOTE — H&P PST ADULT - PROBLEM SELECTOR PLAN 2
ptn is volume depleted, hold diuretics, hydrate w NS @ 75 cc /hr for 10 hrs. endo called, on insulin  on a sliding scale., check HA1C Continue Lopressor as ordered and aware to take in am dos w/ a sip of water

## 2018-11-29 NOTE — H&P PST ADULT - PROBLEM SELECTOR PLAN 1
place on BB lopressor 25 bid, get 2decho, place on AC w eliquis( d/w dr Burks, covering Dr, J. Goldberg), EP consult. trop is chronically elevated 2/2 ckd. start AC w ELIQUIS C/W BB/ lopressor as ordered, on AC w / Eliquis( Asked to stop 5 day prior to sx as per surgeon, form in chart and told to c/w ASA QD).

## 2018-11-29 NOTE — H&P PST ADULT - HISTORY OF PRESENT ILLNESS
89 yo M w/ PMH of 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, follows with endo Dr. Painting, with nephropathy, retinopathy s/p laser tx, last A1c at [facility] 8 on 6/2018.   Wife states 1 wk ago went to podiatrist was referred to vascular surgeon since  done and circulation was noted to be abnormal/ poor. Dr Figueroa evaluated and Doppler done. Pt recommended procedure at this time. 89 yo M w/ PMH of 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, Afib, HTN, HLD, COPD? (wife unsure of diagnosis, on Breo) and CHF. Wife states 1 wk ago went to podiatrist due to LLE pain,  was referred to a vascular surgeon since poor circulation was noted. Dr Figueroa evaluated and Doppler done. Pt recommended a left angiogram, possible angioplasty, possible stent on 12/11/18. 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

## 2018-11-29 NOTE — H&P PST ADULT - PROBLEM SELECTOR PLAN 4
prob pre-renal 2/2 volume depletion. hydrate, hold ARBs/nephrotoxics, hold diuretics. trend creat meets KARYN precautions criteria, OR booking notified

## 2018-11-30 ENCOUNTER — APPOINTMENT (OUTPATIENT)
Dept: PULMONOLOGY | Facility: CLINIC | Age: 83
End: 2018-11-30
Payer: MEDICARE

## 2018-11-30 VITALS
RESPIRATION RATE: 16 BRPM | OXYGEN SATURATION: 100 % | HEART RATE: 63 BPM | DIASTOLIC BLOOD PRESSURE: 61 MMHG | SYSTOLIC BLOOD PRESSURE: 98 MMHG

## 2018-11-30 DIAGNOSIS — J44.9 CHRONIC OBSTRUCTIVE PULMONARY DISEASE, UNSPECIFIED: ICD-10-CM

## 2018-11-30 PROCEDURE — 94060 EVALUATION OF WHEEZING: CPT

## 2018-11-30 PROCEDURE — 99213 OFFICE O/P EST LOW 20 MIN: CPT | Mod: 25

## 2018-12-10 ENCOUNTER — TRANSCRIPTION ENCOUNTER (OUTPATIENT)
Age: 83
End: 2018-12-10

## 2018-12-10 RX ORDER — SODIUM CHLORIDE 9 MG/ML
1000 INJECTION INTRAMUSCULAR; INTRAVENOUS; SUBCUTANEOUS
Qty: 0 | Refills: 0 | Status: DISCONTINUED | OUTPATIENT
Start: 2018-12-11 | End: 2018-12-11

## 2018-12-10 NOTE — ASU PATIENT PROFILE, ADULT - PMH
Afib    CHF (congestive heart failure)    COPD (chronic obstructive pulmonary disease)    HLD (hyperlipidemia)    HTN (hypertension)    Type 2 diabetes mellitus with retinopathy, macular edema presence unspecified, unspecified long term insulin use status, unspecified retinopathy severity

## 2018-12-11 ENCOUNTER — INPATIENT (INPATIENT)
Facility: HOSPITAL | Age: 83
LOS: 0 days | Discharge: HOME | End: 2018-12-12
Attending: SURGERY | Admitting: SURGERY

## 2018-12-11 ENCOUNTER — APPOINTMENT (OUTPATIENT)
Dept: VASCULAR SURGERY | Facility: HOSPITAL | Age: 83
End: 2018-12-11

## 2018-12-11 VITALS
HEART RATE: 53 BPM | SYSTOLIC BLOOD PRESSURE: 110 MMHG | HEIGHT: 63 IN | DIASTOLIC BLOOD PRESSURE: 69 MMHG | TEMPERATURE: 97 F | OXYGEN SATURATION: 96 % | RESPIRATION RATE: 16 BRPM | WEIGHT: 149.91 LBS

## 2018-12-11 DIAGNOSIS — Z98.49 CATARACT EXTRACTION STATUS, UNSPECIFIED EYE: Chronic | ICD-10-CM

## 2018-12-11 DIAGNOSIS — I70.262 ATHEROSCLEROSIS OF NATIVE ARTERIES OF EXTREMITIES WITH GANGRENE, LEFT LEG: ICD-10-CM

## 2018-12-11 PROBLEM — I48.91 UNSPECIFIED ATRIAL FIBRILLATION: Chronic | Status: ACTIVE | Noted: 2018-11-29

## 2018-12-11 PROBLEM — J44.9 CHRONIC OBSTRUCTIVE PULMONARY DISEASE, UNSPECIFIED: Chronic | Status: ACTIVE | Noted: 2018-11-29

## 2018-12-11 PROBLEM — I50.9 HEART FAILURE, UNSPECIFIED: Chronic | Status: ACTIVE | Noted: 2018-11-29

## 2018-12-11 LAB
GLUCOSE BLDC GLUCOMTR-MCNC: 73 MG/DL — SIGNIFICANT CHANGE UP (ref 70–99)
GLUCOSE BLDV-MCNC: 120 — HIGH (ref 70–99)
POTASSIUM BLDV-SCNC: 3.7 MMOL/L — SIGNIFICANT CHANGE UP (ref 3.4–4.5)
RH IG SCN BLD-IMP: POSITIVE — SIGNIFICANT CHANGE UP

## 2018-12-11 RX ORDER — DEXTROSE 50 % IN WATER 50 %
25 SYRINGE (ML) INTRAVENOUS ONCE
Qty: 0 | Refills: 0 | Status: DISCONTINUED | OUTPATIENT
Start: 2018-12-11 | End: 2018-12-12

## 2018-12-11 RX ORDER — METOPROLOL TARTRATE 50 MG
50 TABLET ORAL DAILY
Qty: 0 | Refills: 0 | Status: DISCONTINUED | OUTPATIENT
Start: 2018-12-11 | End: 2018-12-12

## 2018-12-11 RX ORDER — SODIUM CHLORIDE 9 MG/ML
1000 INJECTION INTRAMUSCULAR; INTRAVENOUS; SUBCUTANEOUS
Qty: 0 | Refills: 0 | Status: DISCONTINUED | OUTPATIENT
Start: 2018-12-11 | End: 2018-12-12

## 2018-12-11 RX ORDER — ATORVASTATIN CALCIUM 80 MG/1
10 TABLET, FILM COATED ORAL AT BEDTIME
Qty: 0 | Refills: 0 | Status: DISCONTINUED | OUTPATIENT
Start: 2018-12-11 | End: 2018-12-12

## 2018-12-11 RX ORDER — GLUCAGON INJECTION, SOLUTION 0.5 MG/.1ML
1 INJECTION, SOLUTION SUBCUTANEOUS ONCE
Qty: 0 | Refills: 0 | Status: DISCONTINUED | OUTPATIENT
Start: 2018-12-11 | End: 2018-12-12

## 2018-12-11 RX ORDER — DEXTROSE 50 % IN WATER 50 %
15 SYRINGE (ML) INTRAVENOUS ONCE
Qty: 0 | Refills: 0 | Status: DISCONTINUED | OUTPATIENT
Start: 2018-12-11 | End: 2018-12-12

## 2018-12-11 RX ORDER — INSULIN LISPRO 100/ML
VIAL (ML) SUBCUTANEOUS AT BEDTIME
Qty: 0 | Refills: 0 | Status: DISCONTINUED | OUTPATIENT
Start: 2018-12-11 | End: 2018-12-12

## 2018-12-11 RX ORDER — INSULIN LISPRO 100/ML
VIAL (ML) SUBCUTANEOUS
Qty: 0 | Refills: 0 | Status: DISCONTINUED | OUTPATIENT
Start: 2018-12-11 | End: 2018-12-12

## 2018-12-11 RX ORDER — BUDESONIDE AND FORMOTEROL FUMARATE DIHYDRATE 160; 4.5 UG/1; UG/1
2 AEROSOL RESPIRATORY (INHALATION)
Qty: 0 | Refills: 0 | Status: DISCONTINUED | OUTPATIENT
Start: 2018-12-11 | End: 2018-12-12

## 2018-12-11 RX ORDER — DEXTROSE 50 % IN WATER 50 %
12.5 SYRINGE (ML) INTRAVENOUS ONCE
Qty: 0 | Refills: 0 | Status: DISCONTINUED | OUTPATIENT
Start: 2018-12-11 | End: 2018-12-12

## 2018-12-11 RX ORDER — METOPROLOL TARTRATE 50 MG
5 TABLET ORAL ONCE
Qty: 0 | Refills: 0 | Status: COMPLETED | OUTPATIENT
Start: 2018-12-11 | End: 2018-12-11

## 2018-12-11 RX ORDER — ASPIRIN/CALCIUM CARB/MAGNESIUM 324 MG
81 TABLET ORAL DAILY
Qty: 0 | Refills: 0 | Status: DISCONTINUED | OUTPATIENT
Start: 2018-12-11 | End: 2018-12-12

## 2018-12-11 RX ADMIN — Medication 81 MILLIGRAM(S): at 18:53

## 2018-12-11 RX ADMIN — BUDESONIDE AND FORMOTEROL FUMARATE DIHYDRATE 2 PUFF(S): 160; 4.5 AEROSOL RESPIRATORY (INHALATION) at 19:46

## 2018-12-11 RX ADMIN — ATORVASTATIN CALCIUM 10 MILLIGRAM(S): 80 TABLET, FILM COATED ORAL at 22:59

## 2018-12-11 NOTE — PROGRESS NOTE ADULT - SUBJECTIVE AND OBJECTIVE BOX
S: Patient underwent LLE angiogram, SFA stent w/ R groin access and tolerated procedure without  issue and sent to PACU.  Patient denies chest pain, groin pain, shortness of breath, nausea, vomiting, lightheadedness, or dizziness.  Pain was well controlled.      O:T(C): 36.4 (12-11-18 @ 17:00), Max: 36.7 (12-11-18 @ 10:50)  HR: 85 (12-11-18 @ 18:00) (72 - 103)  BP: 114/66 (12-11-18 @ 17:30) (93/58 - 114/66)  RR: 15 (12-11-18 @ 18:00) (10 - 22)  SpO2: 99% (12-11-18 @ 18:00) (93% - 100%)  Wt(kg): --             Gen: NAD  Groin: R groin w bandage in place dry. No palpable masses/hematomas  Vasc: LLE w/ palpable popliteal pulse. +DP/peroneal signals w/ doppler. RLE w/ palpable popliteal pulse         Assessment/Plan:  89y Male s/p LLE angiogram, SFA stent    Pain control  ADAT- CC, IVF until tolerating diet  restart eliquis in AM  DVT PPX  Flat for 6hr post op

## 2018-12-11 NOTE — ASU PREOP CHECKLIST - 2.
side of left foot has cracked skin and dry flaking skin. toes to left foot are red. Dr. Figueroa aware and observed the pt's foot.

## 2018-12-12 ENCOUNTER — TRANSCRIPTION ENCOUNTER (OUTPATIENT)
Age: 83
End: 2018-12-12

## 2018-12-12 VITALS
HEART RATE: 87 BPM | RESPIRATION RATE: 16 BRPM | OXYGEN SATURATION: 97 % | DIASTOLIC BLOOD PRESSURE: 57 MMHG | TEMPERATURE: 98 F | SYSTOLIC BLOOD PRESSURE: 112 MMHG

## 2018-12-12 LAB
GLUCOSE BLDC GLUCOMTR-MCNC: 104 MG/DL — HIGH (ref 70–99)
GLUCOSE BLDC GLUCOMTR-MCNC: 202 MG/DL — HIGH (ref 70–99)
GLUCOSE BLDC GLUCOMTR-MCNC: 66 MG/DL — LOW (ref 70–99)
GLUCOSE BLDC GLUCOMTR-MCNC: 67 MG/DL — LOW (ref 70–99)

## 2018-12-12 RX ORDER — APIXABAN 2.5 MG/1
0 TABLET, FILM COATED ORAL
Qty: 0 | Refills: 0 | COMMUNITY

## 2018-12-12 RX ORDER — APIXABAN 2.5 MG/1
2.5 TABLET, FILM COATED ORAL EVERY 12 HOURS
Qty: 0 | Refills: 0 | Status: DISCONTINUED | OUTPATIENT
Start: 2018-12-12 | End: 2018-12-12

## 2018-12-12 RX ORDER — APIXABAN 2.5 MG/1
5 TABLET, FILM COATED ORAL EVERY 12 HOURS
Qty: 0 | Refills: 0 | Status: DISCONTINUED | OUTPATIENT
Start: 2018-12-12 | End: 2018-12-12

## 2018-12-12 RX ADMIN — Medication 50 MILLIGRAM(S): at 11:22

## 2018-12-12 RX ADMIN — Medication 81 MILLIGRAM(S): at 11:21

## 2018-12-12 RX ADMIN — Medication 2: at 12:46

## 2018-12-12 NOTE — PROGRESS NOTE ADULT - SUBJECTIVE AND OBJECTIVE BOX
GENERAL SURGERY DAILY PROGRESS NOTE:       Subjective:  Pt seen and examined at bedside. Pt went to the OR yesterday evening for LLE angio w/ SFA stent (R groin access)         Objective:    PE:  Gen: NAD  Groin: R groin w bandage in place dry. No palpable masses/hematomas  Vasc: LLE w/ palpable popliteal pulse. +DP/peroneal signals w/ doppler. RLE w/ palpable popliteal pulse     Vital Signs Last 24 Hrs  T(C): 36.3 (11 Dec 2018 22:00), Max: 36.7 (11 Dec 2018 10:50)  T(F): 97.4 (11 Dec 2018 22:00), Max: 98.1 (11 Dec 2018 10:50)  HR: 89 (11 Dec 2018 22:00) (53 - 103)  BP: 107/70 (11 Dec 2018 22:00) (93/58 - 115/100)  BP(mean): 103 (11 Dec 2018 20:00) (62 - 103)  RR: 16 (11 Dec 2018 22:00) (10 - 22)  SpO2: 100% (11 Dec 2018 22:00) (93% - 100%)    I&O's Detail    11 Dec 2018 07:01  -  12 Dec 2018 01:14  --------------------------------------------------------  IN:    Oral Fluid: 480 mL    sodium chloride 0.9%.: 700 mL  Total IN: 1180 mL    OUT:    Voided: 585 mL  Total OUT: 585 mL    Total NET: 595 mL          Daily Height in cm: 160.02 (11 Dec 2018 05:55)    Daily     MEDICATIONS  (STANDING):  aspirin enteric coated 81 milliGRAM(s) Oral daily  atorvastatin 10 milliGRAM(s) Oral at bedtime  dextrose 50% Injectable 12.5 Gram(s) IV Push once  dextrose 50% Injectable 25 Gram(s) IV Push once  dextrose 50% Injectable 25 Gram(s) IV Push once  insulin lispro (HumaLOG) corrective regimen sliding scale   SubCutaneous three times a day before meals  insulin lispro (HumaLOG) corrective regimen sliding scale   SubCutaneous at bedtime  metoprolol succinate ER 50 milliGRAM(s) Oral daily  sodium chloride 0.9%. 1000 milliLiter(s) (100 mL/Hr) IV Continuous <Continuous>    MEDICATIONS  (PRN):  buDESOnide  80 MICROgram(s)/formoterol 4.5 MICROgram(s) Inhaler 2 Puff(s) Inhalation two times a day PRN shortness of breath or oxygen desaturation <95  dextrose 40% Gel 15 Gram(s) Oral once PRN Blood Glucose LESS THAN 70 milliGRAM(s)/deciliter  glucagon  Injectable 1 milliGRAM(s) IntraMuscular once PRN Glucose LESS THAN 70 milligrams/deciliter      LABS:                RADIOLOGY & ADDITIONAL STUDIES:

## 2018-12-12 NOTE — DISCHARGE NOTE ADULT - MEDICATION SUMMARY - MEDICATIONS TO CHANGE
I will SWITCH the dose or number of times a day I take the medications listed below when I get home from the hospital:  None I will SWITCH the dose or number of times a day I take the medications listed below when I get home from the hospital:    Eliquis 2.5 mg oral tablet  -- orally once a day

## 2018-12-12 NOTE — PROGRESS NOTE ADULT - ASSESSMENT
89y Male s/p LLE angiogram, SFA stent    Pain control  ADAT- CC, IVF until tolerating diet  restart eliquis  DVT PPX

## 2018-12-12 NOTE — DISCHARGE NOTE ADULT - MEDICATION SUMMARY - MEDICATIONS TO TAKE
I will START or STAY ON the medications listed below when I get home from the hospital:    aspirin 81 mg oral tablet  -- 1 tab(s) by mouth once a day  -- Indication: For Peripheral vascular disease    Eliquis 2.5 mg oral tablet  -- orally once a day  -- Indication: For Anticoagulation    Onglyza 2.5 mg oral tablet  -- 1 tab(s) by mouth once a day  -- Indication: For Diabetes    glimepiride 2 mg oral tablet  -- 1 tab(s) by mouth once a day  -- Indication: For Diabetes    pravastatin 40 mg oral tablet  -- 1 tab(s) by mouth once a day  -- Indication: For Hyperlipidemia    metoprolol  -- 50 milligram(s) by mouth once a day  -- Indication: For Hypertension    Breo Ellipta  -- Indication: For COPD    furosemide 40 mg oral tablet  -- Indication: For Diuretic I will START or STAY ON the medications listed below when I get home from the hospital:    aspirin 81 mg oral tablet  -- 1 tab(s) by mouth once a day  -- Indication: For Peripheral vascular disease    Eliquis 2.5 mg oral tablet  -- orally 2 times a day  -- Indication: For Afib    glimepiride 2 mg oral tablet  -- 1 tab(s) by mouth once a day  -- Indication: For Diabetes    Onglyza 2.5 mg oral tablet  -- 1 tab(s) by mouth once a day  -- Indication: For Diabetes    pravastatin 40 mg oral tablet  -- 1 tab(s) by mouth once a day  -- Indication: For Hyperlipidemia    metoprolol  -- 50 milligram(s) by mouth once a day  -- Indication: For Hypertension    Breo Ellipta  -- Indication: For COPD    furosemide 40 mg oral tablet  -- Indication: For Diuretic

## 2018-12-12 NOTE — DISCHARGE NOTE ADULT - CARE PROVIDER_API CALL
Ivan Figueroa), Surgery; Vascular Surgery  [id number] [place] [address]  Phone: [phone]  Fax: [phone]

## 2018-12-12 NOTE — DISCHARGE NOTE ADULT - PATIENT PORTAL LINK FT
You can access the [facility] Patient Portal, offered by [facility], by registering with the following website: [email or web address]

## 2018-12-12 NOTE — DISCHARGE NOTE ADULT - HOSPITAL COURSE
89 yo M w/ PMH of 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, Afib, HTN, HLD, COPD? (wife unsure of diagnosis, on Breo) and CHF. Wife states 1 wk ago went to podiatrist due to LLE pain,  was referred to a vascular surgeon since poor circulation was noted. Dr Figueroa evaluated and Doppler done. Pt recommended a left angiogram, possible angioplasty.    On 12/11 patient underwent LLE angiogram, SFA stent placement (6 x10 and 6 x12) in the OR. The patient tolerated the procedure well. Post-operatively the patient was sent to the PACU. The patient was hemodynamically stable and was transferred to a surgical floor. The patient had daily wound care and was seen by physical therapy which recommended discharge to home. The patient's pain was controlled by IV pain medications and then by PO pain medications. The patient was advanced to a regular diet and tolerated it well. The patient was placed on home medications. At the time of discharge, the patient was hemodynamically stable, was tolerating PO diet, was voiding urine and passing stool, was ambulating, and was comfortable with adequate pain control. The patient was instructed to follow up with Dr. Figueroa within 1 week after discharge from the hospital. The patient & family felt comfortable with discharge. The patient had no other issues.

## 2018-12-12 NOTE — DISCHARGE NOTE ADULT - CONDITION (STATED IN TERMS THAT PERMIT A SPECIFIC MEASURABLE COMPARISON WITH CONDITION ON ADMISSION):
Patient seen and examined on day of discharge, patient stable for discharge per attending
(0) swallows foods and liquids w/o difficulty

## 2018-12-12 NOTE — DISCHARGE NOTE ADULT - PLAN OF CARE
You had an angiogram with placement of SFA stent BATHING: Please do not submerge wound underwater. You may shower and/or sponge bathe.  ACTIVITY: No heavy lifting or straining. Resume activities of daily living. Do not lift heavy weights (greater than 15 pounds) or engage in strenuous exercise until you see your doctor in the office in 1-2 weeks. You may shower today, just let the water run over the wound, no scrubbing. No immersion baths or swimming in pools or ocean until you see us in the office again. Please leave the steri-strips (small white bandaids) on. These will remain on and fall off by themselves in the next few weeks. If you are taking narcotic pain medication (such as Percocet), do NOT drive a car, operate machinery or make important decisions.  DIET: Return to your usual diet.  NOTIFY YOUR SURGEON IF: You have any bleeding that does not stop, any pus draining from your wound, any fever (over 100.4 F) or chills, persistent nausea/vomiting, persistent diarrhea, or if your pain is not controlled on your discharge pain medications.  FOLLOW-UP:  1. Please call to make a follow-up appointment within one week of discharge with Dr. Figueroa   2. Please follow up with your primary care physician in one week regarding your hospitalization.   3. Please continue taking medications as prescribed

## 2018-12-17 ENCOUNTER — APPOINTMENT (OUTPATIENT)
Dept: PULMONOLOGY | Facility: CLINIC | Age: 83
End: 2018-12-17
Payer: MEDICARE

## 2018-12-17 VITALS
RESPIRATION RATE: 17 BRPM | OXYGEN SATURATION: 95 % | HEART RATE: 56 BPM | DIASTOLIC BLOOD PRESSURE: 68 MMHG | TEMPERATURE: 97.3 F | SYSTOLIC BLOOD PRESSURE: 109 MMHG

## 2018-12-17 DIAGNOSIS — J90 PLEURAL EFFUSION, NOT ELSEWHERE CLASSIFIED: ICD-10-CM

## 2018-12-17 DIAGNOSIS — R05 COUGH: ICD-10-CM

## 2018-12-17 PROCEDURE — 71046 X-RAY EXAM CHEST 2 VIEWS: CPT

## 2018-12-17 PROCEDURE — 99213 OFFICE O/P EST LOW 20 MIN: CPT | Mod: 25

## 2018-12-18 PROBLEM — R05 COUGH: Status: ACTIVE | Noted: 2018-07-13

## 2018-12-18 PROBLEM — J90 PLEURAL EFFUSION: Status: ACTIVE | Noted: 2018-07-13

## 2018-12-20 ENCOUNTER — APPOINTMENT (OUTPATIENT)
Dept: VASCULAR SURGERY | Facility: CLINIC | Age: 83
End: 2018-12-20
Payer: MEDICARE

## 2018-12-20 ENCOUNTER — INPATIENT (INPATIENT)
Facility: HOSPITAL | Age: 83
LOS: 1 days | Discharge: HOME HEALTH | End: 2018-12-22
Attending: SURGERY | Admitting: SURGERY
Payer: MEDICARE

## 2018-12-20 VITALS
SYSTOLIC BLOOD PRESSURE: 105 MMHG | HEART RATE: 88 BPM | DIASTOLIC BLOOD PRESSURE: 68 MMHG | BODY MASS INDEX: 26.63 KG/M2 | WEIGHT: 160 LBS | TEMPERATURE: 97.5 F

## 2018-12-20 VITALS
SYSTOLIC BLOOD PRESSURE: 111 MMHG | RESPIRATION RATE: 18 BRPM | TEMPERATURE: 98 F | HEART RATE: 74 BPM | OXYGEN SATURATION: 92 % | DIASTOLIC BLOOD PRESSURE: 66 MMHG

## 2018-12-20 VITALS — DIASTOLIC BLOOD PRESSURE: 65 MMHG | SYSTOLIC BLOOD PRESSURE: 108 MMHG | HEART RATE: 67 BPM

## 2018-12-20 DIAGNOSIS — Z98.49 CATARACT EXTRACTION STATUS, UNSPECIFIED EYE: Chronic | ICD-10-CM

## 2018-12-20 DIAGNOSIS — I72.9 ANEURYSM OF UNSPECIFIED SITE: ICD-10-CM

## 2018-12-20 LAB
ALBUMIN SERPL ELPH-MCNC: 3.5 G/DL — SIGNIFICANT CHANGE UP (ref 3.3–5)
ALP SERPL-CCNC: 161 U/L — HIGH (ref 40–120)
ALT FLD-CCNC: 35 U/L — SIGNIFICANT CHANGE UP (ref 4–41)
AST SERPL-CCNC: 52 U/L — HIGH (ref 4–40)
BASOPHILS # BLD AUTO: 0.04 K/UL — SIGNIFICANT CHANGE UP (ref 0–0.2)
BASOPHILS NFR BLD AUTO: 0.6 % — SIGNIFICANT CHANGE UP (ref 0–2)
BILIRUB SERPL-MCNC: 0.9 MG/DL — SIGNIFICANT CHANGE UP (ref 0.2–1.2)
BLD GP AB SCN SERPL QL: NEGATIVE — SIGNIFICANT CHANGE UP
BUN SERPL-MCNC: 137 MG/DL — HIGH (ref 7–23)
CALCIUM SERPL-MCNC: 8.7 MG/DL — SIGNIFICANT CHANGE UP (ref 8.4–10.5)
CHLORIDE SERPL-SCNC: 87 MMOL/L — LOW (ref 98–107)
CO2 SERPL-SCNC: 23 MMOL/L — SIGNIFICANT CHANGE UP (ref 22–31)
CREAT SERPL-MCNC: 3.15 MG/DL — HIGH (ref 0.5–1.3)
EOSINOPHIL # BLD AUTO: 0.04 K/UL — SIGNIFICANT CHANGE UP (ref 0–0.5)
EOSINOPHIL NFR BLD AUTO: 0.6 % — SIGNIFICANT CHANGE UP (ref 0–6)
GLUCOSE BLDC GLUCOMTR-MCNC: 198 MG/DL — HIGH (ref 70–99)
GLUCOSE SERPL-MCNC: 174 MG/DL — HIGH (ref 70–99)
HCT VFR BLD CALC: 36.9 % — LOW (ref 39–50)
HGB BLD-MCNC: 11.8 G/DL — LOW (ref 13–17)
IMM GRANULOCYTES # BLD AUTO: 0.04 # — SIGNIFICANT CHANGE UP
IMM GRANULOCYTES NFR BLD AUTO: 0.6 % — SIGNIFICANT CHANGE UP (ref 0–1.5)
LYMPHOCYTES # BLD AUTO: 0.63 K/UL — LOW (ref 1–3.3)
LYMPHOCYTES # BLD AUTO: 9.1 % — LOW (ref 13–44)
MCHC RBC-ENTMCNC: 26.6 PG — LOW (ref 27–34)
MCHC RBC-ENTMCNC: 32 % — SIGNIFICANT CHANGE UP (ref 32–36)
MCV RBC AUTO: 83.1 FL — SIGNIFICANT CHANGE UP (ref 80–100)
MONOCYTES # BLD AUTO: 0.59 K/UL — SIGNIFICANT CHANGE UP (ref 0–0.9)
MONOCYTES NFR BLD AUTO: 8.5 % — SIGNIFICANT CHANGE UP (ref 2–14)
NEUTROPHILS # BLD AUTO: 5.57 K/UL — SIGNIFICANT CHANGE UP (ref 1.8–7.4)
NEUTROPHILS NFR BLD AUTO: 80.6 % — HIGH (ref 43–77)
NRBC # FLD: 0 — SIGNIFICANT CHANGE UP
PLATELET # BLD AUTO: 154 K/UL — SIGNIFICANT CHANGE UP (ref 150–400)
PMV BLD: 12.6 FL — SIGNIFICANT CHANGE UP (ref 7–13)
POTASSIUM SERPL-MCNC: 3.5 MMOL/L — SIGNIFICANT CHANGE UP (ref 3.5–5.3)
POTASSIUM SERPL-SCNC: 3.5 MMOL/L — SIGNIFICANT CHANGE UP (ref 3.5–5.3)
PROT SERPL-MCNC: 6.5 G/DL — SIGNIFICANT CHANGE UP (ref 6–8.3)
RBC # BLD: 4.44 M/UL — SIGNIFICANT CHANGE UP (ref 4.2–5.8)
RBC # FLD: 15.9 % — HIGH (ref 10.3–14.5)
RH IG SCN BLD-IMP: POSITIVE — SIGNIFICANT CHANGE UP
SODIUM SERPL-SCNC: 133 MMOL/L — LOW (ref 135–145)
WBC # BLD: 6.91 K/UL — SIGNIFICANT CHANGE UP (ref 3.8–10.5)
WBC # FLD AUTO: 6.91 K/UL — SIGNIFICANT CHANGE UP (ref 3.8–10.5)

## 2018-12-20 PROCEDURE — 71045 X-RAY EXAM CHEST 1 VIEW: CPT | Mod: 26

## 2018-12-20 PROCEDURE — 99212 OFFICE O/P EST SF 10 MIN: CPT | Mod: PD

## 2018-12-20 PROCEDURE — 93922 UPR/L XTREMITY ART 2 LEVELS: CPT

## 2018-12-20 PROCEDURE — 93926 LOWER EXTREMITY STUDY: CPT

## 2018-12-20 RX ORDER — METOPROLOL TARTRATE 50 MG
50 TABLET ORAL DAILY
Qty: 0 | Refills: 0 | Status: DISCONTINUED | OUTPATIENT
Start: 2018-12-20 | End: 2018-12-22

## 2018-12-20 RX ORDER — DEXTROSE 50 % IN WATER 50 %
25 SYRINGE (ML) INTRAVENOUS ONCE
Qty: 0 | Refills: 0 | Status: DISCONTINUED | OUTPATIENT
Start: 2018-12-20 | End: 2018-12-20

## 2018-12-20 RX ORDER — FUROSEMIDE 40 MG
40 TABLET ORAL DAILY
Qty: 0 | Refills: 0 | Status: DISCONTINUED | OUTPATIENT
Start: 2018-12-20 | End: 2018-12-22

## 2018-12-20 RX ORDER — DEXTROSE 50 % IN WATER 50 %
12.5 SYRINGE (ML) INTRAVENOUS ONCE
Qty: 0 | Refills: 0 | Status: DISCONTINUED | OUTPATIENT
Start: 2018-12-20 | End: 2018-12-20

## 2018-12-20 RX ORDER — INSULIN LISPRO 100/ML
VIAL (ML) SUBCUTANEOUS EVERY 6 HOURS
Qty: 0 | Refills: 0 | Status: DISCONTINUED | OUTPATIENT
Start: 2018-12-20 | End: 2018-12-21

## 2018-12-20 RX ORDER — DEXTROSE 50 % IN WATER 50 %
15 SYRINGE (ML) INTRAVENOUS ONCE
Qty: 0 | Refills: 0 | Status: DISCONTINUED | OUTPATIENT
Start: 2018-12-20 | End: 2018-12-20

## 2018-12-20 RX ORDER — SODIUM CHLORIDE 9 MG/ML
1000 INJECTION, SOLUTION INTRAVENOUS
Qty: 0 | Refills: 0 | Status: DISCONTINUED | OUTPATIENT
Start: 2018-12-20 | End: 2018-12-20

## 2018-12-20 RX ORDER — GLUCAGON INJECTION, SOLUTION 0.5 MG/.1ML
1 INJECTION, SOLUTION SUBCUTANEOUS ONCE
Qty: 0 | Refills: 0 | Status: DISCONTINUED | OUTPATIENT
Start: 2018-12-20 | End: 2018-12-20

## 2018-12-20 RX ORDER — SODIUM CHLORIDE 9 MG/ML
1000 INJECTION, SOLUTION INTRAVENOUS
Qty: 0 | Refills: 0 | Status: DISCONTINUED | OUTPATIENT
Start: 2018-12-20 | End: 2018-12-22

## 2018-12-20 RX ADMIN — SODIUM CHLORIDE 50 MILLILITER(S): 9 INJECTION, SOLUTION INTRAVENOUS at 19:53

## 2018-12-20 RX ADMIN — Medication 1 TABLET(S): at 23:25

## 2018-12-20 RX ADMIN — Medication 40 MILLIGRAM(S): at 23:26

## 2018-12-20 NOTE — ED ADULT TRIAGE NOTE - CHIEF COMPLAINT QUOTE
Sent in by Dr. Figueroa. t had stent placed in right leg last week,  saw vascular in the office today and was told that he needs to come in for right femoral pseudoaneurism procedure d/t "issue with stent". Both legs warm to touch

## 2018-12-20 NOTE — ED PROVIDER NOTE - OBJECTIVE STATEMENT
Patient is 89 y M with PMH CHF, a fib on eliquis, copd, dm2, hld, htn presenting with possible R femoral pseudoaneurysm s/p 12/11 LLE angiogram with SFA stent placement placed last week, was seen for f/u apt in vascular office today    PMD/card: Joel Goldberg  Pulm: Teofilo  Vascular: alfredo jacinto  ROS: Denies fever, palpitations, chills, recent sickness, HA, vision changes, cough, chest pain, abdominal pain, n/v/d/c, dysuria, hematuria, rash, new joint aches, sick contacts, and recent travel.

## 2018-12-20 NOTE — ED PROVIDER NOTE - PHYSICAL EXAMINATION
Gen: NAD, AOx3  Head: NCAT  HEENT: PERRL, oral mucosa moist, normal conjunctiva  Lung: CTAB, no respiratory distress  CV: rrr, no murmurs, Normal perfusion  Abd: soft, NTND, no CVA tenderness  MSK: No edema, no visible deformities, b/l feet are warm and perfused with palpable DPs, R groin has bruising and mass with palpable thrill  Neuro: No focal neurologic deficits  Skin: No rash   Psych: normal affect

## 2018-12-20 NOTE — ED PROVIDER NOTE - ATTENDING CONTRIBUTION TO CARE
I performed a face to face bedside interview with patient regarding history of present illness, review of symptoms and past medical history. I completed an independent physical exam.  I have discussed patient's plan of care.   I agree with note as stated above, having amended the EMR as needed to reflect my findings. I have discussed the assessment and plan of care.  This includes during the time I functioned as the attending physician for this patient.  Attending Contribution to Care: agree with plan of resident.  89 y M with PMH CHF, a fib on eliquis, copd, dm2, hld, htn presenting with possible R femoral pseudoaneurysm s/p 12/11 LLE angiogram with SFA stent placement placed last week, pt p/w pseudoaneurysm, good pulses distally with no neuro deficits. requires admission for possible repair. hd stable.

## 2018-12-20 NOTE — ED PROVIDER NOTE - MEDICAL DECISION MAKING DETAILS
pt p/w pseudoaneurysm, good pulses distally with no neuro deficits. requires admission for possible repair. hd stable.

## 2018-12-21 LAB
BUN SERPL-MCNC: 128 MG/DL — HIGH (ref 7–23)
BUN SERPL-MCNC: 133 MG/DL — HIGH (ref 7–23)
CALCIUM SERPL-MCNC: 9.1 MG/DL — SIGNIFICANT CHANGE UP (ref 8.4–10.5)
CALCIUM SERPL-MCNC: 9.1 MG/DL — SIGNIFICANT CHANGE UP (ref 8.4–10.5)
CHLORIDE SERPL-SCNC: 88 MMOL/L — LOW (ref 98–107)
CHLORIDE SERPL-SCNC: 90 MMOL/L — LOW (ref 98–107)
CO2 SERPL-SCNC: 26 MMOL/L — SIGNIFICANT CHANGE UP (ref 22–31)
CO2 SERPL-SCNC: 28 MMOL/L — SIGNIFICANT CHANGE UP (ref 22–31)
CREAT SERPL-MCNC: 3.03 MG/DL — HIGH (ref 0.5–1.3)
CREAT SERPL-MCNC: 3.21 MG/DL — HIGH (ref 0.5–1.3)
GLUCOSE BLDC GLUCOMTR-MCNC: 131 MG/DL — HIGH (ref 70–99)
GLUCOSE BLDC GLUCOMTR-MCNC: 149 MG/DL — HIGH (ref 70–99)
GLUCOSE BLDC GLUCOMTR-MCNC: 174 MG/DL — HIGH (ref 70–99)
GLUCOSE BLDC GLUCOMTR-MCNC: 184 MG/DL — HIGH (ref 70–99)
GLUCOSE BLDC GLUCOMTR-MCNC: 231 MG/DL — HIGH (ref 70–99)
GLUCOSE BLDC GLUCOMTR-MCNC: 73 MG/DL — SIGNIFICANT CHANGE UP (ref 70–99)
GLUCOSE SERPL-MCNC: 114 MG/DL — HIGH (ref 70–99)
GLUCOSE SERPL-MCNC: 185 MG/DL — HIGH (ref 70–99)
HCT VFR BLD CALC: 35.6 % — LOW (ref 39–50)
HGB BLD-MCNC: 11.5 G/DL — LOW (ref 13–17)
MAGNESIUM SERPL-MCNC: 2.8 MG/DL — HIGH (ref 1.6–2.6)
MCHC RBC-ENTMCNC: 26.6 PG — LOW (ref 27–34)
MCHC RBC-ENTMCNC: 32.3 % — SIGNIFICANT CHANGE UP (ref 32–36)
MCV RBC AUTO: 82.2 FL — SIGNIFICANT CHANGE UP (ref 80–100)
NRBC # FLD: 0 — SIGNIFICANT CHANGE UP
PHOSPHATE SERPL-MCNC: 7 MG/DL — HIGH (ref 2.5–4.5)
PLATELET # BLD AUTO: 168 K/UL — SIGNIFICANT CHANGE UP (ref 150–400)
PMV BLD: 12.6 FL — SIGNIFICANT CHANGE UP (ref 7–13)
POTASSIUM SERPL-MCNC: 2.9 MMOL/L — CRITICAL LOW (ref 3.5–5.3)
POTASSIUM SERPL-MCNC: 3.7 MMOL/L — SIGNIFICANT CHANGE UP (ref 3.5–5.3)
POTASSIUM SERPL-SCNC: 2.9 MMOL/L — CRITICAL LOW (ref 3.5–5.3)
POTASSIUM SERPL-SCNC: 3.7 MMOL/L — SIGNIFICANT CHANGE UP (ref 3.5–5.3)
RBC # BLD: 4.33 M/UL — SIGNIFICANT CHANGE UP (ref 4.2–5.8)
RBC # FLD: 15.6 % — HIGH (ref 10.3–14.5)
SODIUM SERPL-SCNC: 135 MMOL/L — SIGNIFICANT CHANGE UP (ref 135–145)
SODIUM SERPL-SCNC: 136 MMOL/L — SIGNIFICANT CHANGE UP (ref 135–145)
WBC # BLD: 6.47 K/UL — SIGNIFICANT CHANGE UP (ref 3.8–10.5)
WBC # FLD AUTO: 6.47 K/UL — SIGNIFICANT CHANGE UP (ref 3.8–10.5)

## 2018-12-21 PROCEDURE — 76936 ECHO GUIDE FOR ARTERY REPAIR: CPT | Mod: 26

## 2018-12-21 RX ORDER — POTASSIUM CHLORIDE 20 MEQ
10 PACKET (EA) ORAL
Qty: 0 | Refills: 0 | Status: DISCONTINUED | OUTPATIENT
Start: 2018-12-21 | End: 2018-12-21

## 2018-12-21 RX ORDER — ENOXAPARIN SODIUM 100 MG/ML
80 INJECTION SUBCUTANEOUS ONCE
Qty: 0 | Refills: 0 | Status: COMPLETED | OUTPATIENT
Start: 2018-12-21 | End: 2018-12-21

## 2018-12-21 RX ORDER — INSULIN LISPRO 100/ML
VIAL (ML) SUBCUTANEOUS
Qty: 0 | Refills: 0 | Status: DISCONTINUED | OUTPATIENT
Start: 2018-12-21 | End: 2018-12-22

## 2018-12-21 RX ORDER — INSULIN LISPRO 100/ML
VIAL (ML) SUBCUTANEOUS AT BEDTIME
Qty: 0 | Refills: 0 | Status: DISCONTINUED | OUTPATIENT
Start: 2018-12-21 | End: 2018-12-22

## 2018-12-21 RX ORDER — ENOXAPARIN SODIUM 100 MG/ML
80 INJECTION SUBCUTANEOUS DAILY
Qty: 0 | Refills: 0 | Status: DISCONTINUED | OUTPATIENT
Start: 2018-12-22 | End: 2018-12-22

## 2018-12-21 RX ORDER — POTASSIUM CHLORIDE 20 MEQ
10 PACKET (EA) ORAL ONCE
Qty: 0 | Refills: 0 | Status: DISCONTINUED | OUTPATIENT
Start: 2018-12-21 | End: 2018-12-21

## 2018-12-21 RX ORDER — POTASSIUM CHLORIDE 20 MEQ
20 PACKET (EA) ORAL ONCE
Qty: 0 | Refills: 0 | Status: COMPLETED | OUTPATIENT
Start: 2018-12-21 | End: 2018-12-21

## 2018-12-21 RX ORDER — POTASSIUM CHLORIDE 20 MEQ
10 PACKET (EA) ORAL
Qty: 0 | Refills: 0 | Status: COMPLETED | OUTPATIENT
Start: 2018-12-21 | End: 2018-12-21

## 2018-12-21 RX ADMIN — ENOXAPARIN SODIUM 80 MILLIGRAM(S): 100 INJECTION SUBCUTANEOUS at 12:47

## 2018-12-21 RX ADMIN — Medication 100 MILLIEQUIVALENT(S): at 18:17

## 2018-12-21 RX ADMIN — Medication 100 MILLIEQUIVALENT(S): at 16:55

## 2018-12-21 RX ADMIN — Medication 4: at 01:03

## 2018-12-21 RX ADMIN — Medication 2: at 07:34

## 2018-12-21 RX ADMIN — Medication 100 MILLIEQUIVALENT(S): at 15:50

## 2018-12-21 RX ADMIN — Medication 20 MILLIEQUIVALENT(S): at 12:48

## 2018-12-21 RX ADMIN — SODIUM CHLORIDE 50 MILLILITER(S): 9 INJECTION, SOLUTION INTRAVENOUS at 20:54

## 2018-12-21 RX ADMIN — Medication 100 MILLIEQUIVALENT(S): at 07:22

## 2018-12-21 NOTE — CONSULT NOTE ADULT - SUBJECTIVE AND OBJECTIVE BOX
HPI: Mr. Deluca is a 89 year-old man with history of type 2 diabetes mellitus, atrial fibrillation, congestive heart failure, and chronic kidney disease. He underwent a LLE angiogram/SFA stent placement left week with his vascular surgeon Dr. Ivan Figueroa. He presented yesterday for followup and was noted to have a large right ecchymosis with a pulsatile mass; ultrasound was done and was notable for a pseduoaneurysm; therefore, he was sent to the [facility] ER.    Mr. Deluca is well known to me. Of note, in the past, his serum potassium was quite elevated (low-mid 6s); I was contemplating placing him on [place] as an outpatient...at present his K+ is quite low.      PAST MEDICAL & SURGICAL HISTORY:  CHF (congestive heart failure)  Afib  COPD (chronic obstructive pulmonary disease)  Type 2 diabetes mellitus with retinopathy, macular edema presence unspecified, unspecified long term insulin use status, unspecified retinopathy severity  HLD (hyperlipidemia)  HTN (hypertension)  S/P ECCE (extracapsular cataract extraction)    Allergies  No Known Allergies    SOCIAL HISTORY:  Denies ETOh,Smoking,     FAMILY HISTORY:  Family history of diabetes mellitus (DM) (Uncle)    REVIEW OF SYSTEMS:  CONSTITUTIONAL: No weakness, fevers or chills  EYES/ENT: No visual changes;  No vertigo or throat pain   NECK: No pain or stiffness  RESPIRATORY: No cough, wheezing, hemoptysis; No shortness of breath  CARDIOVASCULAR: No chest pain or palpitations  GASTROINTESTINAL: No abdominal or epigastric pain. No nausea, vomiting, or hematemesis; No diarrhea or constipation. No melena or hematochezia; (+)right groin pain; (+)right groin pulsatile mass  GENITOURINARY: No dysuria, frequency or hematuria  NEUROLOGICAL: No numbness or weakness  SKIN: (+)right groin bruise  All other review of systems is negative unless indicated above.    VITAL:  T(C): , Max: 36.6 (12-20-18 @ 17:05)  T(F): , Max: 97.9 (12-21-18 @ 10:00)  HR: 88 (12-21-18 @ 10:00)  BP: 121/72 (12-21-18 @ 10:00)  RR: 18 (12-21-18 @ 10:00)  SpO2: 100% (12-21-18 @ 10:00)    PHYSICAL EXAM:  Constitutional: NAD, Alert  HEENT: NCAT, MMM  Neck: Supple, No JVD  Respiratory: CTA-b/l  Cardiovascular: RRR s1s2, no m/r/g  Gastrointestinal: BS+, soft, NT/ND  Extremities: No peripheral edema b/l  Neurological: no focal deficits; strength grossly intact  Psychiatric: Normal mood, normal affect  Back: no CVAT b/l  Skin: No rashes, no nevi    LABS:                        11.5   6.47  )-----------( 168      ( 21 Dec 2018 05:30 )             35.6     Na(136)/K(2.9)/Cl(88)/HCO3(26)/BUN(133)/Cr(3.21)Glu(185)/Ca(9.1)/Mg(2.8)/PO4(7.0)    12-21 @ 05:30  Na(133)/K(3.5)/Cl(87)/HCO3(23)/BUN(137)/Cr(3.15)Glu(174)/Ca(8.7)/Mg(--)/PO4(--)    12-20 @ 18:28    (11/29/18) - BUN/creatinine 116/2.62; K 3.3  (6/6/18)     - BUN/creatinine 85/2.04; K 4.0      IMAGING:  < from: Xray Chest 1 View- PORTABLE-Urgent (12.20.18 @ 18:18) >  Moderate right pleural effusion with compressive atelectasis.    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   (2)Hypokalemia -   (3)Hyperphosphatemia - CKD-associated  (4)Vasc - pseudoaneurysm - for ultrasound-guided thrombin injection  RECOMMEND:      Thank you for involving [facility] in this patient's care.    With warm regards,    Elvin Weston MD   [facility]  [phone] Patient is now followed by Nephrology Dr. Anurag Michelle. I will relay to Dr. Michelle as to his pseudoaneurysm/hospital course at [facility]. I will remove my name from the chart as "outpatient provider".

## 2018-12-21 NOTE — H&P ADULT - ASSESSMENT
Patient is a 89M s/p Left SFA stent placement via right CFA access on 12/11 sent in from vasc clinic with a Right groin pseudoaneurysm.  - Admit to surgery under Dr. Figueroa  - Plan for US guided thrombin injection tomorrow  - NPO mn, holding Eliquis for now  - restart other home meds  - ISS for DM  - case discussed with fellow Dr. Walter Porter [id number]

## 2018-12-21 NOTE — CHART NOTE - NSCHARTNOTEFT_GEN_A_CORE
General Surgery Post- procedure Note    SUBJECTIVE:  This is a 89y Male PPC 0 s/p injection of CFA pseudoaneurysm with thrombin in cath lab. Aneurysm thrombosed. There was good flow through CFA. Doing well overall. Denies complaints of leg pain, able to move BLE well. Daughter is concerned for b/l 1+pitting edema noted on feet.      OBJECTIVE:     ** VITAL SIGNS / I&O's **    Vital Signs Last 24 Hrs  T(C): 36.6 (21 Dec 2018 17:50), Max: 36.6 (21 Dec 2018 05:29)  T(F): 97.8 (21 Dec 2018 17:50), Max: 97.9 (21 Dec 2018 10:00)  HR: 94 (21 Dec 2018 17:50) (84 - 98)  BP: 106/72 (21 Dec 2018 17:50) (106/72 - 126/78)  BP(mean): --  RR: 18 (21 Dec 2018 17:50) (16 - 18)  SpO2: 98% (21 Dec 2018 17:50) (94% - 100%)      20 Dec 2018 07:01  -  21 Dec 2018 07:00  --------------------------------------------------------  IN:  Total IN: 0 mL    OUT:    Voided: 400 mL  Total OUT: 400 mL    Total NET: -400 mL      21 Dec 2018 07:01  -  21 Dec 2018 19:45  --------------------------------------------------------  IN:    Oral Fluid: 800 mL  Total IN: 800 mL    OUT:    Voided: 1000 mL  Total OUT: 1000 mL    Total NET: -200 mL          ** PHYSICAL EXAM **    -- CONSTITUTIONAL: Alert, in NAD  -- PULMONARY: non-labored respirations  -- EXT: R groin noted for ecchymosis at the groin site to mid thigh. No palpable hematoma. RLE is warm to touch.   -- NEURO: A&Ox3    ** LABS **                          11.5   6.47  )-----------( 168      ( 21 Dec 2018 05:30 )             35.6     21 Dec 2018 05:30    136    |  88     |  133    ----------------------------<  185    2.9     |  26     |  3.21     Ca    9.1        21 Dec 2018 05:30  Phos  7.0       21 Dec 2018 05:30  Mg     2.8       21 Dec 2018 05:30    TPro  6.5    /  Alb  3.5    /  TBili  0.9    /  DBili  x      /  AST  52     /  ALT  35     /  AlkPhos  161    20 Dec 2018 18:28      CAPILLARY BLOOD GLUCOSE      POCT Blood Glucose.: 131 mg/dL (21 Dec 2018 16:56)  POCT Blood Glucose.: 73 mg/dL (21 Dec 2018 12:49)  POCT Blood Glucose.: 174 mg/dL (21 Dec 2018 07:32)  POCT Blood Glucose.: 184 mg/dL (21 Dec 2018 05:59)  POCT Blood Glucose.: 231 mg/dL (21 Dec 2018 00:38)  POCT Blood Glucose.: 198 mg/dL (20 Dec 2018 21:56)        LIVER FUNCTIONS - ( 20 Dec 2018 18:28 )  Alb: 3.5 g/dL / Pro: 6.5 g/dL / ALK PHOS: 161 u/L / ALT: 35 u/L / AST: 52 u/L / GGT: x                 MEDICATIONS  (STANDING):  furosemide    Tablet 40 milliGRAM(s) Oral daily  insulin lispro (HumaLOG) corrective regimen sliding scale   SubCutaneous every 6 hours  lactated ringers. 1000 milliLiter(s) (50 mL/Hr) IV Continuous <Continuous>  metoprolol succinate ER 50 milliGRAM(s) Oral daily    MEDICATIONS  (PRN):        Assessment:  This is a 89yMale POD 0 s/p injection of CFA pseudoaneurysm with thrombin in cath lab.     Plan:  - Diet: Reg  - Encourage use of IS  - Monitor BLE exam, concerns for ischemia  - DVT ppx: lovenox    [facility] General Surgery- C Team  [id number]

## 2018-12-21 NOTE — H&P ADULT - NSHPPHYSICALEXAM_GEN_ALL_CORE
T(C): 36.5 (12-20-18 @ 21:56), Max: 36.6 (12-20-18 @ 17:05)  HR: 86 (12-20-18 @ 21:56) (74 - 86)  BP: 113/74 (12-20-18 @ 21:56) (111/66 - 113/74)  RR: 16 (12-20-18 @ 21:56) (16 - 18)  SpO2: 94% (12-20-18 @ 21:56) (92% - 94%)  Wt(kg): --    Physical Exam:    General: WN/WD NAD  Neurology: A&Ox3, nonfocal, follows commands  Eyes: PERRLA/ EOMI  ENT/Neck: Neck supple, trachea midline, No JVD  Respiratory: CTA B/L  CV: Normal rate regular rhythm  Abdominal: Soft, NT, ND,   Extremities: Some LE edema from ankles down with bilateral mild erythema both feet.  Right groin ecchymoses, large firm pulsatile mass.

## 2018-12-21 NOTE — H&P ADULT - NSHPLABSRESULTS_GEN_ALL_CORE
11.8   6.91  )-----------( 154      ( 20 Dec 2018 18:28 )             36.9     12-20    133<L>  |  87<L>  |  137<H>  ----------------------------<  174<H>  3.5   |  23  |  3.15<H>    Ca    8.7      20 Dec 2018 18:28    TPro  6.5  /  Alb  3.5  /  TBili  0.9  /  DBili  x   /  AST  52<H>  /  ALT  35  /  AlkPhos  161<H>  12-20    LIVER FUNCTIONS - ( 20 Dec 2018 18:28 )  Alb: 3.5 g/dL / Pro: 6.5 g/dL / ALK PHOS: 161 u/L / ALT: 35 u/L / AST: 52 u/L / GGT: x

## 2018-12-21 NOTE — H&P ADULT - HISTORY OF PRESENT ILLNESS
Patient is 89 y M with PMH CHF, a fib on Eliquis, copd, dm2, hld, htn sent in from vascular clinic with a R femoral pseudoaneurysm.  Patient underwent a LLE angiogram with SFA stent placement accessed via right groin last week, was seen for f/u apt in vascular office today.  Patient noted to have a large ecchymoses in right groin with a firm, pulsatile mass.  US was done in the office and was significant for a pseudoaneurysm.  Patient instructed to go to the ED for management.  Patient endorses some pain in the groin, sensation intact.  No fevers, chills, N/V/D.

## 2018-12-22 ENCOUNTER — TRANSCRIPTION ENCOUNTER (OUTPATIENT)
Age: 83
End: 2018-12-22

## 2018-12-22 VITALS
DIASTOLIC BLOOD PRESSURE: 73 MMHG | OXYGEN SATURATION: 94 % | RESPIRATION RATE: 18 BRPM | TEMPERATURE: 98 F | HEART RATE: 102 BPM | SYSTOLIC BLOOD PRESSURE: 123 MMHG

## 2018-12-22 LAB
BUN SERPL-MCNC: 124 MG/DL — HIGH (ref 7–23)
CALCIUM SERPL-MCNC: 9 MG/DL — SIGNIFICANT CHANGE UP (ref 8.4–10.5)
CHLORIDE SERPL-SCNC: 91 MMOL/L — LOW (ref 98–107)
CO2 SERPL-SCNC: 29 MMOL/L — SIGNIFICANT CHANGE UP (ref 22–31)
CREAT SERPL-MCNC: 3.04 MG/DL — HIGH (ref 0.5–1.3)
GLUCOSE BLDC GLUCOMTR-MCNC: 102 MG/DL — HIGH (ref 70–99)
GLUCOSE BLDC GLUCOMTR-MCNC: 210 MG/DL — HIGH (ref 70–99)
GLUCOSE BLDC GLUCOMTR-MCNC: 82 MG/DL — SIGNIFICANT CHANGE UP (ref 70–99)
GLUCOSE BLDC GLUCOMTR-MCNC: 99 MG/DL — SIGNIFICANT CHANGE UP (ref 70–99)
GLUCOSE SERPL-MCNC: 101 MG/DL — HIGH (ref 70–99)
HCT VFR BLD CALC: 35.4 % — LOW (ref 39–50)
HGB BLD-MCNC: 11.5 G/DL — LOW (ref 13–17)
MAGNESIUM SERPL-MCNC: 2.7 MG/DL — HIGH (ref 1.6–2.6)
MCHC RBC-ENTMCNC: 26.2 PG — LOW (ref 27–34)
MCHC RBC-ENTMCNC: 32.5 % — SIGNIFICANT CHANGE UP (ref 32–36)
MCV RBC AUTO: 80.6 FL — SIGNIFICANT CHANGE UP (ref 80–100)
NRBC # FLD: 0 — SIGNIFICANT CHANGE UP
PHOSPHATE SERPL-MCNC: 6 MG/DL — HIGH (ref 2.5–4.5)
PLATELET # BLD AUTO: 169 K/UL — SIGNIFICANT CHANGE UP (ref 150–400)
PMV BLD: 12.7 FL — SIGNIFICANT CHANGE UP (ref 7–13)
POTASSIUM SERPL-MCNC: 3.4 MMOL/L — LOW (ref 3.5–5.3)
POTASSIUM SERPL-SCNC: 3.4 MMOL/L — LOW (ref 3.5–5.3)
RBC # BLD: 4.39 M/UL — SIGNIFICANT CHANGE UP (ref 4.2–5.8)
RBC # FLD: 15.9 % — HIGH (ref 10.3–14.5)
SODIUM SERPL-SCNC: 136 MMOL/L — SIGNIFICANT CHANGE UP (ref 135–145)
WBC # BLD: 6.36 K/UL — SIGNIFICANT CHANGE UP (ref 3.8–10.5)
WBC # FLD AUTO: 6.36 K/UL — SIGNIFICANT CHANGE UP (ref 3.8–10.5)

## 2018-12-22 PROCEDURE — 93926 LOWER EXTREMITY STUDY: CPT | Mod: 26,RT

## 2018-12-22 RX ORDER — POTASSIUM CHLORIDE 20 MEQ
10 PACKET (EA) ORAL ONCE
Qty: 0 | Refills: 0 | Status: DISCONTINUED | OUTPATIENT
Start: 2018-12-22 | End: 2018-12-22

## 2018-12-22 RX ORDER — SODIUM CHLORIDE 9 MG/ML
1000 INJECTION, SOLUTION INTRAVENOUS
Qty: 0 | Refills: 0 | Status: DISCONTINUED | OUTPATIENT
Start: 2018-12-22 | End: 2018-12-22

## 2018-12-22 RX ADMIN — ENOXAPARIN SODIUM 80 MILLIGRAM(S): 100 INJECTION SUBCUTANEOUS at 12:03

## 2018-12-22 RX ADMIN — SODIUM CHLORIDE 50 MILLILITER(S): 9 INJECTION, SOLUTION INTRAVENOUS at 08:37

## 2018-12-22 RX ADMIN — Medication 4: at 08:36

## 2018-12-22 NOTE — DISCHARGE NOTE ADULT - PATIENT PORTAL LINK FT
You can access the [facility] Patient Portal, offered by [facility], by registering with the following website: [email or web address]

## 2018-12-22 NOTE — PROGRESS NOTE ADULT - SUBJECTIVE AND OBJECTIVE BOX
[facility] VASCULAR SURGERY (TEAM C) DAILY PROGRESS NOTE      SUBJECTIVE:    -  thrombin injection into pseudoaneurysm yesterday, preliminary imaging positive      OBJECTIVE:    Vital Signs Last 24 Hrs  T(C): 36.5 (22 Dec 2018 10:05), Max: 36.7 (21 Dec 2018 20:54)  T(F): 97.7 (22 Dec 2018 10:05), Max: 98 (21 Dec 2018 20:54)  HR: 108 (22 Dec 2018 10:05) (80 - 108)  BP: 110/68 (22 Dec 2018 10:05) (106/72 - 127/68)  BP(mean): --  RR: 17 (22 Dec 2018 10:05) (17 - 18)  SpO2: 94% (22 Dec 2018 10:05) (93% - 98%)    I&O's Detail    21 Dec 2018 07:01  -  22 Dec 2018 07:00  --------------------------------------------------------  IN:    lactated ringers.: 100 mL    lactated ringers.: 200 mL    Oral Fluid: 800 mL  Total IN: 1100 mL    OUT:    Voided: 1250 mL  Total OUT: 1250 mL    Total NET: -150 mL      22 Dec 2018 07:01  -  22 Dec 2018 13:45  --------------------------------------------------------  IN:  Total IN: 0 mL    OUT:    Voided: 450 mL  Total OUT: 450 mL    Total NET: -450 mL        LABS:                        11.5   6.36  )-----------( 169      ( 22 Dec 2018 05:40 )             35.4     12-22    136  |  91<L>  |  124<H>  ----------------------------<  101<H>  3.4<L>   |  29  |  3.04<H>    Ca    9.0      22 Dec 2018 05:40  Phos  6.0     12-22  Mg     2.7     12-22    TPro  6.5  /  Alb  3.5  /  TBili  0.9  /  DBili  x   /  AST  52<H>  /  ALT  35  /  AlkPhos  161<H>  12-20        LIVER FUNCTIONS - ( 20 Dec 2018 18:28 )  Alb: 3.5 g/dL / Pro: 6.5 g/dL / ALK PHOS: 161 u/L / ALT: 35 u/L / AST: 52 u/L / GGT: x           EXAM:  Gen:       alert, in NAD  Lungs:       unlabored breathing  CV:       regular rate, rhythm  Ext:         no bleeding, fluctuance to Rt groin injection site               DP and PT dopplerable on Rt              ecchymosis and swelling from shins downward, stable from prior

## 2018-12-22 NOTE — DISCHARGE NOTE ADULT - ADDITIONAL INSTRUCTIONS
FOLLOW-UP:  Please call [phone] to schedule a follow-up appointment with your surgeon,  Dr. Figueroa in 1 week.    When should I call my doctor?  -  If you have increased redness, or purple streaking along the incision.  -  If you have an oral temperature over 100.4 degrees.  -  If you have any yellowish or greenish drainage from the incisions or notice a foul odor.  -  If you have bleeding from the incisions that is difficult to control with light pressure.  -  If you have severe or increased pain that is not controlled by taking the discharge pain medications as prescribed.

## 2018-12-22 NOTE — DISCHARGE NOTE ADULT - CARE PROVIDER_API CALL
Ivan Figueroa), Surgery; Vascular Surgery  [id number] [place] [address]  Phone: [phone]  Fax: [phone]

## 2018-12-22 NOTE — DISCHARGE NOTE ADULT - HOSPITAL COURSE
Patient is 89 y M with PMH CHF, a fib on Eliquis, copd, dm2, hld, htn sent in from vascular clinic with a R femoral pseudoaneurysm.  Patient underwent a LLE angiogram with SFA stent placement accessed via right groin last week, was seen for f/u apt in vascular office.  Patient noted to have a large ecchymoses in right groin with a firm, pulsatile mass.  US was done in the office and was significant for a pseudoaneurysm.  Patient instructed to go to the ED for management.  Patient endorses some pain in the groin, sensation intact.    Mr. Deluca was admitted on 12/21 for further operative planning. He underwent thrombin injection into pseudoaneurysmal sac with successful thrombosis of aneurysm, and restoration of normal flow through the common femoral artery. A follow-up ultrasound on POD1 showed a successfully thrombosed sac with no interval changes. At time of discharge the patient was stable, tolerating po, able to ambulate with assistance. He is going home with a caretaker and follow-up with Dr. Figueroa in 1 week in clinic.

## 2018-12-22 NOTE — DISCHARGE NOTE ADULT - CARE PLAN
Principal Discharge DX:	Pseudoaneurysm  Goal:	siphon off blood flow from aneurysmal sac to the former shape of the vessel  Assessment and plan of treatment:	prevent increase in size, and reservoiring of blood withing the aneurysmal sac

## 2018-12-22 NOTE — PROGRESS NOTE ADULT - ASSESSMENT
89 yo M p/w Rt CFA pseudoaneurysm after EVAR, now s/p thrombin injection into sac (12/21)    PLAN:  - repeat RLE duplex today to r/o interval changes  - pulse checks  - home today if duplex fine    VASC SURG (C TEAM)  [id number]

## 2018-12-22 NOTE — DISCHARGE NOTE ADULT - PLAN OF CARE
siphon off blood flow from aneurysmal sac to the former shape of the vessel prevent increase in size, and reservoiring of blood withing the aneurysmal sac

## 2018-12-22 NOTE — DISCHARGE NOTE ADULT - MEDICATION SUMMARY - MEDICATIONS TO TAKE
I will START or STAY ON the medications listed below when I get home from the hospital:    aspirin 81 mg oral tablet  -- 1 tab(s) by mouth once a day  -- Indication: For health maintenance    Eliquis 2.5 mg oral tablet  -- orally 2 times a day  -- Indication: For blood thinner    glimepiride 2 mg oral tablet  -- 1 tab(s) by mouth once a day  -- Indication: For diabetes    Onglyza 2.5 mg oral tablet  -- 1 tab(s) by mouth once a day  -- Indication: For diabetes    pravastatin 40 mg oral tablet  -- 1 tab(s) by mouth once a day  -- Indication: For cholesterol    metoprolol  -- 50 milligram(s) by mouth once a day  -- Indication: For blood pressure    Breo Ellipta  -- Indication: For COPD    furosemide 40 mg oral tablet  -- Indication: For CHF I will START or STAY ON the medications listed below when I get home from the hospital:    aspirin 81 mg oral tablet  -- 1 tab(s) by mouth once a day  -- Indication: For health maintenance    acetaminophen 325 mg oral tablet  -- 2 tab(s) by mouth every 6 hours, As Needed for mild to moderate pain  -- Indication: For mild to moderate post-op pain    Eliquis 2.5 mg oral tablet  -- orally 2 times a day  -- Indication: For blood thinner    glimepiride 2 mg oral tablet  -- 1 tab(s) by mouth once a day  -- Indication: For diabetes    Onglyza 2.5 mg oral tablet  -- 1 tab(s) by mouth once a day  -- Indication: For diabetes    pravastatin 40 mg oral tablet  -- 1 tab(s) by mouth once a day  -- Indication: For cholesterol    metoprolol  -- 50 milligram(s) by mouth once a day  -- Indication: For blood pressure    Breo Ellipta  -- Indication: For COPD    furosemide 40 mg oral tablet  -- Indication: For CHF

## 2018-12-27 ENCOUNTER — APPOINTMENT (OUTPATIENT)
Dept: VASCULAR SURGERY | Facility: CLINIC | Age: 83
End: 2018-12-27
Payer: MEDICARE

## 2018-12-27 ENCOUNTER — INPATIENT (INPATIENT)
Facility: HOSPITAL | Age: 83
LOS: 1 days | Discharge: HOME | End: 2018-12-29
Attending: SURGERY | Admitting: SURGERY
Payer: MEDICARE

## 2018-12-27 ENCOUNTER — CHART COPY (OUTPATIENT)
Age: 83
End: 2018-12-27

## 2018-12-27 VITALS — HEART RATE: 85 BPM | SYSTOLIC BLOOD PRESSURE: 109 MMHG | DIASTOLIC BLOOD PRESSURE: 68 MMHG

## 2018-12-27 VITALS
HEIGHT: 65 IN | DIASTOLIC BLOOD PRESSURE: 68 MMHG | TEMPERATURE: 97.5 F | WEIGHT: 160 LBS | SYSTOLIC BLOOD PRESSURE: 100 MMHG | HEART RATE: 85 BPM | BODY MASS INDEX: 26.66 KG/M2

## 2018-12-27 VITALS
OXYGEN SATURATION: 98 % | HEIGHT: 65 IN | DIASTOLIC BLOOD PRESSURE: 63 MMHG | WEIGHT: 156.09 LBS | SYSTOLIC BLOOD PRESSURE: 108 MMHG | RESPIRATION RATE: 20 BRPM | HEART RATE: 72 BPM | TEMPERATURE: 98 F

## 2018-12-27 DIAGNOSIS — I72.9 ANEURYSM OF UNSPECIFIED SITE: ICD-10-CM

## 2018-12-27 DIAGNOSIS — Z98.49 CATARACT EXTRACTION STATUS, UNSPECIFIED EYE: Chronic | ICD-10-CM

## 2018-12-27 LAB
APTT BLD: 40.7 SEC — HIGH (ref 27.5–36.3)
BLD GP AB SCN SERPL QL: NEGATIVE — SIGNIFICANT CHANGE UP
BUN SERPL-MCNC: 101 MG/DL — HIGH (ref 7–23)
CALCIUM SERPL-MCNC: 9.9 MG/DL — SIGNIFICANT CHANGE UP (ref 8.4–10.5)
CHLORIDE SERPL-SCNC: 91 MMOL/L — LOW (ref 98–107)
CO2 SERPL-SCNC: 32 MMOL/L — HIGH (ref 22–31)
CREAT SERPL-MCNC: 2.47 MG/DL — HIGH (ref 0.5–1.3)
GLUCOSE SERPL-MCNC: 38 MG/DL — CRITICAL LOW (ref 70–99)
HCT VFR BLD CALC: 42.3 % — SIGNIFICANT CHANGE UP (ref 39–50)
HGB BLD-MCNC: 12.8 G/DL — LOW (ref 13–17)
INR BLD: 3.12 — HIGH (ref 0.88–1.17)
MAGNESIUM SERPL-MCNC: 2.4 MG/DL — SIGNIFICANT CHANGE UP (ref 1.6–2.6)
MCHC RBC-ENTMCNC: 26 PG — LOW (ref 27–34)
MCHC RBC-ENTMCNC: 30.3 % — LOW (ref 32–36)
MCV RBC AUTO: 85.8 FL — SIGNIFICANT CHANGE UP (ref 80–100)
NRBC # FLD: 0 — SIGNIFICANT CHANGE UP
PHOSPHATE SERPL-MCNC: 5 MG/DL — HIGH (ref 2.5–4.5)
PLATELET # BLD AUTO: 161 K/UL — SIGNIFICANT CHANGE UP (ref 150–400)
PMV BLD: 13.2 FL — HIGH (ref 7–13)
POTASSIUM SERPL-MCNC: 4.3 MMOL/L — SIGNIFICANT CHANGE UP (ref 3.5–5.3)
POTASSIUM SERPL-SCNC: 4.3 MMOL/L — SIGNIFICANT CHANGE UP (ref 3.5–5.3)
PROTHROM AB SERPL-ACNC: 35.9 SEC — HIGH (ref 9.8–13.1)
RBC # BLD: 4.93 M/UL — SIGNIFICANT CHANGE UP (ref 4.2–5.8)
RBC # FLD: 16.4 % — HIGH (ref 10.3–14.5)
RH IG SCN BLD-IMP: POSITIVE — SIGNIFICANT CHANGE UP
SODIUM SERPL-SCNC: 142 MMOL/L — SIGNIFICANT CHANGE UP (ref 135–145)
WBC # BLD: 10.09 K/UL — SIGNIFICANT CHANGE UP (ref 3.8–10.5)
WBC # FLD AUTO: 10.09 K/UL — SIGNIFICANT CHANGE UP (ref 3.8–10.5)

## 2018-12-27 PROCEDURE — 93926 LOWER EXTREMITY STUDY: CPT

## 2018-12-27 PROCEDURE — 99221 1ST HOSP IP/OBS SF/LOW 40: CPT | Mod: AI

## 2018-12-27 PROCEDURE — ZZZZZ: CPT

## 2018-12-27 PROCEDURE — 93926 LOWER EXTREMITY STUDY: CPT | Mod: 26,LT

## 2018-12-27 RX ORDER — ENOXAPARIN SODIUM 100 MG/ML
70 INJECTION SUBCUTANEOUS DAILY
Qty: 0 | Refills: 0 | Status: DISCONTINUED | OUTPATIENT
Start: 2018-12-27 | End: 2018-12-29

## 2018-12-27 RX ORDER — ATORVASTATIN CALCIUM 80 MG/1
10 TABLET, FILM COATED ORAL AT BEDTIME
Qty: 0 | Refills: 0 | Status: DISCONTINUED | OUTPATIENT
Start: 2018-12-27 | End: 2018-12-29

## 2018-12-27 RX ORDER — DEXTROSE 50 % IN WATER 50 %
25 SYRINGE (ML) INTRAVENOUS ONCE
Qty: 0 | Refills: 0 | Status: DISCONTINUED | OUTPATIENT
Start: 2018-12-27 | End: 2018-12-29

## 2018-12-27 RX ORDER — DEXTROSE 50 % IN WATER 50 %
25 SYRINGE (ML) INTRAVENOUS ONCE
Qty: 0 | Refills: 0 | Status: COMPLETED | OUTPATIENT
Start: 2018-12-27 | End: 2018-12-27

## 2018-12-27 RX ORDER — METOPROLOL TARTRATE 50 MG
50 TABLET ORAL DAILY
Qty: 0 | Refills: 0 | Status: DISCONTINUED | OUTPATIENT
Start: 2018-12-27 | End: 2018-12-28

## 2018-12-27 RX ORDER — DEXTROSE 50 % IN WATER 50 %
12.5 SYRINGE (ML) INTRAVENOUS ONCE
Qty: 0 | Refills: 0 | Status: DISCONTINUED | OUTPATIENT
Start: 2018-12-27 | End: 2018-12-29

## 2018-12-27 RX ORDER — GLUCAGON INJECTION, SOLUTION 0.5 MG/.1ML
1 INJECTION, SOLUTION SUBCUTANEOUS ONCE
Qty: 0 | Refills: 0 | Status: DISCONTINUED | OUTPATIENT
Start: 2018-12-27 | End: 2018-12-29

## 2018-12-27 RX ORDER — SODIUM CHLORIDE 9 MG/ML
1000 INJECTION INTRAMUSCULAR; INTRAVENOUS; SUBCUTANEOUS
Qty: 0 | Refills: 0 | Status: DISCONTINUED | OUTPATIENT
Start: 2018-12-27 | End: 2018-12-27

## 2018-12-27 RX ORDER — SODIUM CHLORIDE 9 MG/ML
1000 INJECTION, SOLUTION INTRAVENOUS
Qty: 0 | Refills: 0 | Status: DISCONTINUED | OUTPATIENT
Start: 2018-12-27 | End: 2018-12-29

## 2018-12-27 RX ORDER — METOPROLOL TARTRATE 50 MG
50 TABLET ORAL DAILY
Qty: 0 | Refills: 0 | Status: DISCONTINUED | OUTPATIENT
Start: 2018-12-27 | End: 2018-12-27

## 2018-12-27 RX ORDER — ASPIRIN/CALCIUM CARB/MAGNESIUM 324 MG
81 TABLET ORAL DAILY
Qty: 0 | Refills: 0 | Status: DISCONTINUED | OUTPATIENT
Start: 2018-12-27 | End: 2018-12-29

## 2018-12-27 RX ORDER — DEXTROSE 50 % IN WATER 50 %
15 SYRINGE (ML) INTRAVENOUS ONCE
Qty: 0 | Refills: 0 | Status: DISCONTINUED | OUTPATIENT
Start: 2018-12-27 | End: 2018-12-29

## 2018-12-27 RX ORDER — ENOXAPARIN SODIUM 100 MG/ML
80 INJECTION SUBCUTANEOUS DAILY
Qty: 0 | Refills: 0 | Status: DISCONTINUED | OUTPATIENT
Start: 2018-12-27 | End: 2018-12-27

## 2018-12-27 RX ORDER — INSULIN LISPRO 100/ML
VIAL (ML) SUBCUTANEOUS
Qty: 0 | Refills: 0 | Status: DISCONTINUED | OUTPATIENT
Start: 2018-12-27 | End: 2018-12-28

## 2018-12-27 RX ORDER — ACETAMINOPHEN 500 MG
650 TABLET ORAL EVERY 6 HOURS
Qty: 0 | Refills: 0 | Status: DISCONTINUED | OUTPATIENT
Start: 2018-12-27 | End: 2018-12-29

## 2018-12-27 RX ORDER — INSULIN LISPRO 100/ML
VIAL (ML) SUBCUTANEOUS EVERY 6 HOURS
Qty: 0 | Refills: 0 | Status: DISCONTINUED | OUTPATIENT
Start: 2018-12-27 | End: 2018-12-27

## 2018-12-27 RX ORDER — FUROSEMIDE 40 MG
40 TABLET ORAL DAILY
Qty: 0 | Refills: 0 | Status: DISCONTINUED | OUTPATIENT
Start: 2018-12-27 | End: 2018-12-29

## 2018-12-27 RX ADMIN — ENOXAPARIN SODIUM 70 MILLIGRAM(S): 100 INJECTION SUBCUTANEOUS at 20:20

## 2018-12-27 RX ADMIN — Medication 25 GRAM(S): at 17:05

## 2018-12-27 RX ADMIN — SODIUM CHLORIDE 75 MILLILITER(S): 9 INJECTION, SOLUTION INTRAVENOUS at 21:45

## 2018-12-27 RX ADMIN — ATORVASTATIN CALCIUM 10 MILLIGRAM(S): 80 TABLET, FILM COATED ORAL at 21:44

## 2018-12-27 RX ADMIN — SODIUM CHLORIDE 30 MILLILITER(S): 9 INJECTION, SOLUTION INTRAVENOUS at 17:19

## 2018-12-27 NOTE — CONSULT NOTE ADULT - SUBJECTIVE AND OBJECTIVE BOX
CHIEF COMPLAINT: pseudoaneurysm     HISTORY OF PRESENT ILLNESS:  89M with PMH AFib on apixaban, heart failure with biventricular dysfunction (EF 34%), COPD, T2DM, HLD, HTN and recent LLE angiogram with SFA stent placement on 12/11/18 complicated by R CFA pseudoaneurysm s/p thrombin injection on 12/21 and 12/27 who is scheduled to undergo open repair of the aneurysm with general anesthesia on 12/28/18. Cardiology consulted for pre-op evaluation.     Patient denies chest pain, palpitations, PND, orthopnea. He has chronic LE edema for which he takes Lasix. He has limited functional capacity and can walk about 150 feet before he has to stop and catch his breath. After a few minutes, he resumes walking.     Cardiologist: Dr. Joel Goldberg    Allergies  No Known Allergies    MEDICATIONS:  aspirin enteric coated 81 milliGRAM(s) Oral daily  furosemide    Tablet 40 milliGRAM(s) Oral daily  metoprolol succinate ER 50 milliGRAM(s) Oral daily  acetaminophen   Tablet .. 650 milliGRAM(s) Oral every 6 hours PRN  atorvastatin 10 milliGRAM(s) Oral at bedtime  dextrose 40% Gel 15 Gram(s) Oral once PRN  dextrose 50% Injectable 12.5 Gram(s) IV Push once  dextrose 50% Injectable 25 Gram(s) IV Push once  dextrose 50% Injectable 25 Gram(s) IV Push once  glucagon  Injectable 1 milliGRAM(s) IntraMuscular once PRN  insulin lispro (HumaLOG) corrective regimen sliding scale   SubCutaneous three times a day before meals  dextrose 5% + sodium chloride 0.9%. 1000 milliLiter(s) IV Continuous <Continuous>  dextrose 5%. 1000 milliLiter(s) IV Continuous <Continuous>    PAST MEDICAL & SURGICAL HISTORY:  CHF (congestive heart failure)  Afib  COPD (chronic obstructive pulmonary disease)  Type 2 diabetes mellitus with retinopathy, macular edema presence unspecified, unspecified long term insulin use status, unspecified retinopathy severity  HLD (hyperlipidemia)  HTN (hypertension)  S/P ECCE (extracapsular cataract extraction)      FAMILY HISTORY:  Family history of diabetes mellitus (DM) (Uncle)    SOCIAL HISTORY:    Non-smoker  Denies EtOH, illicit drugs    REVIEW OF SYSTEMS:  General: no fatigue/malaise, weight loss/gain.  Skin: no rashes.  Ophthalmologic: no blurred vision, no loss of vision. 	  ENT: no sore throat, rhinorrhea, sinus congestion.  Respiratory: see HPI  Gastrointestinal:  no N/V/D, no melena/hematemesis/hematochezia.  Genitourinary: no dysuria/hesitancy or hematuria.  Musculoskeletal: no myalgias or arthralgias.  Neurological: no changes in vision or hearing, no lightheadedness/dizziness, no syncope/near syncope	  Psychiatric: no unusual stress/anxiety.   Hematology/Lymphatics: no unusual bleeding, bruising and no lymphadenopathy.  Endocrine: no unusual thirst.   All others negative except as stated above and in HPI.    PHYSICAL EXAM:  T(C): 36.5 (12-27-18 @ 14:39), Max: 36.5 (12-27-18 @ 14:39)  HR: 72 (12-27-18 @ 14:39) (72 - 72)  BP: 108/63 (12-27-18 @ 14:39) (108/63 - 108/63)  RR: 20 (12-27-18 @ 14:39) (20 - 20)  SpO2: 98% (12-27-18 @ 14:39) (98% - 98%)  Wt(kg): --  I&O's Summary      Appearance: Normal, lying supine comfortably	  HEENT:   Normal oral mucosa, PERRL, EOMI	  Lymphatic: No lymphadenopathy  Cardiovascular: Normal S1 S2, No JVD, No murmurs, 1+ LE pitting edema  Respiratory: Lungs clear to auscultation	  Psychiatry: A & O x 3, Mood & affect appropriate  Gastrointestinal:  Soft, Non-tender, + BS	  Skin: No rashes, No ecchymoses, No cyanosis	  Neurologic: Non-focal  Extremities: Normal range of motion, No clubbing, cyanosis  Vascular: Peripheral pulses palpable 2+ bilaterally      LABS:	 	    CBC Full  -  ( 27 Dec 2018 15:34 )  WBC Count : 10.09 K/uL  Hemoglobin : 12.8 g/dL  Hematocrit : 42.3 %  Platelet Count - Automated : 161 K/uL  Mean Cell Volume : 85.8 fL  Mean Cell Hemoglobin : 26.0 pg  Mean Cell Hemoglobin Concentration : 30.3 %  Auto Neutrophil # : x  Auto Lymphocyte # : x  Auto Monocyte # : x  Auto Eosinophil # : x  Auto Basophil # : x  Auto Neutrophil % : x  Auto Lymphocyte % : x  Auto Monocyte % : x  Auto Eosinophil % : x  Auto Basophil % : x    12-27    142  |  91<L>  |  101<H>  ----------------------------<  38<LL>  4.3   |  32<H>  |  2.47<H>    Ca    9.9      27 Dec 2018 15:34  Phos  5.0     12-27  Mg     2.4     12-27    TELEMETRY: 	    EKG:  	  RADIOLOGY:    < from: Transthoracic Echocardiogram (06.04.18 @ 10:22) >  Dimensions:    Normal Values:  LA:     4.6    2.0 - 4.0 cm  Ao:     3.4    2.0 - 3.8 cm  SEPTUM: 1.5    0.6 - 1.2 cm  PWT:    1.4    0.6 - 1.1 cm  LVIDd:  4.3    3.0 - 5.6 cm  LVIDs:  3.6    1.8 - 4.0 cm  Derived variables:  LVMI: 132 g/m2  RWT: 0.65  Fractional short: 16 %  EF (Visual Estimate): 30 %  EF (Teicholtz): 34 %  Doppler Peak Velocity (m/sec): AoV=1.0  ------------------------------------------------------------------------  Conclusions:  1. Severe concentric left ventricular hypertrophy.  2. Severe global left ventricular systolic dysfunction.  3. Normal right ventricular size with severely decreased  right ventricular systolic function. TAPSE 0.9cm.  4. Estimated pulmonary artery systolic pressure equals 58  mm Hg, assuming right atrial pressure equals 8 mm Hg,  consistent with moderate pulmonary pressures.  *** Compared with echocardiogram of 8/30/2017, interval  decline in biventricular function.    < end of copied text >    	  ASSESSMENT/PLAN:  89M with PMH AFib on apixaban, heart failure with biventricular dysfunction (EF 34%), COPD, T2DM, HLD, HTN and recent LLE angiogram with SFA stent placement on 12/11/18 c/b R CFA pseudoaneurysm s/p thrombin injection on 12/21 and 12/27 who is scheduled to undergo open repair of the aneurysm with general anesthesia on 12/28/18. Cardiology consulted for pre-op evaluation.     Patient has limited functional capacity and can walk only 150 feet before he must stop to catch his breath. He recently underwent stress testing, please obtain those records. His RCRI score is 2, that is a 6.6% risk of major cardiac event.     Recommendations:  - Check EKG  - Please obtain recent stress test results from Dr. Joel Goldberg's office    To be discussed with Dr. Manuel.     PATTY Ferrari  Cardiology Fellow  [id number]

## 2018-12-27 NOTE — H&P ADULT - NSHPREVIEWOFSYSTEMS_GEN_ALL_CORE
Systemic:	[ ] Fever	[ ] Chills	[ ] Night sweats    [ ] Fatigue	[ ] Other  [] Cardiovascular:  [] Pulmonary:  [] Renal/Urologic:  [] Gastrointestinal:   [] Metabolic:  [] Neurologic:  [] Hematologic:  [] ENT:  [] Ophthalmologic:  [x] Musculoskeletal: R groin soreness

## 2018-12-27 NOTE — H&P ADULT - ASSESSMENT
89yo M with persistent R CFA PSA s/p thrombin injection on 12/21. Readmitted for second thrombin injection. The pt had a recent LLE angiogram and SFA stent performed via R CFA access (12/11).     - NPO  - STAT labs  - Repeat PSA injection  - home medications reordered  - Holding Eliquis, giving ASA.     D/W Dr. Walter Hopson  [id number]

## 2018-12-27 NOTE — H&P ADULT - HISTORY OF PRESENT ILLNESS
Patient is 89 y M with PMH CHF, a fib on Eliquis, copd, dm2, hld, htn sent in from vascular clinic with a recurrent R femoral pseudoaneurysm.  Patient underwent a LLE angiogram with SFA stent placement accessed via right groin on 12/11. His postoperative course was complicated by a R CFA PSA which was injected on 12/21. He was seen for f/u apt in vascular office today and had a repeat US which showed persistent pseudoaneurysm.  PT was admitted directly to the floor for repeat injection of the PSA.

## 2018-12-27 NOTE — CONSULT NOTE ADULT - ATTENDING COMMENTS
Pt with uncontrolled tachycardia due to beta blocker withdrawal. Please change hold parameters to less than 90 mm Hg and give metoprolol now. Cannot proceed to OR unless HR less than 100.

## 2018-12-27 NOTE — PATIENT PROFILE ADULT - NSPROMEDSPATCH_GEN_A_NUR
----- Message from Erica Conrad MD sent at 1/15/2018  6:07 PM CST -----  Please let Dr. Castro and Dr. Flower know the results of the excision   none

## 2018-12-28 LAB
BUN SERPL-MCNC: 87 MG/DL — HIGH (ref 7–23)
CALCIUM SERPL-MCNC: 9 MG/DL — SIGNIFICANT CHANGE UP (ref 8.4–10.5)
CHLORIDE SERPL-SCNC: 96 MMOL/L — LOW (ref 98–107)
CO2 SERPL-SCNC: 33 MMOL/L — HIGH (ref 22–31)
CREAT SERPL-MCNC: 2.36 MG/DL — HIGH (ref 0.5–1.3)
GLUCOSE SERPL-MCNC: 126 MG/DL — HIGH (ref 70–99)
HBA1C BLD-MCNC: 7.3 % — HIGH (ref 4–5.6)
HCT VFR BLD CALC: 37.8 % — LOW (ref 39–50)
HGB BLD-MCNC: 11.5 G/DL — LOW (ref 13–17)
MAGNESIUM SERPL-MCNC: 2.1 MG/DL — SIGNIFICANT CHANGE UP (ref 1.6–2.6)
MCHC RBC-ENTMCNC: 26.3 PG — LOW (ref 27–34)
MCHC RBC-ENTMCNC: 30.4 % — LOW (ref 32–36)
MCV RBC AUTO: 86.3 FL — SIGNIFICANT CHANGE UP (ref 80–100)
NRBC # FLD: 0 — SIGNIFICANT CHANGE UP
PHOSPHATE SERPL-MCNC: 4 MG/DL — SIGNIFICANT CHANGE UP (ref 2.5–4.5)
PLATELET # BLD AUTO: 141 K/UL — LOW (ref 150–400)
PMV BLD: 12.1 FL — SIGNIFICANT CHANGE UP (ref 7–13)
POTASSIUM SERPL-MCNC: 3.4 MMOL/L — LOW (ref 3.5–5.3)
POTASSIUM SERPL-SCNC: 3.4 MMOL/L — LOW (ref 3.5–5.3)
RBC # BLD: 4.38 M/UL — SIGNIFICANT CHANGE UP (ref 4.2–5.8)
RBC # FLD: 16.7 % — HIGH (ref 10.3–14.5)
SODIUM SERPL-SCNC: 143 MMOL/L — SIGNIFICANT CHANGE UP (ref 135–145)
WBC # BLD: 6.86 K/UL — SIGNIFICANT CHANGE UP (ref 3.8–10.5)
WBC # FLD AUTO: 6.86 K/UL — SIGNIFICANT CHANGE UP (ref 3.8–10.5)

## 2018-12-28 PROCEDURE — 99223 1ST HOSP IP/OBS HIGH 75: CPT

## 2018-12-28 PROCEDURE — 99231 SBSQ HOSP IP/OBS SF/LOW 25: CPT

## 2018-12-28 PROCEDURE — 71045 X-RAY EXAM CHEST 1 VIEW: CPT | Mod: 26

## 2018-12-28 PROCEDURE — 93926 LOWER EXTREMITY STUDY: CPT | Mod: 26,LT

## 2018-12-28 RX ORDER — METOPROLOL TARTRATE 50 MG
25 TABLET ORAL
Qty: 0 | Refills: 0 | Status: DISCONTINUED | OUTPATIENT
Start: 2018-12-28 | End: 2018-12-29

## 2018-12-28 RX ORDER — DEXTROSE 50 % IN WATER 50 %
25 SYRINGE (ML) INTRAVENOUS ONCE
Qty: 0 | Refills: 0 | Status: COMPLETED | OUTPATIENT
Start: 2018-12-28 | End: 2018-12-28

## 2018-12-28 RX ORDER — METOPROLOL TARTRATE 50 MG
25 TABLET ORAL
Qty: 0 | Refills: 0 | Status: DISCONTINUED | OUTPATIENT
Start: 2018-12-28 | End: 2018-12-28

## 2018-12-28 RX ORDER — METOPROLOL TARTRATE 50 MG
12.5 TABLET ORAL ONCE
Qty: 0 | Refills: 0 | Status: COMPLETED | OUTPATIENT
Start: 2018-12-28 | End: 2018-12-28

## 2018-12-28 RX ORDER — POTASSIUM CHLORIDE 20 MEQ
40 PACKET (EA) ORAL ONCE
Qty: 0 | Refills: 0 | Status: COMPLETED | OUTPATIENT
Start: 2018-12-28 | End: 2018-12-28

## 2018-12-28 RX ADMIN — Medication 81 MILLIGRAM(S): at 13:05

## 2018-12-28 RX ADMIN — Medication 40 MILLIEQUIVALENT(S): at 13:06

## 2018-12-28 RX ADMIN — Medication 25 GRAM(S): at 08:56

## 2018-12-28 RX ADMIN — Medication 12.5 MILLIGRAM(S): at 13:06

## 2018-12-28 RX ADMIN — Medication 40 MILLIGRAM(S): at 05:14

## 2018-12-28 RX ADMIN — Medication 25 MILLIGRAM(S): at 22:10

## 2018-12-28 RX ADMIN — ENOXAPARIN SODIUM 70 MILLIGRAM(S): 100 INJECTION SUBCUTANEOUS at 13:05

## 2018-12-28 RX ADMIN — ATORVASTATIN CALCIUM 10 MILLIGRAM(S): 80 TABLET, FILM COATED ORAL at 22:10

## 2018-12-28 NOTE — PROGRESS NOTE ADULT - ATTENDING COMMENTS
s/p R femoral pseudoaneurysm injection.   Repeat duplex shows thrombosed pseudo  the leg is warm and well perfused  Had rapid afib this morning because metoprolol was held. HR is better know after BB   small amount of hemoptyses, pulmonary eval is appreciated  will restart his PO A/C tonight

## 2018-12-28 NOTE — PROGRESS NOTE ADULT - SUBJECTIVE AND OBJECTIVE BOX
[facility] VASCULAR SURGERY (TEAM C) DAILY PROGRESS NOTE      SUBJECTIVE:    -  thrombin injection into pseudoaneurysm yesterday      OBJECTIVE:    Vital Signs Last 24 Hrs  T(C): 36.9 (28 Dec 2018 07:35), Max: 36.9 (28 Dec 2018 07:35)  T(F): 98.5 (28 Dec 2018 07:35), Max: 98.5 (28 Dec 2018 07:35)  HR: 132 (28 Dec 2018 08:32) (72 - 132)  BP: 106/61 (28 Dec 2018 08:32) (100/51 - 112/80)  BP(mean): 71 (28 Dec 2018 08:32) (71 - 75)  RR: 18 (28 Dec 2018 08:32) (18 - 20)  SpO2: 93% (28 Dec 2018 08:32) (93% - 98%)    I&O's Detail    27 Dec 2018 07:01  -  28 Dec 2018 07:00  --------------------------------------------------------  IN:  Total IN: 0 mL    OUT:    Voided: 1100 mL  Total OUT: 1100 mL    Total NET: -1100 mL      28 Dec 2018 07:01  -  28 Dec 2018 08:35  --------------------------------------------------------  IN:    dextrose 5% + sodium chloride 0.9%.: 50 mL  Total IN: 50 mL    OUT:    Voided: 450 mL  Total OUT: 450 mL    Total NET: -400 mL        LABS:                        12.8   10.09 )-----------( 161      ( 27 Dec 2018 15:34 )             42.3     12-27    142  |  91<L>  |  101<H>  ----------------------------<  38<LL>  4.3   |  32<H>  |  2.47<H>    Ca    9.9      27 Dec 2018 15:34  Phos  5.0     12-27  Mg     2.4     12-27      PT/INR - ( 27 Dec 2018 15:34 )   PT: 35.9 SEC;   INR: 3.12          PTT - ( 27 Dec 2018 15:34 )  PTT:40.7 SEC      EXAM:  Gen:       alert, in NAD  Lungs:       unlabored breathing  CV:       regular rate, rhythm  Ext:         no bleeding, fluctuance to Rt groin injection site               palpable cord at Rt groin               DP and PT dopplerable on Rt

## 2018-12-28 NOTE — PROVIDER CONTACT NOTE (OTHER) - RECOMMENDATIONS
Give 0600 metoprolol (patient did not receive due to parameters). Continue to monitor groin and heart rate

## 2018-12-28 NOTE — PROGRESS NOTE ADULT - ASSESSMENT
89 yo M p/w Rt CFA pseudoaneurysm after EVAR, now s/p thrombin injection into sac (12/21, 12/27)    PLAN:  - repeat RLE duplex today to r/o interval changes  - scheduled for OR today for Rt CFA PSA repair if needed based on duplex results    VASC SURG (C TEAM)  [id number]

## 2018-12-28 NOTE — PROVIDER CONTACT NOTE (OTHER) - ASSESSMENT
A&OX4, FLORENTINO 130s this morning after using the urinal. Asymptomatic. Remainder VSS: 98.5F, 103/65, MAP 75, RR18, o2 94% on room air. Right groin ecchymotic with hardened area (outlined on patient)
+ dopplerable PT/DP pulses noted b/l.

## 2018-12-28 NOTE — PROVIDER CONTACT NOTE (OTHER) - ACTION/TREATMENT ORDERED:
team made aware, lovenox to be administered
Give medications (orders to be adjusted), continue to monitor. Safety maintained, awaiting order changes. Will continue to monitor.

## 2018-12-28 NOTE — PROVIDER CONTACT NOTE (OTHER) - SITUATION
Pt in FLORENTINO 130s this morning after using the urinal. Asymptomatic. Remainder VSS: 98.5F, 103/65, MAP 75, RR18, o2 94% on room air.
Patient received with right scrotum, right groin, right hip ecchymotic.

## 2018-12-28 NOTE — CHART NOTE - NSCHARTNOTEFT_GEN_A_CORE
Surgical Resident    Spoke with cardiology @[phone] regarding patient's tachycardia to 106 and BP of 90/66  Recommended 12.5 of PO metoprolol to be given now and changing hold parameters to SBP less than 90    MBrenda Chaudhry PGY 3

## 2018-12-28 NOTE — PROGRESS NOTE ADULT - SUBJECTIVE AND OBJECTIVE BOX
PULMONARY PROGRESS NOTE    JODY WILKINS  MRN-[id number]    Patient is a 89y old  Male who presents with a chief complaint of     HPI:  -pt has chronic right pleural effusion, tapped in the past, chronic bronchitis with small amounts of hemoptysis.  last night had cough with sputum mixed   -    ROS:   -    ACTIVE MEDICATION LIST:  MEDICATIONS  (STANDING):  aspirin enteric coated 81 milliGRAM(s) Oral daily  atorvastatin 10 milliGRAM(s) Oral at bedtime  dextrose 5% + sodium chloride 0.9%. 1000 milliLiter(s) (50 mL/Hr) IV Continuous <Continuous>  dextrose 5%. 1000 milliLiter(s) (50 mL/Hr) IV Continuous <Continuous>  dextrose 50% Injectable 12.5 Gram(s) IV Push once  dextrose 50% Injectable 25 Gram(s) IV Push once  dextrose 50% Injectable 25 Gram(s) IV Push once  enoxaparin Injectable 70 milliGRAM(s) SubCutaneous daily  furosemide    Tablet 40 milliGRAM(s) Oral daily  metoprolol tartrate 25 milliGRAM(s) Oral two times a day    MEDICATIONS  (PRN):  acetaminophen   Tablet .. 650 milliGRAM(s) Oral every 6 hours PRN Mild Pain (1 - 3)  dextrose 40% Gel 15 Gram(s) Oral once PRN Blood Glucose LESS THAN 70 milliGRAM(s)/deciliter  glucagon  Injectable 1 milliGRAM(s) IntraMuscular once PRN Glucose LESS THAN 70 milligrams/deciliter      EXAM:  Vital Signs Last 24 Hrs  T(C): 36.9 (28 Dec 2018 12:16), Max: 36.9 (28 Dec 2018 07:35)  T(F): 98.4 (28 Dec 2018 12:16), Max: 98.5 (28 Dec 2018 07:35)  HR: 112 (28 Dec 2018 12:16) (72 - 132)  BP: 91/63 (28 Dec 2018 12:16) (91/63 - 112/80)  BP(mean): 70 (28 Dec 2018 12:16) (66 - 75)  RR: 18 (28 Dec 2018 12:16) (18 - 20)  SpO2: 97% (28 Dec 2018 12:16) (93% - 98%)    GENERAL: The patient is awake and alert in no apparent distress.     LUNGS: Clear to auscultation without wheezing, rales or rhonchi; respirations unlabored    HEART: Regular rate and rhythm without murmur.    LABS/IMAGING: reviewed      PROBLEM LIST:  89y Male with HEALTH ISSUES - PROBLEM Dx:            RECS:        Meche Wagner MD   [phone] PULMONARY PROGRESS NOTE    JODY WILKINS  MRN-[id number]    Patient is a 89y old  Male who presents with a chief complaint of     HPI:  -pt has chronic right pleural effusion, tapped in the past, chronic bronchitis with small amounts of hemoptysis.  last night had cough with sputum mixed with blood, happened again this am about a "nickel size".  Currently breathing without difficulty, not requiring O2, feeling at his baseline.    ROS:   -no N/V    ACTIVE MEDICATION LIST:  MEDICATIONS  (STANDING):  aspirin enteric coated 81 milliGRAM(s) Oral daily  atorvastatin 10 milliGRAM(s) Oral at bedtime  dextrose 5% + sodium chloride 0.9%. 1000 milliLiter(s) (50 mL/Hr) IV Continuous <Continuous>  dextrose 5%. 1000 milliLiter(s) (50 mL/Hr) IV Continuous <Continuous>  dextrose 50% Injectable 12.5 Gram(s) IV Push once  dextrose 50% Injectable 25 Gram(s) IV Push once  dextrose 50% Injectable 25 Gram(s) IV Push once  enoxaparin Injectable 70 milliGRAM(s) SubCutaneous daily  furosemide    Tablet 40 milliGRAM(s) Oral daily  metoprolol tartrate 25 milliGRAM(s) Oral two times a day    MEDICATIONS  (PRN):  acetaminophen   Tablet .. 650 milliGRAM(s) Oral every 6 hours PRN Mild Pain (1 - 3)  dextrose 40% Gel 15 Gram(s) Oral once PRN Blood Glucose LESS THAN 70 milliGRAM(s)/deciliter  glucagon  Injectable 1 milliGRAM(s) IntraMuscular once PRN Glucose LESS THAN 70 milligrams/deciliter      EXAM:  Vital Signs Last 24 Hrs  T(C): 36.9 (28 Dec 2018 12:16), Max: 36.9 (28 Dec 2018 07:35)  T(F): 98.4 (28 Dec 2018 12:16), Max: 98.5 (28 Dec 2018 07:35)  HR: 112 (28 Dec 2018 12:16) (72 - 132)  BP: 91/63 (28 Dec 2018 12:16) (91/63 - 112/80)  BP(mean): 70 (28 Dec 2018 12:16) (66 - 75)  RR: 18 (28 Dec 2018 12:16) (18 - 20)  SpO2: 97% (28 Dec 2018 12:16) (93% - 98%)    GENERAL: The patient is awake and alert in no apparent distress.     LUNGS: Clear to auscultation without wheezing, rales or rhonchi; respirations unlabored    HEART:S1/S2    LABS/IMAGING: reviewed                        11.5   6.86  )-----------( 141      ( 28 Dec 2018 09:45 )             37.8   12-28    143  |  96<L>  |  87<H>  ----------------------------<  126<H>  3.4<L>   |  33<H>  |  2.36<H>    Ca    9.0      28 Dec 2018 09:45  Phos  4.0     12-28  Mg     2.1     12-28        PROBLEM LIST:  89y Male with HEALTH ISSUES - PROBLEM Dx:  chronic bronchitis  hemoptysis  chronic right sided effusion      RECS:  -hemoptysis in setting of chronic bronchitis and full ac, small amounts intermittently, would cont to monitor for now.  -pleural effusion chronic, not worse than previous xray, would hold off any intervention for now.  can follow up with  in the office after discharge for further management, may consider drainage again at some point.    Meche Wagner MD   [phone]

## 2018-12-29 ENCOUNTER — TRANSCRIPTION ENCOUNTER (OUTPATIENT)
Age: 83
End: 2018-12-29

## 2018-12-29 VITALS
TEMPERATURE: 98 F | HEART RATE: 97 BPM | RESPIRATION RATE: 18 BRPM | DIASTOLIC BLOOD PRESSURE: 63 MMHG | OXYGEN SATURATION: 98 % | SYSTOLIC BLOOD PRESSURE: 101 MMHG

## 2018-12-29 LAB
BUN SERPL-MCNC: 81 MG/DL — HIGH (ref 7–23)
CALCIUM SERPL-MCNC: 8.9 MG/DL — SIGNIFICANT CHANGE UP (ref 8.4–10.5)
CHLORIDE SERPL-SCNC: 95 MMOL/L — LOW (ref 98–107)
CO2 SERPL-SCNC: 30 MMOL/L — SIGNIFICANT CHANGE UP (ref 22–31)
CREAT SERPL-MCNC: 2.33 MG/DL — HIGH (ref 0.5–1.3)
GLUCOSE SERPL-MCNC: 157 MG/DL — HIGH (ref 70–99)
HCT VFR BLD CALC: 35.9 % — LOW (ref 39–50)
HGB BLD-MCNC: 11.3 G/DL — LOW (ref 13–17)
MAGNESIUM SERPL-MCNC: 2.1 MG/DL — SIGNIFICANT CHANGE UP (ref 1.6–2.6)
MCHC RBC-ENTMCNC: 26 PG — LOW (ref 27–34)
MCHC RBC-ENTMCNC: 31.5 % — LOW (ref 32–36)
MCV RBC AUTO: 82.7 FL — SIGNIFICANT CHANGE UP (ref 80–100)
NRBC # FLD: 0 — SIGNIFICANT CHANGE UP
PHOSPHATE SERPL-MCNC: 3.4 MG/DL — SIGNIFICANT CHANGE UP (ref 2.5–4.5)
PLATELET # BLD AUTO: 137 K/UL — LOW (ref 150–400)
PMV BLD: 12.9 FL — SIGNIFICANT CHANGE UP (ref 7–13)
POTASSIUM SERPL-MCNC: 3.6 MMOL/L — SIGNIFICANT CHANGE UP (ref 3.5–5.3)
POTASSIUM SERPL-SCNC: 3.6 MMOL/L — SIGNIFICANT CHANGE UP (ref 3.5–5.3)
RBC # BLD: 4.34 M/UL — SIGNIFICANT CHANGE UP (ref 4.2–5.8)
RBC # FLD: 16.3 % — HIGH (ref 10.3–14.5)
SODIUM SERPL-SCNC: 139 MMOL/L — SIGNIFICANT CHANGE UP (ref 135–145)
WBC # BLD: 7.88 K/UL — SIGNIFICANT CHANGE UP (ref 3.8–10.5)
WBC # FLD AUTO: 7.88 K/UL — SIGNIFICANT CHANGE UP (ref 3.8–10.5)

## 2018-12-29 PROCEDURE — 99232 SBSQ HOSP IP/OBS MODERATE 35: CPT | Mod: GC

## 2018-12-29 PROCEDURE — 99233 SBSQ HOSP IP/OBS HIGH 50: CPT

## 2018-12-29 PROCEDURE — 93926 LOWER EXTREMITY STUDY: CPT | Mod: 26,LT

## 2018-12-29 PROCEDURE — 93924 LWR XTR VASC STDY BILAT: CPT | Mod: 26

## 2018-12-29 RX ADMIN — Medication 81 MILLIGRAM(S): at 14:01

## 2018-12-29 RX ADMIN — Medication 25 MILLIGRAM(S): at 05:12

## 2018-12-29 RX ADMIN — ENOXAPARIN SODIUM 70 MILLIGRAM(S): 100 INJECTION SUBCUTANEOUS at 14:01

## 2018-12-29 RX ADMIN — Medication 25 MILLIGRAM(S): at 17:34

## 2018-12-29 NOTE — PROGRESS NOTE ADULT - SUBJECTIVE AND OBJECTIVE BOX
24H hour events: No acute events. "I feel like I did yesterday." Son describes vascular plans to perform another ultrasound to image pseudoaneurysm and assess for further intervention.    MEDICATIONS:  aspirin enteric coated 81 milliGRAM(s) Oral daily  enoxaparin Injectable 70 milliGRAM(s) SubCutaneous daily  furosemide    Tablet 40 milliGRAM(s) Oral daily  metoprolol tartrate 25 milliGRAM(s) Oral two times a day  acetaminophen   Tablet .. 650 milliGRAM(s) Oral every 6 hours PRN  atorvastatin 10 milliGRAM(s) Oral at bedtime  dextrose 40% Gel 15 Gram(s) Oral once PRN  dextrose 50% Injectable 12.5 Gram(s) IV Push once  dextrose 50% Injectable 25 Gram(s) IV Push once  dextrose 50% Injectable 25 Gram(s) IV Push once  glucagon  Injectable 1 milliGRAM(s) IntraMuscular once PRN  dextrose 5% + sodium chloride 0.9%. 1000 milliLiter(s) IV Continuous <Continuous>  dextrose 5%. 1000 milliLiter(s) IV Continuous <Continuous>    REVIEW OF SYSTEMS:  Complete 10point ROS negative.    PHYSICAL EXAM:  T(C): 36.4 (12-29-18 @ 08:30), Max: 36.9 (12-28-18 @ 12:16)  HR: 75 (12-29-18 @ 08:30) (67 - 132)  BP: 107/63 (12-29-18 @ 08:30) (91/63 - 119/62)  RR: 18 (12-29-18 @ 08:30) (18 - 18)  SpO2: 97% (12-29-18 @ 08:30) (94% - 100%)  Wt(kg): --  I&O's Summary    28 Dec 2018 07:01  -  29 Dec 2018 07:00  --------------------------------------------------------  IN: 600 mL / OUT: 1750 mL / NET: -1150 mL      Appearance: Normal	  HEENT:   Normal oral mucosa, PERRL, EOMI	  Cardiovascular: Normal S1 S2, No JVD, No murmurs, No edema  Respiratory: Lungs clear to auscultation	  Gastrointestinal:  Soft, Non-tender, + BS	  Neurologic: Non-focal  Vascular: Peripheral pulses palpable 2+ bilaterally      LABS:	 	    CBC Full  -  ( 29 Dec 2018 06:23 )  WBC Count : 7.88 K/uL  Hemoglobin : 11.3 g/dL  Hematocrit : 35.9 %  Platelet Count - Automated : 137 K/uL  Mean Cell Volume : 82.7 fL  Mean Cell Hemoglobin : 26.0 pg  Mean Cell Hemoglobin Concentration : 31.5 %    12-29    139  |  95<L>  |  81<H>  ----------------------------<  157<H>  3.6   |  30  |  2.33<H>  12-28    143  |  96<L>  |  87<H>  ----------------------------<  126<H>  3.4<L>   |  33<H>  |  2.36<H>    Ca    8.9      29 Dec 2018 06:23  Ca    9.0      28 Dec 2018 09:45  Phos  3.4     12-29  Phos  4.0     12-28  Mg     2.1     12-29  Mg     2.1     12-28        TELEMETRY: AF 70-80, brief 120s this am self limited

## 2018-12-29 NOTE — PROGRESS NOTE ADULT - SUBJECTIVE AND OBJECTIVE BOX
General Surgery Progress Note    SUBJECTIVE:  The patient was seen and examined. No acute events overnight. No incidences of afib o/n. BPs have been stable. Sitting up in chair.     OBJECTIVE:     ** VITAL SIGNS / I&O's **    Vital Signs Last 24 Hrs  T(C): 36.4 (29 Dec 2018 08:30), Max: 36.9 (28 Dec 2018 13:34)  T(F): 97.6 (29 Dec 2018 08:30), Max: 98.4 (28 Dec 2018 13:34)  HR: 75 (29 Dec 2018 08:30) (67 - 104)  BP: 107/63 (29 Dec 2018 08:30) (95/60 - 110/77)  BP(mean): 75 (29 Dec 2018 08:30) (68 - 75)  RR: 18 (29 Dec 2018 08:30) (18 - 18)  SpO2: 97% (29 Dec 2018 08:30) (94% - 100%)      28 Dec 2018 07:01  -  29 Dec 2018 07:00  --------------------------------------------------------  IN:    dextrose 5% + sodium chloride 0.9%: 600 mL  Total IN: 600 mL    OUT:    Voided: 1750 mL  Total OUT: 1750 mL    Total NET: -1150 mL      29 Dec 2018 07:01  -  29 Dec 2018 13:00  --------------------------------------------------------  IN:    Oral Fluid: 720 mL  Total IN: 720 mL    OUT:  Total OUT: 0 mL    Total NET: 720 mL          ** PHYSICAL EXAM **    -- CONSTITUTIONAL: Alert, NAD  -- PULMONARY: non-labored respirations  -- ABDOMEN: soft, non-distended  -- EXT: no palpable hematoma at R groin injection   -- NEURO: A&Ox3    ** LABS **                          11.3   7.88  )-----------( 137      ( 29 Dec 2018 06:23 )             35.9     29 Dec 2018 06:23    139    |  95     |  81     ----------------------------<  157    3.6     |  30     |  2.33     Ca    8.9        29 Dec 2018 06:23  Phos  3.4       29 Dec 2018 06:23  Mg     2.1       29 Dec 2018 06:23      PT/INR - ( 27 Dec 2018 15:34 )   PT: 35.9 SEC;   INR: 3.12          PTT - ( 27 Dec 2018 15:34 )  PTT:40.7 SEC  CAPILLARY BLOOD GLUCOSE      POCT Blood Glucose.: 126 mg/dL (29 Dec 2018 08:44)  POCT Blood Glucose.: 217 mg/dL (28 Dec 2018 21:51)  POCT Blood Glucose.: 184 mg/dL (28 Dec 2018 17:59)                MEDICATIONS  (STANDING):  aspirin enteric coated 81 milliGRAM(s) Oral daily  atorvastatin 10 milliGRAM(s) Oral at bedtime  dextrose 5%. 1000 milliLiter(s) (50 mL/Hr) IV Continuous <Continuous>  dextrose 50% Injectable 12.5 Gram(s) IV Push once  dextrose 50% Injectable 25 Gram(s) IV Push once  dextrose 50% Injectable 25 Gram(s) IV Push once  enoxaparin Injectable 70 milliGRAM(s) SubCutaneous daily  furosemide    Tablet 40 milliGRAM(s) Oral daily  metoprolol tartrate 25 milliGRAM(s) Oral two times a day    MEDICATIONS  (PRN):  acetaminophen   Tablet .. 650 milliGRAM(s) Oral every 6 hours PRN Mild Pain (1 - 3)  dextrose 40% Gel 15 Gram(s) Oral once PRN Blood Glucose LESS THAN 70 milliGRAM(s)/deciliter  glucagon  Injectable 1 milliGRAM(s) IntraMuscular once PRN Glucose LESS THAN 70 milligrams/deciliter General Surgery Progress Note    SUBJECTIVE:  The patient was seen and examined. No acute events overnight. No incidences of afib o/n. BPs have been stable. Sitting up in chair.     OBJECTIVE:     ** VITAL SIGNS / I&O's **    Vital Signs Last 24 Hrs  T(C): 36.4 (29 Dec 2018 08:30), Max: 36.9 (28 Dec 2018 13:34)  T(F): 97.6 (29 Dec 2018 08:30), Max: 98.4 (28 Dec 2018 13:34)  HR: 75 (29 Dec 2018 08:30) (67 - 104)  BP: 107/63 (29 Dec 2018 08:30) (95/60 - 110/77)  BP(mean): 75 (29 Dec 2018 08:30) (68 - 75)  RR: 18 (29 Dec 2018 08:30) (18 - 18)  SpO2: 97% (29 Dec 2018 08:30) (94% - 100%)      28 Dec 2018 07:01  -  29 Dec 2018 07:00  --------------------------------------------------------  IN:    dextrose 5% + sodium chloride 0.9%: 600 mL  Total IN: 600 mL    OUT:    Voided: 1750 mL  Total OUT: 1750 mL    Total NET: -1150 mL      29 Dec 2018 07:01  -  29 Dec 2018 13:00  --------------------------------------------------------  IN:    Oral Fluid: 720 mL  Total IN: 720 mL    OUT:  Total OUT: 0 mL    Total NET: 720 mL          ** PHYSICAL EXAM **    -- CONSTITUTIONAL: Alert, NAD  -- PULMONARY: non-labored respirations  -- ABDOMEN: soft, non-distended  -- EXT: no palpable hematoma at R groin, ecchymosis noted, no active bleeding noted. B/L DP and PT signals  -- NEURO: A&Ox3    ** LABS **                          11.3   7.88  )-----------( 137      ( 29 Dec 2018 06:23 )             35.9     29 Dec 2018 06:23    139    |  95     |  81     ----------------------------<  157    3.6     |  30     |  2.33     Ca    8.9        29 Dec 2018 06:23  Phos  3.4       29 Dec 2018 06:23  Mg     2.1       29 Dec 2018 06:23      PT/INR - ( 27 Dec 2018 15:34 )   PT: 35.9 SEC;   INR: 3.12          PTT - ( 27 Dec 2018 15:34 )  PTT:40.7 SEC  CAPILLARY BLOOD GLUCOSE      POCT Blood Glucose.: 126 mg/dL (29 Dec 2018 08:44)  POCT Blood Glucose.: 217 mg/dL (28 Dec 2018 21:51)  POCT Blood Glucose.: 184 mg/dL (28 Dec 2018 17:59)                MEDICATIONS  (STANDING):  aspirin enteric coated 81 milliGRAM(s) Oral daily  atorvastatin 10 milliGRAM(s) Oral at bedtime  dextrose 5%. 1000 milliLiter(s) (50 mL/Hr) IV Continuous <Continuous>  dextrose 50% Injectable 12.5 Gram(s) IV Push once  dextrose 50% Injectable 25 Gram(s) IV Push once  dextrose 50% Injectable 25 Gram(s) IV Push once  enoxaparin Injectable 70 milliGRAM(s) SubCutaneous daily  furosemide    Tablet 40 milliGRAM(s) Oral daily  metoprolol tartrate 25 milliGRAM(s) Oral two times a day    MEDICATIONS  (PRN):  acetaminophen   Tablet .. 650 milliGRAM(s) Oral every 6 hours PRN Mild Pain (1 - 3)  dextrose 40% Gel 15 Gram(s) Oral once PRN Blood Glucose LESS THAN 70 milliGRAM(s)/deciliter  glucagon  Injectable 1 milliGRAM(s) IntraMuscular once PRN Glucose LESS THAN 70 milligrams/deciliter

## 2018-12-29 NOTE — DISCHARGE NOTE ADULT - HOSPITAL COURSE
89 yo M with PMHx of CHF, Afib on Eliquis, COPD, T2DM, HTN, HLD who presented to [facility] from Vascular Clinic with a recurrent R femoral pseudoaneurysm. Pt underwent a LLE angiogram with SFA stent placement accessed via the R groin on 12/11. Postoperative course was c/b R CFA PSA which was injected on 12/21. Seen for vascular follow-up which demonstrated a persistent pseudoaneurysm on repeat US. Pt was admitted to [facility] for repeat injection of the R femoral PSA on 12/27. Repeat duplex demonstrated thrombosed PSA. On HD#2, pt had an episode of rapid atrial fibrillation possibly caused by not receiving his scheduled metoprolol dose due to hold parameters. Cardiology was called and recommended an additional 12.5mg dose of metoprolol which resolved his tachycardia and low BP. At this time, pt had an episode of hemoptysis in setting of chronic bronchitis and full AC. Pulmonology was consulted and recommended monitoring with follow up with Dr. Red in the office after discharge for further management. Cardiology recommended to resume systemic AC for CVA ppx and to continue lasix at the current dose. Pt received 2 duplex US of the RLE in order to assess the pseudoaneurysm. Both scans on 12/28 and 12/29 showed a completely thrombosed PSA. Pt was stable for discharge on 12/29 and instructed to continue his Eliquis as indicated and to follow-up with the indicated physicians. 89 yo M with PMHx of CHF, Afib on Eliquis, COPD, T2DM, HTN, HLD who [facility] with a recurrent R femoral pseudoaneurysm. Pt underwent a LLE angiogram with SFA stent placement accessed via the R groin on 12/11. Postoperative course was c/b R CFA PSA which was injected on 12/21. Seen for vascular follow-up which demonstrated a persistent pseudoaneurysm on repeat US. Pt was admitted [facility] for repeat injection of the R femoral PSA on 12/27. Repeat duplex demonstrated thrombosed PSA. On HD#2, pt had an episode of rapid atrial fibrillation possibly caused by not receiving his scheduled metoprolol dose due to hold parameters. Cardiology was called and recommended an additional 12.5mg dose of metoprolol which resolved his tachycardia and low BP. At this time, pt had an episode of hemoptysis in setting of chronic bronchitis and full AC. Pulmonology was consulted and recommended monitoring with follow up with Dr. Red in the office after discharge for further management. Cardiology recommended to resume systemic AC for CVA ppx and to continue lasix at the current dose. Pt received 2 duplex US of the RLE in order to assess the pseudoaneurysm. Both scans on 12/28 and 12/29 demonstrated normal multiphasic waveforms of the R external iliac, common femoral, superficial femoral, and profunda femoral arteries, with no evidence of a pseudoaneurysm. The previous pseudoaneurysm is completely thrombosed. Pt was stable for discharge on 12/29 and instructed to continue his Eliquis as indicated and to follow-up with the indicated physicians.

## 2018-12-29 NOTE — PROGRESS NOTE ADULT - ASSESSMENT
89 yo M p/w Rt CFA pseudoaneurysm after EVAR, now s/p thrombin injection into sac (12/21, 12/27)    PLAN:  - repeat RLE duplex today to r/o interval changes  - scheduled for OR today for Rt CFA PSA repair if needed based on duplex results    VASC SURG (C TEAM)  [id number] 89 yo M p/w Rt CFA pseudoaneurysm after EVAR, now s/p thrombin injection into sac (12/21, 12/27)    PLAN:  - repeat RLE duplex today to r/o interval changes  - Will plan for Rt CFA PSA repair if needed based on duplex results  - D/C today if pseudoaneurysm is still closed  - F/u with Dr. Figueroa   - Re-start Xarelto once pseudoaneurysm is confirmed to be closed    VASC SURG (C TEAM)  [id number]

## 2018-12-29 NOTE — DISCHARGE NOTE ADULT - ADDITIONAL INSTRUCTIONS
Please resume all home medications as indicated. Please resume Eliquis as prescribed for history of atrial fibrillation.   Please call to schedule your follow-up appointment with Dr. Figueroa in 1-2 wks. Please resume all home medications as indicated. Please resume Eliquis as prescribed for history of atrial fibrillation.     Please call [phone] to follow up with Dr. Red (Pulmonologist) for further management of hemoptysis    Please call [phone] to schedule your follow-up appointment with Dr. Figueroa in 1-2 wks.

## 2018-12-29 NOTE — PROGRESS NOTE ADULT - ASSESSMENT
89M with PMH AFib on apixaban, heart failure with biventricular dysfunction (EF 34%), COPD, T2DM, HLD, HTN and recent LLE angiogram with SFA stent placement on 12/11/18 c/b R CFA pseudoaneurysm s/p thrombin injection on 12/21 and 12/27 who is scheduled to undergo open repair of the aneurysm with general anesthesia on 12/28/18. Cardiology consulted for pre-op evaluation.     Previously assessed as: patient has limited functional capacity and can walk only 150 feet before he must stop to catch his breath. He recently underwent stress testing, please obtain those records. His RCRI score is 2, that is a 6.6% risk of major cardiac event.     appreciate surgical follow up; for now, appears monitoring w imaging    Recommendations:  - if no plans for surgery, appreciate resuming systemic AC for cva ppx  - cont lasix at current dose, per daughter this is home maintenance dose; hold for sbp < 100  - Please obtain recent stress test results from Dr. Joel Goldberg's office

## 2018-12-29 NOTE — DISCHARGE NOTE ADULT - CARE PLAN
Principal Discharge DX:	Pseudoaneurysm  Goal:	Return to daily goals of living  Assessment and plan of treatment:	Please follow up with Dr. Figueroa when  Secondary Diagnosis:	Atrial fibrillation, unspecified type Principal Discharge DX:	Pseudoaneurysm  Goal:	Return to daily goals of living  Assessment and plan of treatment:	Please follow up with Dr. Figueroa in 1-2 wks after discharge.  Secondary Diagnosis:	Atrial fibrillation, unspecified type  Assessment and plan of treatment:	Please resume Eliquis and ASA dosing as indicated. Principal Discharge DX:	Pseudoaneurysm  Goal:	Return to daily goals of living  Assessment and plan of treatment:	Please follow up with Dr. Figueroa in 1-2 wks after discharge.  Secondary Diagnosis:	Atrial fibrillation, unspecified type  Assessment and plan of treatment:	Please resume Eliquis and ASA dosing as indicated.  Secondary Diagnosis:	Hemoptysis  Assessment and plan of treatment:	Please call [phone] to follow up with Dr. Red (Pulmonologist) for further management of hemoptysis in setting of chronic bronchitis and full anticoagulation.

## 2018-12-29 NOTE — DISCHARGE NOTE ADULT - CARE PROVIDER_API CALL
Ivan Figueroa), Surgery; Vascular Surgery  [address] [place] [id number]  Phone: [phone]  Fax: [phone]    Andrew Red), Internal Medicine; Pulmonary Disease  [address] [facility]  [address]  [place] [address]  Phone: [phone]  Fax: [phone]

## 2018-12-29 NOTE — PROGRESS NOTE ADULT - SUBJECTIVE AND OBJECTIVE BOX
PULMONARY PROGRESS NOTE    JODY WILKINS  MRN-[id number]    Patient is a 89y old  Male who presents with a chief complaint of     HPI:  some sputum with blood streaks this am, no dyspnea, no complaints.    ROS:   -no N/V    MEDICATIONS  (STANDING):  aspirin enteric coated 81 milliGRAM(s) Oral daily  atorvastatin 10 milliGRAM(s) Oral at bedtime  dextrose 5% + sodium chloride 0.9%. 1000 milliLiter(s) (50 mL/Hr) IV Continuous <Continuous>  dextrose 5%. 1000 milliLiter(s) (50 mL/Hr) IV Continuous <Continuous>  dextrose 50% Injectable 12.5 Gram(s) IV Push once  dextrose 50% Injectable 25 Gram(s) IV Push once  dextrose 50% Injectable 25 Gram(s) IV Push once  enoxaparin Injectable 70 milliGRAM(s) SubCutaneous daily  furosemide    Tablet 40 milliGRAM(s) Oral daily  metoprolol tartrate 25 milliGRAM(s) Oral two times a day    MEDICATIONS  (PRN):  acetaminophen   Tablet .. 650 milliGRAM(s) Oral every 6 hours PRN Mild Pain (1 - 3)  dextrose 40% Gel 15 Gram(s) Oral once PRN Blood Glucose LESS THAN 70 milliGRAM(s)/deciliter  glucagon  Injectable 1 milliGRAM(s) IntraMuscular once PRN Glucose LESS THAN 70 milligrams/deciliter      EXAM:  Vital Signs Last 24 Hrs  T(C): 36.4 (29 Dec 2018 08:30), Max: 36.9 (28 Dec 2018 12:16)  T(F): 97.6 (29 Dec 2018 08:30), Max: 98.4 (28 Dec 2018 12:16)  HR: 75 (29 Dec 2018 08:30) (67 - 132)  BP: 107/63 (29 Dec 2018 08:30) (91/63 - 119/62)  BP(mean): 75 (29 Dec 2018 08:30) (68 - 75)  RR: 18 (29 Dec 2018 08:30) (18 - 18)  SpO2: 97% (29 Dec 2018 08:30) (94% - 100%)    GENERAL: The patient is awake and alert in no apparent distress.     LUNGS: Clear to auscultation without wheezing, rales or rhonchi; respirations unlabored    HEART:S1/S2    LABS/IMAGING: reviewed                                   11.3   7.88  )-----------( 137      ( 29 Dec 2018 06:23 )             35.9   12-29    139  |  95<L>  |  81<H>  ----------------------------<  157<H>  3.6   |  30  |  2.33<H>    Ca    8.9      29 Dec 2018 06:23  Phos  3.4     12-29  Mg     2.1     12-29        PROBLEM LIST:  89y Male with HEALTH ISSUES - PROBLEM Dx:  chronic bronchitis  hemoptysis  chronic right sided effusion      RECS:  -hemoptysis in setting of chronic bronchitis and full ac, small amounts intermittently, would cont to monitor for now.  -pleural effusion chronic, not worse than previous xray, would hold off any intervention for now.  can follow up with  in the office after discharge for further management, may consider drainage again at some point.  -can add duonebs Q6 prn cough/dyspnea    Meche Wagner MD   [phone]

## 2018-12-29 NOTE — DISCHARGE NOTE ADULT - PLAN OF CARE
Return to daily goals of living Please follow up with Dr. Figueroa when Please follow up with Dr. Figueroa in 1-2 wks after discharge. Please resume Eliquis and ASA dosing as indicated. Please call [phone] to follow up with Dr. Red (Pulmonologist) for further management of hemoptysis in setting of chronic bronchitis and full anticoagulation.

## 2018-12-29 NOTE — PROGRESS NOTE ADULT - ATTENDING COMMENTS
Pt with uncontrolled tachycardia due to beta blocker withdrawal. Please change hold parameters to less than 90 mm Hg and give metoprolol now. Cannot proceed to OR unless HR less than 100. Arash Deluca is a 89 year old man with history of aFib on apixaban (Eliquis) with [id number] score 5 points, heart failure with biventricular dysfunction (EF 34%), COPD, T2DM, HLD, HTN and SFA stent placement on 12/11/18,complicated by right common femoral artery pseudoaneurysm. He received thrombin injections on 12/21 and 12/27 and is scheduled to undergo open repair of the aneurysm with general anesthesia on 12/28/18. His functional capacity is limited, walking 150 feet before stopping to catch his breath. Stress testing, was recently done elsewhere, with result currently not available. Revised cardiac risk index for pre-operative risk (RCRI) score is 2, with 6.6% risk of major cardiac event.  Standing regimen:  EC aspirin 81 mg daily, enoxaparin 70 mg subcutaneously once daily, furosemide (Lasix) 40 mg by mouth once daily, metoprolol tartrate 25 mg twice daily and atorvastatin (Lipitor) 10 mg daily at bedtime. Anticoagulation is currently on hold in preparation for surgery.

## 2018-12-29 NOTE — DISCHARGE NOTE ADULT - MEDICATION SUMMARY - MEDICATIONS TO TAKE
I will START or STAY ON the medications listed below when I get home from the hospital:    aspirin 81 mg oral tablet  -- 1 tab(s) by mouth once a day  -- Indication: For home med    acetaminophen 325 mg oral tablet  -- 2 tab(s) by mouth every 6 hours, As Needed for mild to moderate pain  -- Indication: For home med    Eliquis 2.5 mg oral tablet  -- orally 2 times a day  -- Indication: For Atrial fibrillation    glimepiride 2 mg oral tablet  -- 1 tab(s) by mouth once a day  -- Indication: For DM    Onglyza 2.5 mg oral tablet  -- 1 tab(s) by mouth once a day  -- Indication: For DM    pravastatin 40 mg oral tablet  -- 1 tab(s) by mouth once a day  -- Indication: For Hyperlipidemia    metoprolol  -- 50 milligram(s) by mouth once a day  -- Indication: For Cardiovascular health    Vin Weber  -- Indication: For COPD    furosemide 40 mg oral tablet  -- Indication: For Cardiovascular health

## 2018-12-29 NOTE — DISCHARGE NOTE ADULT - PATIENT PORTAL LINK FT
You can access the [facility] Patient Portal, offered by [facility], by registering with the following website: [email or web address]

## 2019-01-03 ENCOUNTER — APPOINTMENT (OUTPATIENT)
Dept: VASCULAR SURGERY | Facility: CLINIC | Age: 84
End: 2019-01-03
Payer: MEDICARE

## 2019-01-03 VITALS
DIASTOLIC BLOOD PRESSURE: 70 MMHG | HEART RATE: 67 BPM | HEIGHT: 65 IN | BODY MASS INDEX: 26.66 KG/M2 | SYSTOLIC BLOOD PRESSURE: 105 MMHG | WEIGHT: 160 LBS

## 2019-01-03 VITALS — HEART RATE: 66 BPM | SYSTOLIC BLOOD PRESSURE: 112 MMHG | DIASTOLIC BLOOD PRESSURE: 76 MMHG

## 2019-01-03 DIAGNOSIS — I72.4 ANEURYSM OF ARTERY OF LOWER EXTREMITY: ICD-10-CM

## 2019-01-03 DIAGNOSIS — I70.262 ATHEROSCLEROSIS OF NATIVE ARTERIES OF EXTREMITIES WITH GANGRENE, LEFT LEG: ICD-10-CM

## 2019-01-03 PROCEDURE — 93925 LOWER EXTREMITY STUDY: CPT

## 2019-01-03 PROCEDURE — 99213 OFFICE O/P EST LOW 20 MIN: CPT

## 2019-01-04 PROBLEM — I70.262 ATHEROSCLEROSIS OF NATIVE ARTERY OF LEFT LOWER EXTREMITY WITH GANGRENE: Status: ACTIVE | Noted: 2018-11-26

## 2019-01-04 PROBLEM — I72.4 PSEUDOANEURYSM OF FEMORAL ARTERY: Status: ACTIVE | Noted: 2018-12-20

## 2019-01-28 ENCOUNTER — APPOINTMENT (OUTPATIENT)
Dept: PULMONOLOGY | Facility: CLINIC | Age: 84
End: 2019-01-28

## 2019-02-03 ENCOUNTER — INPATIENT (INPATIENT)
Facility: HOSPITAL | Age: 84
LOS: 3 days | Discharge: HOME | DRG: 637 | End: 2019-02-07
Attending: INTERNAL MEDICINE | Admitting: INTERNAL MEDICINE
Payer: MEDICARE

## 2019-02-03 VITALS
DIASTOLIC BLOOD PRESSURE: 63 MMHG | TEMPERATURE: 98 F | WEIGHT: 130.07 LBS | SYSTOLIC BLOOD PRESSURE: 116 MMHG | HEART RATE: 100 BPM | OXYGEN SATURATION: 99 % | HEIGHT: 65 IN | RESPIRATION RATE: 19 BRPM

## 2019-02-03 DIAGNOSIS — I50.9 HEART FAILURE, UNSPECIFIED: ICD-10-CM

## 2019-02-03 DIAGNOSIS — Z98.49 CATARACT EXTRACTION STATUS, UNSPECIFIED EYE: Chronic | ICD-10-CM

## 2019-02-03 LAB
ALBUMIN SERPL ELPH-MCNC: 3.3 G/DL — SIGNIFICANT CHANGE UP (ref 3.3–5)
ALBUMIN SERPL ELPH-MCNC: 3.5 G/DL — SIGNIFICANT CHANGE UP (ref 3.3–5)
ALP SERPL-CCNC: 114 U/L — SIGNIFICANT CHANGE UP (ref 40–120)
ALP SERPL-CCNC: 118 U/L — SIGNIFICANT CHANGE UP (ref 40–120)
ALT FLD-CCNC: 22 U/L — SIGNIFICANT CHANGE UP (ref 10–45)
ALT FLD-CCNC: 23 U/L — SIGNIFICANT CHANGE UP (ref 10–45)
ANION GAP SERPL CALC-SCNC: 17 MMOL/L — SIGNIFICANT CHANGE UP (ref 5–17)
ANION GAP SERPL CALC-SCNC: 20 MMOL/L — HIGH (ref 5–17)
APPEARANCE UR: ABNORMAL
APTT BLD: 30.5 SEC — SIGNIFICANT CHANGE UP (ref 27.5–36.3)
AST SERPL-CCNC: 29 U/L — SIGNIFICANT CHANGE UP (ref 10–40)
AST SERPL-CCNC: 33 U/L — SIGNIFICANT CHANGE UP (ref 10–40)
BACTERIA # UR AUTO: ABNORMAL
BASE EXCESS BLDV CALC-SCNC: 10.1 MMOL/L — HIGH (ref -2–2)
BASE EXCESS BLDV CALC-SCNC: 10.9 MMOL/L — HIGH (ref -2–2)
BASOPHILS # BLD AUTO: 0 K/UL — SIGNIFICANT CHANGE UP (ref 0–0.2)
BASOPHILS # BLD AUTO: 0 K/UL — SIGNIFICANT CHANGE UP (ref 0–0.2)
BASOPHILS NFR BLD AUTO: 0.1 % — SIGNIFICANT CHANGE UP (ref 0–2)
BASOPHILS NFR BLD AUTO: 0.2 % — SIGNIFICANT CHANGE UP (ref 0–2)
BILIRUB SERPL-MCNC: 2.3 MG/DL — HIGH (ref 0.2–1.2)
BILIRUB SERPL-MCNC: 2.5 MG/DL — HIGH (ref 0.2–1.2)
BILIRUB UR-MCNC: NEGATIVE — SIGNIFICANT CHANGE UP
BUN SERPL-MCNC: 126 MG/DL — HIGH (ref 7–23)
BUN SERPL-MCNC: 129 MG/DL — HIGH (ref 7–23)
CA-I SERPL-SCNC: 0.84 MMOL/L — LOW (ref 1.12–1.3)
CA-I SERPL-SCNC: 1.02 MMOL/L — LOW (ref 1.12–1.3)
CALCIUM SERPL-MCNC: 8.8 MG/DL — SIGNIFICANT CHANGE UP (ref 8.4–10.5)
CALCIUM SERPL-MCNC: 8.9 MG/DL — SIGNIFICANT CHANGE UP (ref 8.4–10.5)
CHLORIDE BLDV-SCNC: 103 MMOL/L — SIGNIFICANT CHANGE UP (ref 96–108)
CHLORIDE BLDV-SCNC: 97 MMOL/L — SIGNIFICANT CHANGE UP (ref 96–108)
CHLORIDE SERPL-SCNC: 92 MMOL/L — LOW (ref 96–108)
CHLORIDE SERPL-SCNC: 94 MMOL/L — LOW (ref 96–108)
CO2 BLDV-SCNC: 38 MMOL/L — HIGH (ref 22–30)
CO2 BLDV-SCNC: 38 MMOL/L — HIGH (ref 22–30)
CO2 SERPL-SCNC: 33 MMOL/L — HIGH (ref 22–31)
CO2 SERPL-SCNC: 34 MMOL/L — HIGH (ref 22–31)
COLOR SPEC: ABNORMAL
CREAT SERPL-MCNC: 3.83 MG/DL — HIGH (ref 0.5–1.3)
CREAT SERPL-MCNC: 3.85 MG/DL — HIGH (ref 0.5–1.3)
DIFF PNL FLD: ABNORMAL
EOSINOPHIL # BLD AUTO: 0 K/UL — SIGNIFICANT CHANGE UP (ref 0–0.5)
EOSINOPHIL # BLD AUTO: 0 K/UL — SIGNIFICANT CHANGE UP (ref 0–0.5)
EOSINOPHIL NFR BLD AUTO: 0.1 % — SIGNIFICANT CHANGE UP (ref 0–6)
EOSINOPHIL NFR BLD AUTO: 0.2 % — SIGNIFICANT CHANGE UP (ref 0–6)
EPI CELLS # UR: 13 /HPF — HIGH
GAS PNL BLDV: 131 MMOL/L — LOW (ref 136–145)
GAS PNL BLDV: 138 MMOL/L — SIGNIFICANT CHANGE UP (ref 136–145)
GAS PNL BLDV: SIGNIFICANT CHANGE UP
GLUCOSE BLDC GLUCOMTR-MCNC: 124 MG/DL — HIGH (ref 70–99)
GLUCOSE BLDV-MCNC: 162 MG/DL — HIGH (ref 70–99)
GLUCOSE BLDV-MCNC: 209 MG/DL — HIGH (ref 70–99)
GLUCOSE SERPL-MCNC: 175 MG/DL — HIGH (ref 70–99)
GLUCOSE SERPL-MCNC: 244 MG/DL — HIGH (ref 70–99)
GLUCOSE UR QL: NEGATIVE — SIGNIFICANT CHANGE UP
HCO3 BLDV-SCNC: 36 MMOL/L — HIGH (ref 21–29)
HCO3 BLDV-SCNC: 37 MMOL/L — HIGH (ref 21–29)
HCT VFR BLD CALC: 33.9 % — LOW (ref 39–50)
HCT VFR BLD CALC: 34.6 % — LOW (ref 39–50)
HCT VFR BLDA CALC: 31 % — LOW (ref 39–50)
HCT VFR BLDA CALC: 32 % — LOW (ref 39–50)
HGB BLD CALC-MCNC: 10.3 G/DL — LOW (ref 13–17)
HGB BLD CALC-MCNC: 9.9 G/DL — LOW (ref 13–17)
HGB BLD-MCNC: 10.7 G/DL — LOW (ref 13–17)
HGB BLD-MCNC: 10.9 G/DL — LOW (ref 13–17)
HYALINE CASTS # UR AUTO: 86 /LPF — HIGH (ref 0–2)
INR BLD: 1.56 RATIO — HIGH (ref 0.88–1.16)
KETONES UR-MCNC: NEGATIVE — SIGNIFICANT CHANGE UP
LACTATE BLDV-MCNC: 2.6 MMOL/L — HIGH (ref 0.7–2)
LACTATE BLDV-MCNC: 2.8 MMOL/L — HIGH (ref 0.7–2)
LEUKOCYTE ESTERASE UR-ACNC: ABNORMAL
LYMPHOCYTES # BLD AUTO: 0.4 K/UL — LOW (ref 1–3.3)
LYMPHOCYTES # BLD AUTO: 0.6 K/UL — LOW (ref 1–3.3)
LYMPHOCYTES # BLD AUTO: 3.9 % — LOW (ref 13–44)
LYMPHOCYTES # BLD AUTO: 5 % — LOW (ref 13–44)
MCHC RBC-ENTMCNC: 27.3 PG — SIGNIFICANT CHANGE UP (ref 27–34)
MCHC RBC-ENTMCNC: 27.6 PG — SIGNIFICANT CHANGE UP (ref 27–34)
MCHC RBC-ENTMCNC: 31.4 GM/DL — LOW (ref 32–36)
MCHC RBC-ENTMCNC: 31.5 GM/DL — LOW (ref 32–36)
MCV RBC AUTO: 86.9 FL — SIGNIFICANT CHANGE UP (ref 80–100)
MCV RBC AUTO: 87.7 FL — SIGNIFICANT CHANGE UP (ref 80–100)
MONOCYTES # BLD AUTO: 0.5 K/UL — SIGNIFICANT CHANGE UP (ref 0–0.9)
MONOCYTES # BLD AUTO: 0.6 K/UL — SIGNIFICANT CHANGE UP (ref 0–0.9)
MONOCYTES NFR BLD AUTO: 4 % — SIGNIFICANT CHANGE UP (ref 2–14)
MONOCYTES NFR BLD AUTO: 5.1 % — SIGNIFICANT CHANGE UP (ref 2–14)
NEUTROPHILS # BLD AUTO: 10.1 K/UL — HIGH (ref 1.8–7.4)
NEUTROPHILS # BLD AUTO: 10.4 K/UL — HIGH (ref 1.8–7.4)
NEUTROPHILS NFR BLD AUTO: 90.6 % — HIGH (ref 43–77)
NEUTROPHILS NFR BLD AUTO: 90.8 % — HIGH (ref 43–77)
NITRITE UR-MCNC: NEGATIVE — SIGNIFICANT CHANGE UP
OTHER CELLS CSF MANUAL: 7 ML/DL — LOW (ref 18–22)
OTHER CELLS CSF MANUAL: 7 ML/DL — LOW (ref 18–22)
PCO2 BLDV: 56 MMHG — HIGH (ref 35–50)
PCO2 BLDV: 59 MMHG — HIGH (ref 35–50)
PH BLDV: 7.4 — SIGNIFICANT CHANGE UP (ref 7.35–7.45)
PH BLDV: 7.43 — SIGNIFICANT CHANGE UP (ref 7.35–7.45)
PH UR: 6 — SIGNIFICANT CHANGE UP (ref 5–8)
PLATELET # BLD AUTO: 175 K/UL — SIGNIFICANT CHANGE UP (ref 150–400)
PLATELET # BLD AUTO: 182 K/UL — SIGNIFICANT CHANGE UP (ref 150–400)
PO2 BLDV: 29 MMHG — SIGNIFICANT CHANGE UP (ref 25–45)
PO2 BLDV: 30 MMHG — SIGNIFICANT CHANGE UP (ref 25–45)
POTASSIUM BLDV-SCNC: 10 MMOL/L — CRITICAL HIGH (ref 3.5–5.3)
POTASSIUM BLDV-SCNC: 3.3 MMOL/L — LOW (ref 3.5–5.3)
POTASSIUM SERPL-MCNC: 3.6 MMOL/L — SIGNIFICANT CHANGE UP (ref 3.5–5.3)
POTASSIUM SERPL-MCNC: 3.6 MMOL/L — SIGNIFICANT CHANGE UP (ref 3.5–5.3)
POTASSIUM SERPL-SCNC: 3.6 MMOL/L — SIGNIFICANT CHANGE UP (ref 3.5–5.3)
POTASSIUM SERPL-SCNC: 3.6 MMOL/L — SIGNIFICANT CHANGE UP (ref 3.5–5.3)
PROT SERPL-MCNC: 6.4 G/DL — SIGNIFICANT CHANGE UP (ref 6–8.3)
PROT SERPL-MCNC: 6.6 G/DL — SIGNIFICANT CHANGE UP (ref 6–8.3)
PROT UR-MCNC: ABNORMAL
PROTHROM AB SERPL-ACNC: 18 SEC — HIGH (ref 10–12.9)
RBC # BLD: 3.87 M/UL — LOW (ref 4.2–5.8)
RBC # BLD: 3.99 M/UL — LOW (ref 4.2–5.8)
RBC # FLD: 19.3 % — HIGH (ref 10.3–14.5)
RBC # FLD: 19.4 % — HIGH (ref 10.3–14.5)
RBC CASTS # UR COMP ASSIST: 96 /HPF — HIGH (ref 0–4)
SAO2 % BLDV: 48 % — LOW (ref 67–88)
SAO2 % BLDV: 50 % — LOW (ref 67–88)
SODIUM SERPL-SCNC: 145 MMOL/L — SIGNIFICANT CHANGE UP (ref 135–145)
SODIUM SERPL-SCNC: 145 MMOL/L — SIGNIFICANT CHANGE UP (ref 135–145)
SP GR SPEC: 1.02 — SIGNIFICANT CHANGE UP (ref 1.01–1.02)
UROBILINOGEN FLD QL: ABNORMAL
WBC # BLD: 11.2 K/UL — HIGH (ref 3.8–10.5)
WBC # BLD: 11.4 K/UL — HIGH (ref 3.8–10.5)
WBC # FLD AUTO: 11.2 K/UL — HIGH (ref 3.8–10.5)
WBC # FLD AUTO: 11.4 K/UL — HIGH (ref 3.8–10.5)
WBC UR QL: 655 /HPF — HIGH (ref 0–5)

## 2019-02-03 PROCEDURE — 70450 CT HEAD/BRAIN W/O DYE: CPT | Mod: 26

## 2019-02-03 PROCEDURE — 93308 TTE F-UP OR LMTD: CPT | Mod: 26

## 2019-02-03 PROCEDURE — 93010 ELECTROCARDIOGRAM REPORT: CPT | Mod: GC

## 2019-02-03 PROCEDURE — 71045 X-RAY EXAM CHEST 1 VIEW: CPT | Mod: 26

## 2019-02-03 PROCEDURE — 99285 EMERGENCY DEPT VISIT HI MDM: CPT | Mod: GC,25

## 2019-02-03 PROCEDURE — 74176 CT ABD & PELVIS W/O CONTRAST: CPT | Mod: 26

## 2019-02-03 RX ORDER — METOPROLOL TARTRATE 50 MG
50 TABLET ORAL
Qty: 0 | Refills: 0 | COMMUNITY

## 2019-02-03 RX ORDER — DEXTROSE 50 % IN WATER 50 %
15 SYRINGE (ML) INTRAVENOUS ONCE
Qty: 0 | Refills: 0 | Status: DISCONTINUED | OUTPATIENT
Start: 2019-02-03 | End: 2019-02-07

## 2019-02-03 RX ORDER — DOCUSATE SODIUM 100 MG
100 CAPSULE ORAL
Qty: 0 | Refills: 0 | Status: DISCONTINUED | OUTPATIENT
Start: 2019-02-03 | End: 2019-02-07

## 2019-02-03 RX ORDER — CALCIUM GLUCONATE 100 MG/ML
1 VIAL (ML) INTRAVENOUS ONCE
Qty: 0 | Refills: 0 | Status: DISCONTINUED | OUTPATIENT
Start: 2019-02-03 | End: 2019-02-03

## 2019-02-03 RX ORDER — DEXTROSE 50 % IN WATER 50 %
12.5 SYRINGE (ML) INTRAVENOUS ONCE
Qty: 0 | Refills: 0 | Status: DISCONTINUED | OUTPATIENT
Start: 2019-02-03 | End: 2019-02-07

## 2019-02-03 RX ORDER — PIPERACILLIN AND TAZOBACTAM 4; .5 G/20ML; G/20ML
3.38 INJECTION, POWDER, LYOPHILIZED, FOR SOLUTION INTRAVENOUS EVERY 12 HOURS
Qty: 0 | Refills: 0 | Status: DISCONTINUED | OUTPATIENT
Start: 2019-02-03 | End: 2019-02-05

## 2019-02-03 RX ORDER — SODIUM CHLORIDE 9 MG/ML
1000 INJECTION, SOLUTION INTRAVENOUS
Qty: 0 | Refills: 0 | Status: DISCONTINUED | OUTPATIENT
Start: 2019-02-03 | End: 2019-02-07

## 2019-02-03 RX ORDER — POLYETHYLENE GLYCOL 3350 17 G/17G
17 POWDER, FOR SOLUTION ORAL DAILY
Qty: 0 | Refills: 0 | Status: DISCONTINUED | OUTPATIENT
Start: 2019-02-03 | End: 2019-02-07

## 2019-02-03 RX ORDER — FLUTICASONE FUROATE AND VILANTEROL TRIFENATATE 100; 25 UG/1; UG/1
0 POWDER RESPIRATORY (INHALATION)
Qty: 0 | Refills: 0 | COMMUNITY

## 2019-02-03 RX ORDER — APIXABAN 2.5 MG/1
0 TABLET, FILM COATED ORAL
Qty: 0 | Refills: 0 | COMMUNITY

## 2019-02-03 RX ORDER — SODIUM CHLORIDE 9 MG/ML
500 INJECTION INTRAMUSCULAR; INTRAVENOUS; SUBCUTANEOUS ONCE
Qty: 0 | Refills: 0 | Status: COMPLETED | OUTPATIENT
Start: 2019-02-03 | End: 2019-02-03

## 2019-02-03 RX ORDER — SENNA PLUS 8.6 MG/1
2 TABLET ORAL AT BEDTIME
Qty: 0 | Refills: 0 | Status: DISCONTINUED | OUTPATIENT
Start: 2019-02-03 | End: 2019-02-07

## 2019-02-03 RX ORDER — INSULIN LISPRO 100/ML
VIAL (ML) SUBCUTANEOUS AT BEDTIME
Qty: 0 | Refills: 0 | Status: DISCONTINUED | OUTPATIENT
Start: 2019-02-03 | End: 2019-02-07

## 2019-02-03 RX ORDER — CEFTRIAXONE 500 MG/1
1 INJECTION, POWDER, FOR SOLUTION INTRAMUSCULAR; INTRAVENOUS ONCE
Qty: 0 | Refills: 0 | Status: COMPLETED | OUTPATIENT
Start: 2019-02-03 | End: 2019-02-03

## 2019-02-03 RX ORDER — AMIODARONE HYDROCHLORIDE 400 MG/1
400 TABLET ORAL DAILY
Qty: 0 | Refills: 0 | Status: DISCONTINUED | OUTPATIENT
Start: 2019-02-03 | End: 2019-02-07

## 2019-02-03 RX ORDER — DEXTROSE 50 % IN WATER 50 %
25 SYRINGE (ML) INTRAVENOUS ONCE
Qty: 0 | Refills: 0 | Status: DISCONTINUED | OUTPATIENT
Start: 2019-02-03 | End: 2019-02-07

## 2019-02-03 RX ORDER — POLYETHYLENE GLYCOL 3350 17 G/17G
17 POWDER, FOR SOLUTION ORAL
Qty: 0 | Refills: 0 | COMMUNITY

## 2019-02-03 RX ORDER — SODIUM CHLORIDE 9 MG/ML
1000 INJECTION, SOLUTION INTRAVENOUS
Qty: 0 | Refills: 0 | Status: DISCONTINUED | OUTPATIENT
Start: 2019-02-03 | End: 2019-02-05

## 2019-02-03 RX ORDER — HEPARIN SODIUM 5000 [USP'U]/ML
5000 INJECTION INTRAVENOUS; SUBCUTANEOUS EVERY 12 HOURS
Qty: 0 | Refills: 0 | Status: DISCONTINUED | OUTPATIENT
Start: 2019-02-03 | End: 2019-02-04

## 2019-02-03 RX ORDER — INSULIN HUMAN 100 [IU]/ML
5 INJECTION, SOLUTION SUBCUTANEOUS ONCE
Qty: 0 | Refills: 0 | Status: DISCONTINUED | OUTPATIENT
Start: 2019-02-03 | End: 2019-02-03

## 2019-02-03 RX ORDER — GLUCAGON INJECTION, SOLUTION 0.5 MG/.1ML
1 INJECTION, SOLUTION SUBCUTANEOUS ONCE
Qty: 0 | Refills: 0 | Status: DISCONTINUED | OUTPATIENT
Start: 2019-02-03 | End: 2019-02-07

## 2019-02-03 RX ORDER — IPRATROPIUM/ALBUTEROL SULFATE 18-103MCG
3 AEROSOL WITH ADAPTER (GRAM) INHALATION EVERY 6 HOURS
Qty: 0 | Refills: 0 | Status: DISCONTINUED | OUTPATIENT
Start: 2019-02-03 | End: 2019-02-07

## 2019-02-03 RX ORDER — ACETAMINOPHEN 500 MG
2 TABLET ORAL
Qty: 0 | Refills: 0 | COMMUNITY

## 2019-02-03 RX ORDER — INSULIN LISPRO 100/ML
VIAL (ML) SUBCUTANEOUS
Qty: 0 | Refills: 0 | Status: DISCONTINUED | OUTPATIENT
Start: 2019-02-03 | End: 2019-02-04

## 2019-02-03 RX ORDER — GLIMEPIRIDE 1 MG
1 TABLET ORAL
Qty: 0 | Refills: 0 | COMMUNITY

## 2019-02-03 RX ORDER — FUROSEMIDE 40 MG
0 TABLET ORAL
Qty: 0 | Refills: 0 | COMMUNITY

## 2019-02-03 RX ORDER — MIDODRINE HYDROCHLORIDE 2.5 MG/1
5 TABLET ORAL THREE TIMES A DAY
Qty: 0 | Refills: 0 | Status: DISCONTINUED | OUTPATIENT
Start: 2019-02-03 | End: 2019-02-07

## 2019-02-03 RX ORDER — ASPIRIN/CALCIUM CARB/MAGNESIUM 324 MG
1 TABLET ORAL
Qty: 0 | Refills: 0 | COMMUNITY

## 2019-02-03 RX ADMIN — Medication 100 MILLIGRAM(S): at 21:41

## 2019-02-03 RX ADMIN — SODIUM CHLORIDE 500 MILLILITER(S): 9 INJECTION INTRAMUSCULAR; INTRAVENOUS; SUBCUTANEOUS at 14:35

## 2019-02-03 RX ADMIN — SENNA PLUS 2 TABLET(S): 8.6 TABLET ORAL at 21:41

## 2019-02-03 RX ADMIN — CEFTRIAXONE 100 GRAM(S): 500 INJECTION, POWDER, FOR SOLUTION INTRAMUSCULAR; INTRAVENOUS at 15:15

## 2019-02-03 RX ADMIN — PIPERACILLIN AND TAZOBACTAM 25 GRAM(S): 4; .5 INJECTION, POWDER, LYOPHILIZED, FOR SOLUTION INTRAVENOUS at 21:42

## 2019-02-03 RX ADMIN — MIDODRINE HYDROCHLORIDE 5 MILLIGRAM(S): 2.5 TABLET ORAL at 21:42

## 2019-02-03 NOTE — ED PROVIDER NOTE - ATTENDING CONTRIBUTION TO CARE
I performed a history and physical exam of the patient and discussed their management with the resident. I reviewed the resident's note and agree with the documented findings and plan of care, except if noted below. My medical decision making and observations can be found be found below.    89 yo male hx of DM on insulin, CAD, CHF, renal failure presents with elevated glucose, discharge from thorpe catheter sp keflex completion for UTI and intermittent AMS. On exam, ill appearing, MM dry, confused, ab soft nt/nd, no rash, lungs clear, tachypneic. Concern for DKA vs sepsis. Labs, fluids, ct head, UA, admit.

## 2019-02-03 NOTE — ED ADULT NURSE NOTE - CHIEF COMPLAINT QUOTE
AMS for 1 week and hyperglycemia  FS on arrival is 272  recintly IFTIKHAR from [facility] with thorpe in place

## 2019-02-03 NOTE — H&P ADULT - NSHPPHYSICALEXAM_GEN_ALL_CORE
wd frail male, eyes closed, asking for water  99/-06-98.6F-98%on O2NC  HEENT- NC/AT, PERRL, EOMI, CP/SA  NECK- SUPPLE  LUNGS- CTA  CARD- TACHY  ABD- NT, ND, POS BS  EXT- NO EDEMA  NEURO- MOANING, AxOx0  back- sacral decubs

## 2019-02-03 NOTE — CHART NOTE - NSCHARTNOTEFT_GEN_A_CORE
Pt was seen and examined.  Briefly this is a 89 yo c cirrhosis biv CHF and now advanced renal failure  Functional status is poor at best  Possible UTI  Failure to thrive--+ sacral ducub    Suggest  -Pt would do terrible with HD and I do not think that he is a candidate.  Will aaron Marino (outpt nephrologist)  -Palliative approach would make rigoberto      Sayed Alisson  [facility]  [phone]

## 2019-02-03 NOTE — H&P ADULT - NSHPLABSRESULTS_GEN_ALL_CORE
labs, meds imaging reveiwed, d/w renal, GOC d/w family x 30 min, not ready for DNR, agree for comfort care

## 2019-02-03 NOTE — ED ADULT NURSE NOTE - OBJECTIVE STATEMENT
Patient is a 89 Y male presenting to the ED via EMS with c/o altered mental status x 1 week. Pt has pmh DM type 2, CHF, A fib. Pt was d/c from [facility] 1/27/19 s/p cardiac mems placement. During surgery pt stopped breathing and needed to be intubated. Pt diagnosed with UTI and d/c with prescription for Keflex. Pt has had altered mental status since discharge. Pt on 3 L nasal cannula at home Patient is a 89 Y male presenting to the ED via EMS with c/o altered mental status x 1 week and hyperglycemia.  Pt has pmh DM type 2, CHF, A fib. Pt was d/c from [facility] 1/27/19 s/p cardiac mems placement. Per the patient's family, during surgery pt stopped breathing and needed to be intubated. Pt diagnosed with UTI and d/c with prescription for Keflex x 7 days, last dose was this am. Pt has had altered mental status since discharge.  S1, S2 heard upon auscultation. Pt displayed shallow respirations. Pt on 3 L nasal cannula at home. The patient has been less responsive and lethargic, refusing meals and fluctuating over the last week. At 4 AM this morning, his blood sugar was 450s, he was given his long acting insulin. EMS was called due to blood sugar being 400s persistently at noon. Pt had thorpe catheter in place with suspicion of infection. New thorpe catheter placed. Patient is a 89 Y male presenting to the ED via EMS with c/o altered mental status x 1 week and hyperglycemia.  Pt has pmh DM type 2, CHF, A fib. Pt was d/c from [facility] 1/27/19 s/p cardiac mems placement. Per the patient's family, during surgery pt stopped breathing and needed to be intubated. Pt diagnosed with UTI and d/c with prescription for Keflex x 7 days, last dose was this am. Pt has had altered mental status since discharge.  S1, S2 heard upon auscultation. Pt displayed shallow respirations. Pt on 3 L nasal cannula at home. The patient has been less responsive and lethargic, refusing meals and fluctuating over the last week. At 4 AM this morning, his blood sugar was 450s, he was given his long acting insulin. EMS was called due to blood sugar being 400s persistently at noon. Pt had thorpe catheter in place with suspicion of infection. New thorpe catheter placed. 22 gauge peripheral IV placed by EMS in right hand. 20 gauge peripheral IV placed left forearm. Patient's family bedside.

## 2019-02-03 NOTE — ED PROVIDER NOTE - OBJECTIVE STATEMENT
A 89 year old male with PMHx of CHF with recent CardioMEMs placement, Afib on Eliquis, COPD, IDDM, ESRD, HTN, HLD, recurrent R femoral pseudoaneurysm s/p injectionx2, who presents with hyperglycemia and AMS. Per the patient's nurse and family, the patient has been has had fluctuating AMS for the last week (was recently d/franky from [place] for CardioMEMs placement, hospital course complicated by respiratory failure requiring intubationx1 day). The patient reportedly was diagnosed with a UTI upon discharge and treated with Keflexx7 days, last dose was this AM. However, the nurse reports seeing urethral discharge and scant blood. The patient has been less responsive and lethargic, refusing meals and fluctuating over the last week. At 4 AM this morning, his blood sugar was 450s, he was given his long acting insulin. EMS was called due to blood sugar being 400s persistently at noon. A 89 year old male with PMHx of CHF with recent CardioMEMs placement, Afib on Eliquis, COPD, IDDM, ESRD, HTN, HLD, recurrent R femoral pseudoaneurysm s/p injectionx2, who presents with hyperglycemia and AMS. Per the patient's nurse and family, the patient has been has had fluctuating AMS for the last week (was recently d/franky from [facility] for CardioMEMs placement, hospital course complicated by respiratory failure requiring intubationx1 day). The patient reportedly was diagnosed with a UTI upon discharge and treated with Keflexx7 days, last dose was this AM. However, the nurse reports seeing urethral discharge and scant blood. The patient has been less responsive and lethargic, refusing meals and fluctuating over the last week. At 4 AM this morning, his blood sugar was 450s, he was given his long acting insulin. EMS was called due to blood sugar being 400s persistently at noon.  In the ED, the patient is lethargic and unable to follow commands, reports he is "thirsty and tired."

## 2019-02-03 NOTE — ED PROVIDER NOTE - PROGRESS NOTE DETAILS
Attending Isabel Weston: I received sign out. on my exam pt pale appearing, RUQ ttp. pocus shows sludge with wall thickening as well as ascites. will obtain ct scan. wall thickening could be secondary to known heart disease. per family no black or bloody stools but will check a rectal exam as pt appears pale. ct scan ordered. Attending Isabel Weston: ct shows evidence of stercolitis, ascites and pleural effusion. d/w dr espino will admit

## 2019-02-03 NOTE — H&P ADULT - HISTORY OF PRESENT ILLNESS
A 89 year old male with PMHx of systolic and diastolic CHF with recent CardioMEMs placement at [facility], Afib on Eliquis, COPD, IDDM, CKD5, HTN, HLD, recurrent R femoral pseudoaneurysm s/p injectionx2, who presents with hyperglycemia and AMS. Per the patient's nurse and family, the patient has been has had fluctuating AMS for the last week (was recently d/franky from [place] for CardioMEMs placement, hospital course complicated by respiratory failure requiring intubationx1 day). The patient reportedly was diagnosed with a UTI upon discharge and treated with Keflexx7 days, last dose was this AM. However, the nurse reports seeing urethral discharge and scant blood. The patient has been less responsive and lethargic, refusing meals and fluctuating over the last week. At 4 AM this morning, his blood sugar was 450s, he was given his long acting insulin. EMS was called due to blood sugar being 400s persistently at noon.  In the ED, the patient is lethargic and unable to follow commands, reports he is "thirsty and tired.". Ptn has worsening renal failure, had a new James placed in the ED, has a low urine output, seen by renal, family agrees on comfort care and no HD, ptn has sacral decubs. Wife not ready for DNR yet, they will meet as a family and reach a decision by tomorrow.   CT A/P in the ED c/w ascites, cirrhosis, constipation and stercoral colitis.

## 2019-02-03 NOTE — CHART NOTE - NSCHARTNOTEFT_GEN_A_CORE
Received call from ED earlier today about patient. Patient was seen last admission by private endocrinologist Dr. Xavier Horton. Informed ED that Dr. Horton should be called first prior to house endocrine.    Redd Mccarthy MD.  Endocrine Fellow

## 2019-02-03 NOTE — ED ADULT TRIAGE NOTE - CHIEF COMPLAINT QUOTE
AMS for 1 week and hyperglycemia  FS on arrival is 272  recintly IFTIKHAR from [facility] with thorpe in place

## 2019-02-03 NOTE — ED CLERICAL - NS ED CLERK NOTE PRE-ARRIVAL INFORMATION; ADDITIONAL PRE-ARRIVAL INFORMATION
CC/Reason For referral: Heart Failure, Renal Failure, Diabetes  Preferred Consultant(if applicable):  Who admits for you (if needed): Adolfo Trejo  Do you have documents you would like to fax over? No  Would you still like to speak to an ED attending? Please call Dr. Bell once patient arrives, [phone].

## 2019-02-03 NOTE — ED PROVIDER NOTE - CARE PLAN
Principal Discharge DX:	CHF (congestive heart failure)  Secondary Diagnosis:	Cholelithiases Principal Discharge DX:	CHF (congestive heart failure)  Secondary Diagnosis:	Acute cystitis without hematuria

## 2019-02-03 NOTE — H&P ADULT - ASSESSMENT
89 yo male w complicated h/o as stated in H&P presents w metabolic encephalopathy 2/2 uremia, volume depletion, uti, stercoral colitis and uncontrolled hyperglycemia. will gently hydrate, place on insulin, get S&S eval, place on dysphagia diet w renal restrictions, blood and urine Cx sent. ptn is not in HF exacerbation. renal, card, GI, endo, pulm consults called. ptn not on eliquis any longer, will inquire why was DCed w his cardiologist.

## 2019-02-04 DIAGNOSIS — E11.65 TYPE 2 DIABETES MELLITUS WITH HYPERGLYCEMIA: ICD-10-CM

## 2019-02-04 LAB
AMMONIA BLD-MCNC: 51 UMOL/L — SIGNIFICANT CHANGE UP (ref 11–55)
ANION GAP SERPL CALC-SCNC: 20 MMOL/L — HIGH (ref 5–17)
BUN SERPL-MCNC: 119 MG/DL — HIGH (ref 7–23)
CALCIUM SERPL-MCNC: 8.4 MG/DL — SIGNIFICANT CHANGE UP (ref 8.4–10.5)
CHLORIDE SERPL-SCNC: 97 MMOL/L — SIGNIFICANT CHANGE UP (ref 96–108)
CO2 SERPL-SCNC: 24 MMOL/L — SIGNIFICANT CHANGE UP (ref 22–31)
CREAT SERPL-MCNC: 3.67 MG/DL — HIGH (ref 0.5–1.3)
CULTURE RESULTS: SIGNIFICANT CHANGE UP
GLUCOSE BLDC GLUCOMTR-MCNC: 121 MG/DL — HIGH (ref 70–99)
GLUCOSE BLDC GLUCOMTR-MCNC: 138 MG/DL — HIGH (ref 70–99)
GLUCOSE BLDC GLUCOMTR-MCNC: 178 MG/DL — HIGH (ref 70–99)
GLUCOSE BLDC GLUCOMTR-MCNC: 94 MG/DL — SIGNIFICANT CHANGE UP (ref 70–99)
GLUCOSE SERPL-MCNC: 79 MG/DL — SIGNIFICANT CHANGE UP (ref 70–99)
HCT VFR BLD CALC: 36.3 % — LOW (ref 39–50)
HGB BLD-MCNC: 11.5 G/DL — LOW (ref 13–17)
MCHC RBC-ENTMCNC: 28.2 PG — SIGNIFICANT CHANGE UP (ref 27–34)
MCHC RBC-ENTMCNC: 31.7 GM/DL — LOW (ref 32–36)
MCV RBC AUTO: 88.8 FL — SIGNIFICANT CHANGE UP (ref 80–100)
PLATELET # BLD AUTO: 148 K/UL — LOW (ref 150–400)
POTASSIUM SERPL-MCNC: 3.8 MMOL/L — SIGNIFICANT CHANGE UP (ref 3.5–5.3)
POTASSIUM SERPL-SCNC: 3.8 MMOL/L — SIGNIFICANT CHANGE UP (ref 3.5–5.3)
RBC # BLD: 4.09 M/UL — LOW (ref 4.2–5.8)
RBC # FLD: 19.7 % — HIGH (ref 10.3–14.5)
SODIUM SERPL-SCNC: 141 MMOL/L — SIGNIFICANT CHANGE UP (ref 135–145)
SPECIMEN SOURCE: SIGNIFICANT CHANGE UP
WBC # BLD: 10.2 K/UL — SIGNIFICANT CHANGE UP (ref 3.8–10.5)
WBC # FLD AUTO: 10.2 K/UL — SIGNIFICANT CHANGE UP (ref 3.8–10.5)

## 2019-02-04 RX ORDER — APIXABAN 2.5 MG/1
2.5 TABLET, FILM COATED ORAL EVERY 12 HOURS
Qty: 0 | Refills: 0 | Status: DISCONTINUED | OUTPATIENT
Start: 2019-02-04 | End: 2019-02-07

## 2019-02-04 RX ORDER — ACETAMINOPHEN 500 MG
1000 TABLET ORAL ONCE
Qty: 0 | Refills: 0 | Status: COMPLETED | OUTPATIENT
Start: 2019-02-04 | End: 2019-02-04

## 2019-02-04 RX ORDER — INSULIN LISPRO 100/ML
VIAL (ML) SUBCUTANEOUS
Qty: 0 | Refills: 0 | Status: DISCONTINUED | OUTPATIENT
Start: 2019-02-04 | End: 2019-02-07

## 2019-02-04 RX ADMIN — MIDODRINE HYDROCHLORIDE 5 MILLIGRAM(S): 2.5 TABLET ORAL at 13:23

## 2019-02-04 RX ADMIN — MIDODRINE HYDROCHLORIDE 5 MILLIGRAM(S): 2.5 TABLET ORAL at 21:18

## 2019-02-04 RX ADMIN — PIPERACILLIN AND TAZOBACTAM 25 GRAM(S): 4; .5 INJECTION, POWDER, LYOPHILIZED, FOR SOLUTION INTRAVENOUS at 18:25

## 2019-02-04 RX ADMIN — APIXABAN 2.5 MILLIGRAM(S): 2.5 TABLET, FILM COATED ORAL at 18:26

## 2019-02-04 RX ADMIN — HEPARIN SODIUM 5000 UNIT(S): 5000 INJECTION INTRAVENOUS; SUBCUTANEOUS at 06:08

## 2019-02-04 RX ADMIN — Medication 3 MILLILITER(S): at 06:08

## 2019-02-04 RX ADMIN — AMIODARONE HYDROCHLORIDE 400 MILLIGRAM(S): 400 TABLET ORAL at 06:08

## 2019-02-04 RX ADMIN — Medication 400 MILLIGRAM(S): at 22:42

## 2019-02-04 RX ADMIN — Medication 10 MILLIGRAM(S): at 13:24

## 2019-02-04 RX ADMIN — SODIUM CHLORIDE 60 MILLILITER(S): 9 INJECTION, SOLUTION INTRAVENOUS at 06:08

## 2019-02-04 RX ADMIN — Medication 100 MILLIGRAM(S): at 06:08

## 2019-02-04 RX ADMIN — Medication 3 MILLILITER(S): at 18:25

## 2019-02-04 RX ADMIN — POLYETHYLENE GLYCOL 3350 17 GRAM(S): 17 POWDER, FOR SOLUTION ORAL at 13:24

## 2019-02-04 RX ADMIN — Medication 1 ENEMA: at 16:49

## 2019-02-04 RX ADMIN — Medication 3 MILLILITER(S): at 23:07

## 2019-02-04 RX ADMIN — Medication 100 MILLIGRAM(S): at 18:26

## 2019-02-04 RX ADMIN — PIPERACILLIN AND TAZOBACTAM 25 GRAM(S): 4; .5 INJECTION, POWDER, LYOPHILIZED, FOR SOLUTION INTRAVENOUS at 06:07

## 2019-02-04 RX ADMIN — SENNA PLUS 2 TABLET(S): 8.6 TABLET ORAL at 21:18

## 2019-02-04 RX ADMIN — Medication 1000 MILLIGRAM(S): at 23:12

## 2019-02-04 RX ADMIN — MIDODRINE HYDROCHLORIDE 5 MILLIGRAM(S): 2.5 TABLET ORAL at 06:08

## 2019-02-04 RX ADMIN — Medication 3 MILLILITER(S): at 01:28

## 2019-02-04 RX ADMIN — Medication 3 MILLILITER(S): at 13:24

## 2019-02-04 NOTE — PROGRESS NOTE ADULT - SUBJECTIVE AND OBJECTIVE BOX
No pain, no shortness of breath      VITAL:  T(C): , Max: 36.7 (19 @ 13:27)  T(F): , Max: 98.1 (19 @ 13:27)  HR: 99 (19 @ 05:40)  BP: 102/67 (19 @ 05:40)  RR: 18 (19 @ 05:40)  SpO2: 97% (19 @ 05:40  urine output 800cc/12h      PHYSICAL EXAM:  Constitutional: NAD; Alert  HEENT:  NCAT; DMM  Neck: No JVD; supple  Respiratory: CTA-b/l  Cardiac: irreg s1s2  Gastrointestinal: BS+, soft, NT/ND  Urologic: (+) thorpe  Extremities: No peripheral edema  Back: No CVAT b/l    LABS:                        11.5   10.2  )-----------( 148      ( 2019 06:36 )             36.3     Na(141)/K(3.8)/Cl(97)/HCO3(24)/BUN(119)/Cr(3.67)Glu(79)/Ca(8.4)/Mg(--)/PO4(--)     @ 06:36  Na(145)/K(3.6)/Cl(94)/HCO3(34)/BUN(126)/Cr(3.83)Glu(175)/Ca(8.8)/Mg(--)/PO4(--)     @ 18:33  Na(145)/K(3.6)/Cl(92)/HCO3(33)/BUN(129)/Cr(3.85)Glu(244)/Ca(8.9)/Mg(2.4)/PO4(6.5)     @ 14:31    Urinalysis Basic - ( 2019 14:30 )  Color: Light Orange / Appearance: Turbid / S.016 / pH: x  Gluc: x / Ketone: Negative  / Bili: Negative / Urobili: 3 mg/dL   Blood: x / Protein: 100 mg/dL / Nitrite: Negative   Leuk Esterase: Large / RBC: 96 /hpf /  /HPF   Sq Epi: x / Non Sq Epi: 13 /hpf / Bacteria: Moderate    IMPRESSION: 89M w/ HTN, DM2, AFib, CKD5, HFrEF-MEMS, recurrent R femoral pseudoanyeurysm, 2/3/19 a/w hyperglycemia/AMS    (1)Renal - CKD5 - AMS of late; uremia is likely playing a role here. Family opting against HD on patient's behalf. I agree with this decision.  (2)Hyperphosphatemia - on a renal diet. No strong need to add a PO4 binder for now; the potential GI side effects would outweigh the potential long-term benefit, in my opinion  (3)ID - UTI/colitis - on Zosyn; dosing is appropriate  (4)CV - intravascularly depleted; on gentle IVF  (5)Goals of care - as above, not for HD per family wishes; patient however remains full code.    RECOMMEND:  (1)No HD  (2)No PO4 binders for now  (3)Continue abx for GFR<15ml/min  (4)IVF as ordered for now  (5)BMP + Mg + PO4 daily  (6)Continued f/u with family regarding goals of care          Elvin Weston MD  [facility]  [phone] Several family members at bedside (wife, 2 daughters, etc). They ask regarding my opinion regarding dialysis on his behalf. They bring up that different physicians have had difference stances on whether dialysis would be appropriate for him.   VITAL:  T(C): , Max: 36.7 (19 @ 13:27)  T(F): , Max: 98.1 (19 @ 13:27)  HR: 99 (19 @ 05:40)  BP: 102/67 (19 @ 05:40)  RR: 18 (19 @ 05:40)  SpO2: 97% (19 @ 05:40  urine output 800cc/12h      PHYSICAL EXAM:  Constitutional: NAD; Alert  HEENT:  NCAT; DMM  Neck: No JVD; supple  Respiratory: CTA-b/l  Cardiac: irreg s1s2  Gastrointestinal: BS+, soft, NT/ND  Urologic: (+) thorpe  Extremities: No peripheral edema  Back: No CVAT b/l    LABS:                        11.5   10.2  )-----------( 148      ( 2019 06:36 )             36.3     Na(141)/K(3.8)/Cl(97)/HCO3(24)/BUN(119)/Cr(3.67)Glu(79)/Ca(8.4)/Mg(--)/PO4(--)     @ 06:36  Na(145)/K(3.6)/Cl(94)/HCO3(34)/BUN(126)/Cr(3.83)Glu(175)/Ca(8.8)/Mg(--)/PO4(--)     @ 18:33  Na(145)/K(3.6)/Cl(92)/HCO3(33)/BUN(129)/Cr(3.85)Glu(244)/Ca(8.9)/Mg(2.4)/PO4(6.5)     @ 14:31    Urinalysis Basic - ( 2019 14:30 )  Color: Light Orange / Appearance: Turbid / S.016 / pH: x  Gluc: x / Ketone: Negative  / Bili: Negative / Urobili: 3 mg/dL   Blood: x / Protein: 100 mg/dL / Nitrite: Negative   Leuk Esterase: Large / RBC: 96 /hpf /  /HPF   Sq Epi: x / Non Sq Epi: 13 /hpf / Bacteria: Moderate    IMPRESSION: 89M w/ HTN, DM2, AFib, CKD5, HFrEF-MEMS, recurrent R femoral pseudoanyeurysm, 2/3/19 a/w hyperglycemia/AMS    (1)Renal - CKD5 - AMS of late; uremia is likely playing a role here. Family opting against HD on patient's behalf. I agree with this decision.  (2)Hyperphosphatemia - on a renal diet. No strong need to add a PO4 binder for now; the potential GI side effects would outweigh the potential long-term benefit, in my opinion  (3)ID - UTI/colitis - on Zosyn; dosing is appropriate  (4)CV - intravascularly depleted; on gentle IVF  (5)Goals of care - several family members with concerns/questions regarding goals of care.     RECOMMEND:  (1)No HD  (2)No PO4 binders for now  (3)Continue abx for GFR<15ml/min  (4)IVF as ordered for now  (5)BMP + Mg + PO4 daily  (6)Palliative evaluation    I spoke with Cardiology Morro Martins while speaking with them; she informed me regarding just how advanced his congestive heart failure is. I called her outside nephrologist Dr. Anurag Michelle; Dr. Michelle informed me that he did not consider Mr. Deluca to be a good candidate for chronic dialysis; he also informed me that CHF specialist Dr. Claudia Canchola from [facility] also did not feel dialysis would be worthwhile here. I conveyed this all to the family, and explained as to why we believed that he would do poorly on dialysis. Answered all of their questions to their satisfaction, including (a)why he is complaining of pain, (b)why he is complaining of thirst; (c)the benefits/drawbacks of DNR/DNI; (d)where we go from here, and (e)the benefits/drawbacks of Hospice.          Elvin Weston MD  [facility]  [phone]

## 2019-02-04 NOTE — CONSULT NOTE ADULT - ASSESSMENT
89 yo male with CKD,CHF,PVD,DM,Afib, recent LLE angio/stent complicated by RT CFA pseudoaneurysm formation,respiratory failure after cardiomems placement,now admitted with lethargy and failure to thrive.  He has a chronic rt effusion, no change.  Pyuria is non specific in setting of thorpe,I do not see any indications to treat urine isolates.  No rt groin pain or erythema, doubt infected aneurysm.  The CT findings with regards to rectum and gallbladder seem very non specific, again I think indications for antibiotics are "soft"  Suggest:  1.Await blood and urine cultures  2.I am not apposed to stopping antibiotics either today or in AM assuming blood cultures stay negative.Again, a positive urine culture in the setting of a thorpe does not warrant treatment.I have reviewed the [facility] records and it looks like they treated "asymptomatic bactiuria".  3.Endorse a family meeting to evaluate goals of care, 2 different nephrologists have been reluctant to advise dialysis in his overall condition.

## 2019-02-04 NOTE — CONSULT NOTE ADULT - ASSESSMENT
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  some stool in vault on rectal.  recommend mirlrax daily and tap water enema.  will follow with you

## 2019-02-04 NOTE — CONSULT NOTE ADULT - SUBJECTIVE AND OBJECTIVE BOX
HPI:  A 89 year old male with PMHx of systolic and diastolic CHF with recent CardioMEMs placement at [facility], Afib on Eliquis, COPD, IDDM, CKD5, HTN, HLD, recurrent R femoral pseudoaneurysm s/p injectionx2, who presents with hyperglycemia and AMS. Per the patient's nurse and family, the patient has been has had fluctuating AMS for the last week (was recently d/franky from [facility] for CardioMEMs placement, hospital course complicated by respiratory failure requiring intubationx1 day). The patient reportedly was diagnosed with a UTI upon discharge and treated with Keflexx7 days, last dose was this AM. However, the nurse reports seeing urethral discharge and scant blood. The patient has been less responsive and lethargic, refusing meals and fluctuating over the last week. At 4 AM this morning, his blood sugar was 450s, he was given his long acting insulin. EMS was called due to blood sugar being 400s persistently at noon.  In the ED, the patient is lethargic and unable to follow commands, reports he is "thirsty and tired.". Ptn has worsening renal failure, had a new James placed in the ED, has a low urine output, seen by renal, family agrees on comfort care and no HD, ptn has sacral decubs. Wife not ready for DNR yet, they will meet as a family and reach a decision by tomorrow.   CT A/P in the ED c/w ascites, cirrhosis, constipation and stercoral colitis. (03 Feb 2019 17:51)      Admit Diagnosis  Heart failure      ENDOCRINE HPI: 89 y/o Type 2 DM 40 years admitted with dehydration and hyperglycemia.    Type 2 DM for 40 years. F/U by Dr. Painting. Recently patient treated with Tresiba inasulin 6 units at AM and onglyza 2.5 mg daily.  Patient with multiple recent admissions this last year, with CAD, CHF, AF and CRF. Patient diuretics were increased the day prior to admission, due to weight gain. The night of admission nurse noted confusion, SOB and FS above 400 mg/dL. No fever no chills.  Hyperglycemia continued till patient seen in the ER and gives 5 units of humalog. FS remained low since- 124-->94 today.  PO intake small, on diabetic dysphagia diet. Patient confused, afebrile, ID input noted.     PAST MEDICAL & SURGICAL HISTORY:  CHF (congestive heart failure)  Afib  COPD (chronic obstructive pulmonary disease)  Type 2 diabetes mellitus with retinopathy, macular edema presence unspecified, unspecified long term insulin use status, unspecified retinopathy severity  HLD (hyperlipidemia)  HTN (hypertension)  S/P ECCE (extracapsular cataract extraction)      FAMILY HISTORY:  Family history of diabetes mellitus (DM) (Uncle)      Social History:    Outpatient Medications: Tresiba insulin 6 units daily, Onglyza 2.5 mg daily.    MEDICATIONS  (STANDING):  ALBUTerol/ipratropium for Nebulization 3 milliLiter(s) Nebulizer every 6 hours  amiodarone    Tablet 400 milliGRAM(s) Oral daily  apixaban 2.5 milliGRAM(s) Oral every 12 hours  dextrose 5%. 1000 milliLiter(s) (50 mL/Hr) IV Continuous <Continuous>  dextrose 50% Injectable 12.5 Gram(s) IV Push once  dextrose 50% Injectable 25 Gram(s) IV Push once  dextrose 50% Injectable 25 Gram(s) IV Push once  docusate sodium 100 milliGRAM(s) Oral two times a day  insulin lispro (HumaLOG) corrective regimen sliding scale   SubCutaneous at bedtime  insulin lispro (HumaLOG) corrective regimen sliding scale   SubCutaneous three times a day before meals  midodrine 5 milliGRAM(s) Oral three times a day  piperacillin/tazobactam IVPB. 3.375 Gram(s) IV Intermittent every 12 hours  polyethylene glycol 3350 17 Gram(s) Oral daily  senna 2 Tablet(s) Oral at bedtime  sodium chloride 0.45%. 1000 milliLiter(s) (60 mL/Hr) IV Continuous <Continuous>    MEDICATIONS  (PRN):  bisacodyl Suppository 10 milliGRAM(s) Rectal daily PRN Constipation  dextrose 40% Gel 15 Gram(s) Oral once PRN Blood Glucose LESS THAN 70 milliGRAM(s)/deciliter  glucagon  Injectable 1 milliGRAM(s) IntraMuscular once PRN Glucose LESS THAN 70 milligrams/deciliter      Allergies    No Known Allergies    Intolerances        Review of Systems:  Constitutional: No fever, no chills. Confusion.  Eyes: blurry vision  Neuro: tremors  HEENT: No pain  Cardiovascular: No chest pain, palpitations  Respiratory: SOB, no cough  GI: No nausea, vomiting, abdominal pain  : James catheter, No dysuria  Skin: no rash  Psych: confusion.  Endocrine: no polyuria, polydipsia  Hem/lymph: leg edema.  Osteoporosis: no fractures    ALL OTHER SYSTEMS REVIEWED AND NEGATIVE      PHYSICAL EXAM:  VITALS: T(C): 36.3 (02-04-19 @ 11:59)  T(F): 97.3 (02-04-19 @ 11:59), Max: 97.6 (02-04-19 @ 00:35)  HR: 97 (02-04-19 @ 11:59) (95 - 102)  BP: 94/60 (02-04-19 @ 11:59) (94/60 - 112/71)  RR:  (18 - 19)  SpO2:  (97% - 100%)  GENERAL: somnolent, confused  EYES: No proptosis, no lid lag, anicteric  HEENT:  Atraumatic, Normocephalic, dry mucous membranes  THYROID: Normal size, no palpable nodules  RESPIRATORY: Clear to auscultation bilaterally; No rales, rhonchi, wheezing  CARDIOVASCULAR: Regular rate and rhythm; No murmurs; no peripheral edema  GI: Soft, nontender, non distended, normal bowel sounds, James catheter.  SKIN: Dry, intact, No rashes or lesions  MUSCULOSKELETAL: bilateral weakness  NEURO: somnolent no localizing deficits.      POCT Blood Glucose.: 94 mg/dL (02-04-19 @ 13:45)  POCT Blood Glucose.: 121 mg/dL (02-04-19 @ 09:42)  POCT Blood Glucose.: 124 mg/dL (02-03-19 @ 22:02)  POCT Blood Glucose.: 200 mg/dL (02-03-19 @ 15:49)  POCT Blood Glucose.: 272 mg/dL (02-03-19 @ 13:28)                            11.5   10.2  )-----------( 148      ( 04 Feb 2019 06:36 )             36.3       02-04    141  |  97  |  119<H>  ----------------------------<  79  3.8   |  24  |  3.67<H>    Ca    8.4      04 Feb 2019 06:36  Phos  6.5     02-03  Mg     2.4     02-03    TPro  6.4  /  Alb  3.3  /  TBili  2.3<H>  /  DBili  x   /  AST  29  /  ALT  22  /  AlkPhos  114  02-03      Thyroid Function Tests:      Hemoglobin A1C, Whole Blood: 7.3 % <H> [4.0 - 5.6] (12-28-18 @ 09:45)  Hemoglobin A1C, Whole Blood: 6.7 % <H> [4.0 - 5.6] (11-29-18 @ 15:10)          Radiology:

## 2019-02-04 NOTE — CONSULT NOTE ADULT - ASSESSMENT
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 Weston.  He also does not feel dialysis would significantly alter quality or duration of life.  Family is now opting for palliative care.    Agree with gentle IVF today only.  Restart eliquis as his stroke risk is very high.   Continue amiodarone for rate control.  Antibiotics as per Dr. Reid.    Morro Martins MD  [facility]

## 2019-02-04 NOTE — CONSULT NOTE ADULT - CONSULT REASON
chronic hypercapnic respiratory failure; hypoxemia; pulmonary edema; pleural effusions; stercoral colitis; CKD; sacral ulcer

## 2019-02-04 NOTE — CONSULT NOTE ADULT - SUBJECTIVE AND OBJECTIVE BOX
Patient is a 89y Male     Patient is a 89y old  Male who presents with a chief complaint of altered mental status (04 Feb 2019 15:02)      HPI:  A 89 year old male with PMHx of systolic and diastolic CHF with recent CardioMEMs placement at [facility], Afib on Eliquis, COPD, IDDM, CKD5, HTN, HLD, recurrent R femoral pseudoaneurysm s/p injectionx2, who presents with hyperglycemia and AMS. Per the patient's nurse and family, the patient has been has had fluctuating AMS for the last week (was recently d/franky from [place] for CardioMEMs placement, hospital course complicated by respiratory failure requiring intubationx1 day). The patient reportedly was diagnosed with a UTI upon discharge and treated with Keflexx7 days, last dose was this AM. However, the nurse reports seeing urethral discharge and scant blood. The patient has been less responsive and lethargic, refusing meals and fluctuating over the last week. At 4 AM this morning, his blood sugar was 450s, he was given his long acting insulin. EMS was called due to blood sugar being 400s persistently at noon.  In the ED, the patient is lethargic and unable to follow commands, reports he is "thirsty and tired.". Ptn has worsening renal failure, had a new James placed in the ED, has a low urine output, seen by renal, family agrees on comfort care and no HD, ptn has sacral decubs. Wife not ready for DNR yet, they will meet as a family and reach a decision by tomorrow.   CT A/P in the ED c/w ascites, cirrhosis, constipation and stercoral colitis. (03 Feb 2019 17:51)      PAST MEDICAL & SURGICAL HISTORY:  CHF (congestive heart failure)  Afib  COPD (chronic obstructive pulmonary disease)  Type 2 diabetes mellitus with retinopathy, macular edema presence unspecified, unspecified long term insulin use status, unspecified retinopathy severity  HLD (hyperlipidemia)  HTN (hypertension)  S/P ECCE (extracapsular cataract extraction)      MEDICATIONS  (STANDING):  ALBUTerol/ipratropium for Nebulization 3 milliLiter(s) Nebulizer every 6 hours  amiodarone    Tablet 400 milliGRAM(s) Oral daily  apixaban 2.5 milliGRAM(s) Oral every 12 hours  dextrose 5%. 1000 milliLiter(s) (50 mL/Hr) IV Continuous <Continuous>  dextrose 50% Injectable 12.5 Gram(s) IV Push once  dextrose 50% Injectable 25 Gram(s) IV Push once  dextrose 50% Injectable 25 Gram(s) IV Push once  docusate sodium 100 milliGRAM(s) Oral two times a day  insulin lispro (HumaLOG) corrective regimen sliding scale   SubCutaneous at bedtime  insulin lispro (HumaLOG) corrective regimen sliding scale   SubCutaneous three times a day before meals  midodrine 5 milliGRAM(s) Oral three times a day  piperacillin/tazobactam IVPB. 3.375 Gram(s) IV Intermittent every 12 hours  polyethylene glycol 3350 17 Gram(s) Oral daily  saline laxative (FLEET) Rectal Enema 1 Enema Rectal once  senna 2 Tablet(s) Oral at bedtime  sodium chloride 0.45%. 1000 milliLiter(s) (60 mL/Hr) IV Continuous <Continuous>      Allergies    No Known Allergies    Intolerances        SOCIAL HISTORY:  Denies ETOh,Smoking,     FAMILY HISTORY:  Family history of diabetes mellitus (DM) (Uncle)      REVIEW OF SYSTEMS:    CONSTITUTIONAL: No weakness, fevers or chills  EYES/ENT: No visual changes;  No vertigo or throat pain   NECK: No pain or stiffness  RESPIRATORY: No cough, wheezing, hemoptysis; No shortness of breath  CARDIOVASCULAR: No chest pain or palpitations  GASTROINTESTINAL: No abdominal or epigastric pain. No nausea, vomiting, or hematemesis; No diarrhea or constipation. No melena or hematochezia.  GENITOURINARY: No dysuria, frequency or hematuria  NEUROLOGICAL: No numbness or weakness  SKIN: No itching, burning, rashes, or lesions   All other review of systems is negative unless indicated above.    VITAL:  T(C): , Max: 36.4 (02-04-19 @ 00:35)  T(F): , Max: 97.6 (02-04-19 @ 00:35)  HR: 97 (02-04-19 @ 11:59)  BP: 94/60 (02-04-19 @ 11:59)  BP(mean): --  RR: 18 (02-04-19 @ 11:59)  SpO2: 100% (02-04-19 @ 11:59)  Wt(kg): --    I and O's:    02-03 @ 07:01  -  02-04 @ 07:00  --------------------------------------------------------  IN: 650 mL / OUT: 1200 mL / NET: -550 mL          PHYSICAL EXAM:    Constitutional: NAD  HEENT: PERRLA,   Neck: No JVD  Respiratory: CTA B/L  Cardiovascular: S1 and S2  Gastrointestinal: BS+, soft, NT/ND  Extremities: No peripheral edema  Neurological: A/O x 3, no focal deficits  Psychiatric: Normal mood, normal affect  : No James  Skin: No rashes  Access: Not applicable  Back: No CVA tenderness    LABS:                        11.5   10.2  )-----------( 148      ( 04 Feb 2019 06:36 )             36.3     02-04    141  |  97  |  119<H>  ----------------------------<  79  3.8   |  24  |  3.67<H>    Ca    8.4      04 Feb 2019 06:36  Phos  6.5     02-03  Mg     2.4     02-03    TPro  6.4  /  Alb  3.3  /  TBili  2.3<H>  /  DBili  x   /  AST  29  /  ALT  22  /  AlkPhos  114  02-03          RADIOLOGY & ADDITIONAL STUDIES:

## 2019-02-04 NOTE — PROGRESS NOTE ADULT - ASSESSMENT
89 yo male w complicated h/o as stated in H&P presents w metabolic encephalopathy 2/2 uremia, volume depletion, uti, stercoral colitis and uncontrolled hyperglycemia. Improved clinically on  gentle hydration, IV zosyn, family at bedside, recognize ptn is at end of life, DNR order placed, restart Eliquis as per cardiology, on dysphagia diet. DC planning home w home Hospice.

## 2019-02-04 NOTE — PROGRESS NOTE ADULT - SUBJECTIVE AND OBJECTIVE BOX
Patient is a 89y old  Male who presents with a chief complaint of altered mental status (2019 16:32)      SUBJECTIVE / OVERNIGHT EVENTS: had a long discussion w family re GOC, wife and children agree to DNR, agree to Hospice consult, ptn verbal c/o feeling thirsty    MEDICATIONS  (STANDING):  ALBUTerol/ipratropium for Nebulization 3 milliLiter(s) Nebulizer every 6 hours  amiodarone    Tablet 400 milliGRAM(s) Oral daily  apixaban 2.5 milliGRAM(s) Oral every 12 hours  dextrose 5%. 1000 milliLiter(s) (50 mL/Hr) IV Continuous <Continuous>  dextrose 50% Injectable 12.5 Gram(s) IV Push once  dextrose 50% Injectable 25 Gram(s) IV Push once  dextrose 50% Injectable 25 Gram(s) IV Push once  docusate sodium 100 milliGRAM(s) Oral two times a day  insulin lispro (HumaLOG) corrective regimen sliding scale   SubCutaneous at bedtime  insulin lispro (HumaLOG) corrective regimen sliding scale   SubCutaneous three times a day before meals  midodrine 5 milliGRAM(s) Oral three times a day  piperacillin/tazobactam IVPB. 3.375 Gram(s) IV Intermittent every 12 hours  polyethylene glycol 3350 17 Gram(s) Oral daily  senna 2 Tablet(s) Oral at bedtime  sodium chloride 0.45%. 1000 milliLiter(s) (60 mL/Hr) IV Continuous <Continuous>    MEDICATIONS  (PRN):  bisacodyl Suppository 10 milliGRAM(s) Rectal daily PRN Constipation  dextrose 40% Gel 15 Gram(s) Oral once PRN Blood Glucose LESS THAN 70 milliGRAM(s)/deciliter  glucagon  Injectable 1 milliGRAM(s) IntraMuscular once PRN Glucose LESS THAN 70 milligrams/deciliter      Vital Signs Last 24 Hrs  T(F): 97.3 (19 @ 11:59), Max: 97.6 (19 @ 00:35)  HR: 97 (19 @ 11:59) (95 - 102)  BP: 94/60 (19 @ 11:59) (94/60 - 112/71)  RR: 18 (19 @ 11:59) (18 - 19)  SpO2: 100% (19 @ 11:59) (97% - 100%)  Telemetry:   CAPILLARY BLOOD GLUCOSE      POCT Blood Glucose.: 94 mg/dL (2019 13:45)  POCT Blood Glucose.: 121 mg/dL (2019 09:42)  POCT Blood Glucose.: 124 mg/dL (2019 22:02)    I&O's Summary    2019 07:  -  2019 07:00  --------------------------------------------------------  IN: 650 mL / OUT: 1200 mL / NET: -550 mL    2019 07:01  -  2019 17:30  --------------------------------------------------------  IN: 1200 mL / OUT: 0 mL / NET: 1200 mL        PHYSICAL EXAM:  GENERAL: NAD, well-developed  HEAD:  Atraumatic, Normocephalic  EYES: EOMI, PERRLA, conjunctiva and sclera clear  NECK: Supple, No JVD  CHEST/LUNG: Clear to auscultation bilaterally; No wheeze  HEART: Regular rate and rhythm; No murmurs, rubs, or gallops  ABDOMEN: Soft, Nontender, Nondistended; Bowel sounds present  EXTREMITIES:  2+ Peripheral Pulses, No clubbing, cyanosis, or edema  PSYCH: AAOx3  NEUROLOGY: non-focal  SKIN: No rashes or lesions    LABS:                        11.5   10.2  )-----------( 148      ( 2019 06:36 )             36.3     02-04    141  |  97  |  119<H>  ----------------------------<  79  3.8   |  24  |  3.67<H>    Ca    8.4      2019 06:36  Phos  6.5     02-03  Mg     2.4     02-03    TPro  6.4  /  Alb  3.3  /  TBili  2.3<H>  /  DBili  x   /  AST  29  /  ALT  22  /  AlkPhos  114  -    PT/INR - ( 2019 14:31 )   PT: 18.0 sec;   INR: 1.56 ratio         PTT - ( 2019 14:31 )  PTT:30.5 sec      Urinalysis Basic - ( 2019 14:30 )    Color: Light Orange / Appearance: Turbid / S.016 / pH: x  Gluc: x / Ketone: Negative  / Bili: Negative / Urobili: 3 mg/dL   Blood: x / Protein: 100 mg/dL / Nitrite: Negative   Leuk Esterase: Large / RBC: 96 /hpf /  /HPF   Sq Epi: x / Non Sq Epi: 13 /hpf / Bacteria: Moderate        RADIOLOGY & ADDITIONAL TESTS:    Imaging Personally Reviewed:    Consultant(s) Notes Reviewed:      Care Discussed with Consultants/Other Providers:

## 2019-02-04 NOTE — CONSULT NOTE ADULT - ASSESSMENT
ASSESSMENT:     weakness, lethargy, confusion - multifactorial     1) "infection" - possible UTI versus stercoral colitis versus sacral ulcer infection - development of a marked metabolic acidosis since admission  2) hypoxemia due to pulmonary edema with a chronic right sided pleural effusion - the patient has chronic hypercapnic respiratory failure due to advanced COPD which is at baseline and should not be contributing to lethargy  3) CKD with uremia - felt not to be a candidate for dialysis  4) hyperglycemia  5) cirrhosis likely due to chronic passive liver congestion - "rule out" hepatic encephalopathy ASSESSMENT:     weakness, lethargy, confusion - multifactorial     1) "infection" - possible UTI versus stercoral colitis versus sacral ulcer infection - development of a marked metabolic acidosis since admission  2) hypoxemia due to pulmonary edema with a chronic right sided pleural effusion - the patient has chronic hypercapnic respiratory failure due to advanced COPD which is at baseline and should not be contributing to lethargy  3) CKD with uremia - felt not to be a candidate for dialysis  4) hyperglycemia - resolved  5) cirrhosis likely due to chronic passive liver congestion - "rule out" hepatic encephalopathy    HTN/HLD/DM    CAD/ischemic cardiomyopathy    PAD s/p LLE stenting    atrial fibrillation    PLAN/RECOMMENDATIONS:    oxygen supplementation to keep saturation greater than 92%  albuterol/atrovent nebs  no indication for a thoracentesis given stable respiratory status unless develops sign/symptoms of a pleural space infection  on empiric zosyn  cardiac meds: amiodarone/midorine - off Eliquis  GI/DVT prophylaxis  bowel regimen  felt not to be a candidate for dialysis    Thank you for the courtesy of this referral. Plan of care discussed at length with the family at bedside. Prognosis is poor.    Lenny Sparrow MD, [facility] - [phone]  Pulmonary Medicine

## 2019-02-04 NOTE — CONSULT NOTE ADULT - SUBJECTIVE AND OBJECTIVE BOX
HPI:   Patient is a 89y male with multiple medical problems admitted 2/3 with failure to thrive at home.He was hospitalized at [facility] 3 times in 2018 for lower extremity ischemia, s/p LLE angio and stent complicated by Rt CFA pseudoaneurysm, s/p thrombin injection x 2.He was scheduled for cardiomems at [facility] on , developed hemoptysis  during procedure and required brief period for ventilatory support.He was extubated same day,required a thorpe for retention, was noted to be in near ESRD and felt not a dialysis candidate, family offered and declined a hospice evaluation.He had E Coli isolatsed from urine and was sent home on cephalexin.He was admitted yesterday not feeling well.The family note confusion,poor po intake, and lethargy.In Er he had thorpe changed, received CTX and zosyn,and will be having a family meeting today to discuss goals of care.He is able to follow simple commands, knows he is in a hospital, complains of pain all over.  REVIEW OF SYSTEMS:  All other review of systems negative (Comprehensive ROS): limited, non ambulatory, thorpe for retention    PAST MEDICAL & SURGICAL HISTORY:  CHF (congestive heart failure)  Afib  COPD (chronic obstructive pulmonary disease)  Type 2 diabetes mellitus with retinopathy, macular edema presence unspecified, unspecified long term insulin use status, unspecified retinopathy severity  HLD (hyperlipidemia)  HTN (hypertension)  S/P ECCE (extracapsular cataract extraction)      Allergies    No Known Allergies    Intolerances        Antimicrobials Day #  :day2  piperacillin/tazobactam IVPB. 3.375 Gram(s) IV Intermittent every 12 hours    Other Medications:  ALBUTerol/ipratropium for Nebulization 3 milliLiter(s) Nebulizer every 6 hours  amiodarone    Tablet 400 milliGRAM(s) Oral daily  dextrose 40% Gel 15 Gram(s) Oral once PRN  dextrose 5%. 1000 milliLiter(s) IV Continuous <Continuous>  dextrose 50% Injectable 12.5 Gram(s) IV Push once  dextrose 50% Injectable 25 Gram(s) IV Push once  dextrose 50% Injectable 25 Gram(s) IV Push once  docusate sodium 100 milliGRAM(s) Oral two times a day  glucagon  Injectable 1 milliGRAM(s) IntraMuscular once PRN  heparin  Injectable 5000 Unit(s) SubCutaneous every 12 hours  insulin lispro (HumaLOG) corrective regimen sliding scale   SubCutaneous at bedtime  insulin lispro (HumaLOG) corrective regimen sliding scale   SubCutaneous three times a day before meals  midodrine 5 milliGRAM(s) Oral three times a day  polyethylene glycol 3350 17 Gram(s) Oral daily  senna 2 Tablet(s) Oral at bedtime  sodium chloride 0.45%. 1000 milliLiter(s) IV Continuous <Continuous>      FAMILY HISTORY:  Family history of diabetes mellitus (DM) (Uncle)      SOCIAL HISTORY:  Smoking: no    ETOH:no     Drug Use:no         T(F): 97.2 (19 @ 05:40), Max: 98.1 (19 @ 13:27)  HR: 99 (19 @ 05:40)  BP: 102/67 (19 @ 05:40)  RR: 18 (19 @ 05:40)  SpO2: 97% (19 @ 05:40)  Wt(kg): --    PHYSICAL EXAM:  General lethaergic, no acute distress  Eyes:  anicteric, no conjunctival injection, no discharge  Oropharynx: no lesions or injection 	  Neck: supple, without adenopathy  Lungs: diminished at bases, rt greater than left  Heart: irregular rate and rhythm; soft uche  Abdomen: soft, nondistended, nontender, without mass or organomegaly  Skin: rt groin echymossis, rt thigh with papular lesions  Extremities: no clubbing, cyanosis, or edema  Neurologic: alert, oriented, moves all extremities  Hands are cold,feet without breakdown  LAB RESULTS:                        11.5   10.2  )-----------( 148      ( 2019 06:36 )             36.3     02-04    141  |  97  |  119<H>  ----------------------------<  79  3.8   |  24  |  3.67<H>    Ca    8.4      2019 06:36  Phos  6.5     02-03  Mg     2.4     02-    TPro  6.4  /  Alb  3.3  /  TBili  2.3<H>  /  DBili  x   /  AST  29  /  ALT  22  /  AlkPhos  114  02-03    LIVER FUNCTIONS - ( 2019 18:33 )  Alb: 3.3 g/dL / Pro: 6.4 g/dL / ALK PHOS: 114 U/L / ALT: 22 U/L / AST: 29 U/L / GGT: x           Urinalysis Basic - ( 2019 14:30 )    Color: Light Orange / Appearance: Turbid / S.016 / pH: x  Gluc: x / Ketone: Negative  / Bili: Negative / Urobili: 3 mg/dL   Blood: x / Protein: 100 mg/dL / Nitrite: Negative   Leuk Esterase: Large / RBC: 96 /hpf /  /HPF   Sq Epi: x / Non Sq Epi: 13 /hpf / Bacteria: Moderate        MICROBIOLOGY:  RECENT CULTURES:        RADIOLOGY REVIEWED:  < from: Xray Chest 1 View AP/PA (19 @ 17:38) >  FINDINGS:  The heart is enlarged.    Right pleural effusion. Pulmonary edema. No pneumothorax.    Degenerative changes and bilateral shoulders.    IMPRESSION: Right pleural effusion. Pulmonary edema.    < end of copied text >  < from: CT Abdomen and Pelvis No Cont (19 @ 16:28) >  IMPRESSION:     Moderate right pleural effusion with passive atelectasis of the right   lower lobe.    Cirrhosis with moderate ascites.    The rectum is distended with stool with mild thickening suggesting   stercoral colitis.    < end of copied text >  < from: US TTE 2D F/U, Limited w/o Contrast (ED) (19 @ 15:44) >  Limited Gallbladder exam: Sludge was present. Diffuse wall thickening   seen.     IMPRESSION:   No Pericardial Effusion.  No anterior B lines visualized in the lung fields    < end of copied text >  < from: CT Head No Cont (19 @ 14:54) >  IMPRESSION:     No acute intracranial bleeding, mass effect, or shift.     < end of copied text >

## 2019-02-04 NOTE — PROVIDER CONTACT NOTE (OTHER) - SITUATION
pt made DNR by Dr. Mata per order. however in chart there is no MOST form. Call team to make aware there is no MOST form in chart and it is needed.

## 2019-02-04 NOTE — CONSULT NOTE ADULT - SUBJECTIVE AND OBJECTIVE BOX
[facility] - [facility]  CONSULT NOTE    HPI: 89 year old gentleman, former smoker, with history of atrial fibrillation on Eliquis, ischemic cardiomyopathy with biventricular dysfunction (LVEF ~ 34%), COPD with chronic hypercapnic respiratory failure, HTN, HLD and DM. The patient underwent a LLE angiogram with superficial femoral artery stenting on 2018 complicated by a right common femoral artery pseudoaneurysm. He received thrombin injections on  and . He was scheduled for a cardiomems placement on  but developed hemoptysis during the procedure requiring brief intubation for airway protection. Hospital course was further complicated by urinary retention requiring a thorpe placement. He was felt not to be a candidate for dialysis despite worsening renal function. He was discharge on cephalexin for an E coli UTI. The patient was admitted yesterday with confusion, poor po intake, weakness and lethargy.     PMHX:  COPD (chronic obstructive pulmonary disease)  Atrial fibrillation  CHF (congestive heart failure)  Atrial fibrillation  HLD (hyperlipidemia)  HTN (hypertension)  Diabetes mellitus   Diabetic retinopathy  PAD    PSHX:  S/P ECCE (extracapsular cataract extraction)  Left SFA stenting 2018    FAMILY HISTORY:  Family history of diabetes mellitus (DM) (Uncle)      SOCIAL HISTORY:  former smoker; ; lives with his wife;     Pulmonary Medications:   ALBUTerol/ipratropium for Nebulization 3 milliLiter(s) Nebulizer every 6 hours      Antimicrobials:  piperacillin/tazobactam IVPB. 3.375 Gram(s) IV Intermittent every 12 hours      Cardiology:  amiodarone    Tablet 400 milliGRAM(s) Oral daily  midodrine 5 milliGRAM(s) Oral three times a day      Other:  dextrose 40% Gel 15 Gram(s) Oral once PRN  dextrose 5%. 1000 milliLiter(s) IV Continuous <Continuous>  dextrose 50% Injectable 12.5 Gram(s) IV Push once  dextrose 50% Injectable 25 Gram(s) IV Push once  dextrose 50% Injectable 25 Gram(s) IV Push once  docusate sodium 100 milliGRAM(s) Oral two times a day  glucagon  Injectable 1 milliGRAM(s) IntraMuscular once PRN  heparin  Injectable 5000 Unit(s) SubCutaneous every 12 hours  insulin lispro (HumaLOG) corrective regimen sliding scale   SubCutaneous at bedtime  insulin lispro (HumaLOG) corrective regimen sliding scale   SubCutaneous three times a day before meals  polyethylene glycol 3350 17 Gram(s) Oral daily  senna 2 Tablet(s) Oral at bedtime  sodium chloride 0.45%. 1000 milliLiter(s) IV Continuous <Continuous>      Allergies    No Known Allergies    HOME MEDICATIONS: see  H & P    REVIEW OF SYSTEMS:  Constitutional: As per HPI  HEENT: Within normal limits  CV: As per HPI  Resp: As per HPI  GI: Within normal limits   : CKD/uremia  Musculoskeletal: Within normal limits  Skin: Within normal limits  Neurological: Within normal limits  Psychiatric: Within normal limits  Endocrine: Within normal limits  Hematologic/Lymphatic: Within normal limits  Allergic/Immunologic: Within normal limits    [x] All other systems negative    OBJECTIVE:    I&O's Detail    2019 07:01  -  2019 07:00  --------------------------------------------------------  IN:    Oral Fluid: 170 mL    sodium chloride 0.45%.: 480 mL  Total IN: 650 mL    OUT:    Indwelling Catheter - Urethral: 800 mL    Voided: 400 mL  Total OUT: 1200 mL    Total NET: -550 mL    Daily Height in cm: 165.1 (2019 13:27)      POCT Blood Glucose.: 124 mg/dL (2019 22:02)  POCT Blood Glucose.: 200 mg/dL (2019 15:49)  POCT Blood Glucose.: 272 mg/dL (2019 13:28)      PHYSICAL EXAM:  ICU Vital Signs Last 24 Hrs  T(C): 36.2 (2019 05:40), Max: 36.7 (2019 13:27)  T(F): 97.2 (2019 05:40), Max: 98.1 (2019 13:27)  HR: 99 (2019 05:40) (95 - 104)  BP: 102/67 (2019 05:40) (99/57 - 116/63)  BP(mean): --  ABP: --  ABP(mean): --  RR: 18 (2019 05:40) (18 - 20)  SpO2: 97% (2019 05:40) (97% - 100%)    General: Awake. Alert. Cooperative. No distress. Appears stated age 	  HEENT:   Atraumatic. Normocephalic. Anicteric. Normal oral mucosa. PERRL. EOMI.  Neck: Supple. Trachea midline. Thyroid without enlargement/tenderness/nodules. No carotid bruit. No JVD.	  Cardiovascular: Regular rate and rhythm. S1 S2 normal. No murmurs, rubs or gallops.  Respiratory: Respirations unlabored. Clear to auscultation and percussion bilaterally. No curvature.  Abdomen: Soft. Non-tender. Non-distended. No organomegaly. No masses. Normal bowel sounds.  Extremities: Warm to touch. No clubbing or cyanosis. No pedal edema.  Pulses: 2+ peripheral pulses all extremities.	  Skin: Normal skin color. No rashes or lesions. No ecchymoses. No cyanosis. Warm to touch.  Lymph Nodes: Cervical, supraclavicular and axillary nodes normal  Neurological: Motor and sensory examination equal and normal. A and O x 3  Psychiatry: Appropriate mood and affect.      LABS:                          11.5   10.2  )-----------( 148      ( 2019 06:36 )             36.3                         10.7   11.2  )-----------( 175      ( 2019 18:33 )             33.9     02-04    141  |  97  |  119<H>  ----------------------------<  79  3.8   |  24  |  3.67<H>    02-    145  |  94<L>  |  126<H>  ----------------------------<  175<H>  3.6   |  34<H>  |  3.83<H>    Ca      8.4      02-    Ca      8.8      -    Phos    6.5     02-      Mg       2.4     02-    TPro  6.4  /  Alb  3.3  /  TBili  2.3<H>  /  DBili  x   /  AST  29  /  ALT  22  /  AlkPhos  114  02    TPro  6.6  /  Alb  3.5  /  TBili  2.5<H>  /  DBili  x   /  AST  33  /  ALT  23  /  AlkPhos  118  02-    PT/INR - ( 2019 14:31 )   PT: 18.0 sec;   INR: 1.56 ratio       PTT - ( 2019 14:31 )  PTT:30.5 sec    Venous Blood Gas:   @ 18:33  7.43/56/30/37/50  VBG Lactate: 2.6    Venous Blood Gas:   @ 16:53  7.42/56/29/36/45  VBG Lactate: 3.1    Venous Blood Gas:   @ 16:01  7.40/59/29/36/48  VBG Lactate: 2.8    Venous Blood Gas:   @ 14:30  7.46/52/36/36/64  VBG Lactate: 2.7    Serum Pro-Brain Natriuretic Peptide: >26019 pg/mL ( @ 14:31)    < from: Transthoracic Echocardiogram (18 @ 10:22) >    Patient name: JODY WILKINS  YOB: 1928   Age: 89 (M)   MR#: [id number]  Study Date: 2018  [facility] Cristina Oglesby REGIS  Study quality: Technically fair  Referring Physician: Daria Ackerman MD  Blood Pressure: 135/76 mmHg  Height: 167 cm  Weight: 76 kg  BSA: 1.9 m2  ------------------------------------------------------------------------  PROCEDURE: Transthoracic echocardiogram with 2-D, M-Mode  and complete spectral and color flow Doppler.  INDICATION: Abnormal electrocardiogram (ECG) (EKG) (R94.31)  ------------------------------------------------------------------------  Dimensions:    Normal Values:  LA:     4.6    2.0 - 4.0 cm  Ao:     3.4    2.0 - 3.8 cm  SEPTUM: 1.5    0.6 - 1.2 cm  PWT:    1.4    0.6 - 1.1 cm  LVIDd:  4.3    3.0 - 5.6 cm  LVIDs:  3.6    1.8 - 4.0 cm  Derived variables:  LVMI: 132 g/m2  RWT: 0.65  Fractional short: 16 %  EF (Visual Estimate): 30 %  EF (Teicholtz): 34 %  Doppler Peak Velocity (m/sec): AoV=1.0  ------------------------------------------------------------------------  Observations:  Mitral Valve: Mitral annular calcification. Tethered mitral  valve leaflets with normal opening. Mild mitral  regurgitation.  Mean transmitral valve gradient equals 2 mm  Hg, consistent with mild mitral stenosis.  Aortic Valve/Aorta: Calcified trileaflet aortic valve with  decreased opening. Peak transaortic valve gradient equals 4  mm Hg, mean transaortic valve gradient equals 2 mm Hg,  aortic valve velocity time integralequals 18 cm. No aortic  valve regurgitation seen. Peak left ventricular outflow  tract gradient equals 1 mm Hg, mean gradient is equal to 1  mm Hg, LVOT velocity time integral equals 10 cm.  Normal aortic root (Ao: 3.4 cm at the sinuses of Valsalva).  Left Atrium: Moderately dilated left atrium.  LA volume  index = 44 cc/m2.  Left Ventricle: Severe global left ventricular systolic  dysfunction. Severe concentric left ventricular  hypertrophy. Moderate diastolic dysfunction (Stage II).  Right Heart: Normal right atrium. Normal right ventricular  size with severely decreased right ventricular systolic  function. TAPSE 0.9cm.  Normal tricuspid valve.  Mild-moderate tricuspid regurgitation. Normal pulmonic  valve. Minimal pulmonic regurgitation.  Pericardium/Pleura: Normal pericardium with no pericardial  effusion.  Hemodynamic: Estimated right atrial pressure is 8 mm Hg.  Estimated right ventricular systolic pressure equals 58 mm  Hg, assuming right atrial pressure equals 8 mm Hg,  consistent with moderate pulmonary hypertension.  ------------------------------------------------------------------------  Conclusions:  1. Severe concentric left ventricular hypertrophy.  2. Severe global left ventricular systolic dysfunction.  3. Normal right ventricular size with severely decreased  right ventricular systolic function. TAPSE 0.9cm.  4. Estimated pulmonary artery systolic pressure equals 58  mm Hg, assuming right atrial pressure equals 8 mm Hg,  consistent with moderate pulmonary pressures.  *** Compared with echocardiogram of 2017, interval  decline in biventricular function.  ------------------------------------------------------------------------  Confirmed on  2018 - 13:51:55 by Clair Luna M.D.  ------------------------------------------------------------------------    < end of copied text >  ---------------------------------------------------------------------------------------------------------------  MICROBIOLOGY:   Urinalysis Basic - ( 2019 14:30 )    Color: Light Orange / Appearance: Turbid / S.016 / pH: x  Gluc: x / Ketone: Negative  / Bili: Negative / Urobili: 3 mg/dL   Blood: x / Protein: 100 mg/dL / Nitrite: Negative   Leuk Esterase: Large / RBC: 96 /hpf /  /HPF   Sq Epi: x / Non Sq Epi: 13 /hpf / Bacteria: Moderate          RADIOLOGY:  [x ] Chest radiographs reviewed and interpreted by me    < from: Xray Chest 1 View AP/PA (19 @ 17:38) >    EXAM:  XR CHEST AP OR PA 1V                          PROCEDURE DATE:  2019      INTERPRETATION:  CLINICAL INFORMATION: Altered mental status.    EXAM: Frontal radiograph of the chest.    COMPARISON: Chest radiograph from 2018.    FINDINGS:  The heart is enlarged.    Right pleural effusion. Pulmonary edema. No pneumothorax.    Degenerative changes and bilateral shoulders.    IMPRESSION: Right pleural effusion. Pulmonary edema.    BERT ARCHER M.D., RADIOLOGY RESIDENT  This document has been electronically signed.  ELIO REAL M.D., ATTENDING RADIOLOGIST  This document has been electronically signed. Feb  3 2019  9:36PM      < end of copied text >  ---------------------------------------------------------------------------------------------------------------  < from: CT Abdomen and Pelvis No Cont (19 @ 16:28) >    EXAM:  CT ABDOMEN AND PELVIS                          PROCEDURE DATE:  2019      INTERPRETATION:  CLINICAL INFORMATION: Abdominal distention.         COMPARISON: CT chest 2018    PROCEDURE:   CT of the Abdomen and Pelvis was performed without intravenous contrast.   Intravenous contrast: None.  Oral contrast: None.  Sagittal and coronal reformats were performed.    FINDINGS:    LOWER CHEST: Aortic valve calcifications. Mitral annular calcifications.   The heart is enlarged. Moderate right pleural effusion with passive   atelectasis of the right lower lobe. Small left pleural effusion.    LIVER: Nodular, shrunken liver.  BILE DUCTS: Normal caliber.  GALLBLADDER: Within normal limits.   SPLEEN: Within normal limits.  PANCREAS: Within normal limits.  ADRENALS: Thickening of the bilateral adrenal glands.  KIDNEYS/URETERS: Within normal limits. No hydronephrosis.    BLADDER: The bladder is collapsed around Thorpe catheter balloon.  REPRODUCTIVE ORGANS:   Prostate is normal in size.     BOWEL: Large amount of stool within the rectum with mild thickening   suggesting stercoral colitis. No bowel obstruction. The appendix is   within normal limits.  PERITONEUM: Moderate ascites.  VESSELS:  Atheromatous disease.  RETROPERITONEUM: No lymphadenopathy.    ABDOMINAL WALL: Within normal limits.  BONES: Chronic fracture of the right iliac wing. Degenerative changes of   spine.    IMPRESSION:     Moderate right pleural effusion with passive atelectasis of the right   lower lobe.    Cirrhosis with moderate ascites.    The rectum is distended with stool with mild thickening suggesting   stercoral colitis.    BERT ARCHER M.D., RADIOLOGY RESIDENT  This document has been electronically signed.  DEBBY VICTOR M.D., ATTENDING RADIOLOGIST  This document has been electronically signed. Feb  3 2019  5:37PM      < end of copied text >  ---------------------------------------------------------------------------------------------------------------  < from: CT Head No Cont (19 @ 14:54) >    EXAM:  CT BRAIN                          PROCEDURE DATE:  2019      INTERPRETATION:  HISTORY: Altered mental status. Confusion.    COMPARISON: CT head 2017.    TECHNIQUE: Axial noncontrast CT images from the skull base to the vertex   were obtained and submitted for interpretation. Coronal and sagittal   reformatted images were performed. Bone and soft tissue windows were   evaluated.    FINDINGS:    There is no acute intracranial mass-effect, hemorrhage, midline shift, or   abnormal extra-axial fluid collection.     Moderate patchy hypodensity in the periventricular and subcortical white   matter compatible with microvascular ischemic changes.    Age-appropriate cerebral volume loss with proportional sulcal and   ventricular prominence. No hydrocephalus. Basal cisterns are patent.     Left maxillary sinus retention cyst versus polyp and minimal right   maxillary sinus mucosal thickening. Paranasal sinuses and mastoid air   cells are clear. Status post bilateral cataract surgery. Calvarium is   intact.     IMPRESSION:     No acute intracranial bleeding, mass effect, or shift.     OSIEL MIKHITARIAN M.D., RADIOLGY RESIDENT  This document has been electronically signed.  CORNELL SHEA M.D., ATTENDING RADIOLOGIST  This document has been electronically signed. Feb  3 2019  3:46PM      < end of copied text >  --------------------------------------------------------------------------------------------------------------- [facility] - [facility]  CONSULT NOTE    HPI: 89 year old gentleman, former smoker, with history of atrial fibrillation on Eliquis until last week, ischemic cardiomyopathy with biventricular dysfunction (LVEF ~ 34%), COPD with chronic hypercapnic respiratory failure, HTN, HLD, DM and advanced kidney disease. The patient underwent a LLE angiogram with superficial femoral artery stenting on 2018 complicated by a right common femoral artery pseudoaneurysm. He received thrombin injections on  and . He underwent cardiomems placement at [facility] on  but required a brief intubation for airway protection for unclear reasons. Hospital course was further complicated by urinary retention requiring a thorpe placement. He was felt not to be a candidate for dialysis despite worsening renal function. He was discharged on cephalexin for an E coli UTI. The patient was admitted yesterday with confusion, poor po intake, weakness and lethargy. He has mild shortness of breath which has improved with oxygen supplementation associated with a cough productive of scant sputum. He has no chest congestion or wheeze. He has no fevers, chills or sweats. No chest pain/pressure or palpitations. The family is awaiting a meeting with nephrology to reconsider dialysis. Asked to evaluate.    PMHX:  COPD (chronic obstructive pulmonary disease)  Atrial fibrillation  CHF (congestive heart failure)  Atrial fibrillation  HLD (hyperlipidemia)  HTN (hypertension)  Diabetes mellitus   Diabetic retinopathy  CKD  PAD    PSHX:  S/P ECCE (extracapsular cataract extraction)  Left SFA stenting 2018    FAMILY HISTORY:  Family history of diabetes mellitus (DM) (Uncle)      SOCIAL HISTORY:  former smoker; ; lives with his wife;     Pulmonary Medications:   ALBUTerol/ipratropium for Nebulization 3 milliLiter(s) Nebulizer every 6 hours      Antimicrobials:  piperacillin/tazobactam IVPB. 3.375 Gram(s) IV Intermittent every 12 hours      Cardiology:  amiodarone    Tablet 400 milliGRAM(s) Oral daily  midodrine 5 milliGRAM(s) Oral three times a day      Other:  dextrose 40% Gel 15 Gram(s) Oral once PRN  dextrose 5%. 1000 milliLiter(s) IV Continuous <Continuous>  dextrose 50% Injectable 12.5 Gram(s) IV Push once  dextrose 50% Injectable 25 Gram(s) IV Push once  dextrose 50% Injectable 25 Gram(s) IV Push once  docusate sodium 100 milliGRAM(s) Oral two times a day  glucagon  Injectable 1 milliGRAM(s) IntraMuscular once PRN  heparin  Injectable 5000 Unit(s) SubCutaneous every 12 hours  insulin lispro (HumaLOG) corrective regimen sliding scale   SubCutaneous at bedtime  insulin lispro (HumaLOG) corrective regimen sliding scale   SubCutaneous three times a day before meals  polyethylene glycol 3350 17 Gram(s) Oral daily  senna 2 Tablet(s) Oral at bedtime  sodium chloride 0.45%. 1000 milliLiter(s) IV Continuous <Continuous>      Allergies    No Known Allergies    HOME MEDICATIONS: see  H & P    REVIEW OF SYSTEMS:  Constitutional: As per HPI  HEENT: Within normal limits  CV: As per HPI  Resp: As per HPI  GI: Within normal limits   : CKD/uremia  Musculoskeletal: Within normal limits  Skin: Within normal limits  Neurological: encephalopathy  Psychiatric: calm  Endocrine: Within normal limits  Hematologic/Lymphatic: Within normal limits  Allergic/Immunologic: Within normal limits    [x] All other systems negative    OBJECTIVE:    I&O's Detail    2019 07:01  -  2019 07:00  --------------------------------------------------------  IN:    Oral Fluid: 170 mL    sodium chloride 0.45%.: 480 mL  Total IN: 650 mL    OUT:    Indwelling Catheter - Urethral: 800 mL    Voided: 400 mL  Total OUT: 1200 mL    Total NET: -550 mL    Daily Height in cm: 165.1 (2019 13:27)      POCT Blood Glucose.: 124 mg/dL (2019 22:02)  POCT Blood Glucose.: 200 mg/dL (2019 15:49)  POCT Blood Glucose.: 272 mg/dL (2019 13:28)      PHYSICAL EXAM:  ICU Vital Signs Last 24 Hrs  T(C): 36.2 (2019 05:40), Max: 36.7 (2019 13:27)  T(F): 97.2 (2019 05:40), Max: 98.1 (2019 13:27)  HR: 99 (2019 05:40) (95 - 104)  BP: 102/67 (2019 05:40) (99/57 - 116/63)  BP(mean): --  ABP: --  ABP(mean): --  RR: 18 (2019 05:40) (18 - 20)  SpO2: 97% (2019 05:40) (97% - 100%) on 2lpm nasal canula    General: Weak. Frail. Lethargic but arousable. No distress. Appears stated age 	  HEENT:   Atraumatic. Bitemporal wasting. Anicteric. Normal oral mucosa. PERRL. EOMI.  Neck: Supple. Trachea midline. Thyroid without enlargement/tenderness/nodules. No carotid bruit. No JVD. Loss of bilateral supraclavicular fat pads.	  Cardiovascular: Irregularly irregular rate and rhythm. S1 S2 normal. No murmurs, rubs or gallops.  Respiratory: Respirations unlabored. Bilateral course breath sounds. Bilaterally decreased breath sounds ~ 1/2 up on the right and at the base on the left with dullness to percussion. Kyphosis  Abdomen: Soft. Non-tender. Non-distended. No organomegaly. No masses. Normal bowel sounds. Thorpe catheter.  Extremities: Cool to touch. No clubbing or cyanosis. No pedal edema.  Pulses: 2+ peripheral pulses all extremities.	  Skin: Normal skin color. No rashes or lesions. No ecchymoses. No cyanosis. Cool to touch.  Lymph Nodes: Cervical, supraclavicular and axillary nodes normal  Neurological: Moving all extremities. A and O x 1  Psychiatry: Calm      LABS:                          11.5   10.2  )-----------( 148      ( 2019 06:36 )             36.3                         10.7   11.2  )-----------( 175      ( 2019 18:33 )             33.9     02-04    141  |  97  |  119<H>  ----------------------------<  79  3.8   |  24  |  3.67<H>    02-03    145  |  94<L>  |  126<H>  ----------------------------<  175<H>  3.6   |  34<H>  |  3.83<H>    Ca      8.4      -    Ca      8.8          Phos    6.5     -      Mg       2.4     -    TPro  6.4  /  Alb  3.3  /  TBili  2.3<H>  /  DBili  x   /  AST  29  /  ALT  22  /  AlkPhos  114      TPro  6.6  /  Alb  3.5  /  TBili  2.5<H>  /  DBili  x   /  AST  33  /  ALT  23  /  AlkPhos  118  03    PT/INR - ( 2019 14:31 )   PT: 18.0 sec;   INR: 1.56 ratio       PTT - ( 2019 14:31 )  PTT:30.5 sec    Venous Blood Gas:   @ 18:33  7.43/56/30/37/50  VBG Lactate: 2.6    Venous Blood Gas:   @ 16:53  7.42/56/29/36/45  VBG Lactate: 3.1    Venous Blood Gas:   @ 16:01  7.40/59/29/36/48  VBG Lactate: 2.8    Venous Blood Gas:   @ 14:30  7.46/52/36/36/64  VBG Lactate: 2.7    Serum Pro-Brain Natriuretic Peptide: >30614 pg/mL ( @ 14:31)    < from: Transthoracic Echocardiogram (18 @ 10:22) >    Patient name: JODY WILKINS  YOB: 1928   Age: 89 (M)   MR#: [id number]  Study Date: 2018  [facility] Crist[facility] RDCS  Study quality: Technically fair  Referring Physician: Daria Ackerman MD  Blood Pressure: 135/76 mmHg  Height: 167 cm  Weight: 76 kg  BSA: 1.9 m2  ------------------------------------------------------------------------  PROCEDURE: Transthoracic echocardiogram with 2-D, M-Mode  and complete spectral and color flow Doppler.  INDICATION: Abnormal electrocardiogram (ECG) (EKG) (R94.31)  ------------------------------------------------------------------------  Dimensions:    Normal Values:  LA:     4.6    2.0 - 4.0 cm  Ao:     3.4    2.0 - 3.8 cm  SEPTUM: 1.5    0.6 - 1.2 cm  PWT:    1.4    0.6 - 1.1 cm  LVIDd:  4.3    3.0 - 5.6 cm  LVIDs:  3.6    1.8 - 4.0 cm  Derived variables:  LVMI: 132 g/m2  RWT: 0.65  Fractional short: 16 %  EF (Visual Estimate): 30 %  EF (Teicholtz): 34 %  Doppler Peak Velocity (m/sec): AoV=1.0  ------------------------------------------------------------------------  Observations:  Mitral Valve: Mitral annular calcification. Tethered mitral  valve leaflets with normal opening. Mild mitral  regurgitation.  Mean transmitral valve gradient equals 2 mm  Hg, consistent with mild mitral stenosis.  Aortic Valve/Aorta: Calcified trileaflet aortic valve with  decreased opening. Peak transaortic valve gradient equals 4  mm Hg, mean transaortic valve gradient equals 2 mm Hg,  aortic valve velocity time integral equals 18 cm. No aortic  valve regurgitation seen. Peak left ventricular outflow  tract gradient equals 1 mm Hg, mean gradient is equal to 1  mm Hg, LVOT velocity time integral equals 10 cm.  Normal aortic root (Ao: 3.4 cm at the sinuses of Valsalva).  Left Atrium: Moderately dilated left atrium.  LA volume  index = 44 cc/m2.  Left Ventricle: Severe global left ventricular systolic  dysfunction. Severe concentric left ventricular  hypertrophy. Moderate diastolic dysfunction (Stage II).  Right Heart: Normal right atrium. Normal right ventricular  size with severely decreased right ventricular systolic  function. TAPSE 0.9cm.  Normal tricuspid valve.  Mild-moderate tricuspid regurgitation. Normal pulmonic  valve. Minimal pulmonic regurgitation.  Pericardium/Pleura: Normal pericardium with no pericardial  effusion.  Hemodynamic: Estimated right atrial pressure is 8 mm Hg.  Estimated right ventricular systolic pressure equals 58 mm  Hg, assuming right atrial pressure equals 8 mm Hg,  consistent with moderate pulmonary hypertension.  ------------------------------------------------------------------------  Conclusions:  1. Severe concentric left ventricular hypertrophy.  2. Severe global left ventricular systolic dysfunction.  3. Normal right ventricular size with severely decreased  right ventricular systolic function. TAPSE 0.9cm.  4. Estimated pulmonary artery systolic pressure equals 58  mm Hg, assuming right atrial pressure equals 8 mm Hg,  consistent with moderate pulmonary pressures.  *** Compared with echocardiogram of 2017, interval  decline in biventricular function.  ------------------------------------------------------------------------  Confirmed on  2018 - 13:51:55 by Clair Luna M.D.  ------------------------------------------------------------------------    < end of copied text >  ---------------------------------------------------------------------------------------------------------------  MICROBIOLOGY:   Urinalysis Basic - ( 2019 14:30 )    Color: Light Orange / Appearance: Turbid / S.016 / pH: x  Gluc: x / Ketone: Negative  / Bili: Negative / Urobili: 3 mg/dL   Blood: x / Protein: 100 mg/dL / Nitrite: Negative   Leuk Esterase: Large / RBC: 96 /hpf /  /HPF   Sq Epi: x / Non Sq Epi: 13 /hpf / Bacteria: Moderate    RADIOLOGY:  [x ] Chest radiographs reviewed and interpreted by me    < from: Xray Chest 1 View AP/PA (19 @ 17:38) >    EXAM:  XR CHEST AP OR PA 1V                          PROCEDURE DATE:  2019      INTERPRETATION:  CLINICAL INFORMATION: Altered mental status.    EXAM: Frontal radiograph of the chest.    COMPARISON: Chest radiograph from 2018.    FINDINGS:  The heart is enlarged.    Right pleural effusion. Pulmonary edema. No pneumothorax.    Degenerative changes and bilateral shoulders.    IMPRESSION: Right pleural effusion. Pulmonary edema.    BERT ARCHER M.D., RADIOLOGY RESIDENT  This document has been electronically signed.  ELIO REAL M.D., ATTENDING RADIOLOGIST  This document has been electronically signed. Feb  3 2019  9:36PM      < end of copied text >  ---------------------------------------------------------------------------------------------------------------  < from: CT Abdomen and Pelvis No Cont (19 @ 16:28) >    EXAM:  CT ABDOMEN AND PELVIS                          PROCEDURE DATE:  2019      INTERPRETATION:  CLINICAL INFORMATION: Abdominal distention.         COMPARISON: CT chest 2018    PROCEDURE:   CT of the Abdomen and Pelvis was performed without intravenous contrast.   Intravenous contrast: None.  Oral contrast: None.  Sagittal and coronal reformats were performed.    FINDINGS:    LOWER CHEST: Aortic valve calcifications. Mitral annular calcifications.   The heart is enlarged. Moderate right pleural effusion with passive   atelectasis of the right lower lobe. Small left pleural effusion.    LIVER: Nodular, shrunken liver.  BILE DUCTS: Normal caliber.  GALLBLADDER: Within normal limits.   SPLEEN: Within normal limits.  PANCREAS: Within normal limits.  ADRENALS: Thickening of the bilateral adrenal glands.  KIDNEYS/URETERS: Within normal limits. No hydronephrosis.    BLADDER: The bladder is collapsed around Thorpe catheter balloon.  REPRODUCTIVE ORGANS:   Prostate is normal in size.     BOWEL: Large amount of stool within the rectum with mild thickening   suggesting stercoral colitis. No bowel obstruction. The appendix is   within normal limits.  PERITONEUM: Moderate ascites.  VESSELS:  Atheromatous disease.  RETROPERITONEUM: No lymphadenopathy.    ABDOMINAL WALL: Within normal limits.  BONES: Chronic fracture of the right iliac wing. Degenerative changes of   spine.    IMPRESSION:     Moderate right pleural effusion with passive atelectasis of the right   lower lobe.    Cirrhosis with moderate ascites.    The rectum is distended with stool with mild thickening suggesting   stercoral colitis.    BERT ARCHER M.D., RADIOLOGY RESIDENT  This document has been electronically signed.  DEBBY VICTOR M.D., ATTENDING RADIOLOGIST  This document has been electronically signed. Feb  3 2019  5:37PM      < end of copied text >  ---------------------------------------------------------------------------------------------------------------  < from: CT Head No Cont (19 @ 14:54) >    EXAM:  CT BRAIN                          PROCEDURE DATE:  2019      INTERPRETATION:  HISTORY: Altered mental status. Confusion.    COMPARISON: CT head 2017.    TECHNIQUE: Axial noncontrast CT images from the skull base to the vertex   were obtained and submitted for interpretation. Coronal and sagittal   reformatted images were performed. Bone and soft tissue windows were   evaluated.    FINDINGS:    There is no acute intracranial mass-effect, hemorrhage, midline shift, or   abnormal extra-axial fluid collection.     Moderate patchy hypodensity in the periventricular and subcortical white   matter compatible with microvascular ischemic changes.    Age-appropriate cerebral volume loss with proportional sulcal and   ventricular prominence. No hydrocephalus. Basal cisterns are patent.     Left maxillary sinus retention cyst versus polyp and minimal right   maxillary sinus mucosal thickening. Paranasal sinuses and mastoid air   cells are clear. Status post bilateral cataract surgery. Calvarium is   intact.     IMPRESSION:     No acute intracranial bleeding, mass effect, or shift.     OSIEL HYDE M.D., RADIOLGY RESIDENT  This document has been electronically signed.  CORNELL SHEA M.D., ATTENDING RADIOLOGIST  This document has been electronically signed. Feb  3 2019  3:46PM      < end of copied text >  ---------------------------------------------------------------------------------------------------------------

## 2019-02-04 NOTE — CONSULT NOTE ADULT - ASSESSMENT
89 y/o Type 2 DM 40 years, retinopathy, neuropathy, CAD, CHF, CRF, noted with change in mental status and Hyperglycemia the night prior to admission, recent multiple hospital stays.     Type 2 DM for 40 years. F/U by Dr. Painting. Recently patient treated with Tresiba inasulin 6 units at AM and onglyza 2.5 mg daily.  Patient with multiple recent admissions this last year, with CAD, CHF, AF and CRF. Patient diuretics were increased the day prior to admission, due to weight gain. The night of admission nurse noted confusion, SOB and FS above 400 mg/dL. No fever no chills.  Hyperglycemia continued till patient seen in the ER and gives 5 units of humalog. FS remained low since- 124-->94 today. Recent HbA1c's were lower set.  PO intake small, on diabetic dysphagia diet.   Patient confused, afebrile, ID and renal input noted.     Hyperglycemia M/P  due to dehydration and possible infection.  Here with worsening renal failure, PO intake small on dysphagia diet, FS low set.    Suggest:  FS only with low scale humalog at this time.  Check TFT's on amiodarone (were normal in June).  On discharge will make sure patient family has humalog pen with a scale for extreme hyperglycemia.

## 2019-02-05 ENCOUNTER — TRANSCRIPTION ENCOUNTER (OUTPATIENT)
Age: 84
End: 2019-02-05

## 2019-02-05 LAB
ANION GAP SERPL CALC-SCNC: 12 MMOL/L — SIGNIFICANT CHANGE UP (ref 5–17)
ANION GAP SERPL CALC-SCNC: 24 MMOL/L — HIGH (ref 5–17)
BUN SERPL-MCNC: 120 MG/DL — HIGH (ref 7–23)
BUN SERPL-MCNC: 66 MG/DL — HIGH (ref 7–23)
CALCIUM SERPL-MCNC: 3.4 MG/DL — CRITICAL LOW (ref 8.4–10.5)
CALCIUM SERPL-MCNC: 8.5 MG/DL — SIGNIFICANT CHANGE UP (ref 8.4–10.5)
CHLORIDE SERPL-SCNC: 82 MMOL/L — LOW (ref 96–108)
CHLORIDE SERPL-SCNC: 91 MMOL/L — LOW (ref 96–108)
CO2 SERPL-SCNC: 14 MMOL/L — LOW (ref 22–31)
CO2 SERPL-SCNC: 26 MMOL/L — SIGNIFICANT CHANGE UP (ref 22–31)
CREAT SERPL-MCNC: 1.64 MG/DL — HIGH (ref 0.5–1.3)
CREAT SERPL-MCNC: 3.52 MG/DL — HIGH (ref 0.5–1.3)
GLUCOSE BLDC GLUCOMTR-MCNC: 128 MG/DL — HIGH (ref 70–99)
GLUCOSE BLDC GLUCOMTR-MCNC: 147 MG/DL — HIGH (ref 70–99)
GLUCOSE BLDC GLUCOMTR-MCNC: 164 MG/DL — HIGH (ref 70–99)
GLUCOSE BLDC GLUCOMTR-MCNC: 182 MG/DL — HIGH (ref 70–99)
GLUCOSE SERPL-MCNC: 188 MG/DL — HIGH (ref 70–99)
GLUCOSE SERPL-MCNC: 210 MG/DL — HIGH (ref 70–99)
HCT VFR BLD CALC: 29.3 % — LOW (ref 39–50)
HGB BLD-MCNC: 9 G/DL — LOW (ref 13–17)
MAGNESIUM SERPL-MCNC: 1 MG/DL — CRITICAL LOW (ref 1.6–2.6)
MAGNESIUM SERPL-MCNC: 2.2 MG/DL — SIGNIFICANT CHANGE UP (ref 1.6–2.6)
MCHC RBC-ENTMCNC: 27.1 PG — SIGNIFICANT CHANGE UP (ref 27–34)
MCHC RBC-ENTMCNC: 30.6 GM/DL — LOW (ref 32–36)
MCV RBC AUTO: 88.8 FL — SIGNIFICANT CHANGE UP (ref 80–100)
PHOSPHATE SERPL-MCNC: 3 MG/DL — SIGNIFICANT CHANGE UP (ref 2.5–4.5)
PHOSPHATE SERPL-MCNC: 7.6 MG/DL — HIGH (ref 2.5–4.5)
PLATELET # BLD AUTO: 127 K/UL — LOW (ref 150–400)
POTASSIUM SERPL-MCNC: 2.3 MMOL/L — CRITICAL LOW (ref 3.5–5.3)
POTASSIUM SERPL-MCNC: 3.4 MMOL/L — LOW (ref 3.5–5.3)
POTASSIUM SERPL-SCNC: 2.3 MMOL/L — CRITICAL LOW (ref 3.5–5.3)
POTASSIUM SERPL-SCNC: 3.4 MMOL/L — LOW (ref 3.5–5.3)
RBC # BLD: 3.3 M/UL — LOW (ref 4.2–5.8)
RBC # FLD: 19.1 % — HIGH (ref 10.3–14.5)
SODIUM SERPL-SCNC: 108 MMOL/L — CRITICAL LOW (ref 135–145)
SODIUM SERPL-SCNC: 141 MMOL/L — SIGNIFICANT CHANGE UP (ref 135–145)
T4 FREE SERPL-MCNC: 1.2 NG/DL — SIGNIFICANT CHANGE UP (ref 0.9–1.8)
TSH SERPL-MCNC: 3.89 UIU/ML — SIGNIFICANT CHANGE UP (ref 0.27–4.2)
WBC # BLD: 7.7 K/UL — SIGNIFICANT CHANGE UP (ref 3.8–10.5)
WBC # FLD AUTO: 7.7 K/UL — SIGNIFICANT CHANGE UP (ref 3.8–10.5)

## 2019-02-05 RX ORDER — ACETAMINOPHEN 500 MG
1000 TABLET ORAL ONCE
Qty: 0 | Refills: 0 | Status: COMPLETED | OUTPATIENT
Start: 2019-02-05 | End: 2019-02-05

## 2019-02-05 RX ORDER — HYDROMORPHONE HYDROCHLORIDE 2 MG/ML
0.5 INJECTION INTRAMUSCULAR; INTRAVENOUS; SUBCUTANEOUS EVERY 6 HOURS
Qty: 0 | Refills: 0 | Status: DISCONTINUED | OUTPATIENT
Start: 2019-02-05 | End: 2019-02-07

## 2019-02-05 RX ORDER — SALIVA SUBSTITUTE COMB NO.11 351 MG
30 POWDER IN PACKET (EA) MUCOUS MEMBRANE
Qty: 0 | Refills: 0 | Status: DISCONTINUED | OUTPATIENT
Start: 2019-02-05 | End: 2019-02-07

## 2019-02-05 RX ADMIN — Medication 3 MILLILITER(S): at 17:59

## 2019-02-05 RX ADMIN — Medication 30 MILLILITER(S): at 17:59

## 2019-02-05 RX ADMIN — Medication 0.25 MILLIGRAM(S): at 17:59

## 2019-02-05 RX ADMIN — APIXABAN 2.5 MILLIGRAM(S): 2.5 TABLET, FILM COATED ORAL at 05:46

## 2019-02-05 RX ADMIN — Medication 400 MILLIGRAM(S): at 09:23

## 2019-02-05 RX ADMIN — PIPERACILLIN AND TAZOBACTAM 25 GRAM(S): 4; .5 INJECTION, POWDER, LYOPHILIZED, FOR SOLUTION INTRAVENOUS at 05:47

## 2019-02-05 RX ADMIN — AMIODARONE HYDROCHLORIDE 400 MILLIGRAM(S): 400 TABLET ORAL at 05:46

## 2019-02-05 RX ADMIN — Medication 30 MILLILITER(S): at 22:53

## 2019-02-05 RX ADMIN — Medication 1000 MILLIGRAM(S): at 09:53

## 2019-02-05 RX ADMIN — Medication 100 MILLIGRAM(S): at 05:47

## 2019-02-05 RX ADMIN — Medication 1: at 08:55

## 2019-02-05 RX ADMIN — Medication 3 MILLILITER(S): at 05:46

## 2019-02-05 RX ADMIN — MIDODRINE HYDROCHLORIDE 5 MILLIGRAM(S): 2.5 TABLET ORAL at 22:48

## 2019-02-05 RX ADMIN — APIXABAN 2.5 MILLIGRAM(S): 2.5 TABLET, FILM COATED ORAL at 17:59

## 2019-02-05 RX ADMIN — SODIUM CHLORIDE 60 MILLILITER(S): 9 INJECTION, SOLUTION INTRAVENOUS at 05:47

## 2019-02-05 RX ADMIN — MIDODRINE HYDROCHLORIDE 5 MILLIGRAM(S): 2.5 TABLET ORAL at 05:46

## 2019-02-05 RX ADMIN — Medication 0.25 MILLIGRAM(S): at 22:49

## 2019-02-05 RX ADMIN — Medication 3 MILLILITER(S): at 13:38

## 2019-02-05 RX ADMIN — Medication 1: at 13:45

## 2019-02-05 RX ADMIN — MIDODRINE HYDROCHLORIDE 5 MILLIGRAM(S): 2.5 TABLET ORAL at 13:39

## 2019-02-05 NOTE — PROGRESS NOTE ADULT - SUBJECTIVE AND OBJECTIVE BOX
No pain, no shortness of breath      VITAL:  T(C): , Max: 36.4 (19 @ 09:27)  T(F): , Max: 97.6 (19 @ 09:27)  HR: 103 (19 @ 09:27)  BP: 99/63 (19 @ 09:27)  BP(mean): --  RR: 17 (19 @ 09:27)  SpO2: 100% (19 @ 09:27)  Wt(kg): --      PHYSICAL EXAM:    Constitutional: NAD; Alert  HEENT:  NCAT; DMM  Neck: No JVD; supple  Respiratory: CTA-b/l  Cardiac: RRR s1s2  Gastrointestinal: BS+, soft, NT/ND  Urologic: No thorpe  Extremities: No peripheral edema  Back: No CVAT b/l    LABS:                        9.0    7.7   )-----------( 127      ( 2019 07:02 )             29.3     Na(141)/K(3.4)/Cl(91)/HCO3(26)/BUN(120)/Cr(3.52)Glu(188)/Ca(8.5)/Mg(2.2)/PO4(7.6)     @ 08:47  Na(108)/K(2.3)/Cl(82)/HCO3(14)/BUN(66)/Cr(1.64)Glu(210)/Ca(3.4)/Mg(1.0)/PO4(3.0)     @ 07:09  Na(141)/K(3.8)/Cl(97)/HCO3(24)/BUN(119)/Cr(3.67)Glu(79)/Ca(8.4)/Mg(--)/PO4(--)     @ 06:36  Na(145)/K(3.6)/Cl(94)/HCO3(34)/BUN(126)/Cr(3.83)Glu(175)/Ca(8.8)/Mg(--)/PO4(--)     @ 18:33  Na(145)/K(3.6)/Cl(92)/HCO3(33)/BUN(129)/Cr(3.85)Glu(244)/Ca(8.9)/Mg(2.4)/PO4(6.5)     @ 14:31    Urinalysis Basic - ( 2019 14:30 )    Color: Light Orange / Appearance: Turbid / S.016 / pH: x  Gluc: x / Ketone: Negative  / Bili: Negative / Urobili: 3 mg/dL   Blood: x / Protein: 100 mg/dL / Nitrite: Negative   Leuk Esterase: Large / RBC: 96 /hpf /  /HPF   Sq Epi: x / Non Sq Epi: 13 /hpf / Bacteria: Moderate      IMPRESSION: 89M w/ HTN, DM2, AFib, CKD5, HFrEF-MEMS, recurrent R femoral pseudoanyeurysm, 2/3/19 a/w hyperglycemia/AMS    (1)Renal - CKD5 - AMS of late; uremia is likely playing a role here. Family opting against HD on patient's behalf. I agree with this decision.  (2)Hyperphosphatemia - on a renal diet. No strong need to add a PO4 binder for now; the potential GI side effects would outweigh the potential long-term benefit, in my opinion  (3)ID - UTI/colitis - on Zosyn; dosing is appropriate  (4)CV - intravascularly depleted; on gentle IVF  (5)Goals of care - several family members with concerns/questions regarding goals of care.       RECOMMEND:  (1)No HD  (2)No PO4 binders for now  (3)Continue abx for GFR<15ml/min  (4)IVF as ordered for now  (5)BMP + Mg + PO4 daily  (6)Palliative evaluation            Elvin Weston MD  [facility]  [phone] Several family members at bedside. They bring up that patient remains continually thirsty; no other notable complaints.       VITAL:  T(C): , Max: 36.4 (19 @ 09:27)  T(F): , Max: 97.6 (19 @ 09:27)  HR: 103 (19 @ 09:27)  BP: 99/63 (19 @ 09:27)  RR: 17 (19 @ 09:27)  SpO2: 100% (19 @ 09:27)        PHYSICAL EXAM:  Constitutional: frail, lethargic/confused, NAD  HEENT:  NCAT; DMM  Neck: No JVD; supple  Respiratory: (+)rhonchi b/l  Cardiac: irreg s1s2  Gastrointestinal: BS+, soft, NT/ND  Urologic: (+) thorpe  Extremities: warm x 4  Back: No CVAT b/l    LABS:                        9.0    7.7   )-----------( 127      ( 2019 07:02 )             29.3     Na(141)/K(3.4)/Cl(91)/HCO3(26)/BUN(120)/Cr(3.52)Glu(188)/Ca(8.5)/Mg(2.2)/PO4(7.6)     @ 08:47  Na(108)/K(2.3)/Cl(82)/HCO3(14)/BUN(66)/Cr(1.64)Glu(210)/Ca(3.4)/Mg(1.0)/PO4(3.0)     @ 07:09  Na(141)/K(3.8)/Cl(97)/HCO3(24)/BUN(119)/Cr(3.67)Glu(79)/Ca(8.4)/Mg(--)/PO4(--)     @ 06:36  Na(145)/K(3.6)/Cl(94)/HCO3(34)/BUN(126)/Cr(3.83)Glu(175)/Ca(8.8)/Mg(--)/PO4(--)     @ 18:33  Na(145)/K(3.6)/Cl(92)/HCO3(33)/BUN(129)/Cr(3.85)Glu(244)/Ca(8.9)/Mg(2.4)/PO4(6.5)    - @ 14:31    Urinalysis Basic - ( 2019 14:30 )    Color: Light Orange / Appearance: Turbid / S.016 / pH: x  Gluc: x / Ketone: Negative  / Bili: Negative / Urobili: 3 mg/dL   Blood: x / Protein: 100 mg/dL / Nitrite: Negative   Leuk Esterase: Large / RBC: 96 /hpf /  /HPF   Sq Epi: x / Non Sq Epi: 13 /hpf / Bacteria: Moderate      IMPRESSION: 89M w/ HTN, DM2, AFib, CKD5, HFrEF-MEMS, recurrent R femoral pseudoanyeurysm, 2/3/19 a/w hyperglycemia/AMS    (1)Renal - CKD5 - AMS of late; uremia is likely playing a role here. Family opting against HD on patient's behalf. I agree with this decision.  (2)Hyperphosphatemia - on a renal diet. No strong need to add a PO4 binder for now; the potential GI side effects would outweigh the potential long-term benefit, in my opinion  (3)ID - UTI/colitis - on Zosyn; dosing is appropriate  (4)CV - developing fluid overload? Rather than adding back diuretics, I would simply d/c the IVF for now  (5)Goals of care - family wants patient to be comfortable - counseled regarding options of stopping blood draws, regarding option of thorpe changes, and regarding option of removing aspiration precautions. Weighed risks in benefits in depth. They agree with plan to stop blood draws, to d/c aspiration precautions, and to maintain thorpe in place.    RECOMMEND:  (1)No HD  (2)D/C aspiration precautions  (3)D/C blood draws  (4)D/C IVF; no diuretics for now  (5)Maintain thorpe in place          Elvin Weston MD  [facility]  [phone]

## 2019-02-05 NOTE — DISCHARGE NOTE ADULT - CARE PLAN
Principal Discharge DX:	CHF (congestive heart failure)  Goal:	No shortness of breath  Assessment and plan of treatment:	Continue current medications  Keep pt comfortable  Secondary Diagnosis:	HTN (hypertension)  Goal:	stable  Assessment and plan of treatment:	Follow up with your medical doctor to establish long term blood pressure treatment goals.  Secondary Diagnosis:	COPD (chronic obstructive pulmonary disease)  Goal:	stable  Assessment and plan of treatment:	Call your Health Care provider upon arrival home to make a follow up appointment within one week.  Take all inhalers as prescribed by your Health Care Provider.  Take steroids as prescribed by your Health Care Provider.  If your cough increases infrequency and severity and/or you have shortness of breath or increased shortness of breath call your Health Care Provider.  If you develop fever, chills, night sweats, malaise, and/or change in mental status call your Health care Provider.  Nutrition is very important.  Eat small frequent meals.  Increase your activity as tolerated.  Do not stay in bed all day  Secondary Diagnosis:	Type 2 diabetes mellitus with hyperglycemia, with long-term current use of insulin  Goal:	stable  Assessment and plan of treatment:	HgA1C this admission.  Make sure you get your HgA1c checked every three months.  If you take oral diabetes medications, check your blood glucose two times a day.  If you take insulin, check your blood glucose before meals and at bedtime.  It's important not to skip any meals.  Keep a log of your blood glucose results and always take it with you to your doctor appointments.  Keep a list of your current medications including injectables and over the counter medications and bring this medication list with you to all your doctor appointments.  If you have not seen your ophthalmologist this year call for appointment.  Check your feet daily for redness, sores, or openings. Do not self treat. If no improvement in two days call your primary care physician for an appointment.  Low blood sugar (hypoglycemia) is a blood sugar below 70mg/dl. Check your blood sugar if you feel signs/symptoms of hypoglycemia. If your blood sugar is below 70 take 15 grams of carbohydrates (ex 4 oz of apple juice, 3-4 glucose tablets, or 4-6 oz of regular soda) wait 15 minutes and repeat blood sugar to make sure it comes up above 70.  If your blood sugar is above 70 and you are due for a meal, have a meal.  If you are not due for a meal have a snack.  This snack helps keeps your blood sugar at a safe range. Principal Discharge DX:	CHF (congestive heart failure)  Goal:	No shortness of breath  Assessment and plan of treatment:	Continue current medications  Keep pt comfortable home hospice  Secondary Diagnosis:	HTN (hypertension)  Goal:	stable  Assessment and plan of treatment:	Follow up with your medical doctor to establish long term blood pressure treatment goals.  Secondary Diagnosis:	COPD (chronic obstructive pulmonary disease)  Goal:	stable  Assessment and plan of treatment:	Call your Health Care provider upon arrival home to make a follow up appointment within one week.  Take all inhalers as prescribed by your Health Care Provider.  Take steroids as prescribed by your Health Care Provider.  If your cough increases infrequency and severity and/or you have shortness of breath or increased shortness of breath call your Health Care Provider.  If you develop fever, chills, night sweats, malaise, and/or change in mental status call your Health care Provider.  Nutrition is very important.  Eat small frequent meals.  Increase your activity as tolerated.  Do not stay in bed all day  Secondary Diagnosis:	Type 2 diabetes mellitus with hyperglycemia, with long-term current use of insulin  Goal:	stable  Assessment and plan of treatment:	HgA1C this admission. 7.3  Make sure you get your HgA1c checked every three months.  If you take oral diabetes medications, check your blood glucose two times a day.  If you take insulin, check your blood glucose before meals and at bedtime.  It's important not to skip any meals.  Keep a log of your blood glucose results and always take it with you to your doctor appointments.  Keep a list of your current medications including injectables and over the counter medications and bring this medication list with you to all your doctor appointments.  If you have not seen your ophthalmologist this year call for appointment.  Check your feet daily for redness, sores, or openings. Do not self treat. If no improvement in two days call your primary care physician for an appointment.  Low blood sugar (hypoglycemia) is a blood sugar below 70mg/dl. Check your blood sugar if you feel signs/symptoms of hypoglycemia. If your blood sugar is below 70 take 15 grams of carbohydrates (ex 4 oz of apple juice, 3-4 glucose tablets, or 4-6 oz of regular soda) wait 15 minutes and repeat blood sugar to make sure it comes up above 70.  If your blood sugar is above 70 and you are due for a meal, have a meal.  If you are not due for a meal have a snack.  This snack helps keeps your blood sugar at a safe range.

## 2019-02-05 NOTE — PROGRESS NOTE ADULT - ASSESSMENT
ASSESSMENT:     weakness, lethargy, confusion - multifactorial     1) CKD with uremia - not a candidate for dialysis  2) "infection" - possible UTI versus stercoral colitis versus sacral ulcer infection - cultures negative thus far  3) hypoxemia due to pulmonary edema with a chronic right sided pleural effusion - the patient has chronic hypercapnic respiratory failure due to advanced COPD which is at baseline and should not be contributing to lethargy  4) hyperglycemia - resolved  5) cirrhosis likely due to chronic passive liver congestion - no evidence of hepatic encephalopathy with a non-elevated ammonia level    HTN/HLD/DM    CAD/ischemic cardiomyopathy    PAD s/p LLE stenting    atrial fibrillation    PLAN/RECOMMENDATIONS:    oxygen supplementation to keep saturation greater than 92%  albuterol/atrovent nebs  caphosol  no indication for a thoracentesis given stable respiratory status unless develops sign/symptoms of a pleural space infection  on empiric zosyn  cardiac meds: amiodarone/midorine/eliquis  GI/DVT prophylaxis  bowel regimen  felt not to be a candidate for dialysis  agree with allowing patient to drink non-thickened liquids as we are pursing comfort care - soft diet    Will follow with you. Plan of care discussed with the patient's family at bedside and Dr. Weston.    Lenny Sparrow MD, [facility] - [phone]  Pulmonary Medicine

## 2019-02-05 NOTE — DISCHARGE NOTE ADULT - SECONDARY DIAGNOSIS.
HTN (hypertension) COPD (chronic obstructive pulmonary disease) Type 2 diabetes mellitus with hyperglycemia, with long-term current use of insulin

## 2019-02-05 NOTE — DIETITIAN INITIAL EVALUATION ADULT. - PERTINENT MEDS FT
MEDICATIONS  (STANDING):  ALBUTerol/ipratropium for Nebulization 3 milliLiter(s) Nebulizer every 6 hours  amiodarone    Tablet 400 milliGRAM(s) Oral daily  apixaban 2.5 milliGRAM(s) Oral every 12 hours  dextrose 5%. 1000 milliLiter(s) (50 mL/Hr) IV Continuous <Continuous>  dextrose 50% Injectable 12.5 Gram(s) IV Push once  dextrose 50% Injectable 25 Gram(s) IV Push once  dextrose 50% Injectable 25 Gram(s) IV Push once  docusate sodium 100 milliGRAM(s) Oral two times a day  insulin lispro (HumaLOG) corrective regimen sliding scale   SubCutaneous at bedtime  insulin lispro (HumaLOG) corrective regimen sliding scale   SubCutaneous three times a day before meals  midodrine 5 milliGRAM(s) Oral three times a day  piperacillin/tazobactam IVPB. 3.375 Gram(s) IV Intermittent every 12 hours  polyethylene glycol 3350 17 Gram(s) Oral daily  Saliva Substitute (CAPHOSOL) 30 milliLiter(s) Swish and Spit four times a day  senna 2 Tablet(s) Oral at bedtime    MEDICATIONS  (PRN):  bisacodyl Suppository 10 milliGRAM(s) Rectal daily PRN Constipation  dextrose 40% Gel 15 Gram(s) Oral once PRN Blood Glucose LESS THAN 70 milliGRAM(s)/deciliter  glucagon  Injectable 1 milliGRAM(s) IntraMuscular once PRN Glucose LESS THAN 70 milligrams/deciliter

## 2019-02-05 NOTE — DIETITIAN INITIAL EVALUATION ADULT. - NS AS NUTRI INTERV MEALS SNACK
Texture-modified diet/Diet as tolerated for pleasure, no restrictions as per Renal MD.  Texture as tolerated and with level of alertness. Provide food preferences as feasible.

## 2019-02-05 NOTE — PROGRESS NOTE ADULT - ASSESSMENT
89 yo male w complicated h/o as stated in H&P presents w metabolic encephalopathy 2/2 uremia, volume depletion, uti, stercoral colitis and uncontrolled hyperglycemia. Improved clinically on  gentle hydration, off ABx, all blood draws stopped, family at bedside, recognize ptn is at end of life, DNR , on dysphagia diet, comfort care, accepted into hospice care network. place on prn ativan and prn dilaudid. DC planning home w home Hospice.

## 2019-02-05 NOTE — DISCHARGE NOTE ADULT - HOSPITAL COURSE
89 y/o M with PMHx of CKD,CHF, PVD, DM, Afib, recent LLE angio/stent complicated by RT CFA pseudoaneurysm formation, respiratory failure after cardiomems placement presents w metabolic encephalopathy 2/2 uremia, volume depletion, UTI, stercoral colitis and uncontrolled hyperglycemia. Improved clinically on  gentle hydration, off ABx, all blood draws stopped, family at bedside, Pt is DNR, on dysphagia diet, comfort care, accepted into hospice care network. placed on prn ativan and prn Dilaudid. DC planning home w home Hospice. 89 y/o M with PMHx of CKD,CHF, PVD, DM, Afib, recent LLE angio/stent complicated by RT CFA pseudoaneurysm formation, respiratory failure after cardiomems placement presents w metabolic encephalopathy 2/2 uremia, volume depletion, UTI, stercoral colitis and uncontrolled hyperglycemia. Improved clinically on  gentle hydration, off ABx, all blood draws stopped, family at bedside, Pt is DNR, on dysphagia diet, comfort care, accepted into hospice care network. placed on prn ativan and prn Dilaudid. DC planning home w home Hospice.   Onglyza and Tresiba were sent to [facility] pharmacy and all other medications were sent to the [facility] per Hospice RN (Debbie--[phone]) and also spoke to family.

## 2019-02-05 NOTE — PROGRESS NOTE ADULT - SUBJECTIVE AND OBJECTIVE BOX
JODY WILKINS    Still weak but appropriate.  Not dyspneic.    Allergies    No Known Allergies      MEDICATIONS  (STANDING):  ALBUTerol/ipratropium for Nebulization 3 milliLiter(s) Nebulizer every 6 hours  amiodarone    Tablet 400 milliGRAM(s) Oral daily  apixaban 2.5 milliGRAM(s) Oral every 12 hours  dextrose 5%. 1000 milliLiter(s) (50 mL/Hr) IV Continuous <Continuous>  dextrose 50% Injectable 12.5 Gram(s) IV Push once  dextrose 50% Injectable 25 Gram(s) IV Push once  dextrose 50% Injectable 25 Gram(s) IV Push once  docusate sodium 100 milliGRAM(s) Oral two times a day  insulin lispro (HumaLOG) corrective regimen sliding scale   SubCutaneous at bedtime  insulin lispro (HumaLOG) corrective regimen sliding scale   SubCutaneous three times a day before meals  midodrine 5 milliGRAM(s) Oral three times a day  piperacillin/tazobactam IVPB. 3.375 Gram(s) IV Intermittent every 12 hours  polyethylene glycol 3350 17 Gram(s) Oral daily  senna 2 Tablet(s) Oral at bedtime  sodium chloride 0.45%. 1000 milliLiter(s) (60 mL/Hr) IV Continuous <Continuous>    MEDICATIONS  (PRN):  bisacodyl Suppository 10 milliGRAM(s) Rectal daily PRN Constipation  dextrose 40% Gel 15 Gram(s) Oral once PRN Blood Glucose LESS THAN 70 milliGRAM(s)/deciliter  glucagon  Injectable 1 milliGRAM(s) IntraMuscular once PRN Glucose LESS THAN 70 milligrams/deciliter    PHYSICAL EXAM:  Vital Signs Last 24 Hrs  T(C): 36.4 (2019 09:27), Max: 36.4 (2019 09:27)  T(F): 97.6 (2019 09:27), Max: 97.6 (2019 09:27)  HR: 103 (2019 09:27) (70 - 103)  BP: 99/63 (2019 09:27) (99/63 - 118/66)  BP(mean): --  RR: 17 (2019 09:27) (16 - 17)  SpO2: 100% (2019 09:27) (95% - 100%)  Daily     Daily   I&O's Summary    2019 07:01  -  2019 07:00  --------------------------------------------------------  IN: 2540 mL / OUT: 1550 mL / NET: 990 mL    General Appearance: 	 Alert, frail  Neck: JVP elevated at 15 cm  Lungs:  clear to auscultation and percussion bilaterally  Cor:  pmi 5th ICS MCL, Irregular rate and rhythm, S1 normal intensity, S2 normal intensity  Abdomen:	 soft, non-tender; bowel sounds normal  Extremities: without cyanosis, clubbing or edema      EKG:  Telemetry:    Labs:  CBC Full  -  ( 2019 07:02 )  WBC Count : 7.7 K/uL  Hemoglobin : 9.0 g/dL  Hematocrit : 29.3 %  Platelet Count - Automated : 127 K/uL  Mean Cell Volume : 88.8 fl  Mean Cell Hemoglobin : 27.1 pg  Mean Cell Hemoglobin Concentration : 30.6 gm/dL  Auto Neutrophil # : x  Auto Lymphocyte # : x  Auto Monocyte # : x  Auto Eosinophil # : x  Auto Basophil # : x  Auto Neutrophil % : x  Auto Lymphocyte % : x  Auto Monocyte % : x  Auto Eosinophil % : x  Auto Basophil % : x        PT/INR - ( 2019 14:31 )   PT: 18.0 sec;   INR: 1.56 ratio         PTT - ( 2019 14:31 )  PTT:30.5 sec  Urinalysis Basic - ( 2019 14:30 )    Color: Light Orange / Appearance: Turbid / S.016 / pH: x  Gluc: x / Ketone: Negative  / Bili: Negative / Urobili: 3 mg/dL   Blood: x / Protein: 100 mg/dL / Nitrite: Negative   Leuk Esterase: Large / RBC: 96 /hpf /  /HPF   Sq Epi: x / Non Sq Epi: 13 /hpf / Bacteria: Moderate    Basic Metabolic Panel - STAT (19 @ 08:47)    Sodium, Serum: 141 mmol/L    Potassium, Serum: 3.4 mmol/L    Chloride, Serum: 91 mmol/L    Carbon Dioxide, Serum: 26 mmol/L    Anion Gap, Serum: 24 mmol/L    Blood Urea Nitrogen, Serum: 120 mg/dL    Creatinine, Serum: 3.52 mg/dL    Glucose, Serum: 188 mg/dL    Calcium, Total Serum: 8.5 mg/dL    eGFR if Non : 14: Interpretative comment  The units for eGFR are mL/min/1.73M2 (normalized body surface area). The  eGFR is calculated from a serum creatinine using the CKD-EPI equation.  Other variables required for calculation are race, age and sex. Among  patients with chronic kidney disease (CKD), the eGFR is useful in  determining the stage of disease according to KDOQI CKD classification.  All eGFR results are reported numerically with the following  interpretation.          GFR                    With                 Without     (ml/min/1.73 m2)    Kidney Damage       Kidney Damage        >= 90                    Stage 1                     Normal        60-89                    Stage 2                     Decreased GFR        30-59     Stage 3                     Stage 3        15-29                    Stage 4                     Stage 4        < 15                      Stage 5                     Stage 5  Each stage of CKD assumes that the associated GFR level has been in  effect for at least 3 months. Determination of stages one and two (with  eGFR > 59 ml/min/m2) requires estimation of kidney damage for at least 3  months as defined by structural or functional abnormalities.  Limitations: All estimates of GFR will be less accurate for patients at  extremes of muscle mass (including but not limited to frail elderly,  critically ill, or cancer patients), those with unusual diets, and those  with conditions associated with reduced secretion or extrarenal  elimination of creatinine. The eGFR equation is not recommended for use  in patients with unstable creatinine levels. mL/min/1.73M2    eGFR if African American: 17 mL/min/1.73M2

## 2019-02-05 NOTE — PROGRESS NOTE ADULT - ASSESSMENT
89 yo male with CKD,CHF,PVD,DM,Afib, recent LLE angio/stent complicated by RT CFA pseudoaneurysm formation,respiratory failure after cardiomems placement,now admitted with lethargy and failure to thrive.  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  No rt groin pain or erythema, doubt infected aneurysm.  The CT findings with regards to rectum and gallbladder seem very non specific, again I think indications for antibiotics are "soft"  Suggest:  1.Will stop zosyn, no clear indications  2.Supportive care  3.ID issues reviewed with daughter at the bedside  4.Home Hospice is being considered  5.I do not see evidence of ongoing infection,failure to thrive is likely multifactorial.

## 2019-02-05 NOTE — PROGRESS NOTE ADULT - SUBJECTIVE AND OBJECTIVE BOX
Patient is a 89y old  Male who presents with a chief complaint of altered mental status (05 Feb 2019 14:51)      SUBJECTIVE / OVERNIGHT EVENTS: opens his eyes, states he feels anxious, hospice consult done, family at bedside, on comfort care, plan to DC home w hospice on 2/6 or 2/7    MEDICATIONS  (STANDING):  ALBUTerol/ipratropium for Nebulization 3 milliLiter(s) Nebulizer every 6 hours  amiodarone    Tablet 400 milliGRAM(s) Oral daily  apixaban 2.5 milliGRAM(s) Oral every 12 hours  dextrose 5%. 1000 milliLiter(s) (50 mL/Hr) IV Continuous <Continuous>  dextrose 50% Injectable 12.5 Gram(s) IV Push once  dextrose 50% Injectable 25 Gram(s) IV Push once  dextrose 50% Injectable 25 Gram(s) IV Push once  docusate sodium 100 milliGRAM(s) Oral two times a day  insulin lispro (HumaLOG) corrective regimen sliding scale   SubCutaneous three times a day before meals  insulin lispro (HumaLOG) corrective regimen sliding scale   SubCutaneous at bedtime  midodrine 5 milliGRAM(s) Oral three times a day  polyethylene glycol 3350 17 Gram(s) Oral daily  Saliva Substitute (CAPHOSOL) 30 milliLiter(s) Swish and Spit four times a day  senna 2 Tablet(s) Oral at bedtime    MEDICATIONS  (PRN):  bisacodyl Suppository 10 milliGRAM(s) Rectal daily PRN Constipation  dextrose 40% Gel 15 Gram(s) Oral once PRN Blood Glucose LESS THAN 70 milliGRAM(s)/deciliter  glucagon  Injectable 1 milliGRAM(s) IntraMuscular once PRN Glucose LESS THAN 70 milligrams/deciliter  HYDROmorphone  Injectable 0.5 milliGRAM(s) IV Push every 6 hours PRN Moderate Pain (4 - 6)  LORazepam   Injectable 0.25 milliGRAM(s) IV Push every 4 hours PRN Anxiety      Vital Signs Last 24 Hrs  T(F): 97.6 (02-05-19 @ 09:27), Max: 97.6 (02-05-19 @ 09:27)  HR: 103 (02-05-19 @ 09:27) (70 - 103)  BP: 99/63 (02-05-19 @ 09:27) (99/63 - 118/66)  RR: 17 (02-05-19 @ 09:27) (16 - 17)  SpO2: 100% (02-05-19 @ 09:27) (95% - 100%)  Telemetry:   CAPILLARY BLOOD GLUCOSE      POCT Blood Glucose.: 164 mg/dL (05 Feb 2019 13:54)  POCT Blood Glucose.: 182 mg/dL (05 Feb 2019 08:49)  POCT Blood Glucose.: 178 mg/dL (04 Feb 2019 22:06)  POCT Blood Glucose.: 138 mg/dL (04 Feb 2019 18:48)    I&O's Summary    04 Feb 2019 07:01  -  05 Feb 2019 07:00  --------------------------------------------------------  IN: 2540 mL / OUT: 1550 mL / NET: 990 mL    05 Feb 2019 07:01  -  05 Feb 2019 18:10  --------------------------------------------------------  IN: 360 mL / OUT: 400 mL / NET: -40 mL        PHYSICAL EXAM:  GENERAL: NAD, well-developed  HEAD:  Atraumatic, Normocephalic  EYES: EOMI, PERRLA, conjunctiva and sclera clear  NECK: Supple, No JVD  CHEST/LUNG: Clear to auscultation bilaterally; No wheeze  HEART: Regular rate and rhythm; No murmurs, rubs, or gallops  ABDOMEN: Soft, Nontender, Nondistended; Bowel sounds present  EXTREMITIES:  2+ Peripheral Pulses, No clubbing, cyanosis, or edema  PSYCH: AAOx3  NEUROLOGY: non-focal  SKIN: No rashes or lesions    LABS:                        9.0    7.7   )-----------( 127      ( 05 Feb 2019 07:02 )             29.3     02-05    141  |  91<L>  |  120<H>  ----------------------------<  188<H>  3.4<L>   |  26  |  3.52<H>    Ca    8.5      05 Feb 2019 08:47  Phos  7.6     02-05  Mg     2.2     02-05    TPro  6.4  /  Alb  3.3  /  TBili  2.3<H>  /  DBili  x   /  AST  29  /  ALT  22  /  AlkPhos  114  02-03              RADIOLOGY & ADDITIONAL TESTS:    Imaging Personally Reviewed:    Consultant(s) Notes Reviewed:      Care Discussed with Consultants/Other Providers:

## 2019-02-05 NOTE — DIETITIAN INITIAL EVALUATION ADULT. - ENERGY NEEDS
HT 65 inches,  pounds,  pounds- 06/2018,  pounds  Dxd with CKD with uremia, Hypoxia 2/2 pulmonary edema, Change in MS, not a candidate for HD.  UVs. colitis.  DNR for home hospice.  Skin intact.  Fluids per team in view of CKD and pulmonary edema.  skin intact.

## 2019-02-05 NOTE — DISCHARGE NOTE ADULT - MEDICATION SUMMARY - MEDICATIONS TO TAKE
I will START or STAY ON the medications listed below when I get home from the hospital:    Dilaudid 2 mg oral tablet  -- 0.5 tab(s) by mouth every 6 hours, As Needed MDD:2 mg  -- Caution federal law prohibits the transfer of this drug to any person other  than the person for whom it was prescribed.  May cause drowsiness.  Alcohol may intensify this effect.  Use care when operating dangerous machinery.  This prescription cannot be refilled.  Using more of this medication than prescribed may cause serious breathing problems.    -- Indication: For pain    amiodarone 200 mg oral tablet  -- 2 tab(s) by mouth once a day  -- Indication: For Heart failure    apixaban 2.5 mg oral tablet  -- 1 tab(s) by mouth every 12 hours  -- Indication: For Afib    LORazepam 0.5 mg oral tablet  -- 0.5 tab(s) by mouth every 4 hours, As Needed MDD:6 doses  -- Caution federal law prohibits the transfer of this drug to any person other  than the person for whom it was prescribed.  Do not take this drug if you are pregnant.  May cause drowsiness.  Alcohol may intensify this effect.  Use care when operating dangerous machinery.    -- Indication: For Anxiety    Onglyza 2.5 mg oral tablet  -- 1 tab(s) by mouth once a day  -- Indication: For dm    Tresiba FlexTouch 200 units/mL subcutaneous solution  -- 6 unit(s) subcutaneous once a day (at bedtime)  -- Indication: For dm    ipratropium-albuterol 0.5 mg-2.5 mg/3 mLinhalation solution  -- 3 milliliter(s) inhaled every 6 hours  -- Indication: For broncospasm    torsemide 10 mg oral tablet  -- 5 tab(s) by mouth 2 times a day  -- Indication: For CHF (congestive heart failure)    senna oral tablet  -- 2 tab(s) by mouth once a day (at bedtime)  -- Indication: For Constipation    polyethylene glycol 3350 oral powder for reconstitution  -- 17 gram(s) by mouth once a day  -- Indication: For Constipation    docusate sodium 100 mg oral capsule  -- 1 cap(s) by mouth 2 times a day  -- Indication: For Constiaption    bisacodyl 10 mg rectal suppository  -- 1 suppository(ies) rectally once a day, As needed, Constipation  -- Indication: For Constipation    midodrine 5 mg oral tablet  -- 1 tab(s) by mouth 3 times a day  -- Indication: For orthostatic hypotention I will START or STAY ON the medications listed below when I get home from the hospital:    Dilaudid 2 mg oral tablet  -- 0.5 tab(s) by mouth every 6 hours, As Needed MDD:2 mg  -- Caution federal law prohibits the transfer of this drug to any person other  than the person for whom it was prescribed.  May cause drowsiness.  Alcohol may intensify this effect.  Use care when operating dangerous machinery.  This prescription cannot be refilled.  Using more of this medication than prescribed may cause serious breathing problems.    -- Indication: For pain    amiodarone 200 mg oral tablet  -- 2 tab(s) by mouth once a day  -- Indication: For Heart failure    apixaban 2.5 mg oral tablet  -- 1 tab(s) by mouth every 12 hours  -- Indication: For Afib    LORazepam 0.5 mg oral tablet  -- 0.5 tab(s) by mouth every 4 hours, As Needed MDD:6 doses  -- Caution federal law prohibits the transfer of this drug to any person other  than the person for whom it was prescribed.  Do not take this drug if you are pregnant.  May cause drowsiness.  Alcohol may intensify this effect.  Use care when operating dangerous machinery.    -- Indication: For Anxiety    Onglyza 2.5 mg oral tablet  -- 1 tab(s) by mouth once a day  -- Indication: For Type 2 diabetes mellitus with hyperglycemia, with long-term current use of insulin    Tresiba FlexTouch 200 units/mL subcutaneous solution  -- 6 unit(s) subcutaneous once a day (at bedtime)  -- Indication: For Type 2 diabetes mellitus with hyperglycemia, with long-term current use of insulin    ipratropium-albuterol 0.5 mg-2.5 mg/3 mLinhalation solution  -- 3 milliliter(s) inhaled every 6 hours  -- Indication: For broncospasm    torsemide 10 mg oral tablet  -- 5 tab(s) by mouth 2 times a day  -- Indication: For CHF (congestive heart failure)    senna oral tablet  -- 2 tab(s) by mouth once a day (at bedtime)  -- Indication: For Constipation    polyethylene glycol 3350 oral powder for reconstitution  -- 17 gram(s) by mouth once a day  -- Indication: For Constipation    docusate sodium 100 mg oral capsule  -- 1 cap(s) by mouth 2 times a day  -- Indication: For Constiaption    bisacodyl 10 mg rectal suppository  -- 1 suppository(ies) rectally once a day, As needed, Constipation  -- Indication: For Constipation    midodrine 5 mg oral tablet  -- 1 tab(s) by mouth 3 times a day  -- Indication: For orthostatic hypotention

## 2019-02-05 NOTE — PROGRESS NOTE ADULT - ASSESSMENT
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 Weston.  He also does not feel dialysis would significantly alter quality or duration of life.  Family is now opting for palliative care.    Would restart home torsemide dose now.  Restart eliquis as his stroke risk is very high.   Continue amiodarone for rate control.  Antibiotics as per Dr. Reid.    Morro Martins MD  [facility]

## 2019-02-05 NOTE — SWALLOW BEDSIDE ASSESSMENT ADULT - SWALLOW EVAL: DIAGNOSIS
Consult order received and chart reviewed.  SLP attempted to see Pt for swallowing evaluation however the Pt's family does not consent to evaluation at this time.  Per family, GOC (re: provision of nutrition and hydration have been d/w MD, including aspiration precautions and risks per daughters report).  Pt/family/HCP wishes are for pleasure feeds at this time therefore dietary decision deferred to MD and this service to sign off.

## 2019-02-05 NOTE — PROGRESS NOTE ADULT - SUBJECTIVE AND OBJECTIVE BOX
CC: f/u for possible infection    Patient reports: he is lethargic, the daughter reports he is a little more awake today.    REVIEW OF SYSTEMS:  All other review of systems negative (Comprehensive ROS)    Antimicrobials Day #  :day2-3  piperacillin/tazobactam IVPB. 3.375 Gram(s) IV Intermittent every 12 hours    Other Medications Reviewed    T(F): 97.6 (02-05-19 @ 09:27), Max: 97.6 (02-05-19 @ 09:27)  HR: 103 (02-05-19 @ 09:27)  BP: 99/63 (02-05-19 @ 09:27)  RR: 17 (02-05-19 @ 09:27)  SpO2: 100% (02-05-19 @ 09:27)  Wt(kg): --    PHYSICAL EXAM:  General: lethargic, no acute distress  Eyes:  anicteric, no conjunctival injection, no discharge  Oropharynx: no lesions or injection 	  Neck: supple, without adenopathy  Lungs: diminished at bases  Heart: irregular rate and rhythm; no murmur, rubs or gallops  Abdomen: soft, nondistended, nontender, without mass or organomegaly  Skin: no lesions  Extremities: no clubbing, cyanosis, trace edema  Neurologic: lethargic, sitting in chair  : maurilio  LAB RESULTS:                        9.0    7.7   )-----------( 127      ( 05 Feb 2019 07:02 )             29.3     02-05    141  |  91<L>  |  120<H>  ----------------------------<  188<H>  3.4<L>   |  26  |  3.52<H>    Ca    8.5      05 Feb 2019 08:47  Phos  7.6     02-05  Mg     2.2     02-05    TPro  6.4  /  Alb  3.3  /  TBili  2.3<H>  /  DBili  x   /  AST  29  /  ALT  22  /  AlkPhos  114  02-03    LIVER FUNCTIONS - ( 03 Feb 2019 18:33 )  Alb: 3.3 g/dL / Pro: 6.4 g/dL / ALK PHOS: 114 U/L / ALT: 22 U/L / AST: 29 U/L / GGT: x             MICROBIOLOGY:  RECENT CULTURES:  02-03 @ 18:05 .Urine Catheterized     Culture grew 3 or more types of organisms which indicate  collection contamination; consider recollection only if clinically  indicated.      02-03 @ 17:51 .Blood Blood     No growth to date.          RADIOLOGY REVIEWED:  < from: Xray Chest 1 View AP/PA (02.03.19 @ 17:38) >  FINDINGS:  The heart is enlarged.    Right pleural effusion. Pulmonary edema. No pneumothorax.    Degenerative changes and bilateral shoulders.    IMPRESSION: Right pleural effusion. Pulmonary edema.    < end of copied text >

## 2019-02-05 NOTE — PROGRESS NOTE ADULT - SUBJECTIVE AND OBJECTIVE BOX
INTERVAL HPI/OVERNIGHT EVENTS: having BMs, no bleeding or abd pain    MEDICATIONS  (STANDING):  ALBUTerol/ipratropium for Nebulization 3 milliLiter(s) Nebulizer every 6 hours  amiodarone    Tablet 400 milliGRAM(s) Oral daily  apixaban 2.5 milliGRAM(s) Oral every 12 hours  dextrose 5%. 1000 milliLiter(s) (50 mL/Hr) IV Continuous <Continuous>  dextrose 50% Injectable 12.5 Gram(s) IV Push once  dextrose 50% Injectable 25 Gram(s) IV Push once  dextrose 50% Injectable 25 Gram(s) IV Push once  docusate sodium 100 milliGRAM(s) Oral two times a day  insulin lispro (HumaLOG) corrective regimen sliding scale   SubCutaneous at bedtime  insulin lispro (HumaLOG) corrective regimen sliding scale   SubCutaneous three times a day before meals  midodrine 5 milliGRAM(s) Oral three times a day  piperacillin/tazobactam IVPB. 3.375 Gram(s) IV Intermittent every 12 hours  polyethylene glycol 3350 17 Gram(s) Oral daily  senna 2 Tablet(s) Oral at bedtime  sodium chloride 0.45%. 1000 milliLiter(s) (60 mL/Hr) IV Continuous <Continuous>    MEDICATIONS  (PRN):  bisacodyl Suppository 10 milliGRAM(s) Rectal daily PRN Constipation  dextrose 40% Gel 15 Gram(s) Oral once PRN Blood Glucose LESS THAN 70 milliGRAM(s)/deciliter  glucagon  Injectable 1 milliGRAM(s) IntraMuscular once PRN Glucose LESS THAN 70 milligrams/deciliter      Allergies    No Known Allergies    Intolerances            PHYSICAL EXAM:   Vital Signs:  Vital Signs Last 24 Hrs  T(C): 36.4 (2019 09:27), Max: 36.4 (2019 09:27)  T(F): 97.6 (2019 09:27), Max: 97.6 (2019 09:27)  HR: 103 (2019 09:27) (70 - 103)  BP: 99/63 (2019 09:27) (99/63 - 118/66)  BP(mean): --  RR: 17 (2019 09:27) (16 - 17)  SpO2: 100% (2019 09:27) (95% - 100%)  Daily     Daily     GENERAL:  no distress  HEENT:  NC/AT,  anicteric  CHEST:   no increased effort, breath sounds clear  HEART:  Regular rhythm  ABDOMEN:  Soft, non-tender, non-distended, normoactive bowel sounds,  no masses ,no hepato-splenomegaly, no signs of chronic liver disease  EXTEREMITIES:  no cyanosis      LABS:                        9.0    7.7   )-----------( 127      ( 2019 07:02 )             29.3     02-05    141  |  91<L>  |  120<H>  ----------------------------<  188<H>  3.4<L>   |  26  |  3.52<H>    Ca    8.5      2019 08:47  Phos  7.6     02-05  Mg     2.2     02-    TPro  6.4  /  Alb  3.3  /  TBili  2.3<H>  /  DBili  x   /  AST  29  /  ALT  22  /  AlkPhos  114  02-03    PT/INR - ( 2019 14:31 )   PT: 18.0 sec;   INR: 1.56 ratio         PTT - ( 2019 14:31 )  PTT:30.5 sec  Urinalysis Basic - ( 2019 14:30 )    Color: Light Orange / Appearance: Turbid / S.016 / pH: x  Gluc: x / Ketone: Negative  / Bili: Negative / Urobili: 3 mg/dL   Blood: x / Protein: 100 mg/dL / Nitrite: Negative   Leuk Esterase: Large / RBC: 96 /hpf /  /HPF   Sq Epi: x / Non Sq Epi: 13 /hpf / Bacteria: Moderate        RADIOLOGY & ADDITIONAL TESTS:

## 2019-02-05 NOTE — PROGRESS NOTE ADULT - SUBJECTIVE AND OBJECTIVE BOX
[facility] - [facility]     PROGRESS NOTE    CHIEF COMPLAINT: chronic hypercapnic respiratory failure; hypoxemia; pulmonary edema; pleural effusions; CKD    INTERVAL HISTORY: sitting in the chair somewhat more awake and alert; begging for water; mildly short of breath without cough, sputum production, chest congestion or wheeze; no fevers, chills or sweats; no chest pain/pressure or palpitations; plans being made for discharge with home hospice services;    REVIEW OF SYSTEMS:  Constitutional: As per interval history  HEENT: Within normal limits  CV: As per interval history  Resp: As per interval history  GI: Within normal limits   : CKD/uremia  Musculoskeletal: Within normal limits  Skin: Within normal limits  Neurological: encephalopathy  Psychiatric: calm  Endocrine: Within normal limits  Hematologic/Lymphatic: Within normal limits  Allergic/Immunologic: Within normal limits      MEDICATIONS:     Pulmonary "  ALBUTerol/ipratropium for Nebulization 3 milliLiter(s) Nebulizer every 6 hours      Anti-microbials:  piperacillin/tazobactam IVPB. 3.375 Gram(s) IV Intermittent every 12 hours      Cardiovascular:  amiodarone    Tablet 400 milliGRAM(s) Oral daily  midodrine 5 milliGRAM(s) Oral three times a day      Other:  apixaban 2.5 milliGRAM(s) Oral every 12 hours  bisacodyl Suppository 10 milliGRAM(s) Rectal daily PRN  dextrose 40% Gel 15 Gram(s) Oral once PRN  dextrose 5%. 1000 milliLiter(s) IV Continuous <Continuous>  dextrose 50% Injectable 12.5 Gram(s) IV Push once  dextrose 50% Injectable 25 Gram(s) IV Push once  dextrose 50% Injectable 25 Gram(s) IV Push once  docusate sodium 100 milliGRAM(s) Oral two times a day  glucagon  Injectable 1 milliGRAM(s) IntraMuscular once PRN  insulin lispro (HumaLOG) corrective regimen sliding scale   SubCutaneous at bedtime  insulin lispro (HumaLOG) corrective regimen sliding scale   SubCutaneous three times a day before meals  polyethylene glycol 3350 17 Gram(s) Oral daily  senna 2 Tablet(s) Oral at bedtime        OBJECTIVE:    I&O's Detail    2019 07:01  -  2019 07:00  --------------------------------------------------------  IN:    Oral Fluid: 1160 mL    sodium chloride 0.45%: 1380 mL  Total IN: 2540 mL    OUT:    Indwelling Catheter - Urethral: 1550 mL  Total OUT: 1550 mL    Total NET: 990 mL    POCT Blood Glucose.: 182 mg/dL (2019 08:49)  POCT Blood Glucose.: 178 mg/dL (2019 22:06)  POCT Blood Glucose.: 138 mg/dL (2019 18:48)  POCT Blood Glucose.: 94 mg/dL (2019 13:45)      PHYSICAL EXAM:       ICU Vital Signs Last 24 Hrs  T(C): 36.4 (2019 09:27), Max: 36.4 (2019 09:27)  T(F): 97.6 (2019 09:27), Max: 97.6 (2019 09:27)  HR: 103 (2019 09:27) (70 - 103)  BP: 99/63 (2019 09:27) (99/63 - 118/66)  BP(mean): --  ABP: --  ABP(mean): --  RR: 17 (2019 09:27) (16 - 17)  SpO2: 100% (2019 09:27) (95% - 100%) on 2lpm nasal canula     General: Weak. Frail. More awake and alert. No distress. Appears stated age 	  HEENT:   Atraumatic. Bitemporal wasting. Anicteric. Normal oral mucosa. PERRL. EOMI.  Neck: Supple. Trachea midline. Thyroid without enlargement/tenderness/nodules. No carotid bruit. No JVD. Loss of bilateral supraclavicular fat pads.	  Cardiovascular: Irregularly irregular rate and rhythm. S1 S2 normal. No murmurs, rubs or gallops.  Respiratory: Respirations unlabored. Bilateral course breath sounds. Bilaterally decreased breath sounds ~ 1/2 up on the right and at the base on the left with dullness to percussion. Kyphosis.  Abdomen: Soft. Non-tender. Non-distended. No organomegaly. No masses. Normal bowel sounds. James catheter.  Extremities: Cool to touch. No clubbing or cyanosis. No pedal edema.  Pulses: 2+ peripheral pulses all extremities.	  Skin: Normal skin color. No rashes or lesions. No ecchymoses. No cyanosis. Cool to touch.  Lymph Nodes: Cervical, supraclavicular and axillary nodes normal  Neurological: Moving all extremities. A and O x 1  Psychiatry: Calm    LABS:                        9.0    7.7   )-----------( 127      ( 2019 07:02 )             29.3                         11.5   10.2  )-----------( 148      ( 2019 06:36 )             36.3     02-05    141  |  91<L>  |  120<H>  ----------------------------<  188<H>  3.4<L>   |  26  |  3.52<H>      Ca      8.5      02-05    Ca      3.4<LL>      02-05    Phos    7.6     02-05    Phos    3.0     02-05      Mg       2.2     02-05    Mg       1.0     02-05    TPro  6.4  /  Alb  3.3  /  TBili  2.3<H>  /  DBili  x   /  AST  29  /  ALT  22  /  AlkPhos  114  02-    TPro  6.6  /  Alb  3.5  /  TBili  2.5<H>  /  DBili  x   /  AST  33  /  ALT  23  /  AlkPhos  118  02-03    PT/INR - ( 2019 14:31 )   PT: 18.0 sec;   INR: 1.56 ratio       PTT - ( 2019 14:31 )  PTT:30.5 sec    Venous Blood Gas:   @ 18:33  7.43/56/30/37/50  VBG Lactate: 2.6    Venous Blood Gas:   @ 16:53  7.42/56/29/36/45  VBG Lactate: 3.1    Venous Blood Gas:   @ 16:01  7.40/59/29/36/48  VBG Lactate: 2.8    Venous Blood Gas:   @ 14:30  7.46/52/36/36/64  VBG Lactate: 2.7    Serum Pro-Brain Natriuretic Peptide: >67195 pg/mL ( @ 14:31)    < from: Transthoracic Echocardiogram (18 @ 10:22) >    Patient name: JODY WILKINS  YOB: 1928   Age: 89 (M)   MR#: [id number]  Study Date: 2018  [facility]  Study quality: Technically fair  Referring Physician: Daria Ackerman MD  Blood Pressure: 135/76 mmHg  Height: 167 cm  Weight: 76 kg  BSA: 1.9 m2  ------------------------------------------------------------------------  PROCEDURE: Transthoracic echocardiogram with 2-D, M-Mode  and complete spectral and color flow Doppler.  INDICATION: Abnormal electrocardiogram (ECG) (EKG) (R94.31)  ------------------------------------------------------------------------  Dimensions:    Normal Values:  LA:     4.6    2.0 - 4.0 cm  Ao:     3.4    2.0 - 3.8 cm  SEPTUM: 1.5    0.6 - 1.2 cm  PWT:    1.4    0.6 - 1.1 cm  LVIDd:  4.3    3.0 - 5.6 cm  LVIDs:  3.6    1.8 - 4.0 cm  Derived variables:  LVMI: 132 g/m2  RWT: 0.65  Fractional short: 16 %  EF (Visual Estimate): 30 %  EF (Jozeficholtz): 34 %  Doppler Peak Velocity (m/sec): AoV=1.0  ------------------------------------------------------------------------  Observations:  Mitral Valve: Mitral annular calcification. Tethered mitral  valve leaflets with normal opening. Mild mitral  regurgitation.  Mean transmitral valve gradient equals 2 mm  Hg, consistent with mild mitral stenosis.  Aortic Valve/Aorta: Calcified trileaflet aortic valve with  decreased opening. Peak transaortic valve gradient equals 4  mm Hg, mean transaortic valve gradient equals 2 mm Hg,  aortic valve velocity time integral equals 18 cm. No aortic  valve regurgitation seen. Peak left ventricular outflow  tract gradient equals 1 mm Hg, mean gradient is equal to 1  mm Hg, LVOT velocity time integral equals 10 cm.  Normal aortic root (Ao: 3.4 cm at the sinuses of Valsalva).  Left Atrium: Moderately dilated left atrium.  LA volume  index = 44 cc/m2.  Left Ventricle: Severe global left ventricular systolic  dysfunction. Severe concentric left ventricular  hypertrophy. Moderate diastolic dysfunction (Stage II).  Right Heart: Normal right atrium. Normal right ventricular  size with severely decreased right ventricular systolic  function. TAPSE 0.9cm.  Normal tricuspid valve.  Mild-moderate tricuspid regurgitation. Normal pulmonic  valve. Minimal pulmonic regurgitation.  Pericardium/Pleura: Normal pericardium with no pericardial  effusion.  Hemodynamic: Estimated right atrial pressure is 8 mm Hg.  Estimated right ventricular systolic pressure equals 58 mm  Hg, assuming right atrial pressure equals 8 mm Hg,  consistent with moderate pulmonary hypertension.  ------------------------------------------------------------------------  Conclusions:  1. Severe concentric left ventricular hypertrophy.  2. Severe global left ventricular systolic dysfunction.  3. Normal right ventricular size with severely decreased  right ventricular systolic function. TAPSE 0.9cm.  4. Estimated pulmonary artery systolic pressure equals 58  mm Hg, assuming right atrial pressure equals 8 mm Hg,  consistent with moderate pulmonary pressures.  *** Compared with echocardiogram of 2017, interval  decline in biventricular function.  ------------------------------------------------------------------------  Confirmed on  2018 - 13:51:55 by Clair Luna M.D.  ------------------------------------------------------------------------    < end of copied text >  ---------------------------------------------------------------------------------------------------------------  MICROBIOLOGY:   Urinalysis Basic - ( 2019 14:30 )    Color: Light Orange / Appearance: Turbid / S.016 / pH: x  Gluc: x / Ketone: Negative  / Bili: Negative / Urobili: 3 mg/dL   Blood: x / Protein: 100 mg/dL / Nitrite: Negative   Leuk Esterase: Large / RBC: 96 /hpf /  /HPF   Sq Epi: x / Non Sq Epi: 13 /hpf / Bacteria: Moderate    Culture - Urine (19 @ 18:05)    Specimen Source: .Urine Catheterized    Culture Results:   Culture grew 3 or more types of organisms which indicate  collection contamination; consider recollection only if clinically  indicated.    Culture - Blood (19 @ 17:51)    Specimen Source: .Blood Blood    Culture Results:   No growth to date.    Culture - Blood (19 @ 17:51)    Specimen Source: .Blood Blood    Culture Results:   No growth to date.    RADIOLOGY:  [x] Chest radiographs reviewed and interpreted by me    < from: Xray Chest 1 View AP/PA (19 @ 17:38) >    EXAM:  XR CHEST AP OR PA 1V                          PROCEDURE DATE:  2019      INTERPRETATION:  CLINICAL INFORMATION: Altered mental status.    EXAM: Frontal radiograph of the chest.    COMPARISON: Chest radiograph from 2018.    FINDINGS:  The heart is enlarged.    Right pleural effusion. Pulmonary edema. No pneumothorax.    Degenerative changes and bilateral shoulders.    IMPRESSION: Right pleural effusion. Pulmonary edema.    BERT ARCHER M.D., RADIOLOGY RESIDENT  This document has been electronically signed.  ELIO REAL M.D., ATTENDING RADIOLOGIST  This document has been electronically signed. Feb  3 2019  9:36PM      < end of copied text >  ---------------------------------------------------------------------------------------------------------------  < from: CT Abdomen and Pelvis No Cont (19 @ 16:28) >    EXAM:  CT ABDOMEN AND PELVIS                          PROCEDURE DATE:  2019      INTERPRETATION:  CLINICAL INFORMATION: Abdominal distention.         COMPARISON: CT chest 2018    PROCEDURE:   CT of the Abdomen and Pelvis was performed without intravenous contrast.   Intravenous contrast: None.  Oral contrast: None.  Sagittal and coronal reformats were performed.    FINDINGS:    LOWER CHEST: Aortic valve calcifications. Mitral annular calcifications.   The heart is enlarged. Moderate right pleural effusion with passive   atelectasis of the right lower lobe. Small left pleural effusion.    LIVER: Nodular, shrunken liver.  BILE DUCTS: Normal caliber.  GALLBLADDER: Within normal limits.   SPLEEN: Within normal limits.  PANCREAS: Within normal limits.  ADRENALS: Thickening of the bilateral adrenal glands.  KIDNEYS/URETERS: Within normal limits. No hydronephrosis.    BLADDER: The bladder is collapsed around James catheter balloon.  REPRODUCTIVE ORGANS:   Prostate is normal in size.     BOWEL: Large amount of stool within the rectum with mild thickening   suggesting stercoral colitis. No bowel obstruction. The appendix is   within normal limits.  PERITONEUM: Moderate ascites.  VESSELS:  Atheromatous disease.  RETROPERITONEUM: No lymphadenopathy.    ABDOMINAL WALL: Within normal limits.  BONES: Chronic fracture of the right iliac wing. Degenerative changes of   spine.    IMPRESSION:     Moderate right pleural effusion with passive atelectasis of the right   lower lobe.    Cirrhosis with moderate ascites.    The rectum is distended with stool with mild thickening suggesting   stercoral colitis.    BERT ARCHER M.D., RADIOLOGY RESIDENT  This document has been electronically signed.  DEBBY VICTOR M.D., ATTENDING RADIOLOGIST  This document has been electronically signed. Feb  3 2019  5:37PM      < end of copied text >  ---------------------------------------------------------------------------------------------------------------  < from: CT Head No Cont (19 @ 14:54) >    EXAM:  CT BRAIN                          PROCEDURE DATE:  2019      INTERPRETATION:  HISTORY: Altered mental status. Confusion.    COMPARISON: CT head 2017.    TECHNIQUE: Axial noncontrast CT images from the skull base to the vertex   were obtained and submitted for interpretation. Coronal and sagittal   reformatted images were performed. Bone and soft tissue windows were   evaluated.    FINDINGS:    There is no acute intracranial mass-effect, hemorrhage, midline shift, or   abnormal extra-axial fluid collection.     Moderate patchy hypodensity in the periventricular and subcortical white   matter compatible with microvascular ischemic changes.    Age-appropriate cerebral volume loss with proportional sulcal and   ventricular prominence. No hydrocephalus. Basal cisterns are patent.     Left maxillary sinus retention cyst versus polyp and minimal right   maxillary sinus mucosal thickening. Paranasal sinuses and mastoid air   cells are clear. Status post bilateral cataract surgery. Calvarium is   intact.     IMPRESSION:     No acute intracranial bleeding, mass effect, or shift.     OSIEL HYDE M.D., RADIOLGY RESIDENT  This document has been electronically signed.  CORNELL SHEA M.D., ATTENDING RADIOLOGIST  This document has been electronically signed. Feb  3 2019  3:46PM      < end of copied text >  ---------------------------------------------------------------------------------------------------------------

## 2019-02-05 NOTE — SWALLOW BEDSIDE ASSESSMENT ADULT - SLP PERTINENT HISTORY OF CURRENT PROBLEM
H&P: 89 yo male w complicated hx who presented w metabolic encephalopathy 2/2 uremia, volume depletion, uti, stercoral colitis and uncontrolled hyperglycemia. will gently hydrate, place on insulin, get S&S eval, place on dysphagia diet w renal restrictions, blood and urine Cx sent. ptn is not in HF exacerbation. renal, card, GI, endo, pulm consults called. pt not on Eliquis any longer, will inquire why was DCed w his cardiologist. In the ED, the patient is lethargic and unable to follow commands, reports he is "thirsty and tired." As per patient profile: Pt experiences difficulty speaking and understanding language, and is reportedly nonverbal. Pt. has difficulty swallowing foods/liquids. 2/3/19 CT abd/pel: Moderate right pleural effusion with passive atelectasis of the right lower lobe; Cirrhosis with moderate ascites; The rectum is distended with stool with mild thickening suggesting stercoral colitis. H&P: 89 yo male w complicated hx who presented w metabolic encephalopathy 2/2 uremia, volume depletion, uti, stercoral colitis and uncontrolled hyperglycemia. will gently hydrate, place on insulin, get S&S eval, place on dysphagia diet w renal restrictions, blood and urine Cx sent. ptn is not in HF exacerbation. renal, card, GI, endo, pulm consults called. pt not on Eliquis any longer, will inquire why was DCed w his cardiologist. In the ED, the patient is lethargic and unable to follow commands, reports he is "thirsty and tired." As per patient profile: Pt experiences difficulty speaking and understanding language, and is reportedly nonverbal. Pt. has difficulty swallowing foods/liquids. 2/3/19 CT abd/pel: Moderate right pleural effusion with passive atelectasis of the right lower lobe; Cirrhosis with moderate ascites; The rectum is distended with stool with mild thickening suggesting stercoral colitis.2/3/19 CXR: Right pleural effusion. Pulmonary edema. H&P: 89 y/o male w complicated hx who presented w metabolic encephalopathy 2/2 uremia, volume depletion, UTI, stercoral colitis and uncontrolled hyperglycemia. MD plan: gently hydrate, place on insulin, get S&S eval, place on dysphagia diet w renal restrictions, blood and urine Cx sent. Pt is not in HF exacerbation. Renal, card, GI, endo, pulm consults called.  Pt not on Eliquis any longer.  In the ED, the patient was lethargic and unable to follow commands, reported he was "thirsty and tired." As per patient profile: Pt experiences difficulty speaking and understanding language, and is reportedly nonverbal. Pt. has difficulty swallowing foods/liquids. 2/3/19 CT abd/pel: Moderate right pleural effusion with passive atelectasis of the right lower lobe; Cirrhosis with moderate ascites; The rectum is distended with stool with mild thickening suggesting stercoral colitis.2/3/19 CXR: Right pleural effusion. Pulmonary edema.

## 2019-02-05 NOTE — DIETITIAN INITIAL EVALUATION ADULT. - OTHER INFO
Patient referred for poor po intake x 5 days PTA.  Upon chart review, patient was doing well nutritionally as of June 2018.  Baseline weight was 163 pounds.  Now down to 130 pounds likely due to medical and physiological decline and decreased oral intake.  Patient is now DNR and to be going home on Home Hospice and family agrees to stop any blood draws and aggressive interventions.  Patient is at end of life. Diet has been liberalized from renal Dysphagia 1 with Honey consistency liquids, consistent CHO to only Mechanical soft. As per renal, no need to restrict diet.  K 3.4.  Patient complaining of constant thirst. Despite physical decline, patient is eating fairly well.  Patient was tolerating pureed texture and now need to monitor tolerance of upgrade to mechanical soft.  If does not do well, will need to downgrade to pureed.  Patient is spoon fed by family and staff and observed by this RDN to take meds well with applesauce.

## 2019-02-05 NOTE — DISCHARGE NOTE ADULT - PATIENT PORTAL LINK FT
You can access the [facility] Patient Portal, offered by [facility], by registering with the following website: [email or web address]

## 2019-02-06 LAB
ANION GAP SERPL CALC-SCNC: 23 MMOL/L — HIGH (ref 5–17)
BUN SERPL-MCNC: 125 MG/DL — HIGH (ref 7–23)
CALCIUM SERPL-MCNC: 8.8 MG/DL — SIGNIFICANT CHANGE UP (ref 8.4–10.5)
CHLORIDE SERPL-SCNC: 93 MMOL/L — LOW (ref 96–108)
CO2 SERPL-SCNC: 27 MMOL/L — SIGNIFICANT CHANGE UP (ref 22–31)
CREAT SERPL-MCNC: 3.68 MG/DL — HIGH (ref 0.5–1.3)
GLUCOSE BLDC GLUCOMTR-MCNC: 143 MG/DL — HIGH (ref 70–99)
GLUCOSE BLDC GLUCOMTR-MCNC: 197 MG/DL — HIGH (ref 70–99)
GLUCOSE BLDC GLUCOMTR-MCNC: 214 MG/DL — HIGH (ref 70–99)
GLUCOSE SERPL-MCNC: 124 MG/DL — HIGH (ref 70–99)
HCT VFR BLD CALC: 36.9 % — LOW (ref 39–50)
HGB BLD-MCNC: 11.2 G/DL — LOW (ref 13–17)
MCHC RBC-ENTMCNC: 26.6 PG — LOW (ref 27–34)
MCHC RBC-ENTMCNC: 30.3 GM/DL — LOW (ref 32–36)
MCV RBC AUTO: 87.6 FL — SIGNIFICANT CHANGE UP (ref 80–100)
PLATELET # BLD AUTO: 144 K/UL — LOW (ref 150–400)
POTASSIUM SERPL-MCNC: 3.6 MMOL/L — SIGNIFICANT CHANGE UP (ref 3.5–5.3)
POTASSIUM SERPL-SCNC: 3.6 MMOL/L — SIGNIFICANT CHANGE UP (ref 3.5–5.3)
RBC # BLD: 4.21 M/UL — SIGNIFICANT CHANGE UP (ref 4.2–5.8)
RBC # FLD: 18.9 % — HIGH (ref 10.3–14.5)
SODIUM SERPL-SCNC: 143 MMOL/L — SIGNIFICANT CHANGE UP (ref 135–145)
WBC # BLD: 8.8 K/UL — SIGNIFICANT CHANGE UP (ref 3.8–10.5)
WBC # FLD AUTO: 8.8 K/UL — SIGNIFICANT CHANGE UP (ref 3.8–10.5)

## 2019-02-06 RX ORDER — ACETAMINOPHEN 500 MG
1000 TABLET ORAL ONCE
Qty: 0 | Refills: 0 | Status: COMPLETED | OUTPATIENT
Start: 2019-02-06 | End: 2019-02-06

## 2019-02-06 RX ORDER — LANOLIN ALCOHOL/MO/W.PET/CERES
3 CREAM (GRAM) TOPICAL ONCE
Qty: 0 | Refills: 0 | Status: COMPLETED | OUTPATIENT
Start: 2019-02-06 | End: 2019-02-06

## 2019-02-06 RX ADMIN — Medication 400 MILLIGRAM(S): at 03:19

## 2019-02-06 RX ADMIN — Medication 3 MILLILITER(S): at 18:50

## 2019-02-06 RX ADMIN — APIXABAN 2.5 MILLIGRAM(S): 2.5 TABLET, FILM COATED ORAL at 06:17

## 2019-02-06 RX ADMIN — MIDODRINE HYDROCHLORIDE 5 MILLIGRAM(S): 2.5 TABLET ORAL at 21:17

## 2019-02-06 RX ADMIN — MIDODRINE HYDROCHLORIDE 5 MILLIGRAM(S): 2.5 TABLET ORAL at 15:17

## 2019-02-06 RX ADMIN — Medication 3 MILLILITER(S): at 06:17

## 2019-02-06 RX ADMIN — Medication 1: at 18:55

## 2019-02-06 RX ADMIN — Medication 50 MILLIGRAM(S): at 18:51

## 2019-02-06 RX ADMIN — Medication 100 MILLIGRAM(S): at 18:50

## 2019-02-06 RX ADMIN — Medication 1000 MILLIGRAM(S): at 03:49

## 2019-02-06 RX ADMIN — Medication 3 MILLILITER(S): at 12:00

## 2019-02-06 RX ADMIN — APIXABAN 2.5 MILLIGRAM(S): 2.5 TABLET, FILM COATED ORAL at 18:51

## 2019-02-06 RX ADMIN — MIDODRINE HYDROCHLORIDE 5 MILLIGRAM(S): 2.5 TABLET ORAL at 06:17

## 2019-02-06 RX ADMIN — Medication 100 MILLIGRAM(S): at 06:17

## 2019-02-06 RX ADMIN — AMIODARONE HYDROCHLORIDE 400 MILLIGRAM(S): 400 TABLET ORAL at 06:17

## 2019-02-06 RX ADMIN — Medication 3 MILLIGRAM(S): at 21:17

## 2019-02-06 NOTE — PROGRESS NOTE ADULT - SUBJECTIVE AND OBJECTIVE BOX
FRANKY, JODY    Still weak.  Alert.  Not dyspneic.    Allergies    No Known Allergies    MEDICATIONS  (STANDING):  ALBUTerol/ipratropium for Nebulization 3 milliLiter(s) Nebulizer every 6 hours  amiodarone    Tablet 400 milliGRAM(s) Oral daily  apixaban 2.5 milliGRAM(s) Oral every 12 hours  dextrose 5%. 1000 milliLiter(s) (50 mL/Hr) IV Continuous <Continuous>  dextrose 50% Injectable 12.5 Gram(s) IV Push once  dextrose 50% Injectable 25 Gram(s) IV Push once  dextrose 50% Injectable 25 Gram(s) IV Push once  docusate sodium 100 milliGRAM(s) Oral two times a day  insulin lispro (HumaLOG) corrective regimen sliding scale   SubCutaneous three times a day before meals  insulin lispro (HumaLOG) corrective regimen sliding scale   SubCutaneous at bedtime  midodrine 5 milliGRAM(s) Oral three times a day  polyethylene glycol 3350 17 Gram(s) Oral daily  Saliva Substitute (CAPHOSOL) 30 milliLiter(s) Swish and Spit four times a day  senna 2 Tablet(s) Oral at bedtime  torsemide 50 milliGRAM(s) Oral two times a day    MEDICATIONS  (PRN):  bisacodyl Suppository 10 milliGRAM(s) Rectal daily PRN Constipation  dextrose 40% Gel 15 Gram(s) Oral once PRN Blood Glucose LESS THAN 70 milliGRAM(s)/deciliter  glucagon  Injectable 1 milliGRAM(s) IntraMuscular once PRN Glucose LESS THAN 70 milligrams/deciliter  HYDROmorphone  Injectable 0.5 milliGRAM(s) IV Push every 6 hours PRN Moderate Pain (4 - 6)  LORazepam   Injectable 0.25 milliGRAM(s) IV Push every 4 hours PRN Anxiety    PHYSICAL EXAM:  Vital Signs Last 24 Hrs  T(C): 36.5 (2019 13:39), Max: 36.5 (2019 13:39)  T(F): 97.7 (2019 13:39), Max: 97.7 (2019 13:39)  HR: 101 (2019 13:39) (65 - 101)  BP: 97/53 (2019 13:39) (94/67 - 108/72)  BP(mean): --  RR: 18 (2019 13:39) (16 - 18)  SpO2: 100% (2019 13:39) (95% - 100%)  Daily     Daily Weight in k (2019 18:30)  I&O's Summary    2019 07:01  -  2019 07:00  --------------------------------------------------------  IN: 480 mL / OUT: 875 mL / NET: -395 mL      General Appearance: 	 Alert, cooperative, no distress  Neck: JVP elevated at 15 cm  Lungs:  clear to auscultation and percussion bilaterally  Cor:  pmi 5th ICS MCL, Irregular rate and rhythm, S1 normal intensity, S2 normal intensity  Abdomen:	 soft, non-tender; bowel sounds normal  Extremities: without cyanosis, clubbing or edema      EKG:  Telemetry:    Labs:  CBC Full  -  ( 2019 07:02 )  WBC Count : 7.7 K/uL  Hemoglobin : 9.0 g/dL  Hematocrit : 29.3 %  Platelet Count - Automated : 127 K/uL  Mean Cell Volume : 88.8 fl  Mean Cell Hemoglobin : 27.1 pg  Mean Cell Hemoglobin Concentration : 30.6 gm/dL  Auto Neutrophil # : x  Auto Lymphocyte # : x  Auto Monocyte # : x  Auto Eosinophil # : x  Auto Basophil # : x  Auto Neutrophil % : x  Auto Lymphocyte % : x  Auto Monocyte % : x  Auto Eosinophil % : x  Auto Basophil % : x    Basic Metabolic Panel - STAT (19 @ 08:47)    Sodium, Serum: 141 mmol/L    Potassium, Serum: 3.4 mmol/L    Chloride, Serum: 91 mmol/L    Carbon Dioxide, Serum: 26 mmol/L    Anion Gap, Serum: 24 mmol/L    Blood Urea Nitrogen, Serum: 120 mg/dL    Creatinine, Serum: 3.52 mg/dL    Glucose, Serum: 188 mg/dL    Calcium, Total Serum: 8.5 mg/dL    eGFR if Non : 14: Interpretative comment  The units for eGFR are mL/min/1.73M2 (normalized body surface area). The  eGFR is calculated from a serum creatinine using the CKD-EPI equation.  Other variables required for calculation are race, age and sex. Among  patients with chronic kidney disease (CKD), the eGFR is useful in  determining the stage of disease according to KDOQI CKD classification.  All eGFR results are reported numerically with the following  interpretation.          GFR                    With                 Without     (ml/min/1.73 m2)    Kidney Damage       Kidney Damage        >= 90                    Stage 1                     Normal        60-89                    Stage 2                     Decreased GFR        30-59     Stage 3                     Stage 3        15-29                    Stage 4                     Stage 4        < 15                      Stage 5                     Stage 5  Each stage of CKD assumes that the associated GFR level has been in  effect for at least 3 months. Determination of stages one and two (with  eGFR > 59 ml/min/m2) requires estimation of kidney damage for at least 3  months as defined by structural or functional abnormalities.  Limitations: All estimates of GFR will be less accurate for patients at  extremes of muscle mass (including but not limited to frail elderly,  critically ill, or cancer patients), those with unusual diets, and those  with conditions associated with reduced secretion or extrarenal  elimination of creatinine. The eGFR equation is not recommended for use  in patients with unstable creatinine levels. mL/min/1.73M2    eGFR if African American: 17 mL/min/1.73M2

## 2019-02-06 NOTE — PROGRESS NOTE ADULT - ASSESSMENT
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 Weston.  He also does not feel dialysis would significantly alter quality or duration of life.  Family is now opting for palliative care.    Continue oral torsemide dose now and eliquis as his stroke risk is very high.   Family does want blood draws but otherwise comfort care.  Continue amiodarone for rate control.  Observe off antibiotics as per Dr. Reid.  Dispo planning home.    Morro Martins MD  [facility]

## 2019-02-06 NOTE — PROGRESS NOTE ADULT - SUBJECTIVE AND OBJECTIVE BOX
No pain, no shortness of breath      VITAL:  T(C): , Max: 36.4 (02-05-19 @ 09:27)  T(F): , Max: 97.6 (02-05-19 @ 09:27)  HR: 65 (02-06-19 @ 06:15)  BP: 94/67 (02-06-19 @ 06:15)  RR: 16 (02-06-19 @ 06:15)  SpO2: 95% (02-06-19 @ 06:15)      PHYSICAL EXAM:  Constitutional: frail, lethargic/confused, NAD  HEENT:  NCAT; DMM  Neck: No JVD; supple  Respiratory: (+)rhonchi b/l  Cardiac: irreg s1s2  Gastrointestinal: BS+, soft, NT/ND  Urologic: (+) thorpe  Extremities: warm x 4  Back: No CVAT b/l      LABS:                        9.0    7.7   )-----------( 127      ( 05 Feb 2019 07:02 )             29.3     Na(141)/K(3.4)/Cl(91)/HCO3(26)/BUN(120)/Cr(3.52)Glu(188)/Ca(8.5)/Mg(2.2)/PO4(7.6)    02-05 @ 08:47  Na(141)/K(3.8)/Cl(97)/HCO3(24)/BUN(119)/Cr(3.67)Glu(79)/Ca(8.4)/Mg(--)/PO4(--)    02-04 @ 06:36  Na(145)/K(3.6)/Cl(94)/HCO3(34)/BUN(126)/Cr(3.83)Glu(175)/Ca(8.8)/Mg(--)/PO4(--)    02-03 @ 18:33  Na(145)/K(3.6)/Cl(92)/HCO3(33)/BUN(129)/Cr(3.85)Glu(244)/Ca(8.9)/Mg(2.4)/PO4(6.5)    02-03 @ 14:31      IMPRESSION: 89M w/ HTN, DM2, AFib, CKD5, HFrEF-MEMS, recurrent R femoral pseudoanyeurysm, 2/3/19 a/w hyperglycemia/AMS    (1)Renal - CKD5 - AMS of late; uremia is likely playing a role here. Family opting against HD on patient's behalf. I agree with this decision.  (2)CV - off IVF - is there a need for Lasix? Would only look to give if developing worsening dyspnea  (3)Goals of care - family opting to focus on comfort measures at this point (again, which I agree with).       RECOMMEND:  (1)No HD  (2)Allow for pleasure feeds/nonthickened water  (3)No further blood draws  (4)Lasix 40mg iv prn dyspnea  (5)Maintain thorpe in place  (6)Home Hospice setup          Elvin Weston MD  [facility]  [phone] lethargic/minimally communicative. Son at bedside.      VITAL:  T(C): , Max: 36.4 (02-05-19 @ 09:27)  T(F): , Max: 97.6 (02-05-19 @ 09:27)  HR: 65 (02-06-19 @ 06:15)  BP: 94/67 (02-06-19 @ 06:15)  RR: 16 (02-06-19 @ 06:15)  SpO2: 95% (02-06-19 @ 06:15)      PHYSICAL EXAM:  Constitutional: frail, lethargic/confused, NAD  HEENT:  NCAT; DMM  Neck: No JVD; supple  Respiratory: (+)rhonchi b/l  Cardiac: irreg s1s2  Gastrointestinal: BS+, soft, NT/ND  Urologic: (+) thorpe  Extremities: warm x 4  Back: No CVAT b/l      LABS:                        9.0    7.7   )-----------( 127      ( 05 Feb 2019 07:02 )             29.3     Na(141)/K(3.4)/Cl(91)/HCO3(26)/BUN(120)/Cr(3.52)Glu(188)/Ca(8.5)/Mg(2.2)/PO4(7.6)    02-05 @ 08:47  Na(141)/K(3.8)/Cl(97)/HCO3(24)/BUN(119)/Cr(3.67)Glu(79)/Ca(8.4)/Mg(--)/PO4(--)    02-04 @ 06:36  Na(145)/K(3.6)/Cl(94)/HCO3(34)/BUN(126)/Cr(3.83)Glu(175)/Ca(8.8)/Mg(--)/PO4(--)    02-03 @ 18:33  Na(145)/K(3.6)/Cl(92)/HCO3(33)/BUN(129)/Cr(3.85)Glu(244)/Ca(8.9)/Mg(2.4)/PO4(6.5)    02-03 @ 14:31      IMPRESSION: 89M w/ HTN, DM2, AFib, CKD5, HFrEF-MEMS, recurrent R femoral pseudoanyeurysm, 2/3/19 a/w hyperglycemia/AMS    (1)Renal - CKD5 - AMS of late; uremia is likely playing a role here. Family opting against HD on patient's behalf. I agree with this decision.  (2)CV - off IVF - is there a need for Lasix? Would only look to give if developing worsening dyspnea  (3)Goals of care - family opting to focus on comfort measures at this point (again, which I agree with).       RECOMMEND:  (1)No HD  (2)Allow for pleasure feeds/nonthickened water  (3)No further blood draws  (4)Lasix 40mg iv prn dyspnea  (5)Maintain thorpe in place  (6)Home Hospice setup          Elvin Weston MD  [facility]  [phone]

## 2019-02-06 NOTE — PROGRESS NOTE ADULT - SUBJECTIVE AND OBJECTIVE BOX
Patient is a 89y old  Male who presents with a chief complaint of altered mental status (06 Feb 2019 15:44)      SUBJECTIVE / OVERNIGHT EVENTS: daughter wants blood draws restarted, waiting DC home w hospice , on prn lasix and on AC    MEDICATIONS  (STANDING):  ALBUTerol/ipratropium for Nebulization 3 milliLiter(s) Nebulizer every 6 hours  amiodarone    Tablet 400 milliGRAM(s) Oral daily  apixaban 2.5 milliGRAM(s) Oral every 12 hours  dextrose 5%. 1000 milliLiter(s) (50 mL/Hr) IV Continuous <Continuous>  dextrose 50% Injectable 12.5 Gram(s) IV Push once  dextrose 50% Injectable 25 Gram(s) IV Push once  dextrose 50% Injectable 25 Gram(s) IV Push once  docusate sodium 100 milliGRAM(s) Oral two times a day  insulin lispro (HumaLOG) corrective regimen sliding scale   SubCutaneous three times a day before meals  insulin lispro (HumaLOG) corrective regimen sliding scale   SubCutaneous at bedtime  midodrine 5 milliGRAM(s) Oral three times a day  polyethylene glycol 3350 17 Gram(s) Oral daily  Saliva Substitute (CAPHOSOL) 30 milliLiter(s) Swish and Spit four times a day  senna 2 Tablet(s) Oral at bedtime  torsemide 50 milliGRAM(s) Oral two times a day    MEDICATIONS  (PRN):  bisacodyl Suppository 10 milliGRAM(s) Rectal daily PRN Constipation  dextrose 40% Gel 15 Gram(s) Oral once PRN Blood Glucose LESS THAN 70 milliGRAM(s)/deciliter  glucagon  Injectable 1 milliGRAM(s) IntraMuscular once PRN Glucose LESS THAN 70 milligrams/deciliter  HYDROmorphone  Injectable 0.5 milliGRAM(s) IV Push every 6 hours PRN Moderate Pain (4 - 6)  LORazepam   Injectable 0.25 milliGRAM(s) IV Push every 4 hours PRN Anxiety      Vital Signs Last 24 Hrs  T(F): 97.5 (02-06-19 @ 18:05), Max: 97.7 (02-06-19 @ 13:39)  HR: 87 (02-06-19 @ 18:05) (65 - 101)  BP: 105/67 (02-06-19 @ 18:05) (94/67 - 108/72)  RR: 18 (02-06-19 @ 18:05) (16 - 18)  SpO2: 100% (02-06-19 @ 18:05) (95% - 100%)  Telemetry:   CAPILLARY BLOOD GLUCOSE      POCT Blood Glucose.: 197 mg/dL (06 Feb 2019 18:47)  POCT Blood Glucose.: 143 mg/dL (06 Feb 2019 13:53)  POCT Blood Glucose.: 147 mg/dL (05 Feb 2019 22:08)    I&O's Summary    05 Feb 2019 07:01  -  06 Feb 2019 07:00  --------------------------------------------------------  IN: 480 mL / OUT: 875 mL / NET: -395 mL    06 Feb 2019 07:01  -  06 Feb 2019 20:26  --------------------------------------------------------  IN: 560 mL / OUT: 250 mL / NET: 310 mL        PHYSICAL EXAM:  GENERAL: NAD, well-developed  HEAD:  Atraumatic, Normocephalic  EYES: EOMI, PERRLA, conjunctiva and sclera clear  NECK: Supple, No JVD  CHEST/LUNG: Clear to auscultation bilaterally; No wheeze  HEART: Regular rate and rhythm; No murmurs, rubs, or gallops  ABDOMEN: Soft, Nontender, Nondistended; Bowel sounds present  EXTREMITIES:  2+ Peripheral Pulses, No clubbing, cyanosis, or edema  PSYCH: AAOx3  NEUROLOGY: non-focal  SKIN: No rashes or lesions    LABS:                        11.2   8.8   )-----------( 144      ( 06 Feb 2019 16:25 )             36.9     02-06    143  |  93<L>  |  125<H>  ----------------------------<  124<H>  3.6   |  27  |  3.68<H>    Ca    8.8      06 Feb 2019 16:25  Phos  7.6     02-05  Mg     2.2     02-05                RADIOLOGY & ADDITIONAL TESTS:    Imaging Personally Reviewed:    Consultant(s) Notes Reviewed:      Care Discussed with Consultants/Other Providers:

## 2019-02-06 NOTE — PROGRESS NOTE ADULT - SUBJECTIVE AND OBJECTIVE BOX
[facility]     PROGRESS NOTE    CHIEF COMPLAINT: chronic hypercapnic respiratory failure; hypoxemia; pulmonary edema; pleural effusions; CKD    INTERVAL HISTORY: in bed; awake and alert; very tired having not slept in days; weak and frail; eating very little; mouth much less dry with water and Caphosol; no shortness of breath on a nasal canula; no cough, sputum production, chest congestion or wheeze; no fevers, chills or sweats; no chest pain/pressure or palpitations; denies pain    REVIEW OF SYSTEMS:  Constitutional: As per interval history  HEENT: Within normal limits  CV: As per interval history  Resp: As per interval history  GI: Within normal limits   : CKD/uremia  Musculoskeletal: Within normal limits  Skin: Within normal limits  Neurological: encephalopathy  Psychiatric: calm  Endocrine: Within normal limits  Hematologic/Lymphatic: Within normal limits  Allergic/Immunologic: Within normal limits    MEDICATIONS:     Pulmonary "  ALBUTerol/ipratropium for Nebulization 3 milliLiter(s) Nebulizer every 6 hours      Anti-microbials:      Cardiovascular:  amiodarone    Tablet 400 milliGRAM(s) Oral daily  midodrine 5 milliGRAM(s) Oral three times a day      Other:  apixaban 2.5 milliGRAM(s) Oral every 12 hours  bisacodyl Suppository 10 milliGRAM(s) Rectal daily PRN  dextrose 40% Gel 15 Gram(s) Oral once PRN  dextrose 5%. 1000 milliLiter(s) IV Continuous <Continuous>  dextrose 50% Injectable 12.5 Gram(s) IV Push once  dextrose 50% Injectable 25 Gram(s) IV Push once  dextrose 50% Injectable 25 Gram(s) IV Push once  docusate sodium 100 milliGRAM(s) Oral two times a day  glucagon  Injectable 1 milliGRAM(s) IntraMuscular once PRN  HYDROmorphone  Injectable 0.5 milliGRAM(s) IV Push every 6 hours PRN  insulin lispro (HumaLOG) corrective regimen sliding scale   SubCutaneous three times a day before meals  insulin lispro (HumaLOG) corrective regimen sliding scale   SubCutaneous at bedtime  LORazepam   Injectable 0.25 milliGRAM(s) IV Push every 4 hours PRN  polyethylene glycol 3350 17 Gram(s) Oral daily  Saliva Substitute (CAPHOSOL) 30 milliLiter(s) Swish and Spit four times a day  senna 2 Tablet(s) Oral at bedtime        OBJECTIVE:    I&O's Detail    2019 07:01  -  2019 07:00  --------------------------------------------------------  IN:    Oral Fluid: 480 mL  Total IN: 480 mL    OUT:    Indwelling Catheter - Urethral: 875 mL  Total OUT: 875 mL    Total NET: -395 mL    Daily Weight in k (2019 18:30)    POCT Blood Glucose.: 147 mg/dL (2019 22:08)  POCT Blood Glucose.: 128 mg/dL (2019 18:45)  POCT Blood Glucose.: 164 mg/dL (2019 13:54)      PHYSICAL EXAM:       ICU Vital Signs Last 24 Hrs  T(C): 36.4 (2019 10:18), Max: 36.4 (2019 10:18)  T(F): 97.6 (2019 10:18), Max: 97.6 (2019 10:18)  HR: 93 (2019 10:18) (65 - 98)  BP: 108/72 (2019 10:18) (94/67 - 108/72)  BP(mean): --  ABP: --  ABP(mean): --  RR: 18 (2019 10:18) (16 - 18)  SpO2: 100% (2019 10:18) (95% - 100%) on 2lpm nasal canula     General: Weak. Frail. Awake. Alert. No distress. Appears stated age 	  HEENT:   Atraumatic. Bitemporal wasting. Anicteric. Normal oral mucosa. PERRL. EOMI.  Neck: Supple. Trachea midline. Thyroid without enlargement/tenderness/nodules. No carotid bruit. No JVD. Loss of bilateral supraclavicular fat pads.	  Cardiovascular: Irregularly irregular rate and rhythm. S1 S2 normal. No murmurs, rubs or gallops.  Respiratory: Respirations unlabored. Mild bilateral course breath sounds. Bilaterally decreased breath sounds ~ 1/2 up on the right and at the base on the left with dullness to percussion. Kyphosis.  Abdomen: Soft. Non-tender. Non-distended. No organomegaly. No masses. Normal bowel sounds. James catheter.  Extremities: Cool to touch. No clubbing or cyanosis. No pedal edema.  Pulses: 2+ peripheral pulses all extremities.	  Skin: Normal skin color. No rashes or lesions. No ecchymoses. No cyanosis. Cool to touch.  Lymph Nodes: Cervical, supraclavicular and axillary nodes normal  Neurological: Moving all extremities. A and O x 2  Psychiatry: Calm    LABS:                        9.0    7.7   )-----------( 127      ( 2019 07:02 )             29.3     02-05    141  |  91<L>  |  120<H>  ----------------------------<  188<H>  3.4<L>   |  26  |  3.52<H>    Ca      8.5      -    Ca      3.4<LL>      02-    Phos    7.6     02-05    Phos    3.0     02-05      Mg       2.2     02-05    Mg       1.0     02-05    TPro  6.4  /  Alb  3.3  /  TBili  2.3<H>  /  DBili  x   /  AST  29  /  ALT  22  /  AlkPhos  114  02-03    TPro  6.6  /  Alb  3.5  /  TBili  2.5<H>  /  DBili  x   /  AST  33  /  ALT  23  /  AlkPhos  118  02-03    PT/INR - ( 2019 14:31 )   PT: 18.0 sec;   INR: 1.56 ratio       PTT - ( 2019 14:31 )  PTT:30.5 sec    Venous Blood Gas:   @ 18:33  7.43/56/30/37/50  VBG Lactate: 2.6    Venous Blood Gas:   @ 16:53  7.42/56/29/36/45  VBG Lactate: 3.1    Venous Blood Gas:   @ 16:01  7.40/59/29/36/48  VBG Lactate: 2.8    Venous Blood Gas:   @ 14:30  7.46/52/36/36/64  VBG Lactate: 2.7    Serum Pro-Brain Natriuretic Peptide: >83388 pg/mL ( @ 14:31)    < from: Transthoracic Echocardiogram (18 @ 10:22) >    Patient name: JODY WILKINS  YOB: 1928   Age: 89 (M)   MR#: [id number]  Study Date: 2018  [facility]  Study quality: Technically fair  Referring Physician: Daria Ackerman MD  Blood Pressure: 135/76 mmHg  Height: 167 cm  Weight: 76 kg  BSA: 1.9 m2  ------------------------------------------------------------------------  PROCEDURE: Transthoracic echocardiogram with 2-D, M-Mode  and complete spectral and color flow Doppler.  INDICATION: Abnormal electrocardiogram (ECG) (EKG) (R94.31)  ------------------------------------------------------------------------  Dimensions:    Normal Values:  LA:     4.6    2.0 - 4.0 cm  Ao:     3.4    2.0 - 3.8 cm  SEPTUM: 1.5    0.6 - 1.2 cm  PWT:    1.4    0.6 - 1.1 cm  LVIDd:  4.3    3.0 - 5.6 cm  LVIDs:  3.6    1.8 - 4.0 cm  Derived variables:  LVMI: 132 g/m2  RWT: 0.65  Fractional short: 16 %  EF (Visual Estimate): 30 %  EF (Teicholtz): 34 %  Doppler Peak Velocity (m/sec): AoV=1.0  ------------------------------------------------------------------------  Observations:  Mitral Valve: Mitral annular calcification. Tethered mitral  valve leaflets with normal opening. Mild mitral  regurgitation.  Mean transmitral valve gradient equals 2 mm  Hg, consistent with mild mitral stenosis.  Aortic Valve/Aorta: Calcified trileaflet aortic valve with  decreased opening. Peak transaortic valve gradient equals 4  mm Hg, mean transaortic valve gradient equals 2 mm Hg,  aortic valve velocity time integral equals 18 cm. No aortic  valve regurgitation seen. Peak left ventricular outflow  tract gradient equals 1 mm Hg, mean gradient is equal to 1  mm Hg, LVOT velocity time integral equals 10 cm.  Normal aortic root (Ao: 3.4 cm at the sinuses of Valsalva).  Left Atrium: Moderately dilated left atrium.  LA volume  index = 44 cc/m2.  Left Ventricle: Severe global left ventricular systolic  dysfunction. Severe concentric left ventricular  hypertrophy. Moderate diastolic dysfunction (Stage II).  Right Heart: Normal right atrium. Normal right ventricular  size with severely decreased right ventricular systolic  function. TAPSE 0.9cm.  Normal tricuspid valve.  Mild-moderate tricuspid regurgitation. Normal pulmonic  valve. Minimal pulmonic regurgitation.  Pericardium/Pleura: Normal pericardium with no pericardial  effusion.  Hemodynamic: Estimated right atrial pressure is 8 mm Hg.  Estimated right ventricular systolic pressure equals 58 mm  Hg, assuming right atrial pressure equals 8 mm Hg,  consistent with moderate pulmonary hypertension.  ------------------------------------------------------------------------  Conclusions:  1. Severe concentric left ventricular hypertrophy.  2. Severe global left ventricular systolic dysfunction.  3. Normal right ventricular size with severely decreased  right ventricular systolic function. TAPSE 0.9cm.  4. Estimated pulmonary artery systolic pressure equals 58  mm Hg, assuming right atrial pressure equals 8 mm Hg,  consistent with moderate pulmonary pressures.  *** Compared with echocardiogram of 2017, interval  decline in biventricular function.  ------------------------------------------------------------------------  Confirmed on  2018 - 13:51:55 by Clair Luna M.D.  ------------------------------------------------------------------------    < end of copied text >  ---------------------------------------------------------------------------------------------------------------  MICROBIOLOGY:   Urinalysis Basic - ( 2019 14:30 )    Color: Light Orange / Appearance: Turbid / S.016 / pH: x  Gluc: x / Ketone: Negative  / Bili: Negative / Urobili: 3 mg/dL   Blood: x / Protein: 100 mg/dL / Nitrite: Negative   Leuk Esterase: Large / RBC: 96 /hpf /  /HPF   Sq Epi: x / Non Sq Epi: 13 /hpf / Bacteria: Moderate    Culture - Urine (19 @ 18:05)    Specimen Source: .Urine Catheterized    Culture Results:   Culture grew 3 or more types of organisms which indicate  collection contamination; consider recollection only if clinically  indicated.    Culture - Blood (19 @ 17:51)    Specimen Source: .Blood Blood    Culture Results:   No growth to date.    Culture - Blood (19 @ 17:51)    Specimen Source: .Blood Blood    Culture Results:   No growth to date.    RADIOLOGY:  [x] Chest radiographs reviewed and interpreted by me    < from: Xray Chest 1 View AP/PA (19 @ 17:38) >    EXAM:  XR CHEST AP OR PA 1V                          PROCEDURE DATE:  2019      INTERPRETATION:  CLINICAL INFORMATION: Altered mental status.    EXAM: Frontal radiograph of the chest.    COMPARISON: Chest radiograph from 2018.    FINDINGS:  The heart is enlarged.    Right pleural effusion. Pulmonary edema. No pneumothorax.    Degenerative changes and bilateral shoulders.    IMPRESSION: Right pleural effusion. Pulmonary edema.    BERT ARCHER M.D., RADIOLOGY RESIDENT  This document has been electronically signed.  ELIO RAEL M.D., ATTENDING RADIOLOGIST  This document has been electronically signed. Feb  3 2019  9:36PM      < end of copied text >  ---------------------------------------------------------------------------------------------------------------  < from: CT Abdomen and Pelvis No Cont (19 @ 16:28) >    EXAM:  CT ABDOMEN AND PELVIS                          PROCEDURE DATE:  2019      INTERPRETATION:  CLINICAL INFORMATION: Abdominal distention.         COMPARISON: CT chest 2018    PROCEDURE:   CT of the Abdomen and Pelvis was performed without intravenous contrast.   Intravenous contrast: None.  Oral contrast: None.  Sagittal and coronal reformats were performed.    FINDINGS:    LOWER CHEST: Aortic valve calcifications. Mitral annular calcifications.   The heart is enlarged. Moderate right pleural effusion with passive   atelectasis of the right lower lobe. Small left pleural effusion.    LIVER: Nodular, shrunken liver.  BILE DUCTS: Normal caliber.  GALLBLADDER: Within normal limits.   SPLEEN: Within normal limits.  PANCREAS: Within normal limits.  ADRENALS: Thickening of the bilateral adrenal glands.  KIDNEYS/URETERS: Within normal limits. No hydronephrosis.    BLADDER: The bladder is collapsed around James catheter balloon.  REPRODUCTIVE ORGANS:   Prostate is normal in size.     BOWEL: Large amount of stool within the rectum with mild thickening   suggesting stercoral colitis. No bowel obstruction. The appendix is   within normal limits.  PERITONEUM: Moderate ascites.  VESSELS:  Atheromatous disease.  RETROPERITONEUM: No lymphadenopathy.    ABDOMINAL WALL: Within normal limits.  BONES: Chronic fracture of the right iliac wing. Degenerative changes of   spine.    IMPRESSION:     Moderate right pleural effusion with passive atelectasis of the right   lower lobe.    Cirrhosis with moderate ascites.    The rectum is distended with stool with mild thickening suggesting   stercoral colitis.    BERT ARCHER M.D., RADIOLOGY RESIDENT  This document has been electronically signed.  DEBBY VICTOR M.D., ATTENDING RADIOLOGIST  This document has been electronically signed. Feb  3 2019  5:37PM      < end of copied text >  ---------------------------------------------------------------------------------------------------------------  < from: CT Head No Cont (19 @ 14:54) >    EXAM:  CT BRAIN                          PROCEDURE DATE:  2019      INTERPRETATION:  HISTORY: Altered mental status. Confusion.    COMPARISON: CT head 2017.    TECHNIQUE: Axial noncontrast CT images from the skull base to the vertex   were obtained and submitted for interpretation. Coronal and sagittal   reformatted images were performed. Bone and soft tissue windows were   evaluated.    FINDINGS:    There is no acute intracranial mass-effect, hemorrhage, midline shift, or   abnormal extra-axial fluid collection.     Moderate patchy hypodensity in the periventricular and subcortical white   matter compatible with microvascular ischemic changes.    Age-appropriate cerebral volume loss with proportional sulcal and   ventricular prominence. No hydrocephalus. Basal cisterns are patent.     Left maxillary sinus retention cyst versus polyp and minimal right   maxillary sinus mucosal thickening. Paranasal sinuses and mastoid air   cells are clear. Status post bilateral cataract surgery. Calvarium is   intact.     IMPRESSION:     No acute intracranial bleeding, mass effect, or shift.     OSIEL HYDE M.D., RADIOLGY RESIDENT  This document has been electronically signed.  CORNELL SHEA M.D., ATTENDING RADIOLOGIST  This document has been electronically signed. Feb  3 2019  3:46PM      < end of copied text >  ---------------------------------------------------------------------------------------------------------------

## 2019-02-06 NOTE — PROGRESS NOTE ADULT - SUBJECTIVE AND OBJECTIVE BOX
JODY WILKINS:[id number],   89yMale followed for:  No Known Allergies    PAST MEDICAL & SURGICAL HISTORY:  CHF (congestive heart failure)  Afib  COPD (chronic obstructive pulmonary disease)  Type 2 diabetes mellitus with retinopathy, macular edema presence unspecified, unspecified long term insulin use status, unspecified retinopathy severity  HLD (hyperlipidemia)  HTN (hypertension)  S/P ECCE (extracapsular cataract extraction)    FAMILY HISTORY:  Family history of diabetes mellitus (DM) (Uncle)    MEDICATIONS  (STANDING):  ALBUTerol/ipratropium for Nebulization 3 milliLiter(s) Nebulizer every 6 hours  amiodarone    Tablet 400 milliGRAM(s) Oral daily  apixaban 2.5 milliGRAM(s) Oral every 12 hours  dextrose 5%. 1000 milliLiter(s) (50 mL/Hr) IV Continuous <Continuous>  dextrose 50% Injectable 12.5 Gram(s) IV Push once  dextrose 50% Injectable 25 Gram(s) IV Push once  dextrose 50% Injectable 25 Gram(s) IV Push once  docusate sodium 100 milliGRAM(s) Oral two times a day  insulin lispro (HumaLOG) corrective regimen sliding scale   SubCutaneous three times a day before meals  insulin lispro (HumaLOG) corrective regimen sliding scale   SubCutaneous at bedtime  midodrine 5 milliGRAM(s) Oral three times a day  polyethylene glycol 3350 17 Gram(s) Oral daily  Saliva Substitute (CAPHOSOL) 30 milliLiter(s) Swish and Spit four times a day  senna 2 Tablet(s) Oral at bedtime    MEDICATIONS  (PRN):  bisacodyl Suppository 10 milliGRAM(s) Rectal daily PRN Constipation  dextrose 40% Gel 15 Gram(s) Oral once PRN Blood Glucose LESS THAN 70 milliGRAM(s)/deciliter  glucagon  Injectable 1 milliGRAM(s) IntraMuscular once PRN Glucose LESS THAN 70 milligrams/deciliter  HYDROmorphone  Injectable 0.5 milliGRAM(s) IV Push every 6 hours PRN Moderate Pain (4 - 6)  LORazepam   Injectable 0.25 milliGRAM(s) IV Push every 4 hours PRN Anxiety      Vital Signs Last 24 Hrs  T(C): 36.2 (06 Feb 2019 06:15), Max: 36.3 (05 Feb 2019 18:49)  T(F): 97.2 (06 Feb 2019 06:15), Max: 97.4 (05 Feb 2019 18:49)  HR: 65 (06 Feb 2019 06:15) (65 - 98)  BP: 94/67 (06 Feb 2019 06:15) (94/67 - 102/60)  BP(mean): --  RR: 16 (06 Feb 2019 06:15) (16 - 18)  SpO2: 95% (06 Feb 2019 06:15) (95% - 100%)  nc/at  s1s2  cta  soft, nt, nd no guarding or rebound  no c/c/e    CBC Full  -  ( 05 Feb 2019 07:02 )  WBC Count : 7.7 K/uL  Hemoglobin : 9.0 g/dL  Hematocrit : 29.3 %  Platelet Count - Automated : 127 K/uL  Mean Cell Volume : 88.8 fl  Mean Cell Hemoglobin : 27.1 pg  Mean Cell Hemoglobin Concentration : 30.6 gm/dL  Auto Neutrophil # : x  Auto Lymphocyte # : x  Auto Monocyte # : x  Auto Eosinophil # : x  Auto Basophil # : x  Auto Neutrophil % : x  Auto Lymphocyte % : x  Auto Monocyte % : x  Auto Eosinophil % : x  Auto Basophil % : x    02-05    141  |  91<L>  |  120<H>  ----------------------------<  188<H>  3.4<L>   |  26  |  3.52<H>    Ca    8.5      05 Feb 2019 08:47  Phos  7.6     02-05  Mg     2.2     02-05

## 2019-02-06 NOTE — PROGRESS NOTE ADULT - ASSESSMENT
ASSESSMENT:     weakness, lethargy, confusion - multifactorial - improved    1) CKD with uremia - not a candidate for dialysis  2) no evidence of ongoing infection despite abnormal U/A (with a chronic folley), and stercoral colitis" on CT scan  3) hypoxemia due to pulmonary edema with a chronic right sided pleural effusion - the patient has chronic hypercapnic respiratory failure due to advanced COPD which is at baseline and should not be contributing to lethargy  4) hyperglycemia - resolved  5) cirrhosis likely due to chronic passive liver congestion - no evidence of hepatic encephalopathy with a non-elevated ammonia level    HTN/HLD/DM    CAD/ischemic cardiomyopathy    PAD s/p LLE stenting    atrial fibrillation    PLAN/RECOMMENDATIONS:    oxygen supplementation to keep saturation greater than 92%  albuterol/atrovent nebs  caphosol  no indication for a thoracentesis given stable respiratory status unless develops sign/symptoms of a pleural space infection  observe off antibiotics  cardiac meds: amiodarone/midorine/eliquis  GI/DVT prophylaxis  bowel regimen  felt not to be a candidate for dialysis  agree with allowing patient to drink non-thickened liquids as we are pursing comfort care - soft diet    Will follow with you. Plan of care discussed with the patient's family at bedside and Dr. Weston. Discharge planning to home hospice.    Lenny Sparrow MD, [facility] - [phone]  Pulmonary Medicine

## 2019-02-06 NOTE — PROGRESS NOTE ADULT - ASSESSMENT
89 yo male w complicated h/o as stated in H&P presents w metabolic encephalopathy 2/2 uremia, volume depletion, uti, stercoral colitis and uncontrolled hyperglycemia. Improved clinically post  gentle hydration, now on dysphagia diet, off hydration, on lasix, on AC, off ABx, daughter requests blood draws to con't, DNR ,  comfort care, accepted into hospice care network. on prn ativan and prn dilaudid. DC planning home w home Hospice.

## 2019-02-06 NOTE — PROGRESS NOTE ADULT - SUBJECTIVE AND OBJECTIVE BOX
Chief Complaint: 89 y/o Type 2 DM 40 years admitted with dehydration and hyperglycemia.    Patient on mechanical soft (not diabetic) diet.  PO intake small.  Afebrile, VSS, somnolent.  FS in the 120-150 mg/dL on low scale coverage humalog.      MEDICATIONS  (STANDING):  ALBUTerol/ipratropium for Nebulization 3 milliLiter(s) Nebulizer every 6 hours  amiodarone    Tablet 400 milliGRAM(s) Oral daily  apixaban 2.5 milliGRAM(s) Oral every 12 hours  dextrose 5%. 1000 milliLiter(s) (50 mL/Hr) IV Continuous <Continuous>  dextrose 50% Injectable 12.5 Gram(s) IV Push once  dextrose 50% Injectable 25 Gram(s) IV Push once  dextrose 50% Injectable 25 Gram(s) IV Push once  docusate sodium 100 milliGRAM(s) Oral two times a day  insulin lispro (HumaLOG) corrective regimen sliding scale   SubCutaneous three times a day before meals  insulin lispro (HumaLOG) corrective regimen sliding scale   SubCutaneous at bedtime  midodrine 5 milliGRAM(s) Oral three times a day  polyethylene glycol 3350 17 Gram(s) Oral daily  Saliva Substitute (CAPHOSOL) 30 milliLiter(s) Swish and Spit four times a day  senna 2 Tablet(s) Oral at bedtime  torsemide 50 milliGRAM(s) Oral two times a day    MEDICATIONS  (PRN):  bisacodyl Suppository 10 milliGRAM(s) Rectal daily PRN Constipation  dextrose 40% Gel 15 Gram(s) Oral once PRN Blood Glucose LESS THAN 70 milliGRAM(s)/deciliter  glucagon  Injectable 1 milliGRAM(s) IntraMuscular once PRN Glucose LESS THAN 70 milligrams/deciliter  HYDROmorphone  Injectable 0.5 milliGRAM(s) IV Push every 6 hours PRN Moderate Pain (4 - 6)  LORazepam   Injectable 0.25 milliGRAM(s) IV Push every 4 hours PRN Anxiety      Allergies    No Known Allergies    Intolerances        PHYSICAL EXAM:  VITALS: T(C): 36.5 (02-06-19 @ 13:39)  T(F): 97.7 (02-06-19 @ 13:39), Max: 97.7 (02-06-19 @ 13:39)  HR: 101 (02-06-19 @ 13:39) (65 - 101)  BP: 97/53 (02-06-19 @ 13:39) (94/67 - 108/72)  RR:  (16 - 18)  SpO2:  (95% - 100%)  GENERAL: somnolent, confused  EYES: No proptosis, no lid lag, anicteric  HEENT:  Atraumatic, Normocephalic, dry mucous membranes  THYROID: Normal size, no palpable nodules  RESPIRATORY: Clear to auscultation bilaterally; No rales, rhonchi, wheezing  CARDIOVASCULAR: Regular rate and rhythm; No murmurs; no peripheral edema  GI: Soft, nontender, non distended, normal bowel sounds, James catheter.  SKIN: Dry, intact, No rashes or lesions  MUSCULOSKELETAL: bilateral weakness  NEURO: somnolent no localizing deficits.      POCT Blood Glucose.: 143 mg/dL (02-06-19 @ 13:53)  POCT Blood Glucose.: 147 mg/dL (02-05-19 @ 22:08)  POCT Blood Glucose.: 128 mg/dL (02-05-19 @ 18:45)  POCT Blood Glucose.: 164 mg/dL (02-05-19 @ 13:54)  POCT Blood Glucose.: 182 mg/dL (02-05-19 @ 08:49)  POCT Blood Glucose.: 178 mg/dL (02-04-19 @ 22:06)  POCT Blood Glucose.: 138 mg/dL (02-04-19 @ 18:48)  POCT Blood Glucose.: 94 mg/dL (02-04-19 @ 13:45)  POCT Blood Glucose.: 121 mg/dL (02-04-19 @ 09:42)  POCT Blood Glucose.: 124 mg/dL (02-03-19 @ 22:02)  POCT Blood Glucose.: 200 mg/dL (02-03-19 @ 15:49)                            9.0    7.7   )-----------( 127      ( 05 Feb 2019 07:02 )             29.3       02-05    141  |  91<L>  |  120<H>  ----------------------------<  188<H>  3.4<L>   |  26  |  3.52<H>    EGFR if : 17<L>  EGFR if non : 14<L>    Ca    8.5      02-05  Mg     2.2     02-05  Phos  7.6     02-05    TPro  6.4  /  Alb  3.3  /  TBili  2.3<H>  /  DBili  x   /  AST  29  /  ALT  22  /  AlkPhos  114  02-03      Thyroid Function Tests:  02-05 @ 08:50 TSH 3.89 FreeT4 1.2 T3 -- Anti TPO -- Anti Thyroglobulin Ab -- TSI --      Hemoglobin A1C, Whole Blood: 7.3 % <H> [4.0 - 5.6] (12-28-18 @ 09:45)  Hemoglobin A1C, Whole Blood: 6.7 % <H> [4.0 - 5.6] (11-29-18 @ 15:10)

## 2019-02-06 NOTE — PROGRESS NOTE ADULT - ASSESSMENT
89 y/o Type 2 DM 40 years, retinopathy, neuropathy, CAD, CHF, CRF, noted with change in mental status and Hyperglycemia the night prior to admission, recent multiple hospital stays.     Type 2 DM for 40 years. F/U by Dr. Painting. Recently patient treated with Tresiba inasulin 6 units at AM and onglyza 2.5 mg daily.  Patient with multiple recent admissions this last year, with CAD, CHF, AF and CRF. Patient diuretics were increased the day prior to admission, due to weight gain. The night of admission nurse noted confusion, SOB and FS above 400 mg/dL. No fever no chills.  Hyperglycemia continued till patient seen in the ER and gives 5 units of humalog. FS remained low since- 124-->94 today. Recent HbA1c's were lower set.  PO intake small, on diabetic dysphagia diet.   Patient confused, afebrile, ID and renal input noted.     Hyperglycemia M/P  due to dehydration and possible infection.  Patient on mechanical soft (not diabetic) diet.  PO intake small.  Afebrile, VSS, somnolent.  FS in the 120-150 mg/dL on low scale coverage humalog.      Suggest:  FS only with low scale humalog at this time.  TFT's on amiodarone arewere normal.  Gave wife humalog pen to have at home in case of hyperglycemia, instructed to call Dr. Painting if FS>300 mg/dL.

## 2019-02-06 NOTE — PROGRESS NOTE ADULT - SUBJECTIVE AND OBJECTIVE BOX
CC: f/u for lethargy    Patient reports he feels ok, just had a good bm    REVIEW OF SYSTEMS:  All other review of systems negative (Comprehensive ROS)    Antimicrobials Day #  :    Other Medications Reviewed    T(F): 97.7 (02-06-19 @ 13:39), Max: 97.7 (02-06-19 @ 13:39)  HR: 101 (02-06-19 @ 13:39)  BP: 97/53 (02-06-19 @ 13:39)  RR: 18 (02-06-19 @ 13:39)  SpO2: 100% (02-06-19 @ 13:39)  Wt(kg): --    PHYSICAL EXAM:  General: alert, no acute distress  Eyes:  anicteric, no conjunctival injection, no discharge  Oropharynx: no lesions or injection 	  Neck: supple, without adenopathy  Lungs: decreased right base to auscultation  Heart: regular rate and rhythm; no murmur, rubs or gallops  Abdomen: soft, nondistended, nontender, without mass or organomegaly  Skin: no lesions  Extremities: no clubbing, cyanosis, or edema  Neurologic: alert, moves all extremities    LAB RESULTS:                        9.0    7.7   )-----------( 127      ( 05 Feb 2019 07:02 )             29.3     02-05    141  |  91<L>  |  120<H>  ----------------------------<  188<H>  3.4<L>   |  26  |  3.52<H>    Ca    8.5      05 Feb 2019 08:47  Phos  7.6     02-05  Mg     2.2     02-05          ICROBIOLOGY:  RECENT CULTURES:  02-03 @ 18:05 .Urine Catheterized     Culture grew 3 or more types of organisms which indicate  collection contamination; consider recollection only if clinically  indicated.      02-03 @ 17:51 .Blood Blood     No growth to date.          RADIOLOGY REVIEWED:      < from: CT Abdomen and Pelvis No Cont (02.03.19 @ 16:28) >    IMPRESSION:     Moderate right pleural effusion with passive atelectasis of the right   lower lobe.    Cirrhosis with moderate ascites.    The rectum is distended with stool with mild thickening suggesting   stercoral colitis.      < end of copied text >    Assessment:  ELderly man with recent vasculary stent with pseudoaneurysm of cfa, had respiratory failure after placement of device to monitor chf, returns to hospital for lethargy but no fever, urine culture c/w contaminated thorpe specimen, no infection apparent at present  Plan:  monitor off antibiotics  bowel regimen for stercoral colitis  call us if further input is needed

## 2019-02-07 VITALS
HEART RATE: 105 BPM | OXYGEN SATURATION: 98 % | DIASTOLIC BLOOD PRESSURE: 52 MMHG | SYSTOLIC BLOOD PRESSURE: 92 MMHG | TEMPERATURE: 97 F | RESPIRATION RATE: 18 BRPM

## 2019-02-07 LAB
ANION GAP SERPL CALC-SCNC: 22 MMOL/L — HIGH (ref 5–17)
BUN SERPL-MCNC: 127 MG/DL — HIGH (ref 7–23)
CALCIUM SERPL-MCNC: 8.7 MG/DL — SIGNIFICANT CHANGE UP (ref 8.4–10.5)
CHLORIDE SERPL-SCNC: 94 MMOL/L — LOW (ref 96–108)
CO2 SERPL-SCNC: 29 MMOL/L — SIGNIFICANT CHANGE UP (ref 22–31)
CREAT SERPL-MCNC: 3.74 MG/DL — HIGH (ref 0.5–1.3)
GLUCOSE BLDC GLUCOMTR-MCNC: 171 MG/DL — HIGH (ref 70–99)
GLUCOSE SERPL-MCNC: 178 MG/DL — HIGH (ref 70–99)
HCT VFR BLD CALC: 34.3 % — LOW (ref 39–50)
HGB BLD-MCNC: 10.6 G/DL — LOW (ref 13–17)
MCHC RBC-ENTMCNC: 26.9 PG — LOW (ref 27–34)
MCHC RBC-ENTMCNC: 30.8 GM/DL — LOW (ref 32–36)
MCV RBC AUTO: 87.4 FL — SIGNIFICANT CHANGE UP (ref 80–100)
PLATELET # BLD AUTO: 145 K/UL — LOW (ref 150–400)
POTASSIUM SERPL-MCNC: 3.3 MMOL/L — LOW (ref 3.5–5.3)
POTASSIUM SERPL-SCNC: 3.3 MMOL/L — LOW (ref 3.5–5.3)
RBC # BLD: 3.92 M/UL — LOW (ref 4.2–5.8)
RBC # FLD: 18.8 % — HIGH (ref 10.3–14.5)
SODIUM SERPL-SCNC: 145 MMOL/L — SIGNIFICANT CHANGE UP (ref 135–145)
WBC # BLD: 7.7 K/UL — SIGNIFICANT CHANGE UP (ref 3.8–10.5)
WBC # FLD AUTO: 7.7 K/UL — SIGNIFICANT CHANGE UP (ref 3.8–10.5)

## 2019-02-07 PROCEDURE — 99285 EMERGENCY DEPT VISIT HI MDM: CPT | Mod: 25

## 2019-02-07 PROCEDURE — 84100 ASSAY OF PHOSPHORUS: CPT

## 2019-02-07 PROCEDURE — 82947 ASSAY GLUCOSE BLOOD QUANT: CPT

## 2019-02-07 PROCEDURE — 85730 THROMBOPLASTIN TIME PARTIAL: CPT

## 2019-02-07 PROCEDURE — 74176 CT ABD & PELVIS W/O CONTRAST: CPT

## 2019-02-07 PROCEDURE — 85014 HEMATOCRIT: CPT

## 2019-02-07 PROCEDURE — 83690 ASSAY OF LIPASE: CPT

## 2019-02-07 PROCEDURE — 85610 PROTHROMBIN TIME: CPT

## 2019-02-07 PROCEDURE — 82962 GLUCOSE BLOOD TEST: CPT

## 2019-02-07 PROCEDURE — 87040 BLOOD CULTURE FOR BACTERIA: CPT

## 2019-02-07 PROCEDURE — 83735 ASSAY OF MAGNESIUM: CPT

## 2019-02-07 PROCEDURE — 83605 ASSAY OF LACTIC ACID: CPT

## 2019-02-07 PROCEDURE — 80048 BASIC METABOLIC PNL TOTAL CA: CPT

## 2019-02-07 PROCEDURE — 87086 URINE CULTURE/COLONY COUNT: CPT

## 2019-02-07 PROCEDURE — 71045 X-RAY EXAM CHEST 1 VIEW: CPT

## 2019-02-07 PROCEDURE — 96374 THER/PROPH/DIAG INJ IV PUSH: CPT | Mod: XU

## 2019-02-07 PROCEDURE — 85027 COMPLETE CBC AUTOMATED: CPT

## 2019-02-07 PROCEDURE — 93308 TTE F-UP OR LMTD: CPT

## 2019-02-07 PROCEDURE — 83880 ASSAY OF NATRIURETIC PEPTIDE: CPT

## 2019-02-07 PROCEDURE — 93005 ELECTROCARDIOGRAM TRACING: CPT | Mod: XU

## 2019-02-07 PROCEDURE — 82330 ASSAY OF CALCIUM: CPT

## 2019-02-07 PROCEDURE — 82435 ASSAY OF BLOOD CHLORIDE: CPT

## 2019-02-07 PROCEDURE — 84439 ASSAY OF FREE THYROXINE: CPT

## 2019-02-07 PROCEDURE — 81001 URINALYSIS AUTO W/SCOPE: CPT

## 2019-02-07 PROCEDURE — 84443 ASSAY THYROID STIM HORMONE: CPT

## 2019-02-07 PROCEDURE — 80053 COMPREHEN METABOLIC PANEL: CPT

## 2019-02-07 PROCEDURE — 82565 ASSAY OF CREATININE: CPT

## 2019-02-07 PROCEDURE — 70450 CT HEAD/BRAIN W/O DYE: CPT

## 2019-02-07 PROCEDURE — 51702 INSERT TEMP BLADDER CATH: CPT

## 2019-02-07 PROCEDURE — 82140 ASSAY OF AMMONIA: CPT

## 2019-02-07 PROCEDURE — 94640 AIRWAY INHALATION TREATMENT: CPT

## 2019-02-07 PROCEDURE — 82010 KETONE BODYS QUAN: CPT

## 2019-02-07 PROCEDURE — 84132 ASSAY OF SERUM POTASSIUM: CPT

## 2019-02-07 PROCEDURE — 82803 BLOOD GASES ANY COMBINATION: CPT

## 2019-02-07 PROCEDURE — 84295 ASSAY OF SERUM SODIUM: CPT

## 2019-02-07 RX ORDER — POTASSIUM CHLORIDE 20 MEQ
20 PACKET (EA) ORAL
Qty: 0 | Refills: 0 | Status: DISCONTINUED | OUTPATIENT
Start: 2019-02-07 | End: 2019-02-07

## 2019-02-07 RX ORDER — INSULIN DEGLUDEC 100 U/ML
6 INJECTION, SOLUTION SUBCUTANEOUS
Qty: 0 | Refills: 0 | COMMUNITY

## 2019-02-07 RX ORDER — INSULIN DEGLUDEC 100 U/ML
6 INJECTION, SOLUTION SUBCUTANEOUS
Qty: 1 | Refills: 0 | OUTPATIENT
Start: 2019-02-07 | End: 2019-03-08

## 2019-02-07 RX ORDER — APIXABAN 2.5 MG/1
1 TABLET, FILM COATED ORAL
Qty: 60 | Refills: 0 | OUTPATIENT
Start: 2019-02-07 | End: 2019-03-08

## 2019-02-07 RX ORDER — SAXAGLIPTIN 5 MG/1
1 TABLET, FILM COATED ORAL
Qty: 30 | Refills: 0 | OUTPATIENT
Start: 2019-02-07 | End: 2019-03-08

## 2019-02-07 RX ORDER — ROBINUL 0.2 MG/ML
0.5 INJECTION INTRAMUSCULAR; INTRAVENOUS EVERY 6 HOURS
Qty: 0 | Refills: 0 | Status: DISCONTINUED | OUTPATIENT
Start: 2019-02-07 | End: 2019-02-07

## 2019-02-07 RX ORDER — MIDODRINE HYDROCHLORIDE 2.5 MG/1
1 TABLET ORAL
Qty: 0 | Refills: 0 | COMMUNITY

## 2019-02-07 RX ORDER — MIDODRINE HYDROCHLORIDE 2.5 MG/1
1 TABLET ORAL
Qty: 90 | Refills: 0 | OUTPATIENT
Start: 2019-02-07 | End: 2019-03-08

## 2019-02-07 RX ORDER — AMIODARONE HYDROCHLORIDE 400 MG/1
2 TABLET ORAL
Qty: 60 | Refills: 0 | OUTPATIENT
Start: 2019-02-07 | End: 2019-03-08

## 2019-02-07 RX ORDER — IPRATROPIUM/ALBUTEROL SULFATE 18-103MCG
3 AEROSOL WITH ADAPTER (GRAM) INHALATION
Qty: 120 | Refills: 0 | OUTPATIENT
Start: 2019-02-07 | End: 2019-03-08

## 2019-02-07 RX ORDER — BUDESONIDE, MICRONIZED 100 %
0.5 POWDER (GRAM) MISCELLANEOUS EVERY 12 HOURS
Qty: 0 | Refills: 0 | Status: DISCONTINUED | OUTPATIENT
Start: 2019-02-07 | End: 2019-02-07

## 2019-02-07 RX ORDER — SENNA PLUS 8.6 MG/1
2 TABLET ORAL
Qty: 60 | Refills: 0 | OUTPATIENT
Start: 2019-02-07 | End: 2019-03-08

## 2019-02-07 RX ORDER — HYDROMORPHONE HYDROCHLORIDE 2 MG/ML
0.5 INJECTION INTRAMUSCULAR; INTRAVENOUS; SUBCUTANEOUS
Qty: 10 | Refills: 0 | OUTPATIENT
Start: 2019-02-07 | End: 2019-02-11

## 2019-02-07 RX ORDER — SAXAGLIPTIN 5 MG/1
1 TABLET, FILM COATED ORAL
Qty: 0 | Refills: 0 | COMMUNITY

## 2019-02-07 RX ORDER — ROBINUL 0.2 MG/ML
0.2 INJECTION INTRAMUSCULAR; INTRAVENOUS ONCE
Qty: 0 | Refills: 0 | Status: COMPLETED | OUTPATIENT
Start: 2019-02-07 | End: 2019-02-07

## 2019-02-07 RX ORDER — POLYETHYLENE GLYCOL 3350 17 G/17G
17 POWDER, FOR SOLUTION ORAL
Qty: 1 | Refills: 0 | OUTPATIENT
Start: 2019-02-07 | End: 2019-03-08

## 2019-02-07 RX ORDER — AMIODARONE HYDROCHLORIDE 400 MG/1
2 TABLET ORAL
Qty: 0 | Refills: 0 | COMMUNITY

## 2019-02-07 RX ORDER — IPRATROPIUM BROMIDE 0.2 MG/ML
0 SOLUTION, NON-ORAL INHALATION
Qty: 0 | Refills: 0 | COMMUNITY

## 2019-02-07 RX ORDER — DOCUSATE SODIUM 100 MG
1 CAPSULE ORAL
Qty: 60 | Refills: 0 | OUTPATIENT
Start: 2019-02-07 | End: 2019-03-08

## 2019-02-07 RX ADMIN — MIDODRINE HYDROCHLORIDE 5 MILLIGRAM(S): 2.5 TABLET ORAL at 14:59

## 2019-02-07 RX ADMIN — Medication 100 MILLIGRAM(S): at 05:32

## 2019-02-07 RX ADMIN — Medication 3 MILLILITER(S): at 00:19

## 2019-02-07 RX ADMIN — APIXABAN 2.5 MILLIGRAM(S): 2.5 TABLET, FILM COATED ORAL at 05:33

## 2019-02-07 RX ADMIN — AMIODARONE HYDROCHLORIDE 400 MILLIGRAM(S): 400 TABLET ORAL at 05:33

## 2019-02-07 RX ADMIN — ROBINUL 0.2 MILLIGRAM(S): 0.2 INJECTION INTRAMUSCULAR; INTRAVENOUS at 14:58

## 2019-02-07 RX ADMIN — Medication 30 MILLILITER(S): at 05:34

## 2019-02-07 RX ADMIN — POLYETHYLENE GLYCOL 3350 17 GRAM(S): 17 POWDER, FOR SOLUTION ORAL at 12:46

## 2019-02-07 RX ADMIN — Medication 3 MILLILITER(S): at 05:34

## 2019-02-07 RX ADMIN — MIDODRINE HYDROCHLORIDE 5 MILLIGRAM(S): 2.5 TABLET ORAL at 05:32

## 2019-02-07 RX ADMIN — Medication 50 MILLIGRAM(S): at 05:33

## 2019-02-07 RX ADMIN — Medication 3 MILLILITER(S): at 12:46

## 2019-02-07 RX ADMIN — Medication 1: at 10:09

## 2019-02-07 RX ADMIN — Medication 30 MILLILITER(S): at 12:46

## 2019-02-07 NOTE — GOALS OF CARE CONVERSATION - PERSONAL ADVANCE DIRECTIVE - CONVERSATION DETAILS
Met w/ the pt's spouse and dtr Anne to discuss DME delivery. They are unable to accept delivery of DME (bed, air mattress, 02 and commode) until Friday 2/8/19. Megan Sotelo CM made aware. Thank you.
Patient seen and evaluated, case pending approval for home hospice.  At this time, family is agreeable to hospice services and once approval received (likely tomorrow), DME will need to be arranged for delivery.  Hospice will collaborate with Case Management and Medical Team regarding patients discharge home.  Please call [facility] Liaison at [phone] with any questions or concerns.  [facility] office [phone].
Per SHERLYN Guzman, patient may be discharged today.  If this does happen, please collaborate with [facility] Liaison regarding transfer at [phone].  the following medications will be considered related and should be ordered via [facility]:  Amiodarone, Midodrine, apixaban, insulin/humalog, docusate sodium, albuterol/ipratropium, polyethylene glycol, bisacodyl, hydromorphone, lorazepam.

## 2019-02-07 NOTE — PROGRESS NOTE ADULT - SUBJECTIVE AND OBJECTIVE BOX
No pain, no shortness of breath      VITAL:  T(C): , Max: 36.5 (02-06-19 @ 13:39)  T(F): , Max: 97.7 (02-06-19 @ 13:39)  HR: 61 (02-07-19 @ 05:17)  BP: 97/59 (02-07-19 @ 05:17)  RR: 18 (02-07-19 @ 05:17)  SpO2: 98% (02-07-19 @ 05:17)      PHYSICAL EXAM:  Constitutional: frail, lethargic/confused, NAD  HEENT:  NCAT; DMM  Neck: No JVD; supple  Respiratory: (+)rhonchi b/l  Cardiac: irreg s1s2  Gastrointestinal: BS+, soft, NT/ND  Urologic: (+) thorpe  Extremities: warm x 4  Back: No CVAT b/l      LABS:                        11.2   8.8   )-----------( 144      ( 06 Feb 2019 16:25 )             36.9     Na(145)/K(3.3)/Cl(94)/HCO3(29)/BUN(127)/Cr(3.74)Glu(178)/Ca(8.7)/Mg(--)/PO4(--)    02-07 @ 07:18  Na(143)/K(3.6)/Cl(93)/HCO3(27)/BUN(125)/Cr(3.68)Glu(124)/Ca(8.8)/Mg(--)/PO4(--)    02-06 @ 16:25  Na(141)/K(3.4)/Cl(91)/HCO3(26)/BUN(120)/Cr(3.52)Glu(188)/Ca(8.5)/Mg(2.2)/PO4(7.6)    02-05 @ 08:47  Na(108)/K(2.3)/Cl(82)/HCO3(14)/BUN(66)/Cr(1.64)Glu(210)/Ca(3.4)/Mg(1.0)/PO4(3.0)    02-05 @ 07:09      IMPRESSION: 89M w/ HTN, DM2, AFib, CKD5, HFrEF-MEMS, recurrent R femoral pseudoanyeurysm, 2/3/19 a/w hyperglycemia/AMS    (1)Renal - CKD5 - AMS of late; uremia is likely playing a role here. Family opting against HD on patient's behalf. I agree with this decision.  (2)CV - restrictive cardiomyopathy - now back on high-dose Torsemide  (3)Hypokalemia - in association with diuretics/poor PO intake  (4)Goals of care - awaiting discharge on Hospice. Blood draws again being performed, in accordance with daughter's wishes.      RECOMMEND:  (1)No HD  (2)Allow for pleasure feeds/nonthickened water  (3)K-Thu 20 meq po x 2  (4)Maintain thorpe in place  (5)No objection to discharge on Home Hospice            Elvin Weston MD  [facility]  [phone] Minimally communicative at present. Caretaker at bedside.  Events from yesterday noted (ie family now wanting blood draws)    VITAL:  T(C): , Max: 36.5 (02-06-19 @ 13:39)  T(F): , Max: 97.7 (02-06-19 @ 13:39)  HR: 61 (02-07-19 @ 05:17)  BP: 97/59 (02-07-19 @ 05:17)  RR: 18 (02-07-19 @ 05:17)  SpO2: 98% (02-07-19 @ 05:17)      PHYSICAL EXAM:  Constitutional: minimally communicative  HEENT:  NCAT; DMM  Neck: No JVD; supple  Respiratory: CTA-anteriorly  Cardiac: irreg s1s2  Gastrointestinal: BS+, soft, NT/ND  Urologic: (+) thorpe  Extremities: warm x 4  Back: No CVAT b/l      LABS:                        11.2   8.8   )-----------( 144      ( 06 Feb 2019 16:25 )             36.9     Na(145)/K(3.3)/Cl(94)/HCO3(29)/BUN(127)/Cr(3.74)Glu(178)/Ca(8.7)/Mg(--)/PO4(--)    02-07 @ 07:18  Na(143)/K(3.6)/Cl(93)/HCO3(27)/BUN(125)/Cr(3.68)Glu(124)/Ca(8.8)/Mg(--)/PO4(--)    02-06 @ 16:25  Na(141)/K(3.4)/Cl(91)/HCO3(26)/BUN(120)/Cr(3.52)Glu(188)/Ca(8.5)/Mg(2.2)/PO4(7.6)    02-05 @ 08:47  Na(108)/K(2.3)/Cl(82)/HCO3(14)/BUN(66)/Cr(1.64)Glu(210)/Ca(3.4)/Mg(1.0)/PO4(3.0)    02-05 @ 07:09      IMPRESSION: 89M w/ HTN, DM2, AFib, CKD5, HFrEF-MEMS, recurrent R femoral pseudoanyeurysm, 2/3/19 a/w hyperglycemia/AMS    (1)Renal - CKD5 - AMS of late; uremia is likely playing a role here. Family opting against HD on patient's behalf. I agree with this decision.  (2)CV - restrictive cardiomyopathy - now back on high-dose Torsemide  (3)Hypokalemia - in association with diuretics/poor PO intake  (4)Goals of care - awaiting discharge on Hospice. Blood draws again being performed, in accordance with daughter's wishes.      RECOMMEND:  (1)No HD  (2)Allow for pleasure feeds/nonthickened water  (3)K-Thu 20 meq po x 2  (4)Maintain thorpe in place  (5)No objection to discharge on Home Hospice            Elvin Weston MD  [facility]  [phone]

## 2019-02-07 NOTE — PROGRESS NOTE ADULT - REASON FOR ADMISSION
altered mental status

## 2019-02-07 NOTE — PROGRESS NOTE ADULT - SUBJECTIVE AND OBJECTIVE BOX
JODY WILKINS:[id number],   89yMale followed for:  No Known Allergies    PAST MEDICAL & SURGICAL HISTORY:  CHF (congestive heart failure)  Afib  COPD (chronic obstructive pulmonary disease)  Type 2 diabetes mellitus with retinopathy, macular edema presence unspecified, unspecified long term insulin use status, unspecified retinopathy severity  HLD (hyperlipidemia)  HTN (hypertension)  S/P ECCE (extracapsular cataract extraction)    FAMILY HISTORY:  Family history of diabetes mellitus (DM) (Uncle)    MEDICATIONS  (STANDING):  ALBUTerol/ipratropium for Nebulization 3 milliLiter(s) Nebulizer every 6 hours  amiodarone    Tablet 400 milliGRAM(s) Oral daily  apixaban 2.5 milliGRAM(s) Oral every 12 hours  dextrose 5%. 1000 milliLiter(s) (50 mL/Hr) IV Continuous <Continuous>  dextrose 50% Injectable 12.5 Gram(s) IV Push once  dextrose 50% Injectable 25 Gram(s) IV Push once  dextrose 50% Injectable 25 Gram(s) IV Push once  docusate sodium 100 milliGRAM(s) Oral two times a day  insulin lispro (HumaLOG) corrective regimen sliding scale   SubCutaneous three times a day before meals  insulin lispro (HumaLOG) corrective regimen sliding scale   SubCutaneous at bedtime  midodrine 5 milliGRAM(s) Oral three times a day  polyethylene glycol 3350 17 Gram(s) Oral daily  potassium chloride   Powder 20 milliEquivalent(s) Oral two times a day  Saliva Substitute (CAPHOSOL) 30 milliLiter(s) Swish and Spit four times a day  senna 2 Tablet(s) Oral at bedtime  torsemide 50 milliGRAM(s) Oral two times a day    MEDICATIONS  (PRN):  bisacodyl Suppository 10 milliGRAM(s) Rectal daily PRN Constipation  dextrose 40% Gel 15 Gram(s) Oral once PRN Blood Glucose LESS THAN 70 milliGRAM(s)/deciliter  glucagon  Injectable 1 milliGRAM(s) IntraMuscular once PRN Glucose LESS THAN 70 milligrams/deciliter  HYDROmorphone  Injectable 0.5 milliGRAM(s) IV Push every 6 hours PRN Moderate Pain (4 - 6)  LORazepam   Injectable 0.25 milliGRAM(s) IV Push every 4 hours PRN Anxiety      Vital Signs Last 24 Hrs  T(C): 36.3 (07 Feb 2019 09:35), Max: 36.5 (06 Feb 2019 13:39)  T(F): 97.3 (07 Feb 2019 09:35), Max: 97.7 (06 Feb 2019 13:39)  HR: 105 (07 Feb 2019 09:35) (61 - 105)  BP: 92/52 (07 Feb 2019 09:35) (92/52 - 108/72)  BP(mean): --  RR: 18 (07 Feb 2019 09:35) (18 - 18)  SpO2: 98% (07 Feb 2019 09:35) (95% - 100%)  nc/at  s1s2  cta  soft, nt, nd no guarding or rebound  no c/c/e    CBC Full  -  ( 07 Feb 2019 07:18 )  WBC Count : 7.7 K/uL  Hemoglobin : 10.6 g/dL  Hematocrit : 34.3 %  Platelet Count - Automated : 145 K/uL  Mean Cell Volume : 87.4 fl  Mean Cell Hemoglobin : 26.9 pg  Mean Cell Hemoglobin Concentration : 30.8 gm/dL  Auto Neutrophil # : x  Auto Lymphocyte # : x  Auto Monocyte # : x  Auto Eosinophil # : x  Auto Basophil # : x  Auto Neutrophil % : x  Auto Lymphocyte % : x  Auto Monocyte % : x  Auto Eosinophil % : x  Auto Basophil % : x    02-07    145  |  94<L>  |  127<H>  ----------------------------<  178<H>  3.3<L>   |  29  |  3.74<H>    Ca    8.7      07 Feb 2019 07:18

## 2019-02-07 NOTE — PROGRESS NOTE ADULT - SUBJECTIVE AND OBJECTIVE BOX
Patient is a 89y old  Male who presents with a chief complaint of altered mental status (07 Feb 2019 10:16)      SUBJECTIVE / OVERNIGHT EVENTS: ptn is awake, family at bedside, plan DC home w home hospice today    MEDICATIONS  (STANDING):  ALBUTerol/ipratropium for Nebulization 3 milliLiter(s) Nebulizer every 6 hours  amiodarone    Tablet 400 milliGRAM(s) Oral daily  apixaban 2.5 milliGRAM(s) Oral every 12 hours  buDESOnide   0.5 milliGRAM(s) Respule 0.5 milliGRAM(s) Inhalation every 12 hours  dextrose 5%. 1000 milliLiter(s) (50 mL/Hr) IV Continuous <Continuous>  dextrose 50% Injectable 12.5 Gram(s) IV Push once  dextrose 50% Injectable 25 Gram(s) IV Push once  dextrose 50% Injectable 25 Gram(s) IV Push once  docusate sodium 100 milliGRAM(s) Oral two times a day  insulin lispro (HumaLOG) corrective regimen sliding scale   SubCutaneous three times a day before meals  insulin lispro (HumaLOG) corrective regimen sliding scale   SubCutaneous at bedtime  midodrine 5 milliGRAM(s) Oral three times a day  polyethylene glycol 3350 17 Gram(s) Oral daily  potassium chloride   Powder 20 milliEquivalent(s) Oral two times a day  Saliva Substitute (CAPHOSOL) 30 milliLiter(s) Swish and Spit four times a day  senna 2 Tablet(s) Oral at bedtime  torsemide 50 milliGRAM(s) Oral two times a day    MEDICATIONS  (PRN):  bisacodyl Suppository 10 milliGRAM(s) Rectal daily PRN Constipation  dextrose 40% Gel 15 Gram(s) Oral once PRN Blood Glucose LESS THAN 70 milliGRAM(s)/deciliter  glucagon  Injectable 1 milliGRAM(s) IntraMuscular once PRN Glucose LESS THAN 70 milligrams/deciliter  glycopyrrolate 0.5 milliGRAM(s) Oral every 6 hours PRN upper airway secretions  HYDROmorphone  Injectable 0.5 milliGRAM(s) IV Push every 6 hours PRN Moderate Pain (4 - 6)  LORazepam   Injectable 0.25 milliGRAM(s) IV Push every 4 hours PRN Anxiety      Vital Signs Last 24 Hrs  T(F): 97.3 (02-07-19 @ 09:35), Max: 97.5 (02-06-19 @ 18:05)  HR: 105 (02-07-19 @ 09:35) (61 - 105)  BP: 92/52 (02-07-19 @ 09:35) (92/52 - 105/67)  RR: 18 (02-07-19 @ 09:35) (18 - 18)  SpO2: 98% (02-07-19 @ 09:35) (95% - 100%)  Telemetry:   CAPILLARY BLOOD GLUCOSE      POCT Blood Glucose.: 171 mg/dL (07 Feb 2019 10:07)  POCT Blood Glucose.: 214 mg/dL (06 Feb 2019 21:42)  POCT Blood Glucose.: 197 mg/dL (06 Feb 2019 18:47)    I&O's Summary    06 Feb 2019 07:01  -  07 Feb 2019 07:00  --------------------------------------------------------  IN: 660 mL / OUT: 800 mL / NET: -140 mL    07 Feb 2019 07:01  -  07 Feb 2019 15:42  --------------------------------------------------------  IN: 280 mL / OUT: 250 mL / NET: 30 mL        PHYSICAL EXAM:  GENERAL: NAD, well-developed  HEAD:  Atraumatic, Normocephalic  EYES: EOMI, PERRLA, conjunctiva and sclera clear  NECK: Supple, No JVD  CHEST/LUNG: Clear to auscultation bilaterally; No wheeze  HEART: Regular rate and rhythm; No murmurs, rubs, or gallops  ABDOMEN: Soft, Nontender, Nondistended; Bowel sounds present  EXTREMITIES:  2+ Peripheral Pulses, No clubbing, cyanosis, or edema  PSYCH: AAOx3  NEUROLOGY: non-focal  SKIN: No rashes or lesions    LABS:                        10.6   7.7   )-----------( 145      ( 07 Feb 2019 07:18 )             34.3     02-07    145  |  94<L>  |  127<H>  ----------------------------<  178<H>  3.3<L>   |  29  |  3.74<H>    Ca    8.7      07 Feb 2019 07:18                RADIOLOGY & ADDITIONAL TESTS:    Imaging Personally Reviewed:    Consultant(s) Notes Reviewed:      Care Discussed with Consultants/Other Providers:

## 2019-02-07 NOTE — PROGRESS NOTE ADULT - ASSESSMENT
ASSESSMENT:     weakness, lethargy, confusion - multifactorial - improved    1) CKD with uremia - not a candidate for dialysis  2) no evidence of ongoing infection despite abnormal U/A (with a chronic folley), and stercoral colitis" on CT scan  3) hypoxemia due to pulmonary edema with a chronic right sided pleural effusion - the patient has chronic hypercapnic respiratory failure due to advanced COPD which is at baseline and should not be contributing to lethargy  4) hyperglycemia - resolved  5) cirrhosis likely due to chronic passive liver congestion - no evidence of hepatic encephalopathy with a non-elevated ammonia level    HTN/HLD/DM    CAD/ischemic cardiomyopathy    PAD s/p LLE stenting    atrial fibrillation    PLAN/RECOMMENDATIONS:    oxygen supplementation to keep saturation greater than 92%  albuterol/atrovent/pulmicort nebs  glycopyrrolate IVP x 1 now then as needed po for increased secretions  IVP dilaudid for pain or dyspnea  IVP ativan for anxiety as needed  caphosol/lollipops/clear liquids  no indication for a thoracentesis given stable respiratory status unless develops sign/symptoms of a pleural space infection  observe off antibiotics  cardiac meds: amiodarone/midorine/torsemide/eliquis  GI/DVT prophylaxis  bowel regimen  felt not to be a candidate for dialysis  agree with allowing patient to drink non-thickened liquids as we are pursing comfort care - soft diet    Will follow with you. Plan of care discussed with the patient's family at bedside. Discharge planning to home hospice.    Lenny Sparrow MD, [facility] - [phone]  Pulmonary Medicine

## 2019-02-07 NOTE — PROGRESS NOTE ADULT - SUBJECTIVE AND OBJECTIVE BOX
[facility] - [facility]     PROGRESS NOTE    CHIEF COMPLAINT: chronic hypercapnic respiratory failure; hypoxemia; pulmonary edema; pleural effusions; CKD    INTERVAL HISTORY: in bed; awake and alert; very tired but slept a lot over the last day; weak and frail; eating very little; mouth much less dry with water and Caphosol; no shortness of breath on a nasal canula; copious oral and upper airway secretions without chest congestion or wheeze; no fevers, chills or sweats; no chest pain/pressure or palpitations; no pain; eager to go home    REVIEW OF SYSTEMS:  Constitutional: As per interval history  HEENT: Within normal limits  CV: As per interval history  Resp: As per interval history  GI: Within normal limits   : CKD/uremia  Musculoskeletal: Within normal limits  Skin: Within normal limits  Neurological: encephalopathy  Psychiatric: calm  Endocrine: Within normal limits  Hematologic/Lymphatic: Within normal limits  Allergic/Immunologic: Within normal limits    MEDICATIONS:     Pulmonary "  ALBUTerol/ipratropium for Nebulization 3 milliLiter(s) Nebulizer every 6 hours  buDESOnide   0.5 milliGRAM(s) Respule 0.5 milliGRAM(s) Inhalation every 12 hours      Anti-microbials:      Cardiovascular:  amiodarone    Tablet 400 milliGRAM(s) Oral daily  midodrine 5 milliGRAM(s) Oral three times a day  torsemide 50 milliGRAM(s) Oral two times a day      Other:  apixaban 2.5 milliGRAM(s) Oral every 12 hours  bisacodyl Suppository 10 milliGRAM(s) Rectal daily PRN  dextrose 40% Gel 15 Gram(s) Oral once PRN  dextrose 5%. 1000 milliLiter(s) IV Continuous <Continuous>  dextrose 50% Injectable 12.5 Gram(s) IV Push once  dextrose 50% Injectable 25 Gram(s) IV Push once  dextrose 50% Injectable 25 Gram(s) IV Push once  docusate sodium 100 milliGRAM(s) Oral two times a day  glucagon  Injectable 1 milliGRAM(s) IntraMuscular once PRN  glycopyrrolate Injectable 0.2 milliGRAM(s) IV Push once  HYDROmorphone  Injectable 0.5 milliGRAM(s) IV Push every 6 hours PRN  insulin lispro (HumaLOG) corrective regimen sliding scale   SubCutaneous three times a day before meals  insulin lispro (HumaLOG) corrective regimen sliding scale   SubCutaneous at bedtime  LORazepam   Injectable 0.25 milliGRAM(s) IV Push every 4 hours PRN  polyethylene glycol 3350 17 Gram(s) Oral daily  potassium chloride   Powder 20 milliEquivalent(s) Oral two times a day  Saliva Substitute (CAPHOSOL) 30 milliLiter(s) Swish and Spit four times a day  senna 2 Tablet(s) Oral at bedtime        OBJECTIVE:        I&O's Detail    2019 07:01  -  2019 07:00  --------------------------------------------------------  IN:    Oral Fluid: 660 mL  Total IN: 660 mL    OUT:    Indwelling Catheter - Urethral: 800 mL  Total OUT: 800 mL    Total NET: -140 mL    Daily Weight in k (2019 13:39)    POCT Blood Glucose.: 171 mg/dL (2019 10:07)  POCT Blood Glucose.: 214 mg/dL (2019 21:42)  POCT Blood Glucose.: 197 mg/dL (2019 18:47)  POCT Blood Glucose.: 143 mg/dL (2019 13:53)      PHYSICAL EXAM:       ICU Vital Signs Last 24 Hrs  T(C): 36.3 (2019 09:35), Max: 36.5 (2019 13:39)  T(F): 97.3 (2019 09:35), Max: 97.7 (2019 13:39)  HR: 105 (2019 09:35) (61 - 105)  BP: 92/52 (2019 09:35) (92/52 - 105/67)  BP(mean): --  ABP: --  ABP(mean): --  RR: 18 (2019 09:35) (18 - 18)  SpO2: 98% (2019 09:35) (95% - 100%) on 2lpm nasal canula     General: Weak. Frail. Awake. Alert. upper airway secretions. Appears stated age 	  HEENT:   Atraumatic. Bitemporal wasting. Anicteric. Normal oral mucosa. PERRL. EOMI.  Neck: Supple. Trachea midline. Thyroid without enlargement/tenderness/nodules. No carotid bruit. No JVD. Loss of bilateral supraclavicular fat pads.	  Cardiovascular: Irregularly irregular rate and rhythm. S1 S2 normal. No murmurs, rubs or gallops.  Respiratory: Respirations unlabored. Mild bilateral course breath sounds. Bilaterally decreased breath sounds ~ 1/2 up on the right and at the base on the left with dullness to percussion. Kyphosis.  Abdomen: Soft. Non-tender. Non-distended. No organomegaly. No masses. Normal bowel sounds. James catheter.  Extremities: Cool to touch. No clubbing or cyanosis. No pedal edema.  Pulses: 2+ peripheral pulses all extremities.	  Skin: Normal skin color. No rashes or lesions. No ecchymoses. No cyanosis. Cool to touch.  Lymph Nodes: Cervical, supraclavicular and axillary nodes normal  Neurological: Moving all extremities. A and O x 2  Psychiatry: Calm      LABS:                        10.6   7.7   )-----------( 145      ( 2019 07:18 )             34.3                         11.2   8.8   )-----------( 144      ( 2019 16:25 )             36.9     -    145  |  94<L>  |  127<H>  ----------------------------<  178<H>  3.3<L>   |  29  |  3.74<H>        143  |  93<L>  |  125<H>  ----------------------------<  124<H>  3.6   |  27  |  3.68<H>    Ca      8.7      -    Ca      8.8      -    Phos    7.6     -    Phos    3.0     02-      Mg       2.2     -    Mg       1.0     -    TPro  6.4  /  Alb  3.3  /  TBili  2.3<H>  /  DBili  x   /  AST  29  /  ALT  22  /  AlkPhos  114  -    TPro  6.6  /  Alb  3.5  /  TBili  2.5<H>  /  DBili  x   /  AST  33  /  ALT  23  /  AlkPhos  118      PT/INR - ( 2019 14:31 )   PT: 18.0 sec;   INR: 1.56 ratio       PTT - ( 2019 14:31 )  PTT:30.5 sec    Venous Blood Gas:   @ 18:33  7.43/56/30/37/50  VBG Lactate: 2.6    Venous Blood Gas:   @ 16:53  7.42/56/29/36/45  VBG Lactate: 3.1    Venous Blood Gas:   @ 16:01  7.40/59/29/36/48  VBG Lactate: 2.8    Venous Blood Gas:   @ 14:30  7.46/52/36/36/64  VBG Lactate: 2.7    Serum Pro-Brain Natriuretic Peptide: >21538 pg/mL ( @ 14:31)    < from: Transthoracic Echocardiogram (18 @ 10:22) >    Patient name: JODY WILKINS  YOB: 1928   Age: 89 (M)   MR#: [id number]  Study Date: 2018  [facility]  Study quality: Technically fair  Referring Physician: Daria Ackerman MD  Blood Pressure: 135/76 mmHg  Height: 167 cm  Weight: 76 kg  BSA: 1.9 m2  ------------------------------------------------------------------------  PROCEDURE: Transthoracic echocardiogram with 2-D, M-Mode  and complete spectral and color flow Doppler.  INDICATION: Abnormal electrocardiogram (ECG) (EKG) (R94.31)  ------------------------------------------------------------------------  Dimensions:    Normal Values:  LA:     4.6    2.0 - 4.0 cm  Ao:     3.4    2.0 - 3.8 cm  SEPTUM: 1.5    0.6 - 1.2 cm  PWT:    1.4    0.6 - 1.1 cm  LVIDd:  4.3    3.0 - 5.6 cm  LVIDs:  3.6    1.8 - 4.0 cm  Derived variables:  LVMI: 132 g/m2  RWT: 0.65  Fractional short: 16 %  EF (Visual Estimate): 30 %  EF (Teicholtz): 34 %  Doppler Peak Velocity (m/sec): AoV=1.0  ------------------------------------------------------------------------  Observations:  Mitral Valve: Mitral annular calcification. Tethered mitral  valve leaflets with normal opening. Mild mitral  regurgitation.  Mean transmitral valve gradient equals 2 mm  Hg, consistent with mild mitral stenosis.  Aortic Valve/Aorta: Calcified trileaflet aortic valve with  decreased opening. Peak transaortic valve gradient equals 4  mm Hg, mean transaortic valve gradient equals 2 mm Hg,  aortic valve velocity time integral equals 18 cm. No aortic  valve regurgitation seen. Peak left ventricular outflow  tract gradient equals 1 mm Hg, mean gradient is equal to 1  mm Hg, LVOT velocity time integral equals 10 cm.  Normal aortic root (Ao: 3.4 cm at the sinuses of Valsalva).  Left Atrium: Moderately dilated left atrium.  LA volume  index = 44 cc/m2.  Left Ventricle: Severe global left ventricular systolic  dysfunction. Severe concentric left ventricular  hypertrophy. Moderate diastolic dysfunction (Stage II).  Right Heart: Normal right atrium. Normal right ventricular  size with severely decreased right ventricular systolic  function. TAPSE 0.9cm.  Normal tricuspid valve.  Mild-moderate tricuspid regurgitation. Normal pulmonic  valve. Minimal pulmonic regurgitation.  Pericardium/Pleura: Normal pericardium with no pericardial  effusion.  Hemodynamic: Estimated right atrial pressure is 8 mm Hg.  Estimated right ventricular systolic pressure equals 58 mm  Hg, assuming right atrial pressure equals 8 mm Hg,  consistent with moderate pulmonary hypertension.  ------------------------------------------------------------------------  Conclusions:  1. Severe concentric left ventricular hypertrophy.  2. Severe global left ventricular systolic dysfunction.  3. Normal right ventricular size with severely decreased  right ventricular systolic function. TAPSE 0.9cm.  4. Estimated pulmonary artery systolic pressure equals 58  mm Hg, assuming right atrial pressure equals 8 mm Hg,  consistent with moderate pulmonary pressures.  *** Compared with echocardiogram of 2017, interval  decline in biventricular function.  ------------------------------------------------------------------------  Confirmed on  2018 - 13:51:55 by Clair Luna M.D.  ------------------------------------------------------------------------    < end of copied text >  ---------------------------------------------------------------------------------------------------------------  MICROBIOLOGY:   Urinalysis Basic - ( 2019 14:30 )    Color: Light Orange / Appearance: Turbid / S.016 / pH: x  Gluc: x / Ketone: Negative  / Bili: Negative / Urobili: 3 mg/dL   Blood: x / Protein: 100 mg/dL / Nitrite: Negative   Leuk Esterase: Large / RBC: 96 /hpf /  /HPF   Sq Epi: x / Non Sq Epi: 13 /hpf / Bacteria: Moderate    Culture - Urine (19 @ 18:05)    Specimen Source: .Urine Catheterized    Culture Results:   Culture grew 3 or more types of organisms which indicate  collection contamination; consider recollection only if clinically  indicated.    Culture - Blood (19 @ 17:51)    Specimen Source: .Blood Blood    Culture Results:   No growth to date.    Culture - Blood (19 @ 17:51)    Specimen Source: .Blood Blood    Culture Results:   No growth to date.    RADIOLOGY:  [x] Chest radiographs reviewed and interpreted by me    < from: Xray Chest 1 View AP/PA (19 @ 17:38) >    EXAM:  XR CHEST AP OR PA 1V                          PROCEDURE DATE:  2019      INTERPRETATION:  CLINICAL INFORMATION: Altered mental status.    EXAM: Frontal radiograph of the chest.    COMPARISON: Chest radiograph from 2018.    FINDINGS:  The heart is enlarged.    Right pleural effusion. Pulmonary edema. No pneumothorax.    Degenerative changes and bilateral shoulders.    IMPRESSION: Right pleural effusion. Pulmonary edema.    BERT ARCHER M.D., RADIOLOGY RESIDENT  This document has been electronically signed.  ELIO RELA M.D., ATTENDING RADIOLOGIST  This document has been electronically signed. Feb  3 2019  9:36PM      < end of copied text >  ---------------------------------------------------------------------------------------------------------------  < from: CT Abdomen and Pelvis No Cont (19 @ 16:28) >    EXAM:  CT ABDOMEN AND PELVIS                          PROCEDURE DATE:  2019      INTERPRETATION:  CLINICAL INFORMATION: Abdominal distention.         COMPARISON: CT chest 2018    PROCEDURE:   CT of the Abdomen and Pelvis was performed without intravenous contrast.   Intravenous contrast: None.  Oral contrast: None.  Sagittal and coronal reformats were performed.    FINDINGS:    LOWER CHEST: Aortic valve calcifications. Mitral annular calcifications.   The heart is enlarged. Moderate right pleural effusion with passive   atelectasis of the right lower lobe. Small left pleural effusion.    LIVER: Nodular, shrunken liver.  BILE DUCTS: Normal caliber.  GALLBLADDER: Within normal limits.   SPLEEN: Within normal limits.  PANCREAS: Within normal limits.  ADRENALS: Thickening of the bilateral adrenal glands.  KIDNEYS/URETERS: Within normal limits. No hydronephrosis.    BLADDER: The bladder is collapsed around James catheter balloon.  REPRODUCTIVE ORGANS:   Prostate is normal in size.     BOWEL: Large amount of stool within the rectum with mild thickening   suggesting stercoral colitis. No bowel obstruction. The appendix is   within normal limits.  PERITONEUM: Moderate ascites.  VESSELS:  Atheromatous disease.  RETROPERITONEUM: No lymphadenopathy.    ABDOMINAL WALL: Within normal limits.  BONES: Chronic fracture of the right iliac wing. Degenerative changes of   spine.    IMPRESSION:     Moderate right pleural effusion with passive atelectasis of the right   lower lobe.    Cirrhosis with moderate ascites.    The rectum is distended with stool with mild thickening suggesting   stercoral colitis.    BERT ARCHER M.D., RADIOLOGY RESIDENT  This document has been electronically signed.  DEBBY VICTOR M.D., ATTENDING RADIOLOGIST  This document has been electronically signed. Feb  3 2019  5:37PM      < end of copied text >  ---------------------------------------------------------------------------------------------------------------  < from: CT Head No Cont (19 @ 14:54) >    EXAM:  CT BRAIN                          PROCEDURE DATE:  2019      INTERPRETATION:  HISTORY: Altered mental status. Confusion.    COMPARISON: CT head 2017.    TECHNIQUE: Axial noncontrast CT images from the skull base to the vertex   were obtained and submitted for interpretation. Coronal and sagittal   reformatted images were performed. Bone and soft tissue windows were   evaluated.    FINDINGS:    There is no acute intracranial mass-effect, hemorrhage, midline shift, or   abnormal extra-axial fluid collection.     Moderate patchy hypodensity in the periventricular and subcortical white   matter compatible with microvascular ischemic changes.    Age-appropriate cerebral volume loss with proportional sulcal and   ventricular prominence. No hydrocephalus. Basal cisterns are patent.     Left maxillary sinus retention cyst versus polyp and minimal right   maxillary sinus mucosal thickening. Paranasal sinuses and mastoid air   cells are clear. Status post bilateral cataract surgery. Calvarium is   intact.     IMPRESSION:     No acute intracranial bleeding, mass effect, or shift.     OSIEL HYDE M.D., RADIOLGY RESIDENT  This document has been electronically signed.  CORNELL SHEA M.D., ATTENDING RADIOLOGIST  This document has been electronically signed. Feb  3 2019  3:46PM      < end of copied text >  ---------------------------------------------------------------------------------------------------------------

## 2019-02-07 NOTE — PROGRESS NOTE ADULT - ASSESSMENT
89 yo male w complicated h/o as stated in H&P presents w metabolic encephalopathy 2/2 uremia, volume depletion, uti, stercoral colitis and uncontrolled hyperglycemia. Improved clinically post  gentle hydration, now on dysphagia diet, off hydration, on lasix, on AC, off ABx, daughter requests blood draws to con't, DNR ,  comfort care, accepted into hospice care network. on prn ativan and prn dilaudid. DC planning home w home Hospice.

## 2019-02-11 ENCOUNTER — MEDICATION RENEWAL (OUTPATIENT)
Age: 84
End: 2019-02-11

## 2019-02-11 RX ORDER — BUDESONIDE 0.5 MG/2ML
0.5 INHALANT ORAL
Qty: 60 | Refills: 5 | Status: ACTIVE | COMMUNITY
Start: 2019-02-11 | End: 1900-01-01

## 2019-02-11 RX ORDER — CEFUROXIME AXETIL 500 MG/1
500 TABLET ORAL
Qty: 20 | Refills: 0 | Status: ACTIVE | COMMUNITY
Start: 2019-02-11 | End: 1900-01-01

## 2019-02-14 ENCOUNTER — APPOINTMENT (OUTPATIENT)
Dept: VASCULAR SURGERY | Facility: CLINIC | Age: 84
End: 2019-02-14

## 2019-03-06 NOTE — PROVIDER CONTACT NOTE (OTHER) - RECOMMENDATIONS
will continue to monitor
Continue to monitor patient.
Mag and Phos to be added to AM labs.
Perform EKG.
none

## 2019-04-04 NOTE — H&P PST ADULT - PMH
none
Afib    CHF (congestive heart failure)    COPD (chronic obstructive pulmonary disease)    HLD (hyperlipidemia)    HTN (hypertension)    Type 2 diabetes mellitus with retinopathy, macular edema presence unspecified, unspecified long term insulin use status, unspecified retinopathy severity

## 2019-05-22 NOTE — PHYSICAL THERAPY INITIAL EVALUATION ADULT - LEVEL OF INDEPENDENCE: SIT/STAND, REHAB EVAL
Chief Complaint   Patient presents with     RECHECK     6wk Post Op Check     Bernardino Gonsales EMT    
supervision

## 2019-07-23 NOTE — DIETITIAN INITIAL EVALUATION ADULT. - EST PROTEIN NEEDS3
"Requested Prescriptions   Pending Prescriptions Disp Refills     simvastatin (ZOCOR) 40 MG tablet [Pharmacy Med Name: SIMVASTATIN 40MG TABLET] 90 tablet 1     Sig: TAKE 1 TABLET BY MOUTH DAILY AT BEDTIME   Last Written Prescription Date:  1/24/19  Last Fill Quantity: 90,  # refills: 1   Last office visit: 4/3/2019 with prescribing provider:  4/3/19   Future Office Visit:   Next 5 appointments (look out 90 days)    Sep 13, 2019  9:40 AM CDT  Office Visit with Roland Kearns MD  [facility] ([facility] [address] [facility] [address]  [phone]           Statins Protocol Passed - 7/22/2019 10:39 AM        Passed - LDL on file in past 12 months     Recent Labs   Lab Test 08/27/18  0756   *             Passed - No abnormal creatine kinase in past 12 months     Recent Labs   Lab Test 12/17/12  1412   CKT 83                Passed - Recent (12 mo) or future (30 days) visit within the authorizing provider's specialty     Patient had office visit in the last 12 months or has a visit in the next 30 days with authorizing provider or within the authorizing provider's specialty.  See \"Patient Info\" tab in inbasket, or \"Choose Columns\" in Meds & Orders section of the refill encounter.              Passed - Medication is active on med list        Passed - Patient is age 18 or older        " 48.3

## 2019-11-03 NOTE — ED ADULT NURSE NOTE - PSH
negative No significant past surgical history Affect and characteristics of appearance, verbalizations, behaviors are appropriate

## 2020-01-01 NOTE — H&P ADULT - CARDIOVASCULAR SYMPTOMS
EEG REPORT    DATE OF EE2020    PHYSICIAN(S):    1. Estefania Valencia M.D.  2. Oscar Snowden M.D.    AGE/:  3-day-old/2020.    ACTIVATING TECHNIQUES:      OTHER:      CLINICAL HISTORY:  A 3-day-old patient with history of hypoglycemia, abnormal  movement, rule out seizure, for evaluation.     DESCRIPTION:  A 17-channel digital EEG with concurrent EKG, eye, and muscle  monitoring were at bedtime.     EEG was continuous with background delta frequency, excessive sharp wave that  is multifocal in nature, localized at central temporal head region.  No  electrographic or electroclinical seizure captured.     INTERPRETATION:  Abnormal, more than 2 hours electroencephalogram with video  monitoring, excessive sharp wave can be indicative for underlying seizure, but  also can be due to underlying structural abnormality and/or metabolic  instability.  Clinical correlation will be recommended.         ______________________      ____________________________________  DATE AND TIME               Karen Rosado M.D.    AK/CYNTHIA-#[id number]  DD:  2020 20:51:26      DT:  2020 21:25:45      [facility]                      MR #[id number]                               ID #[id number]                               EEG #[id number]  EEG                          ROOM: NICU                                        
                                                                                                                                                                                                       EEG REPORT    DATE OF EE2020    PHYSICIAN(S):    1. Oscar Snowden M.D.  2. Purnima Branham M.D.    AGE/:  9-day-old/2020.    ACTIVATING TECHNIQUES:      OTHER:      CLINICAL HISTORY:  A 9-day-old patient with history of seizure, who had  abnormal EEG and overnight had increased seizure frequency.  The patient on  phenobarbital and Versed drip.     A 12-channel digital EEG with concurrent EKG monitoring was performed, mainly  sleep recording.     EEG was continuous with no discontinuity or asynchrony, sharp wave that  localized to the both sides central temporal, but more to the left than right.     INTERPRETATION:  Abnormal electroencephalogram, sharp waves that is multifocal,  but more localized to the left is indicative of underlying seizure, structural  abnormality of the brain or underlying metabolic disorder is in the  differential.         ______________________      ____________________________________  DATE AND TIME               Karen Rosado M.D.    AK/CYNTHIA-#[id number]  DD:  2020 15:46:13      DT:  2020 16:27:05      [facility]                      MR #[id number]                               ID #[id number]                               EEG #[id number]  EEG                          ROOM: [facility]                                        
                                                                                                                                                                                                       EEG REPORT    DATE OF EEG:      PHYSICIAN(S):  Jose Jalloh D.O.    AGE/:  9-day-old/2020.    ACTIVATING TECHNIQUES:      OTHER:      CLINICAL HISTORY:      START RECORDIN2020 at 1429.    END RECORDIN2020 at 1254.    TOTAL RECORDING TIME:  22 hours 24 minutes.    CLINICAL HISTORY:  A 9-day-old patient with history of seizure, hypoglycemia  and the patient currently on Versed, phenobarbital.     DESCRIPTION:  A 17-channel digital EEG with concurrent video monitoring were  performed at bedside.     There is no event during recording.     Reviewing EEG tracing which showed similar feature to before, EEG tracing being  continuous with no discontinuity, asynchrony, but excessive sharp wave  multifocal in nature, no electrographic electroclinical seizure captured.     INTERPRETATION:  Abnormal 1-day video electroencephalogram, excessive sharp  wave can be indicative of underlying seizure and/or structural,   metabolic disorder, no electrographic electroclinical seizure captured during  recording.         ______________________      ____________________________________  DATE AND TIME               Karen Rosado M.D.    AK/CYNTHIA-#[id number]  DD:  2020 23:31:31      DT:  2020 01:01:03      [facility]                      MR #[id number]                               ID #[id number]                               EEG #   EEG                          ROOM: [facility]                                        
                                                                                                                                                                                                       EEG REPORT    DATE OF EEG:      PHYSICIAN(S):  Mac Bardales MD.    AGE/:  17-day-old/2020.    ACTIVATING TECHNIQUES:      OTHER:  Start recording 2020 at 1109, end recording 2020 at 1345.  Total recording time 26 hours 30 minutes.     CLINICAL HISTORY:  A 17-day-old patient with history of frequent breakthrough  seizures, currently on Keppra, Depakote, phenobarbital, and fosphenytoin.     There are 2 push-button events during recording period with no epileptiform  activity.     Reviewing EEG tracing showed background delta frequency intermixed with muscle  artifact.  No electrographic electroclinical seizure captured.     During tracing, excessive sharp waves that mainly localized to the right  central temporal head region were present.     INTERPRETATION:  Abnormal 1-day video electroencephalogram, findings consist  underlying seizure that localized to the right, there is no clinical seizure or  electrographic seizure captured during recording.         ______________________      ____________________________________  DATE AND TIME               Karen Rosado M.D.    AK/CYNTHIA-#[id number]  DD:  2020 14:53:10      DT:  2020 18:34:09      [facility]                      MR #[id number]                               ID #[id number]                               EEG #[id number]  EEG                          ROOM: [facility]                                        
                                                                                                                                                                                                       EEG REPORT    DATE OF EEG:      PHYSICIAN(S):  __________    AGE/:  8-day-old, 2020.    ACTIVATING TECHNIQUES:      OTHER:    Start recording 2020 at 1157, end recording 2020 at 1201, total  recording time 24 hours 3 minutes.     CLINICAL HISTORY:  An 8-day-old patient with history of seizure, on  phenobarbital for evaluation.     DESCRIPTION:  A 12-channel digital EEG with concurrent EKG, eye, and muscle  monitoring were performed at bedside, both awake and sleep recording.     There is no event during recording.     EEG tracing was continuous with background delta frequency superimposed of  faster frequency.     The patient became drowsy, went to sleep with slowing of the background with no  electrographic or electroclinical seizure captured.     During tracing occasional sharp wave that is more localized to the left central  temporal head region were present.     INTERPRETATION:  Mildly abnormal 1-day video electroencephalogram, sharp wave  that is localized to the left can be indicative of underlying structural  abnormality and/or seizure, but also can be seen   underlying __________ abnormality, clinical correlation will be recommended.        ______________________      ____________________________________  DATE AND TIME               Karen Rosado M.D.    AK/CYNTHIA-#[id number]  DD:  2020 20:01:17      DT:  2020 20:27:01      [facility]                      MR #[id number]                               ID #[id number]                               EEG #[id number]  EEG                          ROOM: [facility]                                        
dyspnea on exertion/peripheral edema/orthopnea

## 2020-02-13 NOTE — ED PROVIDER NOTE - CHIEF COMPLAINT
The patient is a 89y Male complaining of slurred speech.
Pt improved and feeling better. likely vasovagal syncope. will d/c with outpatient f/up. instructed to return for recurrent syncope, cp, sob, or any other concerns.  Pt given a copy of all results and instructed to f/up with pcp regarding any abnormal results.

## 2020-02-27 NOTE — PROGRESS NOTE ADULT - PROBLEM SELECTOR PROBLEM 3
Left message (per HIPAA) informing the patient that Os-Teja can be found over the counter. Reiterated message from Dr. Angie Haq below. Encouraged patient to call the office back with any additional questions or concerns.
Please call pt, would like to review what she is to do, is this RX or over the counter she will be taking  Tasked to nursing
Recent bone density shows bones are thin but not osteoporosis. Continue her vitamin D.   Take Os-Teja twice daily indefinitely
Spoke to pt and advised on MD message below; pt verbalized understanding.
DM2 (diabetes mellitus, type 2)
DM2 (diabetes mellitus, type 2)

## 2020-12-16 PROBLEM — Z87.09 HISTORY OF ACUTE BRONCHITIS: Status: RESOLVED | Noted: 2018-11-02 | Resolved: 2020-12-16

## 2021-01-29 NOTE — PATIENT PROFILE ADULT. - CAREGIVER PHONE NUMBER
[facility]  [address] [address]  Phone: [phone]                                    After Visit Summary   1/29/2021    Kuldeep Sequeira    MRN: [id number]           After Visit Summary Signature Page    I have received my discharge instructions, and my questions have been answered. I have discussed any challenges I see with this plan with the nurse or doctor.    ..........................................................................................................................................  Patient/Patient Representative Signature      ..........................................................................................................................................  Patient Representative Print Name and Relationship to Patient    ..................................................               ................................................  Date                                   Time    ..........................................................................................................................................  Reviewed by Signature/Title    ...................................................              ..............................................  Date                                               Time          22EPIC Rev 08/18        [phone]

## 2021-07-11 NOTE — DIETITIAN INITIAL EVALUATION ADULT. - NUTRITION INTERVENTION
[facility]    Progress Note    Jocelyn Irene Patient Status:  Inpatient    3/26/1944 MRN [id number]   Location [facility] 3W/SW Attending Trina Bowling MD   Hosp Day # 7 PCP Shu Vega MD       Subjective: Worsening atherosclerotic disease involving the upper to mid abdominal aorta resulting in less than 50% luminal narrowing. 5. Stable small left adrenal adenoma. 6. Uncomplicated pancolonic diverticulosis. 7. Lesser incidental findings as above.     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   - hgb at baseline and stable  - pt was given IV venofer  - follow [facility] [employer]/Nutrition Education/Collaboration and Referral of Nutrition Care

## 2021-12-19 NOTE — H&P PST ADULT - VENOUS THROMBOEMBOLISM CURRENT STATUS
Abdomen soft, non-tender, no guarding. (1) congestive heart failure (within 1 month)/(1) abnormal pulmonary function (COPD)/(1) other risk factor (includes escalating BMI, pack-years of smoking, diabetes requiring insulin, chemotherapy, female gender and length of surgery)

## 2022-03-02 NOTE — DISCHARGE NOTE ADULT - FUNCTIONAL SCREEN CURRENT LEVEL: BATHING, MLM
ADDENDUM 2/1/22 PT CALLED DEFERRING CUSTOM PACKAGE, WANTS BASIC IOL. AWARE HE MAY NEED TO CORRECT WITH GLASSES OR CL'S FOR BEST [facility] POTENTIAL . 0 = independent

## 2022-09-30 NOTE — ED ADULT NURSE NOTE - PAIN RATING/NUMBER SCALE (0-10): REST
Continue Effexor  Will resume Wellbutrin 150 mg daily  In future will consider further titration  Encouraged daily activity  Journal keeping, stress outlets, fiance is supportive  0

## 2022-11-02 NOTE — PROVIDER CONTACT NOTE (CRITICAL VALUE NOTIFICATION) - SITUATION
as above: no new events--ID f/up-resp stable--wound care f/up-TC continues- (she will always have secretions) due to TBM-s/p  trach 10/10-s/p  resp failure-s/p hjeshl-MOOM-oj ABX-stable CV status-continue VEST rx if ok w/ CTS  multifactorial dyspnea-resp failure-severe persistent asthma, TBM s/p tracheoplasty, s/p Aortic aneurysm repair, Bronchitis (proteus)-O2-keep 90%;TC  severe persistent asthma--medrol 8mg, singulair 10, duoneb q 6, budes .5 bid, tezspire 8/29-was due 9/29  TBM-s/p tracheoplasty--accapella, vest rx, mucomyst here 2 cc 10% q 8 (secretions)  s/p Aortic aneurysm repair--as per CTS staff care  AF-on eliquis rx               CV-improved hydralazine/lopressor/mexilitine   DVT R-IJ-on heparin rx  *****ID-s/p proteus bronchitis-s/p meropenum changed to ceftriaxone and back to meropenem/vanco- off of ABX as of 9/19--restart of ABX 9/27-meropenem/vanco-NOW on meropenem and vanco for MRSE sepsis (11/15 end date for vanco), plastics/ortho for elbow-proteus (?wash out)-vac in place-suppressive rx-bact/fluconazole  PT-OOB as able                             GI-TF as able--f/up LFTs-normal       ****ORTHO f/up--? additional wash out of elbow wound?; wound care f/up  VC dysfunction--aspiration precautions-s/p trach 10/10  GOC                                    Heme onc f/up colon ca  prog--critical in CTU                PT-to continue-more OOB     DC planning    Eulalio Allison MD-Pulmonary   [phone] lab called with the critical  of potassium of 2.9

## 2023-02-22 NOTE — H&P ADULT - NS NEC GEN PE MLT EXAM PC
Pt is a 29year old female admitted to 1106/1106-A. Patient presents with:   Complication: Pt had routine prenatal visit this evening and found to have elevated BP's, protein in urine, c/o headache rated 2/10.  + FM. Denies vaginal bleeding, LOF. Feels occasional contractions. Pt is  36w5d intra-uterine pregnancy. History obtained, consents signed. Oriented to room, staff, and plan of care. detailed exam

## 2023-07-13 NOTE — PATIENT PROFILE ADULT. - FUNCTIONAL SCREEN CURRENT LEVEL: EATING, MLM
[de-identified] :  Lengthy discussion regarding options was had with the patient. Nonsurgical options including but not limited to cortisone,viscosupplementation, anti-inflammatory medications, activity modification,  non impact exercise /maintaining a healthy BMI, bracing, and icing were reviewed. Surgical options including but not limited to joint replacement were discussed as was risks, benefits and alternatives. All questions were answered.\par Discussed at length Gel injections again or TKA\par \par Plan at this time is for Gelsyn and was informed about 3 month wait for replacement surgery from last injection \par \par Will f/u when auth is acquired. 
(0) independent

## 2023-09-20 NOTE — PROGRESS NOTE ADULT - PROVIDER SPECIALTY LIST ADULT
Cardiology
Electrophysiology
Internal Medicine
Nephrology
Physical Therapy  Physical Therapy Initial Assessment       Name: Amarilis Pratt  : 1979  MRN: [id number]      Date of Service: 2023    Evaluating PT:  Shailesh Her PT, DPT  [id number]    Room #:  7210/1174-D  Diagnosis:  Seizures ([address]) [R56.9]  PMHx/PSHx:   has a past medical history of CAD, multiple vessel, Carotid stenosis, right, CVA (cerebral vascular accident) ([address]), Diabetes ([address]), Hyperlipidemia, and NSTEMI (non-ST elevated myocardial infarction) ([address]). Procedure/Surgery:    Precautions:  Falls, Cognition, B Foot Wounds ( Z Flex Boots), Non verbal  Equipment Needs:  TBD    SUBJECTIVE:    Per chart pt from [facility]. PLOF unknown. Equipment Owned:     OBJECTIVE:   Initial Evaluation  Date: 23 Treatment Short Term/ Long Term   Goals   AM-PAC 6 Clicks 0/53     Was pt agreeable to Eval/treatment? yes     Does pt have pain? No complaint or sign of pain     Bed Mobility  Rolling: Dep  Supine to sit: Dep x 2  Sit to supine: Dep x 2  Scooting: Dep  Rakan   Transfers Sit to stand: NT  Stand to sit: NT  Stand pivot: NT  Sit to stand: ModA  Stand to sit: ModA  Stand pivot: ModA   Ambulation    NT  5 feet with ModA AAD    Stair negotiation: ascended and descended  NT     ROM BUE:  Defer to OT  BLE:  WFL Passively     Strength BUE:  Defer to OT  >3/5 LLE (Involuntary) poor command follow  No AROM observed in RLE  Improve 1 MMT   Balance Sitting EOB:  Dep  Dynamic Standing:  NT  Sitting EOB:  supervision  Dynamic Standing:  ModA AAD     Pt is A & O x 0. Pt did not track to auditory cues or attend to name. Pt follows 0% simple commands this date. Involuntary movement noted in LLE and BUEs  Sensation:  Unable to assess accurately  Edema:   WNL    Vitals:  HR 87  Spo2 97%      BP supine 112/77      Therapeutic Exercises:  Functional mobility    Patient education  Pt educated on role of PT    Patient response to education:   Pt verbalized understanding Pt demonstrated skill Pt requires further education in this area
x     ASSESSMENT:    Conditions Requiring Skilled Therapeutic Intervention:    [x]Decreased strength     [x]Decreased ROM  [x]Decreased functional mobility  [x]Decreased balance   [x]Decreased endurance   []Decreased posture  []Decreased sensation  [x]Decreased coordination   []Decreased vision  [x]Decreased safety awareness   []Increased pain       Comments:  Pt cognitively altered cleared for PT. Pt dependent for bed mobility. Pt with no improvement in command follow post vestibular stimulation. Pt with no righting response at EOB. Pt resistive with posterior lean, decreased level of alertness ~ 15 seconds EOB. Pt returned to supine with an improvement of level of alertness back to initial portion of evaluation. Vitals listed above. Patient would benefit from continued skilled PT to maximize functional mobility independence. Treatment:  Patient practiced and was instructed in the following treatment:    Bed mobility- max cues and hand over hand facilitation   Neuromuscular reeducation- max cues for improved stability EOB    Pt's/ family goals   1. None stated    Prognosis is fair for reaching above PT goals. Patient and or family understand(s) diagnosis, prognosis, and plan of care.   yes    PHYSICAL THERAPY PLAN OF CARE:    PT POC is established based on physician order and patient diagnosis     Referring provider/PT Order:    09/18/23 0915  PT eval and treat  Start:  09/18/23 0915,   End:  09/18/23 0915,   ONE TIME,   Standing Count:  1 Occurrences,   R         Susan Mendez DO     Diagnosis:  Seizures ([address]) [R56.9]  Specific instructions for next treatment:  Functional mobility    Current Treatment Recommendations:     [x] Strengthening to improve independence with functional mobility   [x] ROM to improve independence with functional mobility   [x] Balance Training to improve static/dynamic balance and to reduce fall risk  [x] Endurance Training to improve activity tolerance during functional
Endocrinology
Internal Medicine

## 2024-01-09 NOTE — ASU PATIENT PROFILE, ADULT - PACKS YRS CALCULATION
Quality 130: Documentation Of Current Medications In The Medical Record: Current Medications Documented
Quality 226: Preventive Care And Screening: Tobacco Use: Screening And Cessation Intervention: Patient screened for tobacco use and is an ex/non-smoker
86.7
Detail Level: Detailed
Quality 431: Preventive Care And Screening: Unhealthy Alcohol Use - Screening: Patient not identified as an unhealthy alcohol user when screened for unhealthy alcohol use using a systematic screening method
0

## 2024-02-08 NOTE — PATIENT PROFILE ADULT. - FUNCTIONAL SCREEN CURRENT LEVEL: TRANSFERRING, MLM
How Severe Are Your Spot(S)?: moderate
What Is The Reason For Today's Visit?: Upper Body Skin Exam
(0) independent